# Patient Record
Sex: MALE | Race: WHITE
[De-identification: names, ages, dates, MRNs, and addresses within clinical notes are randomized per-mention and may not be internally consistent; named-entity substitution may affect disease eponyms.]

---

## 2017-01-03 ENCOUNTER — APPOINTMENT (OUTPATIENT)
Dept: OPHTHALMOLOGY | Facility: CLINIC | Age: 77
End: 2017-01-03

## 2017-01-09 ENCOUNTER — APPOINTMENT (OUTPATIENT)
Dept: GASTROENTEROLOGY | Facility: HOSPITAL | Age: 77
End: 2017-01-09

## 2017-01-09 ENCOUNTER — OUTPATIENT (OUTPATIENT)
Dept: OUTPATIENT SERVICES | Facility: HOSPITAL | Age: 77
LOS: 1 days | End: 2017-01-09
Payer: MEDICARE

## 2017-01-09 DIAGNOSIS — Z90.89 ACQUIRED ABSENCE OF OTHER ORGANS: Chronic | ICD-10-CM

## 2017-01-09 DIAGNOSIS — Z95.5 PRESENCE OF CORONARY ANGIOPLASTY IMPLANT AND GRAFT: Chronic | ICD-10-CM

## 2017-01-09 DIAGNOSIS — R13.10 DYSPHAGIA, UNSPECIFIED: ICD-10-CM

## 2017-01-09 DIAGNOSIS — Z12.11 ENCOUNTER FOR SCREENING FOR MALIGNANT NEOPLASM OF COLON: ICD-10-CM

## 2017-01-09 DIAGNOSIS — Z98.89 OTHER SPECIFIED POSTPROCEDURAL STATES: Chronic | ICD-10-CM

## 2017-01-09 DIAGNOSIS — K21.9 GASTRO-ESOPHAGEAL REFLUX DISEASE WITHOUT ESOPHAGITIS: ICD-10-CM

## 2017-01-09 PROCEDURE — 43235 EGD DIAGNOSTIC BRUSH WASH: CPT

## 2017-01-09 PROCEDURE — 45378 DIAGNOSTIC COLONOSCOPY: CPT

## 2017-01-09 PROCEDURE — G0121: CPT

## 2017-01-13 ENCOUNTER — RESULT CHARGE (OUTPATIENT)
Age: 77
End: 2017-01-13

## 2017-01-13 ENCOUNTER — APPOINTMENT (OUTPATIENT)
Dept: ENDOCRINOLOGY | Facility: CLINIC | Age: 77
End: 2017-01-13

## 2017-01-13 VITALS
BODY MASS INDEX: 27.4 KG/M2 | DIASTOLIC BLOOD PRESSURE: 70 MMHG | OXYGEN SATURATION: 99 % | HEIGHT: 69 IN | SYSTOLIC BLOOD PRESSURE: 120 MMHG | WEIGHT: 185 LBS | HEART RATE: 100 BPM

## 2017-01-13 LAB — GLUCOSE BLDC GLUCOMTR-MCNC: 136

## 2017-01-18 LAB
ALBUMIN SERPL ELPH-MCNC: 4.1 G/DL
ALP BLD-CCNC: 126 U/L
ALT SERPL-CCNC: 23 U/L
ANION GAP SERPL CALC-SCNC: 13 MMOL/L
AST SERPL-CCNC: 23 U/L
BILIRUB SERPL-MCNC: 0.5 MG/DL
BUN SERPL-MCNC: 31 MG/DL
CALCIUM SERPL-MCNC: 9.1 MG/DL
CHLORIDE SERPL-SCNC: 108 MMOL/L
CO2 SERPL-SCNC: 22 MMOL/L
CREAT SERPL-MCNC: 1.17 MG/DL
CREAT SPEC-SCNC: 79 MG/DL
GLUCOSE SERPL-MCNC: 130 MG/DL
HBA1C MFR BLD HPLC: 7 %
MICROALBUMIN 24H UR DL<=1MG/L-MCNC: 6.6 MG/DL
MICROALBUMIN/CREAT 24H UR-RTO: 83 UG/MG
POTASSIUM SERPL-SCNC: 4.8 MMOL/L
PROT SERPL-MCNC: 6.4 G/DL
SODIUM SERPL-SCNC: 143 MMOL/L

## 2017-02-08 ENCOUNTER — RX RENEWAL (OUTPATIENT)
Age: 77
End: 2017-02-08

## 2017-02-09 ENCOUNTER — RX RENEWAL (OUTPATIENT)
Age: 77
End: 2017-02-09

## 2017-02-09 ENCOUNTER — RESULT CHARGE (OUTPATIENT)
Age: 77
End: 2017-02-09

## 2017-02-16 ENCOUNTER — MEDICATION RENEWAL (OUTPATIENT)
Age: 77
End: 2017-02-16

## 2017-03-16 ENCOUNTER — APPOINTMENT (OUTPATIENT)
Dept: INTERNAL MEDICINE | Facility: CLINIC | Age: 77
End: 2017-03-16

## 2017-03-16 VITALS
HEIGHT: 69 IN | OXYGEN SATURATION: 97 % | TEMPERATURE: 97.9 F | WEIGHT: 183 LBS | DIASTOLIC BLOOD PRESSURE: 58 MMHG | BODY MASS INDEX: 27.11 KG/M2 | HEART RATE: 64 BPM | SYSTOLIC BLOOD PRESSURE: 114 MMHG

## 2017-03-16 DIAGNOSIS — Z12.11 ENCOUNTER FOR SCREENING FOR MALIGNANT NEOPLASM OF COLON: ICD-10-CM

## 2017-03-16 DIAGNOSIS — H25.13 AGE-RELATED NUCLEAR CATARACT, BILATERAL: ICD-10-CM

## 2017-03-16 DIAGNOSIS — L29.9 PRURITUS, UNSPECIFIED: ICD-10-CM

## 2017-03-16 DIAGNOSIS — R13.10 DYSPHAGIA, UNSPECIFIED: ICD-10-CM

## 2017-03-27 ENCOUNTER — APPOINTMENT (OUTPATIENT)
Dept: DERMATOLOGY | Facility: CLINIC | Age: 77
End: 2017-03-27

## 2017-03-27 VITALS
HEIGHT: 70 IN | SYSTOLIC BLOOD PRESSURE: 118 MMHG | WEIGHT: 182 LBS | BODY MASS INDEX: 26.05 KG/M2 | DIASTOLIC BLOOD PRESSURE: 72 MMHG

## 2017-03-27 DIAGNOSIS — Z87.09 PERSONAL HISTORY OF OTHER DISEASES OF THE RESPIRATORY SYSTEM: ICD-10-CM

## 2017-03-27 DIAGNOSIS — H91.90 UNSPECIFIED HEARING LOSS, UNSPECIFIED EAR: ICD-10-CM

## 2017-03-27 DIAGNOSIS — Z87.448 PERSONAL HISTORY OF OTHER DISEASES OF URINARY SYSTEM: ICD-10-CM

## 2017-03-27 DIAGNOSIS — N20.0 CALCULUS OF KIDNEY: ICD-10-CM

## 2017-03-27 RX ORDER — AMMONIUM LACTATE 5 %
5 LOTION (GRAM) TOPICAL TWICE DAILY
Qty: 50 | Refills: 0 | Status: DISCONTINUED | COMMUNITY
Start: 2017-01-13 | End: 2017-03-27

## 2017-03-27 RX ORDER — TRIAMCINOLONE ACETONIDE 1 MG/G
0.1 CREAM TOPICAL
Qty: 1 | Refills: 1 | Status: COMPLETED | COMMUNITY
Start: 2017-03-16 | End: 2017-05-26

## 2017-04-21 ENCOUNTER — APPOINTMENT (OUTPATIENT)
Dept: ENDOCRINOLOGY | Facility: CLINIC | Age: 77
End: 2017-04-21

## 2017-04-21 VITALS
WEIGHT: 183 LBS | HEART RATE: 63 BPM | SYSTOLIC BLOOD PRESSURE: 118 MMHG | BODY MASS INDEX: 26.2 KG/M2 | HEIGHT: 70 IN | OXYGEN SATURATION: 98 % | DIASTOLIC BLOOD PRESSURE: 64 MMHG

## 2017-04-21 LAB
GLUCOSE BLDC GLUCOMTR-MCNC: 156
HBA1C MFR BLD HPLC: 7.2

## 2017-04-24 ENCOUNTER — RX RENEWAL (OUTPATIENT)
Age: 77
End: 2017-04-24

## 2017-04-26 ENCOUNTER — MEDICATION RENEWAL (OUTPATIENT)
Age: 77
End: 2017-04-26

## 2017-04-27 ENCOUNTER — RX RENEWAL (OUTPATIENT)
Age: 77
End: 2017-04-27

## 2017-04-27 LAB
ALBUMIN SERPL ELPH-MCNC: 4.3 G/DL
ALP BLD-CCNC: 156 U/L
ALT SERPL-CCNC: 19 U/L
ANION GAP SERPL CALC-SCNC: 18 MMOL/L
AST SERPL-CCNC: 31 U/L
BILIRUB SERPL-MCNC: 0.4 MG/DL
BUN SERPL-MCNC: 39 MG/DL
CALCIUM SERPL-MCNC: 9.6 MG/DL
CHLORIDE SERPL-SCNC: 104 MMOL/L
CO2 SERPL-SCNC: 19 MMOL/L
CREAT SERPL-MCNC: 1.47 MG/DL
CREAT SPEC-SCNC: 36 MG/DL
GLUCOSE SERPL-MCNC: 145 MG/DL
MICROALBUMIN 24H UR DL<=1MG/L-MCNC: 4.9 MG/DL
MICROALBUMIN/CREAT 24H UR-RTO: 135 UG/MG
POTASSIUM SERPL-SCNC: 5.6 MMOL/L
PROT SERPL-MCNC: 7.1 G/DL
SODIUM SERPL-SCNC: 141 MMOL/L
T4 FREE SERPL-MCNC: 1.4 NG/DL
TSH SERPL-ACNC: 4.2 UIU/ML

## 2017-04-30 ENCOUNTER — RX RENEWAL (OUTPATIENT)
Age: 77
End: 2017-04-30

## 2017-05-01 ENCOUNTER — APPOINTMENT (OUTPATIENT)
Dept: DERMATOLOGY | Facility: CLINIC | Age: 77
End: 2017-05-01

## 2017-05-01 VITALS — SYSTOLIC BLOOD PRESSURE: 122 MMHG | DIASTOLIC BLOOD PRESSURE: 80 MMHG

## 2017-05-01 DIAGNOSIS — L85.3 XEROSIS CUTIS: ICD-10-CM

## 2017-05-01 DIAGNOSIS — L30.9 DERMATITIS, UNSPECIFIED: ICD-10-CM

## 2017-05-08 ENCOUNTER — LABORATORY RESULT (OUTPATIENT)
Age: 77
End: 2017-05-08

## 2017-05-08 ENCOUNTER — RX RENEWAL (OUTPATIENT)
Age: 77
End: 2017-05-08

## 2017-05-08 ENCOUNTER — APPOINTMENT (OUTPATIENT)
Dept: CARDIOLOGY | Facility: CLINIC | Age: 77
End: 2017-05-08

## 2017-05-08 VITALS
OXYGEN SATURATION: 100 % | BODY MASS INDEX: 26.2 KG/M2 | SYSTOLIC BLOOD PRESSURE: 131 MMHG | DIASTOLIC BLOOD PRESSURE: 71 MMHG | HEART RATE: 60 BPM | HEIGHT: 70 IN | WEIGHT: 183 LBS

## 2017-05-08 LAB
ALBUMIN SERPL ELPH-MCNC: 4.4 G/DL
ALP BLD-CCNC: 142 U/L
ALT SERPL-CCNC: 17 U/L
ANION GAP SERPL CALC-SCNC: 18 MMOL/L
AST SERPL-CCNC: 23 U/L
BILIRUB SERPL-MCNC: 0.7 MG/DL
BUN SERPL-MCNC: 31 MG/DL
CALCIUM SERPL-MCNC: 9.8 MG/DL
CHLORIDE SERPL-SCNC: 104 MMOL/L
CO2 SERPL-SCNC: 20 MMOL/L
CREAT SERPL-MCNC: 1.23 MG/DL
GLUCOSE SERPL-MCNC: 148 MG/DL
POTASSIUM SERPL-SCNC: 4.7 MMOL/L
PROT SERPL-MCNC: 7.7 G/DL
SODIUM SERPL-SCNC: 142 MMOL/L

## 2017-05-08 RX ORDER — LISINOPRIL 5 MG/1
5 TABLET ORAL
Qty: 90 | Refills: 0 | Status: DISCONTINUED | COMMUNITY
Start: 2016-12-08

## 2017-05-09 ENCOUNTER — MESSAGE (OUTPATIENT)
Age: 77
End: 2017-05-09

## 2017-05-09 LAB
BASOPHILS # BLD AUTO: 0.04 K/UL
BASOPHILS NFR BLD AUTO: 0.5 %
EOSINOPHIL # BLD AUTO: 0.33 K/UL
EOSINOPHIL NFR BLD AUTO: 4.2 %
HCT VFR BLD CALC: 25.8 %
HGB BLD-MCNC: 8.5 G/DL
IMM GRANULOCYTES NFR BLD AUTO: 1.4 %
LYMPHOCYTES # BLD AUTO: 1.33 K/UL
LYMPHOCYTES NFR BLD AUTO: 16.9 %
MAN DIFF?: NORMAL
MCHC RBC-ENTMCNC: 32.9 GM/DL
MCHC RBC-ENTMCNC: 37.6 PG
MCV RBC AUTO: 114.2 FL
MONOCYTES # BLD AUTO: 0.54 K/UL
MONOCYTES NFR BLD AUTO: 6.9 %
NEUTROPHILS # BLD AUTO: 5.5 K/UL
NEUTROPHILS NFR BLD AUTO: 70.1 %
NT-PROBNP SERPL-MCNC: 304 PG/ML
PLATELET # BLD AUTO: 343 K/UL
RBC # BLD: 2.26 M/UL
RBC # FLD: 16.4 %
WBC # FLD AUTO: 7.85 K/UL

## 2017-05-10 ENCOUNTER — INPATIENT (INPATIENT)
Facility: HOSPITAL | Age: 77
LOS: 1 days | Discharge: HOME | DRG: 812 | End: 2017-05-12
Attending: STUDENT IN AN ORGANIZED HEALTH CARE EDUCATION/TRAINING PROGRAM | Admitting: INTERNAL MEDICINE
Payer: MEDICARE

## 2017-05-10 VITALS
DIASTOLIC BLOOD PRESSURE: 57 MMHG | TEMPERATURE: 98 F | HEART RATE: 78 BPM | SYSTOLIC BLOOD PRESSURE: 147 MMHG | OXYGEN SATURATION: 100 % | HEIGHT: 70 IN | RESPIRATION RATE: 19 BRPM

## 2017-05-10 DIAGNOSIS — I48.91 UNSPECIFIED ATRIAL FIBRILLATION: ICD-10-CM

## 2017-05-10 DIAGNOSIS — D64.9 ANEMIA, UNSPECIFIED: ICD-10-CM

## 2017-05-10 DIAGNOSIS — Z90.89 ACQUIRED ABSENCE OF OTHER ORGANS: Chronic | ICD-10-CM

## 2017-05-10 DIAGNOSIS — Z95.5 PRESENCE OF CORONARY ANGIOPLASTY IMPLANT AND GRAFT: Chronic | ICD-10-CM

## 2017-05-10 DIAGNOSIS — I25.10 ATHEROSCLEROTIC HEART DISEASE OF NATIVE CORONARY ARTERY WITHOUT ANGINA PECTORIS: ICD-10-CM

## 2017-05-10 DIAGNOSIS — N18.9 CHRONIC KIDNEY DISEASE, UNSPECIFIED: ICD-10-CM

## 2017-05-10 DIAGNOSIS — E11.9 TYPE 2 DIABETES MELLITUS WITHOUT COMPLICATIONS: ICD-10-CM

## 2017-05-10 DIAGNOSIS — I50.9 HEART FAILURE, UNSPECIFIED: ICD-10-CM

## 2017-05-10 DIAGNOSIS — E03.9 HYPOTHYROIDISM, UNSPECIFIED: ICD-10-CM

## 2017-05-10 DIAGNOSIS — M10.9 GOUT, UNSPECIFIED: ICD-10-CM

## 2017-05-10 DIAGNOSIS — I10 ESSENTIAL (PRIMARY) HYPERTENSION: ICD-10-CM

## 2017-05-10 DIAGNOSIS — Z98.89 OTHER SPECIFIED POSTPROCEDURAL STATES: Chronic | ICD-10-CM

## 2017-05-10 DIAGNOSIS — Z29.9 ENCOUNTER FOR PROPHYLACTIC MEASURES, UNSPECIFIED: ICD-10-CM

## 2017-05-10 LAB
ALBUMIN SERPL ELPH-MCNC: 4.4 G/DL — SIGNIFICANT CHANGE UP (ref 3.3–5)
ALP SERPL-CCNC: 140 U/L — HIGH (ref 40–120)
ALT FLD-CCNC: 16 U/L RC — SIGNIFICANT CHANGE UP (ref 10–45)
ANION GAP SERPL CALC-SCNC: 14 MMOL/L — SIGNIFICANT CHANGE UP (ref 5–17)
AST SERPL-CCNC: 21 U/L — SIGNIFICANT CHANGE UP (ref 10–40)
BASOPHILS # BLD AUTO: 0.1 K/UL — SIGNIFICANT CHANGE UP (ref 0–0.2)
BASOPHILS NFR BLD AUTO: 1.3 % — SIGNIFICANT CHANGE UP (ref 0–2)
BILIRUB SERPL-MCNC: 0.4 MG/DL — SIGNIFICANT CHANGE UP (ref 0.2–1.2)
BLD GP AB SCN SERPL QL: NEGATIVE — SIGNIFICANT CHANGE UP
BUN SERPL-MCNC: 38 MG/DL — HIGH (ref 7–23)
CALCIUM SERPL-MCNC: 10.3 MG/DL — SIGNIFICANT CHANGE UP (ref 8.4–10.5)
CHLORIDE SERPL-SCNC: 108 MMOL/L — SIGNIFICANT CHANGE UP (ref 96–108)
CO2 SERPL-SCNC: 22 MMOL/L — SIGNIFICANT CHANGE UP (ref 22–31)
CREAT SERPL-MCNC: 1.42 MG/DL — HIGH (ref 0.5–1.3)
EOSINOPHIL # BLD AUTO: 0.2 K/UL — SIGNIFICANT CHANGE UP (ref 0–0.5)
EOSINOPHIL NFR BLD AUTO: 4.6 % — SIGNIFICANT CHANGE UP (ref 0–6)
GAS PNL BLDV: SIGNIFICANT CHANGE UP
GLUCOSE SERPL-MCNC: 131 MG/DL — HIGH (ref 70–99)
HCT VFR BLD CALC: 23.8 % — LOW (ref 39–50)
HGB BLD-MCNC: 8.3 G/DL — LOW (ref 13–17)
LYMPHOCYTES # BLD AUTO: 1.3 K/UL — SIGNIFICANT CHANGE UP (ref 1–3.3)
LYMPHOCYTES # BLD AUTO: 25.4 % — SIGNIFICANT CHANGE UP (ref 13–44)
MCHC RBC-ENTMCNC: 34.7 GM/DL — SIGNIFICANT CHANGE UP (ref 32–36)
MCHC RBC-ENTMCNC: 39.3 PG — HIGH (ref 27–34)
MCV RBC AUTO: 113 FL — HIGH (ref 80–100)
MONOCYTES # BLD AUTO: 0 K/UL — SIGNIFICANT CHANGE UP (ref 0–0.9)
MONOCYTES NFR BLD AUTO: 0.1 % — LOW (ref 2–14)
NEUTROPHILS # BLD AUTO: 3.6 K/UL — SIGNIFICANT CHANGE UP (ref 1.8–7.4)
NEUTROPHILS NFR BLD AUTO: 68.6 % — SIGNIFICANT CHANGE UP (ref 43–77)
PLATELET # BLD AUTO: 235 K/UL — SIGNIFICANT CHANGE UP (ref 150–400)
POTASSIUM SERPL-MCNC: 4.3 MMOL/L — SIGNIFICANT CHANGE UP (ref 3.5–5.3)
POTASSIUM SERPL-SCNC: 4.3 MMOL/L — SIGNIFICANT CHANGE UP (ref 3.5–5.3)
PROT SERPL-MCNC: 7.8 G/DL — SIGNIFICANT CHANGE UP (ref 6–8.3)
RBC # BLD: 2.1 M/UL — LOW (ref 4.2–5.8)
RBC # FLD: 16.6 % — HIGH (ref 10.3–14.5)
RH IG SCN BLD-IMP: POSITIVE — SIGNIFICANT CHANGE UP
SODIUM SERPL-SCNC: 144 MMOL/L — SIGNIFICANT CHANGE UP (ref 135–145)
WBC # BLD: 5.3 K/UL — SIGNIFICANT CHANGE UP (ref 3.8–10.5)
WBC # FLD AUTO: 5.3 K/UL — SIGNIFICANT CHANGE UP (ref 3.8–10.5)

## 2017-05-10 PROCEDURE — 93010 ELECTROCARDIOGRAM REPORT: CPT

## 2017-05-10 PROCEDURE — 99223 1ST HOSP IP/OBS HIGH 75: CPT | Mod: GC

## 2017-05-10 PROCEDURE — 71010: CPT | Mod: 26

## 2017-05-10 PROCEDURE — 99285 EMERGENCY DEPT VISIT HI MDM: CPT | Mod: 25

## 2017-05-10 RX ORDER — METHYLPHENIDATE HCL 5 MG
20 TABLET ORAL THREE TIMES A DAY
Qty: 0 | Refills: 0 | Status: DISCONTINUED | OUTPATIENT
Start: 2017-05-10 | End: 2017-05-12

## 2017-05-10 RX ORDER — METOPROLOL TARTRATE 50 MG
50 TABLET ORAL
Qty: 0 | Refills: 0 | Status: DISCONTINUED | OUTPATIENT
Start: 2017-05-10 | End: 2017-05-12

## 2017-05-10 RX ORDER — DEXTROSE 50 % IN WATER 50 %
25 SYRINGE (ML) INTRAVENOUS ONCE
Qty: 0 | Refills: 0 | Status: DISCONTINUED | OUTPATIENT
Start: 2017-05-10 | End: 2017-05-12

## 2017-05-10 RX ORDER — INSULIN LISPRO 100/ML
VIAL (ML) SUBCUTANEOUS
Qty: 0 | Refills: 0 | Status: DISCONTINUED | OUTPATIENT
Start: 2017-05-10 | End: 2017-05-12

## 2017-05-10 RX ORDER — ATORVASTATIN CALCIUM 80 MG/1
20 TABLET, FILM COATED ORAL AT BEDTIME
Qty: 0 | Refills: 0 | Status: DISCONTINUED | OUTPATIENT
Start: 2017-05-10 | End: 2017-05-12

## 2017-05-10 RX ORDER — DEXTROSE 50 % IN WATER 50 %
12.5 SYRINGE (ML) INTRAVENOUS ONCE
Qty: 0 | Refills: 0 | Status: DISCONTINUED | OUTPATIENT
Start: 2017-05-10 | End: 2017-05-12

## 2017-05-10 RX ORDER — SODIUM CHLORIDE 9 MG/ML
1000 INJECTION, SOLUTION INTRAVENOUS
Qty: 0 | Refills: 0 | Status: DISCONTINUED | OUTPATIENT
Start: 2017-05-10 | End: 2017-05-12

## 2017-05-10 RX ORDER — INSULIN LISPRO 100/ML
VIAL (ML) SUBCUTANEOUS AT BEDTIME
Qty: 0 | Refills: 0 | Status: DISCONTINUED | OUTPATIENT
Start: 2017-05-10 | End: 2017-05-12

## 2017-05-10 RX ORDER — ASPIRIN/CALCIUM CARB/MAGNESIUM 324 MG
81 TABLET ORAL DAILY
Qty: 0 | Refills: 0 | Status: DISCONTINUED | OUTPATIENT
Start: 2017-05-10 | End: 2017-05-10

## 2017-05-10 RX ORDER — LEVOTHYROXINE SODIUM 125 MCG
37.5 TABLET ORAL DAILY
Qty: 0 | Refills: 0 | Status: DISCONTINUED | OUTPATIENT
Start: 2017-05-10 | End: 2017-05-12

## 2017-05-10 RX ORDER — INSULIN GLARGINE 100 [IU]/ML
8 INJECTION, SOLUTION SUBCUTANEOUS AT BEDTIME
Qty: 0 | Refills: 0 | Status: DISCONTINUED | OUTPATIENT
Start: 2017-05-10 | End: 2017-05-12

## 2017-05-10 RX ORDER — COLCHICINE 0.6 MG
0.3 TABLET ORAL DAILY
Qty: 0 | Refills: 0 | Status: DISCONTINUED | OUTPATIENT
Start: 2017-05-10 | End: 2017-05-12

## 2017-05-10 RX ORDER — PANTOPRAZOLE SODIUM 20 MG/1
40 TABLET, DELAYED RELEASE ORAL
Qty: 0 | Refills: 0 | Status: DISCONTINUED | OUTPATIENT
Start: 2017-05-10 | End: 2017-05-12

## 2017-05-10 RX ORDER — FUROSEMIDE 40 MG
20 TABLET ORAL DAILY
Qty: 0 | Refills: 0 | Status: DISCONTINUED | OUTPATIENT
Start: 2017-05-10 | End: 2017-05-12

## 2017-05-10 RX ORDER — DEXTROSE 50 % IN WATER 50 %
1 SYRINGE (ML) INTRAVENOUS ONCE
Qty: 0 | Refills: 0 | Status: DISCONTINUED | OUTPATIENT
Start: 2017-05-10 | End: 2017-05-12

## 2017-05-10 RX ORDER — LISINOPRIL 2.5 MG/1
10 TABLET ORAL DAILY
Qty: 0 | Refills: 0 | Status: DISCONTINUED | OUTPATIENT
Start: 2017-05-10 | End: 2017-05-12

## 2017-05-10 RX ORDER — GLUCAGON INJECTION, SOLUTION 0.5 MG/.1ML
1 INJECTION, SOLUTION SUBCUTANEOUS ONCE
Qty: 0 | Refills: 0 | Status: DISCONTINUED | OUTPATIENT
Start: 2017-05-10 | End: 2017-05-12

## 2017-05-10 RX ADMIN — ATORVASTATIN CALCIUM 20 MILLIGRAM(S): 80 TABLET, FILM COATED ORAL at 22:05

## 2017-05-10 RX ADMIN — INSULIN GLARGINE 8 UNIT(S): 100 INJECTION, SOLUTION SUBCUTANEOUS at 22:57

## 2017-05-10 NOTE — H&P ADULT. - HISTORY OF PRESENT ILLNESS
76M PMHx AF on asa, CAD/MI 2011 s/p PCI, HFrEF (EF 40%), DM2, htn here with anemia. Had cbc done at Dr. Francis's office, noted to have Hb 8 down from 11 one year ago. Pt spoke to Dr. Tolentino who recommended pt be admitted. He states that he has been having lightheadedness, dizziness for the past few months; worsening in the past week. He has PUGH, no SOB, which has decreased his functional status. He has been having balance issues as well, described as his feet do not feel like his own. When he walks he will wobble to one side. Has not noted melena or hematochezia, no overt bleeds. Had colonoscopy and EGD done in 1/2017 demonstrating diverticulosis and hiatal hernia. No syncope, no weakness. No fever, chills, dysuria, abd pain, diarrhea.    In the ED vitals were T 98.3, HR 78, Bp 147/57, RR 19, O2 100 RA. 76M PMHx AF on asa, CAD/MI 2011 s/p PCI, ckd, HFrEF (EF 40%), DM2, htn here with anemia. Had cbc done at Dr. Francis's office, noted to have Hb 8 down from 11 one year ago. Pt spoke to Dr. Tolentino who recommended pt be admitted. He states that he has been having lightheadedness, dizziness for the past few months; worsening in the past week. He has PUGH, no SOB, which has decreased his functional status. He has been having balance issues as well, described as his feet do not feel like his own. When he walks he will wobble to one side. Has not noted melena or hematochezia, no overt bleeds. Had colonoscopy and EGD done in 1/2017 demonstrating diverticulosis and hiatal hernia. No syncope, no weakness. No fever, chills, dysuria, abd pain, diarrhea.    In the ED vitals were T 98.3, HR 78, Bp 147/57, RR 19, O2 100 RA.

## 2017-05-10 NOTE — H&P ADULT. - RS GEN PE MLT RESP DETAILS PC
no rhonchi/no intercostal retractions/breath sounds equal/no wheezes/clear to auscultation bilaterally/airway patent/good air movement/no rales

## 2017-05-10 NOTE — H&P ADULT. - ATTENDING COMMENTS
I have reviewed the labs, imaging and ekg, I agree with the above note unless otherwise stated in my independent assessment below  76M PMHx AF on asa, CKD, CAD/MI 2011 s/p PCI, HFrEF (EF 40%), DM2, htn, CKD p/w symptomatic macrocytic anemia. Will need to eval for folate and B12 deficiency. Also evaluate sources of iron deficiency anemia and have consult pt's GI to see him inpatient. He feels much better after 1uprbcs.  -F/u anemia work up including iron profile, B12, folate, methylmalonic acid (drawn after transfusion)  -F/u repeat hgb  -cont. home medications except ASA for now although could probably restart depending on GI recs  -GI consult  -Trend BMP  -DVT PPx, SCDs

## 2017-05-10 NOTE — H&P ADULT. - PROBLEM SELECTOR PLAN 1
macrocytic, inappropriate retic count  ldh high, pending hapto  pending folate, B12 (pt with balance disturbance)  s/p 1 unit prbc for symptomatic anemia  trend cbc q12  GI c/s in am  check peripheral smear macrocytic, inappropriate retic count  ldh high, pending hapto  pending folate, B12 (pt with balance disturbance)  check iron studies  s/p 1 unit prbc for symptomatic anemia  trend cbc q12  GI c/s in am  check peripheral smear

## 2017-05-10 NOTE — ED ADULT TRIAGE NOTE - CHIEF COMPLAINT QUOTE
anemia, (noted low H/H at Dr alcazar's office during routine visit, Hgb: 8.5), concern for bleed of unknown source, wife notes SOB, PUGH x few mos intermittently, +dark stools (denies hematuria), colonoscopy/endoscopy in feb: normal

## 2017-05-10 NOTE — ED ADULT NURSE NOTE - PMH
ADD (attention deficit disorder)    Atrial fibrillation    CAD (coronary artery disease)    Congestive heart failure (CHF)    DM (Diabetes Mellitus)    Gout    Hypercholesterolemia    Hypertension    Hypothyroid

## 2017-05-10 NOTE — ED PROVIDER NOTE - OBJECTIVE STATEMENT
75yo M with PMH A.fib, CAD, CHF, DM, HTN,  presenting with worsening weakness and lightheadedness x 1 week. Reports associated SOB. Saw cardiologist 2 days ago for routine visit who did labs and called patient regarding Hgb 8.5. Pt called his GI doctor who instructed him to come to ED. Pt also c/o burning sensation when he occasionally eats, has h/o GERD and hiatal hernia on last endoscopy in 2/2017. Normal colonoscopy at this time. Reports ?melena. Denies fever/chills, CP, abd pain, N/V, BRBPR. 75yo M with PMH A.fib, CAD, CHF, DM, HTN,  presenting with worsening weakness and lightheadedness x 1 week. Reports associated SOB. Saw cardiologist 2 days ago for routine visit who did labs and called patient regarding Hgb 8.5. Pt called his GI doctor who instructed him to come to ED. Pt also c/o burning sensation when he occasionally eats, has h/o GERD and hiatal hernia on last endoscopy in 2/2017. Normal colonoscopy at this time. Reports ?melena. Denies fever/chills, CP, abd pain, N/V, BRBPR.    GI Wayne  Cards Tito  PCP luz marina

## 2017-05-10 NOTE — ED PROVIDER NOTE - MEDICAL DECISION MAKING DETAILS
Attending Note (Paula): patient complaining of feeling weak and tired x months, getting progressively worse. reports drop in hgb from baseline 10.5 to 8.5.  denies active bleeding. denies chest pain, sob, abd pain. reports recent negative colonoscopy in last few months.  concern for symptomatic anemia, less likely acs. will send troponin and likely admit.

## 2017-05-10 NOTE — H&P ADULT. - ASSESSMENT
76M PMHx AF on asa, CAD/MI 2011 s/p PCI, HFrEF (EF 40%), DM2, htn here with anemia. 76M PMHx AF on asa, CKD, CAD/MI 2011 s/p PCI, HFrEF (EF 40%), DM2, htn here with anemia.

## 2017-05-10 NOTE — ED PROVIDER NOTE - PROGRESS NOTE DETAILS
Spoke to covering GI doctor for Dr. Tolentino. Agrees with plan for admission and will f/u with patient. - Nicole Andrade PA-C Spoke to covering GI doctor for Dr. Tolentino. Agrees with plan for admission and will f/u with patient. Called back by PMD's service - admit to hospitalist. - Nicole Andrade PA-C

## 2017-05-10 NOTE — ED ADULT NURSE REASSESSMENT NOTE - NS ED NURSE REASSESS COMMENT FT1
consent for PRBC in chart, PRBC initiated per facility protocol, patient verbalized possible side effects, no adverse reactions noted within first 15 minutes, vs in chart. patient admitted rtm clicked

## 2017-05-10 NOTE — ED ADULT NURSE NOTE - OBJECTIVE STATEMENT
76 year old male a/ox3 ambulatory presenting to ed with increased weakness, sob and dyspnea worsening over the last 2 weeks. patient had recent blood work drawn which shows anemia and was sent into ed for further eval. patient and wife states he has been tired, weak, PUGH, and light headed ness and dizziness with quick movements. patient denies any melena, or BRBPR, no hematuria no increased bruising or hematoma noted. respiration even unlabored no sob/dyspnea skin pale warm intact newsome equally

## 2017-05-10 NOTE — H&P ADULT. - PROBLEM SELECTOR PLAN 3
managed on aspirin  currently in sinus  lopressor 50 bid hold asa  currently in sinus  lopressor 50 bid

## 2017-05-11 ENCOUNTER — RESULT REVIEW (OUTPATIENT)
Age: 77
End: 2017-05-11

## 2017-05-11 LAB
BUN SERPL-MCNC: 33 MG/DL — HIGH (ref 7–23)
CALCIUM SERPL-MCNC: 9.2 MG/DL — SIGNIFICANT CHANGE UP (ref 8.4–10.5)
CHLORIDE SERPL-SCNC: 109 MMOL/L — HIGH (ref 96–108)
CO2 SERPL-SCNC: 20 MMOL/L — LOW (ref 22–31)
CREAT SERPL-MCNC: 1.15 MG/DL — SIGNIFICANT CHANGE UP (ref 0.5–1.3)
FERRITIN SERPL-MCNC: 369 NG/ML — SIGNIFICANT CHANGE UP (ref 30–400)
FOLATE SERPL-MCNC: 16.4 NG/ML — SIGNIFICANT CHANGE UP (ref 4.8–24.2)
GLUCOSE SERPL-MCNC: 149 MG/DL — HIGH (ref 70–99)
HBA1C BLD-MCNC: 6.8 % — HIGH (ref 4–5.6)
HCT VFR BLD CALC: 23.1 % — LOW (ref 39–50)
HCT VFR BLD CALC: 25.3 % — LOW (ref 39–50)
HGB BLD-MCNC: 8.2 G/DL — LOW (ref 13–17)
HGB BLD-MCNC: 9.3 G/DL — LOW (ref 13–17)
IRON SATN MFR SERPL: 147 UG/DL — SIGNIFICANT CHANGE UP (ref 45–165)
IRON SATN MFR SERPL: 67 % — HIGH (ref 16–55)
MAGNESIUM SERPL-MCNC: 1.8 MG/DL — SIGNIFICANT CHANGE UP (ref 1.6–2.6)
MCHC RBC-ENTMCNC: 35.8 GM/DL — SIGNIFICANT CHANGE UP (ref 32–36)
MCHC RBC-ENTMCNC: 36.9 GM/DL — HIGH (ref 32–36)
MCHC RBC-ENTMCNC: 38.5 PG — HIGH (ref 27–34)
MCHC RBC-ENTMCNC: 39.3 PG — HIGH (ref 27–34)
MCV RBC AUTO: 106 FL — HIGH (ref 80–100)
MCV RBC AUTO: 108 FL — HIGH (ref 80–100)
PHOSPHATE SERPL-MCNC: 3.2 MG/DL — SIGNIFICANT CHANGE UP (ref 2.5–4.5)
PLATELET # BLD AUTO: 197 K/UL — SIGNIFICANT CHANGE UP (ref 150–400)
PLATELET # BLD AUTO: 205 K/UL — SIGNIFICANT CHANGE UP (ref 150–400)
POTASSIUM SERPL-MCNC: 4.1 MMOL/L — SIGNIFICANT CHANGE UP (ref 3.5–5.3)
POTASSIUM SERPL-SCNC: 4.1 MMOL/L — SIGNIFICANT CHANGE UP (ref 3.5–5.3)
RBC # BLD: 2.14 M/UL — LOW (ref 4.2–5.8)
RBC # BLD: 2.37 M/UL — LOW (ref 4.2–5.8)
RBC # FLD: 19.8 % — HIGH (ref 10.3–14.5)
RBC # FLD: 19.9 % — HIGH (ref 10.3–14.5)
SODIUM SERPL-SCNC: 142 MMOL/L — SIGNIFICANT CHANGE UP (ref 135–145)
TIBC SERPL-MCNC: 219 UG/DL — LOW (ref 220–430)
UIBC SERPL-MCNC: 72 UG/DL — LOW (ref 110–370)
VIT B12 SERPL-MCNC: 624 PG/ML — SIGNIFICANT CHANGE UP (ref 243–894)
WBC # BLD: 4.6 K/UL — SIGNIFICANT CHANGE UP (ref 3.8–10.5)
WBC # BLD: 5.4 K/UL — SIGNIFICANT CHANGE UP (ref 3.8–10.5)
WBC # FLD AUTO: 4.6 K/UL — SIGNIFICANT CHANGE UP (ref 3.8–10.5)
WBC # FLD AUTO: 5.4 K/UL — SIGNIFICANT CHANGE UP (ref 3.8–10.5)

## 2017-05-11 PROCEDURE — 88305 TISSUE EXAM BY PATHOLOGIST: CPT | Mod: 26

## 2017-05-11 PROCEDURE — 88189 FLOWCYTOMETRY/READ 16 & >: CPT

## 2017-05-11 PROCEDURE — 93306 TTE W/DOPPLER COMPLETE: CPT | Mod: 26

## 2017-05-11 PROCEDURE — 88360 TUMOR IMMUNOHISTOCHEM/MANUAL: CPT | Mod: 26

## 2017-05-11 PROCEDURE — 99233 SBSQ HOSP IP/OBS HIGH 50: CPT | Mod: GC

## 2017-05-11 PROCEDURE — 88313 SPECIAL STAINS GROUP 2: CPT | Mod: 26

## 2017-05-11 PROCEDURE — 85097 BONE MARROW INTERPRETATION: CPT

## 2017-05-11 RX ADMIN — PANTOPRAZOLE SODIUM 40 MILLIGRAM(S): 20 TABLET, DELAYED RELEASE ORAL at 06:06

## 2017-05-11 RX ADMIN — Medication 37.5 MICROGRAM(S): at 06:06

## 2017-05-11 RX ADMIN — Medication 20 MILLIGRAM(S): at 15:38

## 2017-05-11 RX ADMIN — Medication 0.3 MILLIGRAM(S): at 15:38

## 2017-05-11 RX ADMIN — Medication 20 MILLIGRAM(S): at 06:06

## 2017-05-11 RX ADMIN — Medication 50 MILLIGRAM(S): at 06:06

## 2017-05-11 RX ADMIN — LISINOPRIL 10 MILLIGRAM(S): 2.5 TABLET ORAL at 06:06

## 2017-05-11 RX ADMIN — INSULIN GLARGINE 8 UNIT(S): 100 INJECTION, SOLUTION SUBCUTANEOUS at 21:24

## 2017-05-11 RX ADMIN — Medication 1: at 18:20

## 2017-05-11 RX ADMIN — ATORVASTATIN CALCIUM 20 MILLIGRAM(S): 80 TABLET, FILM COATED ORAL at 21:24

## 2017-05-11 RX ADMIN — Medication 50 MILLIGRAM(S): at 17:08

## 2017-05-12 ENCOUNTER — TRANSCRIPTION ENCOUNTER (OUTPATIENT)
Age: 77
End: 2017-05-12

## 2017-05-12 ENCOUNTER — APPOINTMENT (OUTPATIENT)
Dept: GASTROENTEROLOGY | Facility: CLINIC | Age: 77
End: 2017-05-12

## 2017-05-12 VITALS
DIASTOLIC BLOOD PRESSURE: 61 MMHG | RESPIRATION RATE: 17 BRPM | HEART RATE: 78 BPM | TEMPERATURE: 98 F | OXYGEN SATURATION: 99 % | SYSTOLIC BLOOD PRESSURE: 109 MMHG

## 2017-05-12 LAB
ALBUMIN SERPL ELPH-MCNC: 3.5 G/DL — SIGNIFICANT CHANGE UP (ref 3.3–5)
ALP SERPL-CCNC: 115 U/L — SIGNIFICANT CHANGE UP (ref 40–120)
ALT FLD-CCNC: 16 U/L RC — SIGNIFICANT CHANGE UP (ref 10–45)
AST SERPL-CCNC: 21 U/L — SIGNIFICANT CHANGE UP (ref 10–40)
BASOPHILS # BLD AUTO: 0 K/UL — SIGNIFICANT CHANGE UP (ref 0–0.2)
BASOPHILS NFR BLD AUTO: 0.8 % — SIGNIFICANT CHANGE UP (ref 0–2)
BILIRUB SERPL-MCNC: 0.8 MG/DL — SIGNIFICANT CHANGE UP (ref 0.2–1.2)
BUN SERPL-MCNC: 32 MG/DL — HIGH (ref 7–23)
CALCIUM SERPL-MCNC: 9.1 MG/DL — SIGNIFICANT CHANGE UP (ref 8.4–10.5)
CHLORIDE SERPL-SCNC: 106 MMOL/L — SIGNIFICANT CHANGE UP (ref 96–108)
CO2 SERPL-SCNC: 23 MMOL/L — SIGNIFICANT CHANGE UP (ref 22–31)
CREAT SERPL-MCNC: 1.17 MG/DL — SIGNIFICANT CHANGE UP (ref 0.5–1.3)
EOSINOPHIL # BLD AUTO: 0.3 K/UL — SIGNIFICANT CHANGE UP (ref 0–0.5)
EOSINOPHIL NFR BLD AUTO: 5.9 % — SIGNIFICANT CHANGE UP (ref 0–6)
GLUCOSE SERPL-MCNC: 143 MG/DL — HIGH (ref 70–99)
HCT VFR BLD CALC: 24.4 % — LOW (ref 39–50)
HGB BLD-MCNC: 8.7 G/DL — LOW (ref 13–17)
LYMPHOCYTES # BLD AUTO: 1.5 K/UL — SIGNIFICANT CHANGE UP (ref 1–3.3)
LYMPHOCYTES # BLD AUTO: 28.7 % — SIGNIFICANT CHANGE UP (ref 13–44)
MCHC RBC-ENTMCNC: 35.8 GM/DL — SIGNIFICANT CHANGE UP (ref 32–36)
MCHC RBC-ENTMCNC: 39.3 PG — HIGH (ref 27–34)
MCV RBC AUTO: 110 FL — HIGH (ref 80–100)
MONOCYTES # BLD AUTO: 0.3 K/UL — SIGNIFICANT CHANGE UP (ref 0–0.9)
MONOCYTES NFR BLD AUTO: 4.9 % — SIGNIFICANT CHANGE UP (ref 2–14)
NEUTROPHILS # BLD AUTO: 3.2 K/UL — SIGNIFICANT CHANGE UP (ref 1.8–7.4)
NEUTROPHILS NFR BLD AUTO: 59.7 % — SIGNIFICANT CHANGE UP (ref 43–77)
PLATELET # BLD AUTO: 198 K/UL — SIGNIFICANT CHANGE UP (ref 150–400)
POTASSIUM SERPL-MCNC: 4 MMOL/L — SIGNIFICANT CHANGE UP (ref 3.5–5.3)
POTASSIUM SERPL-SCNC: 4 MMOL/L — SIGNIFICANT CHANGE UP (ref 3.5–5.3)
PROT SERPL-MCNC: 6.3 G/DL — SIGNIFICANT CHANGE UP (ref 6–8.3)
RBC # BLD: 2.22 M/UL — LOW (ref 4.2–5.8)
RBC # FLD: 19.4 % — HIGH (ref 10.3–14.5)
SODIUM SERPL-SCNC: 142 MMOL/L — SIGNIFICANT CHANGE UP (ref 135–145)
WBC # BLD: 5.4 K/UL — SIGNIFICANT CHANGE UP (ref 3.8–10.5)
WBC # FLD AUTO: 5.4 K/UL — SIGNIFICANT CHANGE UP (ref 3.8–10.5)

## 2017-05-12 PROCEDURE — 86901 BLOOD TYPING SEROLOGIC RH(D): CPT

## 2017-05-12 PROCEDURE — 88271 CYTOGENETICS DNA PROBE: CPT

## 2017-05-12 PROCEDURE — 84100 ASSAY OF PHOSPHORUS: CPT

## 2017-05-12 PROCEDURE — 85014 HEMATOCRIT: CPT

## 2017-05-12 PROCEDURE — 83605 ASSAY OF LACTIC ACID: CPT

## 2017-05-12 PROCEDURE — 88360 TUMOR IMMUNOHISTOCHEM/MANUAL: CPT

## 2017-05-12 PROCEDURE — 84295 ASSAY OF SERUM SODIUM: CPT

## 2017-05-12 PROCEDURE — 88185 FLOWCYTOMETRY/TC ADD-ON: CPT

## 2017-05-12 PROCEDURE — 83921 ORGANIC ACID SINGLE QUANT: CPT

## 2017-05-12 PROCEDURE — 88184 FLOWCYTOMETRY/ TC 1 MARKER: CPT

## 2017-05-12 PROCEDURE — 82607 VITAMIN B-12: CPT

## 2017-05-12 PROCEDURE — 86340 INTRINSIC FACTOR ANTIBODY: CPT

## 2017-05-12 PROCEDURE — 88264 CHROMOSOME ANALYSIS 20-25: CPT

## 2017-05-12 PROCEDURE — 85045 AUTOMATED RETICULOCYTE COUNT: CPT

## 2017-05-12 PROCEDURE — 86850 RBC ANTIBODY SCREEN: CPT

## 2017-05-12 PROCEDURE — 84484 ASSAY OF TROPONIN QUANT: CPT

## 2017-05-12 PROCEDURE — C8929: CPT

## 2017-05-12 PROCEDURE — 82330 ASSAY OF CALCIUM: CPT

## 2017-05-12 PROCEDURE — 88280 CHROMOSOME KARYOTYPE STUDY: CPT

## 2017-05-12 PROCEDURE — 83010 ASSAY OF HAPTOGLOBIN QUANT: CPT

## 2017-05-12 PROCEDURE — 88237 TISSUE CULTURE BONE MARROW: CPT

## 2017-05-12 PROCEDURE — 80048 BASIC METABOLIC PNL TOTAL CA: CPT

## 2017-05-12 PROCEDURE — P9016: CPT

## 2017-05-12 PROCEDURE — 99285 EMERGENCY DEPT VISIT HI MDM: CPT | Mod: 25

## 2017-05-12 PROCEDURE — 99239 HOSP IP/OBS DSCHRG MGMT >30: CPT

## 2017-05-12 PROCEDURE — 36430 TRANSFUSION BLD/BLD COMPNT: CPT

## 2017-05-12 PROCEDURE — 83735 ASSAY OF MAGNESIUM: CPT

## 2017-05-12 PROCEDURE — 85097 BONE MARROW INTERPRETATION: CPT

## 2017-05-12 PROCEDURE — 82746 ASSAY OF FOLIC ACID SERUM: CPT

## 2017-05-12 PROCEDURE — 88305 TISSUE EXAM BY PATHOLOGIST: CPT

## 2017-05-12 PROCEDURE — 86923 COMPATIBILITY TEST ELECTRIC: CPT

## 2017-05-12 PROCEDURE — 93005 ELECTROCARDIOGRAM TRACING: CPT

## 2017-05-12 PROCEDURE — 82435 ASSAY OF BLOOD CHLORIDE: CPT

## 2017-05-12 PROCEDURE — 88275 CYTOGENETICS 100-300: CPT

## 2017-05-12 PROCEDURE — 82803 BLOOD GASES ANY COMBINATION: CPT

## 2017-05-12 PROCEDURE — 85027 COMPLETE CBC AUTOMATED: CPT

## 2017-05-12 PROCEDURE — 83550 IRON BINDING TEST: CPT

## 2017-05-12 PROCEDURE — 71045 X-RAY EXAM CHEST 1 VIEW: CPT

## 2017-05-12 PROCEDURE — 88313 SPECIAL STAINS GROUP 2: CPT

## 2017-05-12 PROCEDURE — 86900 BLOOD TYPING SEROLOGIC ABO: CPT

## 2017-05-12 PROCEDURE — 84132 ASSAY OF SERUM POTASSIUM: CPT

## 2017-05-12 PROCEDURE — 82947 ASSAY GLUCOSE BLOOD QUANT: CPT

## 2017-05-12 PROCEDURE — 82728 ASSAY OF FERRITIN: CPT

## 2017-05-12 PROCEDURE — 80053 COMPREHEN METABOLIC PANEL: CPT

## 2017-05-12 PROCEDURE — 83036 HEMOGLOBIN GLYCOSYLATED A1C: CPT

## 2017-05-12 PROCEDURE — 82272 OCCULT BLD FECES 1-3 TESTS: CPT

## 2017-05-12 PROCEDURE — 83615 LACTATE (LD) (LDH) ENZYME: CPT

## 2017-05-12 RX ADMIN — Medication 37.5 MICROGRAM(S): at 05:10

## 2017-05-12 RX ADMIN — LISINOPRIL 10 MILLIGRAM(S): 2.5 TABLET ORAL at 05:10

## 2017-05-12 RX ADMIN — Medication 20 MILLIGRAM(S): at 05:10

## 2017-05-12 RX ADMIN — Medication 1: at 07:38

## 2017-05-12 RX ADMIN — PANTOPRAZOLE SODIUM 40 MILLIGRAM(S): 20 TABLET, DELAYED RELEASE ORAL at 05:10

## 2017-05-12 RX ADMIN — Medication 0.3 MILLIGRAM(S): at 12:18

## 2017-05-12 RX ADMIN — Medication 1: at 11:43

## 2017-05-12 RX ADMIN — Medication 50 MILLIGRAM(S): at 05:10

## 2017-05-12 NOTE — DISCHARGE NOTE ADULT - MEDICATION SUMMARY - MEDICATIONS TO TAKE
I will START or STAY ON the medications listed below when I get home from the hospital:    Ecotrin Adult Low Strength 81 mg oral delayed release tablet  -- 1 tab(s) by mouth once a day  -- Indication: For CAD (coronary artery disease)    lisinopril 10 mg oral tablet  -- 1 tab(s) by mouth once a day  -- Indication: For Hypertension    metFORMIN 500 mg oral tablet  -- 1 tab(s) by mouth 2 times a day  -- Indication: For DM (Diabetes Mellitus)    Tradjenta 5 mg oral tablet  -- 1 tab(s) by mouth once a day  -- Indication: For DM (Diabetes Mellitus)    colchicine 0.6 mg oral tablet  -- 0.5 tab(s) by mouth once a day  -- Indication: For Gout    Crestor 5 mg oral tablet  -- 1 tab(s) by mouth once a day (at bedtime)  -- Indication: For Hyperlipidemia     Lopressor 50 mg oral tablet  -- 1 tab(s) by mouth 2 times a day  -- Indication: For Atrial fibrillation    Ritalin 20 mg oral tablet  -- 1 tab(s) by mouth 3 times a day  -- Indication: For Home medication    Lasix 20 mg oral tablet  -- 1 tab(s) by mouth once a day  -- Indication: For Hypertension    omeprazole 20 mg oral delayed release capsule  -- 1 cap(s) by mouth once a day  -- Indication: For Need for prophylactic measure    Synthroid 25 mcg (0.025 mg) oral tablet  -- 1.5 tab(s) by mouth once a day  -- Indication: For Hypothyroid

## 2017-05-12 NOTE — DISCHARGE NOTE ADULT - PLAN OF CARE
Follow up While you were in the hospital, you received a bone marrow biopsy to work up your anemia. You will follow up with Dr. aM, hematologist, for the results. You were also seen by the GI team, who did not feel inclined to perform any procedures. Please continue taking your aspirin at home.

## 2017-05-12 NOTE — DISCHARGE NOTE ADULT - CARE PLAN
Principal Discharge DX:	Anemia  Goal:	Follow up  Instructions for follow-up, activity and diet:	While you were in the hospital, you received a bone marrow biopsy to work up your anemia. You will follow up with Dr. Ma, hematologist, for the results. You were also seen by the GI team, who did not feel inclined to perform any procedures.  Secondary Diagnosis:	CAD (coronary artery disease)  Instructions for follow-up, activity and diet:	Please continue taking your aspirin at home.

## 2017-05-12 NOTE — DISCHARGE NOTE ADULT - CARE PROVIDER_API CALL
Juliann Francis), Cardiovascular Disease; Interventional Cardiology  [address] [place] [address]  Phone: [phone]  Fax: [phone]    Kel Ma), Internal Medicine  [address] [place] [id number]  Phone: [phone]  Fax: [phone]    Kahlil Cedillo), Medicine  [facility]  [address] [place] [id number]  Phone: [phone]  Fax: [phone]

## 2017-05-12 NOTE — DISCHARGE NOTE ADULT - PATIENT PORTAL LINK FT
“You can access the Follow[facility] Patient Portal, offered by [facility], by registering with the following website: [email or web address]”

## 2017-05-12 NOTE — DISCHARGE NOTE ADULT - ADDITIONAL INSTRUCTIONS
Please follow up with Dr. Ma, Hematologist at [address]. Please call [phone] to make an appointment.

## 2017-05-12 NOTE — DISCHARGE NOTE ADULT - HOSPITAL COURSE
History of Present Illness		  76M PMHx AF on asa, CAD/MI 2011 s/p PCI, ckd, HFrEF (EF 40%), DM2, htn here with anemia. Had cbc done at Dr. Francis's office, noted to have Hb 8 down from 11 one year ago. Pt spoke to Dr. Tolentino who recommended pt be admitted. He states that he has been having lightheadedness, dizziness for the past few months; worsening in the past week. He has PUGH, no SOB, which has decreased his functional status. He has been having balance issues as well, described as his feet do not feel like his own. When he walks he will wobble to one side. Has not noted melena or hematochezia, no overt bleeds. Had colonoscopy and EGD done in 1/2017 demonstrating diverticulosis and hiatal hernia. No syncope, no weakness. No fever, chills, dysuria, abd pain, diarrhea.    In the ED vitals were T 98.3, HR 78, Bp 147/57, RR 19, O2 100 RA.     Hospital Course  Patient was admitted to the medicine floors for further evaluation of his anemia. Patient was noted to have macrocytosis with inappropriate reticulocyte response, indicating possible dysplasia process involving the bone marrow. Pt's vitamin b12 and and folic acid levels were normal. Patient was given 1 Unit of blood, and felt symptomatically better. Pt's Hemoglobin hannah to 9 from 8 on admission. Pt was seen by hematology team, who performed a bone marrow biopsy. Pt will follow up with the results outpatient. Pt was seen by GI team, who did not feel inclined to perform any procedures.   Patient had no episodes of sharla bleeding in the hospital with negative guiac.     Pt is not stable for discharge to home. History of Present Illness		  76M PMHx AF on asa, CAD/MI 2011 s/p PCI, ckd, HFrEF (EF 40%), DM2, htn here with anemia. Had cbc done at Dr. Francis's office, noted to have Hb 8 down from 11 one year ago. Pt spoke to Dr. Tolentino who recommended pt be admitted. He states that he has been having lightheadedness, dizziness for the past few months; worsening in the past week. He has PUGH, no SOB, which has decreased his functional status. He has been having balance issues as well, described as his feet do not feel like his own. When he walks he will wobble to one side. Has not noted melena or hematochezia, no overt bleeds. Had colonoscopy and EGD done in 1/2017 demonstrating diverticulosis and hiatal hernia. No syncope, no weakness. No fever, chills, dysuria, abd pain, diarrhea.    In the ED vitals were T 98.3, HR 78, Bp 147/57, RR 19, O2 100 RA.     Hospital Course  Patient was admitted to the medicine floors for further evaluation of his anemia. Patient was noted to have macrocytosis with inappropriate reticulocyte response, indicating possible dysplasia process involving the bone marrow. Pt's vitamin b12 and and folic acid levels were normal. Patient was given 1 Unit of blood, and felt symptomatically better. Pt's Hemoglobin hannah to 9 from 8 on admission. Pt was seen by hematology team, who performed a bone marrow biopsy. Pt will follow up with the results outpatient. Pt was seen by GI team, who did not feel inclined to perform any procedures.   Patient had no episodes of sharla bleeding in the hospital with negative guiac.     Pt is now stable for discharge to home. History of Present Illness		  76M PMHx AF on asa, CAD/MI 2011 s/p PCI, ckd, HFrEF (EF 40%), DM2, htn here with anemia. Had cbc done at Dr. Francis's office, noted to have Hb 8 down from 11 one year ago. Pt spoke to Dr. Tolentino who recommended pt be admitted. He states that he has been having lightheadedness, dizziness for the past few months; worsening in the past week. He has PUGH, no SOB, which has decreased his functional status. He has been having balance issues as well, described as his feet do not feel like his own. When he walks he will wobble to one side. Has not noted melena or hematochezia, no overt bleeds. Had colonoscopy and EGD done in 1/2017 demonstrating diverticulosis and hiatal hernia. No syncope, no weakness. No fever, chills, dysuria, abd pain, diarrhea.    In the ED vitals were T 98.3, HR 78, Bp 147/57, RR 19, O2 100 RA.     Hospital Course  Patient was admitted to the medicine floors for further evaluation and management  of his symptomatic anemia. Patient was noted to have macrocytosis with inappropriate reticulocyte response, indicating possible dysplastic process involving the bone marrow. Pt's vitamin b12 and and folic acid levels were normal. Patient was given 1 Unit of blood, and felt symptomatically better. Pt's Hemoglobin hannah to 9 from 8 on admission. Pt was seen by hematology team, who performed a bone marrow biopsy. Pt will follow up with the results outpatient. Pt was seen by GI team, who did not feel inclined to perform any procedures. Patient had no episodes of sharla bleeding in the hospital with negative guiaic.     Pt is now stable for discharge to home.

## 2017-05-12 NOTE — DISCHARGE NOTE ADULT - CARE PROVIDERS DIRECT ADDRESSES
[email or web address]

## 2017-05-13 ENCOUNTER — INPATIENT (INPATIENT)
Facility: HOSPITAL | Age: 77
LOS: 2 days | Discharge: HOME HEALTH | DRG: 41 | End: 2017-05-16
Attending: PSYCHIATRY & NEUROLOGY | Admitting: PSYCHIATRY & NEUROLOGY
Payer: MEDICARE

## 2017-05-13 VITALS
TEMPERATURE: 97 F | DIASTOLIC BLOOD PRESSURE: 84 MMHG | HEART RATE: 74 BPM | OXYGEN SATURATION: 99 % | SYSTOLIC BLOOD PRESSURE: 148 MMHG | RESPIRATION RATE: 16 BRPM

## 2017-05-13 DIAGNOSIS — I63.9 CEREBRAL INFARCTION, UNSPECIFIED: ICD-10-CM

## 2017-05-13 DIAGNOSIS — Z98.89 OTHER SPECIFIED POSTPROCEDURAL STATES: Chronic | ICD-10-CM

## 2017-05-13 DIAGNOSIS — Z95.5 PRESENCE OF CORONARY ANGIOPLASTY IMPLANT AND GRAFT: Chronic | ICD-10-CM

## 2017-05-13 DIAGNOSIS — Z90.89 ACQUIRED ABSENCE OF OTHER ORGANS: Chronic | ICD-10-CM

## 2017-05-13 LAB
ALBUMIN SERPL ELPH-MCNC: 4.3 G/DL — SIGNIFICANT CHANGE UP (ref 3.3–5)
ALP SERPL-CCNC: 140 U/L — HIGH (ref 40–120)
ALT FLD-CCNC: 19 U/L RC — SIGNIFICANT CHANGE UP (ref 10–45)
ANION GAP SERPL CALC-SCNC: 16 MMOL/L — SIGNIFICANT CHANGE UP (ref 5–17)
APPEARANCE UR: CLEAR — SIGNIFICANT CHANGE UP
AST SERPL-CCNC: 24 U/L — SIGNIFICANT CHANGE UP (ref 10–40)
BASOPHILS # BLD AUTO: 0.1 K/UL — SIGNIFICANT CHANGE UP (ref 0–0.2)
BILIRUB SERPL-MCNC: 1 MG/DL — SIGNIFICANT CHANGE UP (ref 0.2–1.2)
BILIRUB UR-MCNC: NEGATIVE — SIGNIFICANT CHANGE UP
BLD GP AB SCN SERPL QL: NEGATIVE — SIGNIFICANT CHANGE UP
BUN SERPL-MCNC: 47 MG/DL — HIGH (ref 7–23)
CALCIUM SERPL-MCNC: 9.7 MG/DL — SIGNIFICANT CHANGE UP (ref 8.4–10.5)
CHLORIDE SERPL-SCNC: 106 MMOL/L — SIGNIFICANT CHANGE UP (ref 96–108)
CO2 SERPL-SCNC: 21 MMOL/L — LOW (ref 22–31)
COLOR SPEC: YELLOW — SIGNIFICANT CHANGE UP
CREAT SERPL-MCNC: 1.41 MG/DL — HIGH (ref 0.5–1.3)
DIFF PNL FLD: NEGATIVE — SIGNIFICANT CHANGE UP
EOSINOPHIL # BLD AUTO: 0.3 K/UL — SIGNIFICANT CHANGE UP (ref 0–0.5)
EOSINOPHIL NFR BLD AUTO: 3 % — SIGNIFICANT CHANGE UP (ref 0–6)
GAS PNL BLDV: SIGNIFICANT CHANGE UP
GLUCOSE SERPL-MCNC: 134 MG/DL — HIGH (ref 70–99)
GLUCOSE UR QL: NEGATIVE — SIGNIFICANT CHANGE UP
HCT VFR BLD CALC: 28.7 % — LOW (ref 39–50)
HGB BLD-MCNC: 10 G/DL — LOW (ref 13–17)
IF BLOCK AB SER-ACNC: SIGNIFICANT CHANGE UP
KETONES UR-MCNC: NEGATIVE — SIGNIFICANT CHANGE UP
LEUKOCYTE ESTERASE UR-ACNC: NEGATIVE — SIGNIFICANT CHANGE UP
LYMPHOCYTES # BLD AUTO: 1.6 K/UL — SIGNIFICANT CHANGE UP (ref 1–3.3)
LYMPHOCYTES # BLD AUTO: 15 % — SIGNIFICANT CHANGE UP (ref 13–44)
MCHC RBC-ENTMCNC: 35 GM/DL — SIGNIFICANT CHANGE UP (ref 32–36)
MCHC RBC-ENTMCNC: 38.9 PG — HIGH (ref 27–34)
MCV RBC AUTO: 111 FL — HIGH (ref 80–100)
MONOCYTES # BLD AUTO: 0.4 K/UL — SIGNIFICANT CHANGE UP (ref 0–0.9)
MONOCYTES NFR BLD AUTO: 1 % — LOW (ref 2–14)
NEUTROPHILS # BLD AUTO: 3.3 K/UL — SIGNIFICANT CHANGE UP (ref 1.8–7.4)
NEUTROPHILS NFR BLD AUTO: 81 % — HIGH (ref 43–77)
NITRITE UR-MCNC: NEGATIVE — SIGNIFICANT CHANGE UP
PH UR: 6 — SIGNIFICANT CHANGE UP (ref 5–8)
PLATELET # BLD AUTO: 201 K/UL — SIGNIFICANT CHANGE UP (ref 150–400)
POTASSIUM SERPL-MCNC: 4.5 MMOL/L — SIGNIFICANT CHANGE UP (ref 3.5–5.3)
POTASSIUM SERPL-SCNC: 4.5 MMOL/L — SIGNIFICANT CHANGE UP (ref 3.5–5.3)
PROT SERPL-MCNC: 7.6 G/DL — SIGNIFICANT CHANGE UP (ref 6–8.3)
PROT UR-MCNC: SIGNIFICANT CHANGE UP
RBC # BLD: 2.58 M/UL — LOW (ref 4.2–5.8)
RBC # FLD: 19.1 % — HIGH (ref 10.3–14.5)
RBC CASTS # UR COMP ASSIST: SIGNIFICANT CHANGE UP /HPF (ref 0–2)
RH IG SCN BLD-IMP: POSITIVE — SIGNIFICANT CHANGE UP
SODIUM SERPL-SCNC: 143 MMOL/L — SIGNIFICANT CHANGE UP (ref 135–145)
SP GR SPEC: 1.02 — SIGNIFICANT CHANGE UP (ref 1.01–1.02)
UROBILINOGEN FLD QL: NEGATIVE — SIGNIFICANT CHANGE UP
WBC # BLD: 5.7 K/UL — SIGNIFICANT CHANGE UP (ref 3.8–10.5)
WBC # FLD AUTO: 5.7 K/UL — SIGNIFICANT CHANGE UP (ref 3.8–10.5)
WBC UR QL: SIGNIFICANT CHANGE UP /HPF (ref 0–5)

## 2017-05-13 PROCEDURE — 99285 EMERGENCY DEPT VISIT HI MDM: CPT | Mod: 25,GC

## 2017-05-13 PROCEDURE — 70450 CT HEAD/BRAIN W/O DYE: CPT | Mod: 26

## 2017-05-13 PROCEDURE — 99223 1ST HOSP IP/OBS HIGH 75: CPT

## 2017-05-13 PROCEDURE — 93010 ELECTROCARDIOGRAM REPORT: CPT

## 2017-05-13 RX ORDER — FUROSEMIDE 40 MG
20 TABLET ORAL DAILY
Qty: 0 | Refills: 0 | Status: DISCONTINUED | OUTPATIENT
Start: 2017-05-13 | End: 2017-05-16

## 2017-05-13 RX ORDER — INSULIN LISPRO 100/ML
VIAL (ML) SUBCUTANEOUS
Qty: 0 | Refills: 0 | Status: DISCONTINUED | OUTPATIENT
Start: 2017-05-13 | End: 2017-05-16

## 2017-05-13 RX ORDER — INSULIN LISPRO 100/ML
VIAL (ML) SUBCUTANEOUS AT BEDTIME
Qty: 0 | Refills: 0 | Status: DISCONTINUED | OUTPATIENT
Start: 2017-05-13 | End: 2017-05-16

## 2017-05-13 RX ORDER — CLOPIDOGREL BISULFATE 75 MG/1
75 TABLET, FILM COATED ORAL DAILY
Qty: 0 | Refills: 0 | Status: DISCONTINUED | OUTPATIENT
Start: 2017-05-13 | End: 2017-05-16

## 2017-05-13 RX ORDER — COLCHICINE 0.6 MG
0.6 TABLET ORAL DAILY
Qty: 0 | Refills: 0 | Status: DISCONTINUED | OUTPATIENT
Start: 2017-05-13 | End: 2017-05-16

## 2017-05-13 RX ORDER — METHYLPHENIDATE HCL 5 MG
20 TABLET ORAL THREE TIMES A DAY
Qty: 0 | Refills: 0 | Status: DISCONTINUED | OUTPATIENT
Start: 2017-05-13 | End: 2017-05-16

## 2017-05-13 RX ORDER — ATORVASTATIN CALCIUM 80 MG/1
80 TABLET, FILM COATED ORAL AT BEDTIME
Qty: 0 | Refills: 0 | Status: DISCONTINUED | OUTPATIENT
Start: 2017-05-13 | End: 2017-05-16

## 2017-05-13 RX ORDER — ENOXAPARIN SODIUM 100 MG/ML
40 INJECTION SUBCUTANEOUS EVERY 24 HOURS
Qty: 0 | Refills: 0 | Status: DISCONTINUED | OUTPATIENT
Start: 2017-05-13 | End: 2017-05-16

## 2017-05-13 RX ORDER — DEXTROSE 50 % IN WATER 50 %
12.5 SYRINGE (ML) INTRAVENOUS ONCE
Qty: 0 | Refills: 0 | Status: DISCONTINUED | OUTPATIENT
Start: 2017-05-13 | End: 2017-05-16

## 2017-05-13 RX ORDER — LISINOPRIL 2.5 MG/1
10 TABLET ORAL DAILY
Qty: 0 | Refills: 0 | Status: DISCONTINUED | OUTPATIENT
Start: 2017-05-13 | End: 2017-05-16

## 2017-05-13 RX ORDER — ASPIRIN/CALCIUM CARB/MAGNESIUM 324 MG
81 TABLET ORAL DAILY
Qty: 0 | Refills: 0 | Status: DISCONTINUED | OUTPATIENT
Start: 2017-05-13 | End: 2017-05-16

## 2017-05-13 RX ORDER — METOPROLOL TARTRATE 50 MG
50 TABLET ORAL
Qty: 0 | Refills: 0 | Status: DISCONTINUED | OUTPATIENT
Start: 2017-05-13 | End: 2017-05-16

## 2017-05-13 RX ORDER — GLUCAGON INJECTION, SOLUTION 0.5 MG/.1ML
1 INJECTION, SOLUTION SUBCUTANEOUS ONCE
Qty: 0 | Refills: 0 | Status: DISCONTINUED | OUTPATIENT
Start: 2017-05-13 | End: 2017-05-16

## 2017-05-13 RX ORDER — PANTOPRAZOLE SODIUM 20 MG/1
40 TABLET, DELAYED RELEASE ORAL
Qty: 0 | Refills: 0 | Status: DISCONTINUED | OUTPATIENT
Start: 2017-05-13 | End: 2017-05-16

## 2017-05-13 RX ORDER — DEXTROSE 50 % IN WATER 50 %
1 SYRINGE (ML) INTRAVENOUS ONCE
Qty: 0 | Refills: 0 | Status: DISCONTINUED | OUTPATIENT
Start: 2017-05-13 | End: 2017-05-16

## 2017-05-13 RX ORDER — HYDROCORTISONE 20 MG
200 TABLET ORAL ONCE
Qty: 0 | Refills: 0 | Status: COMPLETED | OUTPATIENT
Start: 2017-05-13 | End: 2017-05-13

## 2017-05-13 RX ORDER — LEVOTHYROXINE SODIUM 125 MCG
25 TABLET ORAL DAILY
Qty: 0 | Refills: 0 | Status: DISCONTINUED | OUTPATIENT
Start: 2017-05-13 | End: 2017-05-16

## 2017-05-13 RX ADMIN — Medication 200 MILLIGRAM(S): at 15:28

## 2017-05-13 RX ADMIN — Medication 4: at 21:30

## 2017-05-13 RX ADMIN — ATORVASTATIN CALCIUM 80 MILLIGRAM(S): 80 TABLET, FILM COATED ORAL at 21:26

## 2017-05-13 RX ADMIN — ENOXAPARIN SODIUM 40 MILLIGRAM(S): 100 INJECTION SUBCUTANEOUS at 21:26

## 2017-05-13 NOTE — H&P ADULT. - PROBLEM SELECTOR PLAN 1
admit to stroke  [] telemetry  [] MRI/ MRA (pt is allergic to CT contrast  [] TTE c/bubble  [] BP goal normotensive, c/w home antihypertensives  [] A1c and Lipid Panel  [] Will c/w ASA and add Plavix for 3 weeks as per [facility] protocol  [] LOOP  [] PT  [] c/w other home meds

## 2017-05-13 NOTE — H&P ADULT. - ATTENDING COMMENTS
76-year-old right-handed white gentleman first evaluated at [facility] on 5/13/17 with right leg weakness. About 6 years prior to admission, he was noted to have atrial fibrillation, but this was thought to be transient and he was not anticoagulated. He had recently been admitted to the hospital with severe anemia, of uncertain cause, but apparently not due to GI blood loss. On 5/11/17 he stumbled, possibly due to right leg weakness. On the morning of 5/13/17 he had 2 additional falls. He had subjective mild difficulty with word finding although this was not noticeable to his wife. Medical history includes coronary stents. Home medications included aspirin. Allergy to IV contrast. ROS otherwise negative. Exam. Mental status, including speech production, was normal. Right leg drift, iliopsoas 4 -/5; remainder of neurologic exam was nonfocal. CT head (5/13/17) to my eye showed an acute/subacute small left FADIA infarct, callosomarginal distribution, involving the perirolandic region. Impression. 6 years prior to admission, he had atrial fibrillation which was thought to be transient and he was not anticoagulated. One-week prior to admission he had been admitted to hospital with severe anemia of uncertain cause, but apparently not due to GI blood loss. For 2 days prior to admission, he had several falls due to right leg weakness, probably due to a small left FADIA infarct of uncertain cause. Plan. MRI brain/MRA neck and head; TTE; most likely implantable loop recorder; aspirin/Plavix for 3 weeks, most likely followed by aspirin alone if atrial fibrillation is not detected; rehabilitation.

## 2017-05-13 NOTE — H&P ADULT. - MENTAL STATUS
occasionally difficulty getting words out, like trying to push them out, no word-finding difficulty, naming and repetition intact

## 2017-05-13 NOTE — ED ADULT NURSE NOTE - OBJECTIVE STATEMENT
76 yr old male PMHx DM2, a fib, CAD, HLD, HTN, CHF and hypothyroid presents here today with c/o right sided weakness and fall x 2 76 yr old male PMHx DM2, a fib, CAD, HLD, HTN, CHF and hypothyroid presents here today with c/o right sided weakness and fall x 2. states he was discharged from here yesterday and when he got home he felt weak in his right leg and "toppled over to his side on the hardwood floor", then again today it happened again but he denies any loc, head inj, or pain. reports right sided weakness and as per family intermittent episodes of confusion, where he forgets how perform simple tasks. Patient also states he has trouble pronouncing words recently as well. All these new symptoms have started a few days now. denies any dizziness, chest pain, sob, fevers, chills, changes in vision, dysuria, hematuria, leg swelling, leg pain, lower back pain, numbness, tingling, syncope, or headaches. Alert and oriented x 4. PERRLA brisk b/l WETZEL, weak on right arm and leg, strong on left arm and leg. safety and comfort maintained.

## 2017-05-13 NOTE — H&P ADULT. - ASSESSMENT
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 75 y/o RH  man with Hx of Afib on ASA, recent symptomatic anemia, and several other risk factors for stroke p/w acute onset RLE weakness and dysarthria due to a distal Left FADIA infarct of currently unknown etiology, possibly embolic. NIHSS 2, MRS 0

## 2017-05-13 NOTE — ED PROVIDER NOTE - OBJECTIVE STATEMENT
76 m hx Pt is a 76 yr old male with past medical hx of cardiac stenting, afib, CAD, CHF, HTN, DM, hypothyroid who was recently admitted for anemia found not to be GI bleed and more likely related to decreased iron.  Pt was generally weak which lead to presentation for recent anemia.  Family and pt give prolonged (mult year hx) of waxing and waning generalized weakness, intermittent confusion, falls with periods of return to full function.  He's had UTI previously when he's had these sxs. Was d/c'd yesterday after admission during which he received blood transfusion.  Had gone home with some improvement in sxs and general deconditioning but functional.  Now over the last day has developed weakness when he stands.  Pt denies dizziness/light-headedness/syncope.  He has not fallen/hit his head but has collapsed to the ground slowly and been unable to stand thereafter without assistance.  Pt has been evaluated with bone marrow bx.  He has not had formal muscle strength testing.  He presents today and when asked repeatedly he endorses focal leg weakness and states that RLE is weak.  On neuro exam pt found to have RUE weakness ~2-3/5 and RLE weakness ~1/5.  LUE and LLE 4/5 strength.  No significant dysmetria bilaterally.  No facial droop appreciated.  No hx of spinal injury/injections.  Pt endorses no back pain but occasional incontinence.  He is in no acute distress and is able to give some hx though details supplied by wife.  No headache/recent head injury.  Baby ASA only, no other anticoagulant.  Pt and family endorse hx of allergy to CT contrast but state he's been 'fine when he's had it with steroids and benadryl'.  No known last normal but pt and family endorse increasing weakness x several days to 1 wk.      PCP:  Nguyen  Cards: Tito Pt is a 76 yr old male with past medical hx of cardiac stenting, afib, CAD, CHF, HTN, DM, hypothyroid who was recently admitted for anemia found not to be GI bleed and more likely related to decreased iron.  Pt was generally weak which lead to presentation for recent anemia. Pt presents today for 2 episodes of fall and weakness especially on the right. per pt, during events felt like he couldn't stand on his legs and felt weak. Family and pt give prolonged (mult year hx) of waxing and waning generalized weakness, intermittent confusion, falls with periods of return to full function.  He's had UTI previously when he's had these sxs. Was d/c'd yesterday after admission during which he received blood transfusion.  Had gone home with some improvement in sxs and general deconditioning but functional.  Now over the last day has developed weakness when he stands.  Pt denies dizziness/light-headedness/syncope.  He has not fallen/hit his head but has collapsed to the ground slowly and been unable to stand thereafter without assistance.  Pt has been evaluated with bone marrow bx.  He has not had formal muscle strength testing.  He presents today and when asked repeatedly he endorses focal leg weakness and states that RLE is weak.  On neuro exam pt found to have RUE weakness ~2-3/5 and RLE weakness ~1/5.  LUE and LLE 4/5 strength.  No significant dysmetria bilaterally.  No facial droop appreciated.  No hx of spinal injury/injections.  Pt endorses no back pain but occasional incontinence.  He is in no acute distress and is able to give some hx though details supplied by wife.  No headache/recent head injury.  Baby ASA only, no other anticoagulant.  Pt and family endorse hx of allergy to CT contrast but state he's been 'fine when he's had it with steroids and benadryl'.  No known last normal but pt and family endorse increasing weakness x several days to 1 wk.      PCP:  Nguyen  Cards: Tito [employer]:  Pt is a 76 yr old male with past medical hx of cardiac stenting, afib, CAD, CHF, HTN, DM, hypothyroid who was recently admitted for anemia found not to be GI bleed and more likely related to decreased iron.  Pt was generally weak which lead to presentation for recent anemia. Pt presents today for 2 episodes of fall and weakness especially on the right. per pt, during events felt like he couldn't stand on his legs and felt weak. Family and pt give prolonged (mult year hx) of waxing and waning generalized weakness, intermittent confusion, falls with periods of return to full function.  He's had UTI previously when he's had these sxs. Was d/c'd yesterday after admission during which he received blood transfusion.  Had gone home with some improvement in sxs and general deconditioning but functional.  Now over the last day has developed weakness when he stands.  Pt denies dizziness/light-headedness/syncope.  He has not fallen/hit his head but has collapsed to the ground slowly and been unable to stand thereafter without assistance.  Pt has been evaluated with bone marrow bx.  He has not had formal muscle strength testing.  He presents today and when asked repeatedly he endorses focal leg weakness and states that RLE is weak.  On neuro exam pt found to have RUE weakness ~2-3/5 and RLE weakness ~1/5.  LUE and LLE 4/5 strength.  No significant dysmetria bilaterally.  No facial droop appreciated.  No hx of spinal injury/injections.  Pt endorses no back pain but occasional incontinence.  He is in no acute distress and is able to give some hx though details supplied by wife.  No headache/recent head injury.  Baby ASA only, no other anticoagulant.  Pt and family endorse hx of allergy to CT contrast but state he's been 'fine when he's had it with steroids and benadryl'.  No known last normal but pt and family endorse increasing weakness x several days to 1 wk.      PCP:  Nguyen  Cards: [place]

## 2017-05-13 NOTE — H&P ADULT. - HISTORY OF PRESENT ILLNESS
75 y/o RH  man PMH of AF on asa, CAD/MI 2011 s/p PCI, ckd, HFrEF (EF 40%), DM2, HTN, HLD p/w RLE weakness for 1 day. Pt was recently admitted on 5/10 for symptomatic anemia. Pt was determined to not have any Gi source of bleeding. He was given 1 unit PRBC and seen by hematology who performed a bone marrow biopsy. He was discharged 5/12, the day previous to this presentation. Pt previously have been feeling generalized weakness which improved after transfusion. When pt got home he later again noted some weakness and attributed it to the anemia. He woke today at around 3A and again at 8AM and noted right sided weakness and slumped to the floor each time. Pt returned to the ED due to this new focal weakness. Pt denies HA, numbness, difficulty swallowing.

## 2017-05-14 DIAGNOSIS — I63.9 CEREBRAL INFARCTION, UNSPECIFIED: ICD-10-CM

## 2017-05-14 LAB
ANION GAP SERPL CALC-SCNC: 16 MMOL/L — SIGNIFICANT CHANGE UP (ref 5–17)
BUN SERPL-MCNC: 42 MG/DL — HIGH (ref 7–23)
CALCIUM SERPL-MCNC: 9.6 MG/DL — SIGNIFICANT CHANGE UP (ref 8.4–10.5)
CHLORIDE SERPL-SCNC: 105 MMOL/L — SIGNIFICANT CHANGE UP (ref 96–108)
CHOLEST SERPL-MCNC: 115 MG/DL — SIGNIFICANT CHANGE UP (ref 10–199)
CO2 SERPL-SCNC: 19 MMOL/L — LOW (ref 22–31)
CREAT SERPL-MCNC: 1.23 MG/DL — SIGNIFICANT CHANGE UP (ref 0.5–1.3)
GLUCOSE SERPL-MCNC: 122 MG/DL — HIGH (ref 70–99)
HBA1C BLD-MCNC: 6.9 % — HIGH (ref 4–5.6)
HDLC SERPL-MCNC: 27 MG/DL — LOW (ref 40–125)
LIPID PNL WITH DIRECT LDL SERPL: 62 MG/DL — SIGNIFICANT CHANGE UP
POTASSIUM SERPL-MCNC: 4.3 MMOL/L — SIGNIFICANT CHANGE UP (ref 3.5–5.3)
POTASSIUM SERPL-SCNC: 4.3 MMOL/L — SIGNIFICANT CHANGE UP (ref 3.5–5.3)
SODIUM SERPL-SCNC: 140 MMOL/L — SIGNIFICANT CHANGE UP (ref 135–145)
TOTAL CHOLESTEROL/HDL RATIO MEASUREMENT: 4 RATIO — SIGNIFICANT CHANGE UP (ref 3.4–9.6)
TRIGL SERPL-MCNC: 129 MG/DL — SIGNIFICANT CHANGE UP (ref 10–149)

## 2017-05-14 PROCEDURE — 70544 MR ANGIOGRAPHY HEAD W/O DYE: CPT | Mod: 26

## 2017-05-14 PROCEDURE — 70549 MR ANGIOGRAPH NECK W/O&W/DYE: CPT | Mod: 26

## 2017-05-14 PROCEDURE — 99233 SBSQ HOSP IP/OBS HIGH 50: CPT

## 2017-05-14 RX ADMIN — Medication 20 MILLIGRAM(S): at 05:31

## 2017-05-14 RX ADMIN — LISINOPRIL 10 MILLIGRAM(S): 2.5 TABLET ORAL at 05:31

## 2017-05-14 RX ADMIN — Medication 20 MILLIGRAM(S): at 22:09

## 2017-05-14 RX ADMIN — Medication 50 MILLIGRAM(S): at 17:27

## 2017-05-14 RX ADMIN — Medication 20 MILLIGRAM(S): at 05:32

## 2017-05-14 RX ADMIN — Medication 25 MICROGRAM(S): at 05:32

## 2017-05-14 RX ADMIN — Medication 4: at 12:06

## 2017-05-14 RX ADMIN — Medication 50 MILLIGRAM(S): at 05:31

## 2017-05-14 RX ADMIN — Medication: at 17:31

## 2017-05-14 RX ADMIN — Medication 20 MILLIGRAM(S): at 14:00

## 2017-05-14 RX ADMIN — Medication 81 MILLIGRAM(S): at 11:38

## 2017-05-14 RX ADMIN — PANTOPRAZOLE SODIUM 40 MILLIGRAM(S): 20 TABLET, DELAYED RELEASE ORAL at 05:31

## 2017-05-14 RX ADMIN — ENOXAPARIN SODIUM 40 MILLIGRAM(S): 100 INJECTION SUBCUTANEOUS at 22:11

## 2017-05-14 RX ADMIN — ATORVASTATIN CALCIUM 80 MILLIGRAM(S): 80 TABLET, FILM COATED ORAL at 22:09

## 2017-05-14 RX ADMIN — Medication 0.6 MILLIGRAM(S): at 11:38

## 2017-05-14 RX ADMIN — CLOPIDOGREL BISULFATE 75 MILLIGRAM(S): 75 TABLET, FILM COATED ORAL at 11:38

## 2017-05-15 ENCOUNTER — MEDICATION RENEWAL (OUTPATIENT)
Age: 77
End: 2017-05-15

## 2017-05-15 PROCEDURE — 95957 EEG DIGITAL ANALYSIS: CPT | Mod: 26

## 2017-05-15 PROCEDURE — 33282: CPT

## 2017-05-15 PROCEDURE — 70553 MRI BRAIN STEM W/O & W/DYE: CPT | Mod: 26

## 2017-05-15 PROCEDURE — 99152 MOD SED SAME PHYS/QHP 5/>YRS: CPT

## 2017-05-15 PROCEDURE — 95819 EEG AWAKE AND ASLEEP: CPT | Mod: 26

## 2017-05-15 RX ADMIN — PANTOPRAZOLE SODIUM 40 MILLIGRAM(S): 20 TABLET, DELAYED RELEASE ORAL at 06:37

## 2017-05-15 RX ADMIN — Medication 20 MILLIGRAM(S): at 06:37

## 2017-05-15 RX ADMIN — Medication 81 MILLIGRAM(S): at 11:31

## 2017-05-15 RX ADMIN — Medication 20 MILLIGRAM(S): at 22:03

## 2017-05-15 RX ADMIN — Medication 50 MILLIGRAM(S): at 17:55

## 2017-05-15 RX ADMIN — LISINOPRIL 10 MILLIGRAM(S): 2.5 TABLET ORAL at 06:37

## 2017-05-15 RX ADMIN — Medication 4: at 12:54

## 2017-05-15 RX ADMIN — Medication 50 MILLIGRAM(S): at 06:37

## 2017-05-15 RX ADMIN — ENOXAPARIN SODIUM 40 MILLIGRAM(S): 100 INJECTION SUBCUTANEOUS at 22:03

## 2017-05-15 RX ADMIN — ATORVASTATIN CALCIUM 80 MILLIGRAM(S): 80 TABLET, FILM COATED ORAL at 22:03

## 2017-05-15 RX ADMIN — Medication 0.6 MILLIGRAM(S): at 11:32

## 2017-05-15 RX ADMIN — Medication 25 MICROGRAM(S): at 06:37

## 2017-05-15 RX ADMIN — CLOPIDOGREL BISULFATE 75 MILLIGRAM(S): 75 TABLET, FILM COATED ORAL at 11:33

## 2017-05-15 NOTE — EEG REPORT - NS EEG TEXT BOX
5/15/2017.  Indication: concern for seizure      Study Interpretation:    FINDINGS:  The background was continuous, spontaneously variable and reactive.  During wakefulness, the posteriorly dominant rhythm consisted of symmetric, well modulated 8 Hz activity, with an amplitude to 40 uV, that attenuated to eye opening.  Low amplitude central beta was noted in wakefulness.    Background Slowing:  Intermittent irregular delta/ theta activity over left temporal region     Sleep Background:  Stage II sleep transients were not recorded.    Epileptiform Activity:   No epileptiform discharges were present.    Events:  No clinical events were recorded.  No seizures were recorded.    Activation Procedures:   -Hyperventilation was not performed.    -Photic stimulation was not performed.    Artifacts:  Intermittent myogenic and movement artifacts were noted.    ECG:  The heart rate on single channel ECG was predominantly between 55-65 BPM.    Compressed Spectral Array Digital Analysis    FINDINGS:  Compressed Spectral Array (CSA) data was reviewed separately and correlated with the electroencephalographic findings detailed above.  CSA showed a variable spectral pattern.  Areas of increased power in particular were reviewed in detail, and compared with the raw EEG data.  Areas of abrupt increases in spectral power were reviewed to exclude seizures, and were determined to be artifactual in nature.    The relative ratio of the power of delta range frequencies and faster frequencies remained stable over the course of the study.  There was no definitive increase in the relative power in the delta frequency spectrum apparent in the left hemisphere versus the right hemisphere.      Compressed Spectral Array (Digital Analysis) Summary/ Impression:  No persistent hemispheric asymmetry.  Intermittent areas of increased power reviewed, without definite epileptiform activity associated on CSA.      EEG Classification / Summary:  Abnormal Routine EEG Study   Mild focal slowing over the left temporal region  Clinical Impression:  Findings indicate mild focal cerebral dysfunction in the left temporal region.  There were no epileptiform abnormalities recorded.      Prelim Read by fellow and resident 5/15/2017.  Indication: concern for seizure      Study Interpretation:    FINDINGS:  The background was continuous, spontaneously variable and reactive.  During wakefulness, the posteriorly dominant rhythm consisted of well modulated 8 Hz activity, with an amplitude to 40 uV, that attenuated to eye opening.  Low amplitude central beta was noted in wakefulness.    Background Slowing:  Intermittent irregular delta/ theta activity diffusely, greatest over left temporal region     Sleep Background:  Stage II sleep transients were not recorded.    Epileptiform Activity:   No epileptiform discharges were present.    Events:  No clinical events were recorded.  No seizures were recorded.    Activation Procedures:   -Hyperventilation was not performed.    -Photic stimulation was not performed.    Artifacts:  Intermittent myogenic and movement artifacts were noted.    ECG:  The heart rate on single channel ECG was predominantly between 55-65 BPM.    Compressed Spectral Array Digital Analysis    FINDINGS:  Compressed Spectral Array (CSA) data was reviewed separately and correlated with the electroencephalographic findings detailed above.  CSA showed a variable spectral pattern.  Areas of increased power in particular were reviewed in detail, and compared with the raw EEG data.  Areas of abrupt increases in spectral power were reviewed to exclude seizures, and were determined to be artifactual in nature.    The relative ratio of the power of delta range frequencies and faster frequencies remained stable over the course of the study.  There was no definitive increase in the relative power in the delta frequency spectrum apparent in the left hemisphere versus the right hemisphere.      Compressed Spectral Array (Digital Analysis) Summary/ Impression:  No persistent hemispheric asymmetry.  Intermittent areas of increased power reviewed, without definite epileptiform activity associated on CSA.      EEG Classification / Summary:  Abnormal Routine EEG Study   Mild diffuse slowing with focal slowing over the left temporal region    Clinical Impression:  Findings indicate mild diffuse cerebral dysfunction, more prominent in the left temporal region.  There were no epileptiform abnormalities recorded.

## 2017-05-15 NOTE — PHYSICAL THERAPY INITIAL EVALUATION ADULT - LIVES WITH, PROFILE
as per chart pt lives with spouse in PH with 3 steps to enter. +10 steps to bedroom/bathroom/spouse spouse/as per chart pt lives with spouse in  with 3 + 3 steps to enter. +10 steps to bedroom/bathroom with unilateral HR.  Pt states he and wife have been using bedroom/bathroom located on main level.

## 2017-05-15 NOTE — PHYSICAL THERAPY INITIAL EVALUATION ADULT - ADDITIONAL COMMENTS
5/13 CT head: Small acute to subacute infarct in the high left parasagittal frontoparietal region. No hemorrhagic conversion or significant mass effect. Prominence of the ventricular system out of proportion to sulcal prominence which is nonspecific but may be seen in the setting of normal pressure hydrocephalus.

## 2017-05-15 NOTE — PHYSICAL THERAPY INITIAL EVALUATION ADULT - PERTINENT HX OF CURRENT PROBLEM, REHAB EVAL
Pt is 76 y M, was recently admitted on 5/10 for symptomatic anemia. Pt was determined to not have any GI source of bleeding. He was given 1 unit PRBC and seen by hematology who performed a bone marrow biopsy. He was discharged 5/12. Now admitted with new onset RT sided weakness,  s/p fall x2 at home on 05/13, dx CVA. Pt is 76 y M, was recently admitted on 5/10 for symptomatic anemia. Pt was determined to not have any GI source of bleeding. He was given 1 unit PRBC and seen by hematology who performed a bone marrow biopsy. He was discharged 5/12. Now admitted with new onset RT sided weakness & dysathria due to a distal Left FADIA,  s/p fall x2 at home on 05/13.

## 2017-05-16 ENCOUNTER — TRANSCRIPTION ENCOUNTER (OUTPATIENT)
Age: 77
End: 2017-05-16

## 2017-05-16 VITALS
HEART RATE: 80 BPM | DIASTOLIC BLOOD PRESSURE: 71 MMHG | OXYGEN SATURATION: 98 % | RESPIRATION RATE: 18 BRPM | TEMPERATURE: 97 F | SYSTOLIC BLOOD PRESSURE: 106 MMHG

## 2017-05-16 LAB
ANION GAP SERPL CALC-SCNC: 14 MMOL/L — SIGNIFICANT CHANGE UP (ref 5–17)
BUN SERPL-MCNC: 36 MG/DL — HIGH (ref 7–23)
CALCIUM SERPL-MCNC: 9.2 MG/DL — SIGNIFICANT CHANGE UP (ref 8.4–10.5)
CHLORIDE SERPL-SCNC: 102 MMOL/L — SIGNIFICANT CHANGE UP (ref 96–108)
CHROM ANALY INTERPHASE BLD FISH-IMP: SIGNIFICANT CHANGE UP
CHROM ANALY OVERALL INTERP SPEC-IMP: SIGNIFICANT CHANGE UP
CO2 SERPL-SCNC: 21 MMOL/L — LOW (ref 22–31)
CREAT SERPL-MCNC: 1.15 MG/DL — SIGNIFICANT CHANGE UP (ref 0.5–1.3)
GLUCOSE SERPL-MCNC: 133 MG/DL — HIGH (ref 70–99)
HCT VFR BLD CALC: 26.1 % — LOW (ref 39–50)
HGB BLD-MCNC: 8.7 G/DL — LOW (ref 13–17)
MCHC RBC-ENTMCNC: 33.3 GM/DL — SIGNIFICANT CHANGE UP (ref 32–36)
MCHC RBC-ENTMCNC: 35.5 PG — HIGH (ref 27–34)
MCV RBC AUTO: 106.5 FL — HIGH (ref 80–100)
METHYLMALONATE SERPL-SCNC: 111 NMOL/L — SIGNIFICANT CHANGE UP (ref 0–378)
PLATELET # BLD AUTO: 240 K/UL — SIGNIFICANT CHANGE UP (ref 150–400)
POTASSIUM SERPL-MCNC: 4.1 MMOL/L — SIGNIFICANT CHANGE UP (ref 3.5–5.3)
POTASSIUM SERPL-SCNC: 4.1 MMOL/L — SIGNIFICANT CHANGE UP (ref 3.5–5.3)
RBC # BLD: 2.45 M/UL — LOW (ref 4.2–5.8)
RBC # FLD: 20.1 % — HIGH (ref 10.3–14.5)
SODIUM SERPL-SCNC: 137 MMOL/L — SIGNIFICANT CHANGE UP (ref 135–145)
WBC # BLD: 4.79 K/UL — SIGNIFICANT CHANGE UP (ref 3.8–10.5)
WBC # FLD AUTO: 4.79 K/UL — SIGNIFICANT CHANGE UP (ref 3.8–10.5)

## 2017-05-16 PROCEDURE — 85014 HEMATOCRIT: CPT

## 2017-05-16 PROCEDURE — 97165 OT EVAL LOW COMPLEX 30 MIN: CPT

## 2017-05-16 PROCEDURE — 99152 MOD SED SAME PHYS/QHP 5/>YRS: CPT

## 2017-05-16 PROCEDURE — 82330 ASSAY OF CALCIUM: CPT

## 2017-05-16 PROCEDURE — 83605 ASSAY OF LACTIC ACID: CPT

## 2017-05-16 PROCEDURE — 99153 MOD SED SAME PHYS/QHP EA: CPT

## 2017-05-16 PROCEDURE — A9585: CPT

## 2017-05-16 PROCEDURE — 86850 RBC ANTIBODY SCREEN: CPT

## 2017-05-16 PROCEDURE — C1764: CPT

## 2017-05-16 PROCEDURE — 81001 URINALYSIS AUTO W/SCOPE: CPT

## 2017-05-16 PROCEDURE — 80053 COMPREHEN METABOLIC PANEL: CPT

## 2017-05-16 PROCEDURE — 33282: CPT

## 2017-05-16 PROCEDURE — 96374 THER/PROPH/DIAG INJ IV PUSH: CPT

## 2017-05-16 PROCEDURE — 70553 MRI BRAIN STEM W/O & W/DYE: CPT

## 2017-05-16 PROCEDURE — 70544 MR ANGIOGRAPHY HEAD W/O DYE: CPT

## 2017-05-16 PROCEDURE — 83036 HEMOGLOBIN GLYCOSYLATED A1C: CPT

## 2017-05-16 PROCEDURE — 86900 BLOOD TYPING SEROLOGIC ABO: CPT

## 2017-05-16 PROCEDURE — 80061 LIPID PANEL: CPT

## 2017-05-16 PROCEDURE — 95951: CPT

## 2017-05-16 PROCEDURE — 84132 ASSAY OF SERUM POTASSIUM: CPT

## 2017-05-16 PROCEDURE — 70549 MR ANGIOGRAPH NECK W/O&W/DYE: CPT

## 2017-05-16 PROCEDURE — 82947 ASSAY GLUCOSE BLOOD QUANT: CPT

## 2017-05-16 PROCEDURE — 86901 BLOOD TYPING SEROLOGIC RH(D): CPT

## 2017-05-16 PROCEDURE — 82803 BLOOD GASES ANY COMBINATION: CPT

## 2017-05-16 PROCEDURE — 84295 ASSAY OF SERUM SODIUM: CPT

## 2017-05-16 PROCEDURE — 95951: CPT | Mod: 26

## 2017-05-16 PROCEDURE — 80048 BASIC METABOLIC PNL TOTAL CA: CPT

## 2017-05-16 PROCEDURE — 95819 EEG AWAKE AND ASLEEP: CPT

## 2017-05-16 PROCEDURE — 82435 ASSAY OF BLOOD CHLORIDE: CPT

## 2017-05-16 PROCEDURE — 70450 CT HEAD/BRAIN W/O DYE: CPT

## 2017-05-16 PROCEDURE — 97162 PT EVAL MOD COMPLEX 30 MIN: CPT

## 2017-05-16 PROCEDURE — 99285 EMERGENCY DEPT VISIT HI MDM: CPT | Mod: 25

## 2017-05-16 PROCEDURE — 85027 COMPLETE CBC AUTOMATED: CPT

## 2017-05-16 PROCEDURE — 95957 EEG DIGITAL ANALYSIS: CPT | Mod: 26

## 2017-05-16 PROCEDURE — 93005 ELECTROCARDIOGRAM TRACING: CPT

## 2017-05-16 PROCEDURE — 95957 EEG DIGITAL ANALYSIS: CPT

## 2017-05-16 RX ORDER — CLOPIDOGREL BISULFATE 75 MG/1
1 TABLET, FILM COATED ORAL
Qty: 21 | Refills: 0
Start: 2017-05-16 | End: 2017-06-06

## 2017-05-16 RX ADMIN — Medication 25 MICROGRAM(S): at 05:05

## 2017-05-16 RX ADMIN — CLOPIDOGREL BISULFATE 75 MILLIGRAM(S): 75 TABLET, FILM COATED ORAL at 12:01

## 2017-05-16 RX ADMIN — Medication 81 MILLIGRAM(S): at 12:01

## 2017-05-16 RX ADMIN — Medication 0.6 MILLIGRAM(S): at 12:01

## 2017-05-16 RX ADMIN — PANTOPRAZOLE SODIUM 40 MILLIGRAM(S): 20 TABLET, DELAYED RELEASE ORAL at 05:05

## 2017-05-16 RX ADMIN — LISINOPRIL 10 MILLIGRAM(S): 2.5 TABLET ORAL at 05:05

## 2017-05-16 RX ADMIN — Medication 20 MILLIGRAM(S): at 05:05

## 2017-05-16 RX ADMIN — Medication 8: at 12:58

## 2017-05-16 RX ADMIN — Medication 20 MILLIGRAM(S): at 13:38

## 2017-05-16 RX ADMIN — Medication 50 MILLIGRAM(S): at 05:05

## 2017-05-16 NOTE — OCCUPATIONAL THERAPY INITIAL EVALUATION ADULT - PERTINENT HX OF CURRENT PROBLEM, REHAB EVAL
77 y/o RH  man PMH of AF on asa, CAD/MI 2011 s/p PCI, ckd, HFrEF (EF 40%), DM2, HTN, HLD p/w RLE weakness for 1 day. Pt was recently admitted on 5/10 for symptomatic anemia. Pt was determined to not have any Gi source of bleeding. He was given 1 unit PRBC and seen by hematology who performed a bone marrow biopsy. He was discharged 5/12, the day previous to this presentation. (cont below)

## 2017-05-16 NOTE — EEG REPORT - NS EEG TEXT BOX
5/15-5/16/2017.  Indication: concern for seizure      Study Interpretation:    FINDINGS:  The background was continuous, spontaneously variable and reactive.  During wakefulness, the posteriorly dominant rhythm consisted of well modulated 8 Hz activity, with an amplitude to 40 uV, that attenuated to eye opening.  Low amplitude central beta was noted in wakefulness.    Background Slowing:  Intermittent irregular delta/ theta activity diffusely, greatest over left temporal region     Sleep Background:  Stage II sleep transients were noted by K-complexes and sleep spindles.    Epileptiform Activity:   No epileptiform discharges were present.    Events:  No clinical events were recorded.  No seizures were recorded.    Activation Procedures:   -Hyperventilation was not performed.    -Photic stimulation was not performed.    Artifacts:  Intermittent myogenic and movement artifacts were noted.    ECG:  The heart rate on single channel ECG was predominantly between 55-65 BPM.    Compressed Spectral Array Digital Analysis    FINDINGS:  Compressed Spectral Array (CSA) data was reviewed separately and correlated with the electroencephalographic findings detailed above.  CSA showed a variable spectral pattern.  Areas of increased power in particular were reviewed in detail, and compared with the raw EEG data.  Areas of abrupt increases in spectral power were reviewed to exclude seizures, and were determined to be artifactual in nature.    The relative ratio of the power of delta range frequencies and faster frequencies remained stable over the course of the study.  There was no definitive increase in the relative power in the delta frequency spectrum apparent in the left hemisphere versus the right hemisphere.      Compressed Spectral Array (Digital Analysis) Summary/ Impression:  No persistent hemispheric asymmetry.  Intermittent areas of increased power reviewed, without definite epileptiform activity associated on CSA.      EEG Classification / Summary:  Abnormal Routine EEG Study   Mild diffuse slowing with focal slowing over the left temporal region    Clinical Impression:  Findings indicate mild diffuse cerebral dysfunction, more prominent in the left temporal region.  There were no epileptiform abnormalities recorded.

## 2017-05-16 NOTE — OCCUPATIONAL THERAPY INITIAL EVALUATION ADULT - PLANNED THERAPY INTERVENTIONS, OT EVAL
balance training/bed mobility training/strengthening/transfer training/IADL retraining/ADL retraining

## 2017-05-16 NOTE — DISCHARGE NOTE ADULT - CARE PROVIDER_API CALL
Dez Tracy (MAKAYLA), Neurology; Vascular Neurology  [address] [place] [address]  Phone: [phone]  Fax: [phone]    your cardiologist,   Phone: (   )    -  Fax: (   )    -

## 2017-05-16 NOTE — DISCHARGE NOTE ADULT - CARE PLAN
Principal Discharge DX:	Cerebrovascular accident (CVA), unspecified mechanism  Goal:	prevent further strokes  Instructions for follow-up, activity and diet:	-take aspirin daily  -take plavix daily for 21 days  -followup neurology and cardiology as an outpatient

## 2017-05-16 NOTE — OCCUPATIONAL THERAPY INITIAL EVALUATION ADULT - ADDITIONAL COMMENTS
When pt got home he later again noted some weakness and attributed it to the anemia. He woke today at around 3A and again at 8AM and noted right sided weakness and slumped to the floor each time. Pt returned to the ED due to this new focal weakness. CT Brain 5/13/17: Small acute to subacute infarct in the high left parasagittal frontoparietal region. No hemorrhagic conversion or significant mass effect. Prominence of the ventricular system out of proportion to sulcal prominence which is nonspecific but may be seen in the setting of normal pressure hydrocephalus. Brain MRI 5/15/17:Acute infarct of the high left parasagittal frontoparietal region, as noted on the recent head CT study. No hemorrhagic transformation MRA brain 5/15/17: No flow-limiting stenosis or MRA evidence of aneurysm of the intracranial arteries.MRA neck 5/15/17: No significant stenosis of the internal carotids as per NASCET criteria. The common carotids and vertebral arteries are unremarkable.

## 2017-05-16 NOTE — DISCHARGE NOTE ADULT - PLAN OF CARE
prevent further strokes -take aspirin daily  -take plavix daily for 21 days  -followup neurology and cardiology as an outpatient

## 2017-05-16 NOTE — DISCHARGE NOTE ADULT - HOSPITAL COURSE
75 y/o RH  man PMH of AF on asa, CAD/MI 2011 s/p PCI, ckd, HFrEF (EF 40%), DM2, HTN, HLD p/w RLE weakness for 1 day. Pt was recently admitted on 5/10 for symptomatic anemia. Pt was determined to not have any Gi source of bleeding. He was given 1 unit PRBC and seen by hematology who performed a bone marrow biopsy. He was discharged 5/12, the day previous to this presentation. Pt previously have been feeling generalized weakness which improved after transfusion. When pt got home he later again noted some weakness and attributed it to the anemia. He woke today at around 3A and again at 8AM and noted right sided weakness and slumped to the floor each time. Pt returned to the ED due to this new focal weakness. Pt denies HA, numbness, difficulty swallowing.     Ct of head showed Small acute to subacute infarct in the high left parasagittal frontoparietal region. No hemorrhagic conversion or significant mass effect. He was admitted to Neurology service for further workup. MRI brain showed acute infarct of the high left parasagittal frontoparietal  region, as noted on the recent head CT study. No hemorrhagic transformation. MRA brain: No flow-limiting stenosis or MRA evidence of aneurysm of the intracranial arteries. MRA neck: No significant stenosis of the internal carotids as per NASCET criteria. The common carotids and vertebral arteries are unremarkable. Echocardiogram was negative. A loop recorder was placed. He was started an aspirin and plavix. Plan to stop plavix in 3 weeks. On 5/16 he was discharged home in stable condition and will followup with neurology and cardiology as an outpatient.

## 2017-05-16 NOTE — OCCUPATIONAL THERAPY INITIAL EVALUATION ADULT - LIVES WITH, PROFILE
Pt lives with spouse in 2 story private home , +3 steps down from street onto walkway that leads up to 3 steps up to front door (modern style home). Pt states he and wife have been using bedroom/bathroom located on main level. +walkin shower/spouse

## 2017-05-16 NOTE — OCCUPATIONAL THERAPY INITIAL EVALUATION ADULT - GENERAL OBSERVATIONS, REHAB EVAL
Pt received semisupine in bed, A+Ox4 however required addl time for processing, spouse at bedside. +bilateral venodynes

## 2017-05-16 NOTE — DISCHARGE NOTE ADULT - NS AS DC STROKE ED MATERIALS
Need for Followup After Discharge/Call 911 for Stroke/Stroke Warning Signs and Symptoms/Stroke Education Booklet/Risk Factors for Stroke/Prescribed Medications

## 2017-05-16 NOTE — OCCUPATIONAL THERAPY INITIAL EVALUATION ADULT - ANTICIPATED DISCHARGE DISPOSITION, OT EVAL
to maximize safety and independence with Activities of Daily Living/IADL in familiar environment/home w/ OT

## 2017-05-17 LAB
HEMATOPATHOLOGY REPORT: SIGNIFICANT CHANGE UP
TM INTERPRETATION: SIGNIFICANT CHANGE UP

## 2017-05-25 RX ORDER — BLOOD SUGAR DIAGNOSTIC
STRIP MISCELLANEOUS
Qty: 120 | Refills: 5 | Status: DISCONTINUED | COMMUNITY
Start: 2017-02-09 | End: 2017-05-25

## 2017-05-26 ENCOUNTER — APPOINTMENT (OUTPATIENT)
Dept: ELECTROPHYSIOLOGY | Facility: CLINIC | Age: 77
End: 2017-05-26

## 2017-05-26 VITALS — OXYGEN SATURATION: 100 % | HEART RATE: 65 BPM | SYSTOLIC BLOOD PRESSURE: 120 MMHG | DIASTOLIC BLOOD PRESSURE: 74 MMHG

## 2017-05-30 ENCOUNTER — RX RENEWAL (OUTPATIENT)
Age: 77
End: 2017-05-30

## 2017-06-02 ENCOUNTER — RX RENEWAL (OUTPATIENT)
Age: 77
End: 2017-06-02

## 2017-06-02 LAB — MISCELLANEOUS TEST NAME: SIGNIFICANT CHANGE UP

## 2017-06-05 ENCOUNTER — OUTPATIENT (OUTPATIENT)
Dept: OUTPATIENT SERVICES | Facility: HOSPITAL | Age: 77
LOS: 1 days | Discharge: HOME | End: 2017-06-05

## 2017-06-05 DIAGNOSIS — Z90.89 ACQUIRED ABSENCE OF OTHER ORGANS: Chronic | ICD-10-CM

## 2017-06-05 DIAGNOSIS — Z98.89 OTHER SPECIFIED POSTPROCEDURAL STATES: Chronic | ICD-10-CM

## 2017-06-05 DIAGNOSIS — D46.9 MYELODYSPLASTIC SYNDROME, UNSPECIFIED: ICD-10-CM

## 2017-06-05 DIAGNOSIS — Z95.5 PRESENCE OF CORONARY ANGIOPLASTY IMPLANT AND GRAFT: Chronic | ICD-10-CM

## 2017-06-07 ENCOUNTER — RESULT REVIEW (OUTPATIENT)
Age: 77
End: 2017-06-07

## 2017-06-07 ENCOUNTER — APPOINTMENT (OUTPATIENT)
Dept: HEMATOLOGY ONCOLOGY | Facility: CLINIC | Age: 77
End: 2017-06-07

## 2017-06-07 ENCOUNTER — LABORATORY RESULT (OUTPATIENT)
Age: 77
End: 2017-06-07

## 2017-06-07 ENCOUNTER — OUTPATIENT (OUTPATIENT)
Dept: OUTPATIENT SERVICES | Facility: HOSPITAL | Age: 77
LOS: 1 days | End: 2017-06-07
Payer: MEDICARE

## 2017-06-07 VITALS
HEIGHT: 70 IN | HEART RATE: 63 BPM | DIASTOLIC BLOOD PRESSURE: 70 MMHG | SYSTOLIC BLOOD PRESSURE: 129 MMHG | TEMPERATURE: 97.9 F | RESPIRATION RATE: 16 BRPM | BODY MASS INDEX: 26.04 KG/M2 | WEIGHT: 181.88 LBS | OXYGEN SATURATION: 100 %

## 2017-06-07 DIAGNOSIS — Z98.89 OTHER SPECIFIED POSTPROCEDURAL STATES: Chronic | ICD-10-CM

## 2017-06-07 DIAGNOSIS — D46.9 MYELODYSPLASTIC SYNDROME, UNSPECIFIED: ICD-10-CM

## 2017-06-07 DIAGNOSIS — Z95.5 PRESENCE OF CORONARY ANGIOPLASTY IMPLANT AND GRAFT: Chronic | ICD-10-CM

## 2017-06-07 DIAGNOSIS — Z90.89 ACQUIRED ABSENCE OF OTHER ORGANS: Chronic | ICD-10-CM

## 2017-06-07 LAB
BASOPHILS # BLD AUTO: 0.1 K/UL — SIGNIFICANT CHANGE UP (ref 0–0.2)
BASOPHILS NFR BLD AUTO: 1 % — SIGNIFICANT CHANGE UP (ref 0–2)
BLD GP AB SCN SERPL QL: NEGATIVE — SIGNIFICANT CHANGE UP
EOSINOPHIL # BLD AUTO: 0.5 K/UL — SIGNIFICANT CHANGE UP (ref 0–0.5)
EOSINOPHIL NFR BLD AUTO: 8.7 % — HIGH (ref 0–6)
HCT VFR BLD CALC: 25 % — LOW (ref 39–50)
HGB BLD-MCNC: 8.6 G/DL — LOW (ref 13–17)
LYMPHOCYTES # BLD AUTO: 1.5 K/UL — SIGNIFICANT CHANGE UP (ref 1–3.3)
LYMPHOCYTES # BLD AUTO: 26.5 % — SIGNIFICANT CHANGE UP (ref 13–44)
MCHC RBC-ENTMCNC: 34.5 G/DL — SIGNIFICANT CHANGE UP (ref 32–36)
MCHC RBC-ENTMCNC: 36.9 PG — HIGH (ref 27–34)
MCV RBC AUTO: 107 FL — HIGH (ref 80–100)
MONOCYTES # BLD AUTO: 0.3 K/UL — SIGNIFICANT CHANGE UP (ref 0–0.9)
MONOCYTES NFR BLD AUTO: 4.5 % — SIGNIFICANT CHANGE UP (ref 2–14)
NEUTROPHILS # BLD AUTO: 3.4 K/UL — SIGNIFICANT CHANGE UP (ref 1.8–7.4)
NEUTROPHILS NFR BLD AUTO: 59.3 % — SIGNIFICANT CHANGE UP (ref 43–77)
PLATELET # BLD AUTO: 260 K/UL — SIGNIFICANT CHANGE UP (ref 150–400)
RBC # BLD: 2.34 M/UL — LOW (ref 4.2–5.8)
RBC # FLD: 19.1 % — HIGH (ref 10.3–14.5)
RH IG SCN BLD-IMP: POSITIVE — SIGNIFICANT CHANGE UP
WBC # BLD: 5.8 K/UL — SIGNIFICANT CHANGE UP (ref 3.8–10.5)
WBC # FLD AUTO: 5.8 K/UL — SIGNIFICANT CHANGE UP (ref 3.8–10.5)

## 2017-06-08 ENCOUNTER — APPOINTMENT (OUTPATIENT)
Dept: INFUSION THERAPY | Facility: HOSPITAL | Age: 77
End: 2017-06-08

## 2017-06-08 ENCOUNTER — OTHER (OUTPATIENT)
Age: 77
End: 2017-06-08

## 2017-06-09 DIAGNOSIS — Z51.89 ENCOUNTER FOR OTHER SPECIFIED AFTERCARE: ICD-10-CM

## 2017-06-23 ENCOUNTER — RESULT REVIEW (OUTPATIENT)
Age: 77
End: 2017-06-23

## 2017-06-23 ENCOUNTER — LABORATORY RESULT (OUTPATIENT)
Age: 77
End: 2017-06-23

## 2017-06-23 ENCOUNTER — APPOINTMENT (OUTPATIENT)
Dept: HEMATOLOGY ONCOLOGY | Facility: CLINIC | Age: 77
End: 2017-06-23

## 2017-06-23 VITALS
HEART RATE: 61 BPM | BODY MASS INDEX: 26.1 KG/M2 | WEIGHT: 181.88 LBS | DIASTOLIC BLOOD PRESSURE: 70 MMHG | RESPIRATION RATE: 16 BRPM | TEMPERATURE: 97.8 F | SYSTOLIC BLOOD PRESSURE: 120 MMHG | OXYGEN SATURATION: 98 %

## 2017-06-23 LAB
BASOPHILS # BLD AUTO: 0.1 K/UL — SIGNIFICANT CHANGE UP (ref 0–0.2)
BASOPHILS NFR BLD AUTO: 1.2 % — SIGNIFICANT CHANGE UP (ref 0–2)
BLD GP AB SCN SERPL QL: NEGATIVE — SIGNIFICANT CHANGE UP
EOSINOPHIL # BLD AUTO: 0.4 K/UL — SIGNIFICANT CHANGE UP (ref 0–0.5)
EOSINOPHIL NFR BLD AUTO: 7 % — HIGH (ref 0–6)
HCT VFR BLD CALC: 30.4 % — LOW (ref 39–50)
HGB BLD-MCNC: 10.6 G/DL — LOW (ref 13–17)
LYMPHOCYTES # BLD AUTO: 1.4 K/UL — SIGNIFICANT CHANGE UP (ref 1–3.3)
LYMPHOCYTES # BLD AUTO: 28.9 % — SIGNIFICANT CHANGE UP (ref 13–44)
MCHC RBC-ENTMCNC: 34.9 G/DL — SIGNIFICANT CHANGE UP (ref 32–36)
MCHC RBC-ENTMCNC: 35.7 PG — HIGH (ref 27–34)
MCV RBC AUTO: 102 FL — HIGH (ref 80–100)
MONOCYTES # BLD AUTO: 0.2 K/UL — SIGNIFICANT CHANGE UP (ref 0–0.9)
MONOCYTES NFR BLD AUTO: 4.2 % — SIGNIFICANT CHANGE UP (ref 2–14)
NEUTROPHILS # BLD AUTO: 2.9 K/UL — SIGNIFICANT CHANGE UP (ref 1.8–7.4)
NEUTROPHILS NFR BLD AUTO: 58.7 % — SIGNIFICANT CHANGE UP (ref 43–77)
PLATELET # BLD AUTO: 238 K/UL — SIGNIFICANT CHANGE UP (ref 150–400)
RBC # BLD: 2.97 M/UL — LOW (ref 4.2–5.8)
RBC # FLD: 18.6 % — HIGH (ref 10.3–14.5)
RH IG SCN BLD-IMP: POSITIVE — SIGNIFICANT CHANGE UP
WBC # BLD: 5 K/UL — SIGNIFICANT CHANGE UP (ref 3.8–10.5)
WBC # FLD AUTO: 5 K/UL — SIGNIFICANT CHANGE UP (ref 3.8–10.5)

## 2017-06-23 PROCEDURE — 86923 COMPATIBILITY TEST ELECTRIC: CPT

## 2017-06-23 PROCEDURE — 86850 RBC ANTIBODY SCREEN: CPT

## 2017-06-23 PROCEDURE — 86900 BLOOD TYPING SEROLOGIC ABO: CPT

## 2017-06-23 PROCEDURE — 86901 BLOOD TYPING SEROLOGIC RH(D): CPT

## 2017-06-26 ENCOUNTER — APPOINTMENT (OUTPATIENT)
Dept: ELECTROPHYSIOLOGY | Facility: CLINIC | Age: 77
End: 2017-06-26
Payer: MEDICARE

## 2017-06-26 PROCEDURE — 93298 REM INTERROG DEV EVAL SCRMS: CPT

## 2017-07-05 ENCOUNTER — APPOINTMENT (OUTPATIENT)
Dept: HEMATOLOGY ONCOLOGY | Facility: CLINIC | Age: 77
End: 2017-07-05

## 2017-07-05 ENCOUNTER — RESULT REVIEW (OUTPATIENT)
Age: 77
End: 2017-07-05

## 2017-07-05 ENCOUNTER — MEDICATION RENEWAL (OUTPATIENT)
Age: 77
End: 2017-07-05

## 2017-07-05 LAB
BASOPHILS # BLD AUTO: 0 K/UL — SIGNIFICANT CHANGE UP (ref 0–0.2)
BASOPHILS NFR BLD AUTO: 0.8 % — SIGNIFICANT CHANGE UP (ref 0–2)
EOSINOPHIL # BLD AUTO: 0.4 K/UL — SIGNIFICANT CHANGE UP (ref 0–0.5)
EOSINOPHIL NFR BLD AUTO: 6.9 % — HIGH (ref 0–6)
HCT VFR BLD CALC: 26.4 % — LOW (ref 39–50)
HGB BLD-MCNC: 9.4 G/DL — LOW (ref 13–17)
LYMPHOCYTES # BLD AUTO: 1.5 K/UL — SIGNIFICANT CHANGE UP (ref 1–3.3)
LYMPHOCYTES # BLD AUTO: 26 % — SIGNIFICANT CHANGE UP (ref 13–44)
MCHC RBC-ENTMCNC: 35.7 G/DL — SIGNIFICANT CHANGE UP (ref 32–36)
MCHC RBC-ENTMCNC: 36.5 PG — HIGH (ref 27–34)
MCV RBC AUTO: 102 FL — HIGH (ref 80–100)
MONOCYTES # BLD AUTO: 0.3 K/UL — SIGNIFICANT CHANGE UP (ref 0–0.9)
MONOCYTES NFR BLD AUTO: 4.7 % — SIGNIFICANT CHANGE UP (ref 2–14)
NEUTROPHILS # BLD AUTO: 3.5 K/UL — SIGNIFICANT CHANGE UP (ref 1.8–7.4)
NEUTROPHILS NFR BLD AUTO: 61.6 % — SIGNIFICANT CHANGE UP (ref 43–77)
PLATELET # BLD AUTO: 249 K/UL — SIGNIFICANT CHANGE UP (ref 150–400)
RBC # BLD: 2.58 M/UL — LOW (ref 4.2–5.8)
RBC # FLD: 19.3 % — HIGH (ref 10.3–14.5)
WBC # BLD: 5.6 K/UL — SIGNIFICANT CHANGE UP (ref 3.8–10.5)
WBC # FLD AUTO: 5.6 K/UL — SIGNIFICANT CHANGE UP (ref 3.8–10.5)

## 2017-07-10 ENCOUNTER — OUTPATIENT (OUTPATIENT)
Dept: OUTPATIENT SERVICES | Facility: HOSPITAL | Age: 77
LOS: 1 days | Discharge: HOME | End: 2017-07-10

## 2017-07-10 DIAGNOSIS — D46.9 MYELODYSPLASTIC SYNDROME, UNSPECIFIED: ICD-10-CM

## 2017-07-10 DIAGNOSIS — Z98.89 OTHER SPECIFIED POSTPROCEDURAL STATES: Chronic | ICD-10-CM

## 2017-07-10 DIAGNOSIS — Z90.89 ACQUIRED ABSENCE OF OTHER ORGANS: Chronic | ICD-10-CM

## 2017-07-10 DIAGNOSIS — Z95.5 PRESENCE OF CORONARY ANGIOPLASTY IMPLANT AND GRAFT: Chronic | ICD-10-CM

## 2017-07-12 ENCOUNTER — RESULT REVIEW (OUTPATIENT)
Age: 77
End: 2017-07-12

## 2017-07-12 ENCOUNTER — APPOINTMENT (OUTPATIENT)
Dept: HEMATOLOGY ONCOLOGY | Facility: CLINIC | Age: 77
End: 2017-07-12

## 2017-07-12 LAB
BASOPHILS # BLD AUTO: 0 K/UL — SIGNIFICANT CHANGE UP (ref 0–0.2)
BASOPHILS NFR BLD AUTO: 0.8 % — SIGNIFICANT CHANGE UP (ref 0–2)
EOSINOPHIL # BLD AUTO: 0.3 K/UL — SIGNIFICANT CHANGE UP (ref 0–0.5)
EOSINOPHIL NFR BLD AUTO: 5.9 % — SIGNIFICANT CHANGE UP (ref 0–6)
HCT VFR BLD CALC: 27.6 % — LOW (ref 39–50)
HGB BLD-MCNC: 10 G/DL — LOW (ref 13–17)
LYMPHOCYTES # BLD AUTO: 1.4 K/UL — SIGNIFICANT CHANGE UP (ref 1–3.3)
LYMPHOCYTES # BLD AUTO: 28.5 % — SIGNIFICANT CHANGE UP (ref 13–44)
MCHC RBC-ENTMCNC: 36.2 G/DL — HIGH (ref 32–36)
MCHC RBC-ENTMCNC: 36.9 PG — HIGH (ref 27–34)
MCV RBC AUTO: 102 FL — HIGH (ref 80–100)
MONOCYTES # BLD AUTO: 0.4 K/UL — SIGNIFICANT CHANGE UP (ref 0–0.9)
MONOCYTES NFR BLD AUTO: 7.1 % — SIGNIFICANT CHANGE UP (ref 2–14)
NEUTROPHILS # BLD AUTO: 2.9 K/UL — SIGNIFICANT CHANGE UP (ref 1.8–7.4)
NEUTROPHILS NFR BLD AUTO: 57.7 % — SIGNIFICANT CHANGE UP (ref 43–77)
PLATELET # BLD AUTO: 245 K/UL — SIGNIFICANT CHANGE UP (ref 150–400)
RBC # BLD: 2.71 M/UL — LOW (ref 4.2–5.8)
RBC # FLD: 19.3 % — HIGH (ref 10.3–14.5)
WBC # BLD: 5 K/UL — SIGNIFICANT CHANGE UP (ref 3.8–10.5)
WBC # FLD AUTO: 5 K/UL — SIGNIFICANT CHANGE UP (ref 3.8–10.5)

## 2017-07-17 ENCOUNTER — RX RENEWAL (OUTPATIENT)
Age: 77
End: 2017-07-17

## 2017-07-26 ENCOUNTER — OUTPATIENT (OUTPATIENT)
Dept: OUTPATIENT SERVICES | Facility: HOSPITAL | Age: 77
LOS: 1 days | End: 2017-07-26
Payer: MEDICARE

## 2017-07-26 ENCOUNTER — RESULT REVIEW (OUTPATIENT)
Age: 77
End: 2017-07-26

## 2017-07-26 ENCOUNTER — APPOINTMENT (OUTPATIENT)
Dept: HEMATOLOGY ONCOLOGY | Facility: CLINIC | Age: 77
End: 2017-07-26

## 2017-07-26 VITALS
DIASTOLIC BLOOD PRESSURE: 70 MMHG | OXYGEN SATURATION: 100 % | SYSTOLIC BLOOD PRESSURE: 118 MMHG | RESPIRATION RATE: 16 BRPM | TEMPERATURE: 98 F | WEIGHT: 178.55 LBS | BODY MASS INDEX: 25.62 KG/M2 | HEART RATE: 66 BPM

## 2017-07-26 DIAGNOSIS — Z98.89 OTHER SPECIFIED POSTPROCEDURAL STATES: Chronic | ICD-10-CM

## 2017-07-26 DIAGNOSIS — Z95.5 PRESENCE OF CORONARY ANGIOPLASTY IMPLANT AND GRAFT: Chronic | ICD-10-CM

## 2017-07-26 DIAGNOSIS — D46.9 MYELODYSPLASTIC SYNDROME, UNSPECIFIED: ICD-10-CM

## 2017-07-26 DIAGNOSIS — Z90.89 ACQUIRED ABSENCE OF OTHER ORGANS: Chronic | ICD-10-CM

## 2017-07-26 LAB
BASOPHILS # BLD AUTO: 0.1 K/UL — SIGNIFICANT CHANGE UP (ref 0–0.2)
BASOPHILS NFR BLD AUTO: 1.2 % — SIGNIFICANT CHANGE UP (ref 0–2)
BLD GP AB SCN SERPL QL: NEGATIVE — SIGNIFICANT CHANGE UP
EOSINOPHIL # BLD AUTO: 0.4 K/UL — SIGNIFICANT CHANGE UP (ref 0–0.5)
EOSINOPHIL NFR BLD AUTO: 5.9 % — SIGNIFICANT CHANGE UP (ref 0–6)
HCT VFR BLD CALC: 23.8 % — LOW (ref 39–50)
HGB BLD-MCNC: 8.6 G/DL — LOW (ref 13–17)
LYMPHOCYTES # BLD AUTO: 1.6 K/UL — SIGNIFICANT CHANGE UP (ref 1–3.3)
LYMPHOCYTES # BLD AUTO: 26.3 % — SIGNIFICANT CHANGE UP (ref 13–44)
MCHC RBC-ENTMCNC: 36.3 G/DL — HIGH (ref 32–36)
MCHC RBC-ENTMCNC: 37.5 PG — HIGH (ref 27–34)
MCV RBC AUTO: 103 FL — HIGH (ref 80–100)
MONOCYTES # BLD AUTO: 0.4 K/UL — SIGNIFICANT CHANGE UP (ref 0–0.9)
MONOCYTES NFR BLD AUTO: 7.1 % — SIGNIFICANT CHANGE UP (ref 2–14)
NEUTROPHILS # BLD AUTO: 3.6 K/UL — SIGNIFICANT CHANGE UP (ref 1.8–7.4)
NEUTROPHILS NFR BLD AUTO: 59.5 % — SIGNIFICANT CHANGE UP (ref 43–77)
PLATELET # BLD AUTO: 311 K/UL — SIGNIFICANT CHANGE UP (ref 150–400)
RBC # BLD: 2.3 M/UL — LOW (ref 4.2–5.8)
RBC # FLD: 20.3 % — HIGH (ref 10.3–14.5)
RH IG SCN BLD-IMP: POSITIVE — SIGNIFICANT CHANGE UP
WBC # BLD: 6 K/UL — SIGNIFICANT CHANGE UP (ref 3.8–10.5)
WBC # FLD AUTO: 6 K/UL — SIGNIFICANT CHANGE UP (ref 3.8–10.5)

## 2017-07-27 PROCEDURE — 86900 BLOOD TYPING SEROLOGIC ABO: CPT

## 2017-07-27 PROCEDURE — 86923 COMPATIBILITY TEST ELECTRIC: CPT

## 2017-07-27 PROCEDURE — 86850 RBC ANTIBODY SCREEN: CPT

## 2017-07-27 PROCEDURE — 86901 BLOOD TYPING SEROLOGIC RH(D): CPT

## 2017-07-28 ENCOUNTER — APPOINTMENT (OUTPATIENT)
Dept: INFUSION THERAPY | Facility: HOSPITAL | Age: 77
End: 2017-07-28

## 2017-07-29 ENCOUNTER — APPOINTMENT (OUTPATIENT)
Dept: INFUSION THERAPY | Facility: HOSPITAL | Age: 77
End: 2017-07-29

## 2017-07-31 ENCOUNTER — APPOINTMENT (OUTPATIENT)
Dept: ELECTROPHYSIOLOGY | Facility: CLINIC | Age: 77
End: 2017-07-31
Payer: MEDICARE

## 2017-07-31 DIAGNOSIS — Z51.89 ENCOUNTER FOR OTHER SPECIFIED AFTERCARE: ICD-10-CM

## 2017-07-31 PROCEDURE — 93298 REM INTERROG DEV EVAL SCRMS: CPT

## 2017-08-08 ENCOUNTER — APPOINTMENT (OUTPATIENT)
Dept: CARDIOLOGY | Facility: CLINIC | Age: 77
End: 2017-08-08

## 2017-08-14 ENCOUNTER — RESULT REVIEW (OUTPATIENT)
Age: 77
End: 2017-08-14

## 2017-08-16 ENCOUNTER — MEDICATION RENEWAL (OUTPATIENT)
Age: 77
End: 2017-08-16

## 2017-08-22 ENCOUNTER — RX RENEWAL (OUTPATIENT)
Age: 77
End: 2017-08-22

## 2017-08-23 ENCOUNTER — OUTPATIENT (OUTPATIENT)
Dept: OUTPATIENT SERVICES | Facility: HOSPITAL | Age: 77
LOS: 1 days | Discharge: HOME | End: 2017-08-23

## 2017-08-23 ENCOUNTER — RX RENEWAL (OUTPATIENT)
Age: 77
End: 2017-08-23

## 2017-08-23 DIAGNOSIS — Z98.89 OTHER SPECIFIED POSTPROCEDURAL STATES: Chronic | ICD-10-CM

## 2017-08-23 DIAGNOSIS — D46.9 MYELODYSPLASTIC SYNDROME, UNSPECIFIED: ICD-10-CM

## 2017-08-23 DIAGNOSIS — Z90.89 ACQUIRED ABSENCE OF OTHER ORGANS: Chronic | ICD-10-CM

## 2017-08-23 DIAGNOSIS — Z95.5 PRESENCE OF CORONARY ANGIOPLASTY IMPLANT AND GRAFT: Chronic | ICD-10-CM

## 2017-08-25 ENCOUNTER — LABORATORY RESULT (OUTPATIENT)
Age: 77
End: 2017-08-25

## 2017-08-25 ENCOUNTER — APPOINTMENT (OUTPATIENT)
Dept: ENDOCRINOLOGY | Facility: CLINIC | Age: 77
End: 2017-08-25
Payer: MEDICARE

## 2017-08-25 ENCOUNTER — APPOINTMENT (OUTPATIENT)
Dept: ELECTROPHYSIOLOGY | Facility: CLINIC | Age: 77
End: 2017-08-25
Payer: MEDICARE

## 2017-08-25 ENCOUNTER — NON-APPOINTMENT (OUTPATIENT)
Age: 77
End: 2017-08-25

## 2017-08-25 VITALS
BODY MASS INDEX: 25.48 KG/M2 | WEIGHT: 178 LBS | HEART RATE: 76 BPM | OXYGEN SATURATION: 100 % | SYSTOLIC BLOOD PRESSURE: 126 MMHG | DIASTOLIC BLOOD PRESSURE: 80 MMHG | HEIGHT: 70 IN

## 2017-08-25 VITALS
OXYGEN SATURATION: 98 % | DIASTOLIC BLOOD PRESSURE: 70 MMHG | SYSTOLIC BLOOD PRESSURE: 120 MMHG | HEIGHT: 70 IN | HEART RATE: 75 BPM

## 2017-08-25 LAB
GLUCOSE BLDC GLUCOMTR-MCNC: 135
HBA1C MFR BLD HPLC: 7

## 2017-08-25 PROCEDURE — 83036 HEMOGLOBIN GLYCOSYLATED A1C: CPT | Mod: QW

## 2017-08-25 PROCEDURE — 93291 INTERROG DEV EVAL SCRMS IP: CPT

## 2017-08-25 PROCEDURE — 99215 OFFICE O/P EST HI 40 MIN: CPT | Mod: 25

## 2017-08-25 PROCEDURE — 82962 GLUCOSE BLOOD TEST: CPT

## 2017-08-28 ENCOUNTER — APPOINTMENT (OUTPATIENT)
Dept: HEMATOLOGY ONCOLOGY | Facility: CLINIC | Age: 77
End: 2017-08-28

## 2017-08-31 LAB
ALBUMIN SERPL ELPH-MCNC: 4.4 G/DL
ALP BLD-CCNC: 256 U/L
ALT SERPL-CCNC: 63 U/L
ANION GAP SERPL CALC-SCNC: 17 MMOL/L
AST SERPL-CCNC: 67 U/L
BASOPHILS # BLD AUTO: 0 K/UL
BASOPHILS NFR BLD AUTO: 0 %
BILIRUB SERPL-MCNC: 0.6 MG/DL
BUN SERPL-MCNC: 47 MG/DL
CALCIUM SERPL-MCNC: 9.5 MG/DL
CHLORIDE SERPL-SCNC: 104 MMOL/L
CO2 SERPL-SCNC: 21 MMOL/L
CREAT SERPL-MCNC: 1.21 MG/DL
CREAT SPEC-SCNC: 62 MG/DL
EOSINOPHIL # BLD AUTO: 0.48 K/UL
EOSINOPHIL NFR BLD AUTO: 6.1 %
GLUCOSE SERPL-MCNC: 130 MG/DL
HCT VFR BLD CALC: 28 %
HGB BLD-MCNC: 9.1 G/DL
LYMPHOCYTES # BLD AUTO: 0.61 K/UL
LYMPHOCYTES NFR BLD AUTO: 7.8 %
MAN DIFF?: NORMAL
MCHC RBC-ENTMCNC: 32.5 GM/DL
MCHC RBC-ENTMCNC: 32.9 PG
MCV RBC AUTO: 101.1 FL
MICROALBUMIN 24H UR DL<=1MG/L-MCNC: 10.3 MG/DL
MICROALBUMIN/CREAT 24H UR-RTO: 166 MG/G
MONOCYTES # BLD AUTO: 0 K/UL
MONOCYTES NFR BLD AUTO: 0 %
NEUTROPHILS # BLD AUTO: 6.71 K/UL
NEUTROPHILS NFR BLD AUTO: 85.2 %
PLATELET # BLD AUTO: 306 K/UL
POTASSIUM SERPL-SCNC: 5.1 MMOL/L
PROT SERPL-MCNC: 7.8 G/DL
RBC # BLD: 2.77 M/UL
RBC # FLD: 24.6 %
SODIUM SERPL-SCNC: 142 MMOL/L
T4 FREE SERPL-MCNC: 1.3 NG/DL
TSH SERPL-ACNC: 2.96 UIU/ML
WBC # FLD AUTO: 7.88 K/UL

## 2017-09-01 ENCOUNTER — RESULT REVIEW (OUTPATIENT)
Age: 77
End: 2017-09-01

## 2017-09-01 ENCOUNTER — OUTPATIENT (OUTPATIENT)
Dept: OUTPATIENT SERVICES | Facility: HOSPITAL | Age: 77
LOS: 1 days | End: 2017-09-01
Payer: MEDICARE

## 2017-09-01 ENCOUNTER — APPOINTMENT (OUTPATIENT)
Dept: HEMATOLOGY ONCOLOGY | Facility: CLINIC | Age: 77
End: 2017-09-01

## 2017-09-01 ENCOUNTER — LABORATORY RESULT (OUTPATIENT)
Age: 77
End: 2017-09-01

## 2017-09-01 DIAGNOSIS — Z95.5 PRESENCE OF CORONARY ANGIOPLASTY IMPLANT AND GRAFT: Chronic | ICD-10-CM

## 2017-09-01 DIAGNOSIS — Z98.89 OTHER SPECIFIED POSTPROCEDURAL STATES: Chronic | ICD-10-CM

## 2017-09-01 DIAGNOSIS — D46.9 MYELODYSPLASTIC SYNDROME, UNSPECIFIED: ICD-10-CM

## 2017-09-01 DIAGNOSIS — Z90.89 ACQUIRED ABSENCE OF OTHER ORGANS: Chronic | ICD-10-CM

## 2017-09-01 LAB
BASOPHILS # BLD AUTO: 0.1 K/UL — SIGNIFICANT CHANGE UP (ref 0–0.2)
BASOPHILS NFR BLD AUTO: 1.1 % — SIGNIFICANT CHANGE UP (ref 0–2)
BLD GP AB SCN SERPL QL: NEGATIVE — SIGNIFICANT CHANGE UP
EOSINOPHIL # BLD AUTO: 0.5 K/UL — SIGNIFICANT CHANGE UP (ref 0–0.5)
EOSINOPHIL NFR BLD AUTO: 8 % — HIGH (ref 0–6)
HCT VFR BLD CALC: 27 % — LOW (ref 39–50)
HGB BLD-MCNC: 9.1 G/DL — LOW (ref 13–17)
LYMPHOCYTES # BLD AUTO: 1.8 K/UL — SIGNIFICANT CHANGE UP (ref 1–3.3)
LYMPHOCYTES # BLD AUTO: 27 % — SIGNIFICANT CHANGE UP (ref 13–44)
MCHC RBC-ENTMCNC: 33.9 G/DL — SIGNIFICANT CHANGE UP (ref 32–36)
MCHC RBC-ENTMCNC: 34 PG — SIGNIFICANT CHANGE UP (ref 27–34)
MCV RBC AUTO: 100 FL — SIGNIFICANT CHANGE UP (ref 80–100)
MONOCYTES # BLD AUTO: 0.5 K/UL — SIGNIFICANT CHANGE UP (ref 0–0.9)
MONOCYTES NFR BLD AUTO: 7 % — SIGNIFICANT CHANGE UP (ref 2–14)
NEUTROPHILS # BLD AUTO: 3.8 K/UL — SIGNIFICANT CHANGE UP (ref 1.8–7.4)
NEUTROPHILS NFR BLD AUTO: 56.9 % — SIGNIFICANT CHANGE UP (ref 43–77)
PLATELET # BLD AUTO: 249 K/UL — SIGNIFICANT CHANGE UP (ref 150–400)
RBC # BLD: 2.69 M/UL — LOW (ref 4.2–5.8)
RBC # FLD: 23.3 % — HIGH (ref 10.3–14.5)
RH IG SCN BLD-IMP: POSITIVE — SIGNIFICANT CHANGE UP
WBC # BLD: 6.7 K/UL — SIGNIFICANT CHANGE UP (ref 3.8–10.5)
WBC # FLD AUTO: 6.7 K/UL — SIGNIFICANT CHANGE UP (ref 3.8–10.5)

## 2017-09-02 ENCOUNTER — APPOINTMENT (OUTPATIENT)
Dept: INFUSION THERAPY | Facility: HOSPITAL | Age: 77
End: 2017-09-02

## 2017-09-05 ENCOUNTER — APPOINTMENT (OUTPATIENT)
Dept: CARDIOLOGY | Facility: CLINIC | Age: 77
End: 2017-09-05
Payer: MEDICARE

## 2017-09-05 VITALS
BODY MASS INDEX: 25.48 KG/M2 | SYSTOLIC BLOOD PRESSURE: 122 MMHG | WEIGHT: 178 LBS | HEART RATE: 58 BPM | HEIGHT: 70 IN | DIASTOLIC BLOOD PRESSURE: 73 MMHG | OXYGEN SATURATION: 100 %

## 2017-09-05 PROCEDURE — 99215 OFFICE O/P EST HI 40 MIN: CPT

## 2017-09-05 PROCEDURE — 93000 ELECTROCARDIOGRAM COMPLETE: CPT

## 2017-09-06 DIAGNOSIS — Z51.89 ENCOUNTER FOR OTHER SPECIFIED AFTERCARE: ICD-10-CM

## 2017-09-08 ENCOUNTER — APPOINTMENT (OUTPATIENT)
Dept: HEMATOLOGY ONCOLOGY | Facility: CLINIC | Age: 77
End: 2017-09-08

## 2017-09-21 ENCOUNTER — OUTPATIENT (OUTPATIENT)
Dept: OUTPATIENT SERVICES | Facility: HOSPITAL | Age: 77
LOS: 1 days | Discharge: HOME | End: 2017-09-21

## 2017-09-21 DIAGNOSIS — Z98.89 OTHER SPECIFIED POSTPROCEDURAL STATES: Chronic | ICD-10-CM

## 2017-09-21 DIAGNOSIS — Z90.89 ACQUIRED ABSENCE OF OTHER ORGANS: Chronic | ICD-10-CM

## 2017-09-21 DIAGNOSIS — D46.9 MYELODYSPLASTIC SYNDROME, UNSPECIFIED: ICD-10-CM

## 2017-09-21 DIAGNOSIS — Z95.5 PRESENCE OF CORONARY ANGIOPLASTY IMPLANT AND GRAFT: Chronic | ICD-10-CM

## 2017-09-22 ENCOUNTER — MEDICATION RENEWAL (OUTPATIENT)
Age: 77
End: 2017-09-22

## 2017-09-25 ENCOUNTER — RESULT REVIEW (OUTPATIENT)
Age: 77
End: 2017-09-25

## 2017-09-25 ENCOUNTER — LABORATORY RESULT (OUTPATIENT)
Age: 77
End: 2017-09-25

## 2017-09-25 ENCOUNTER — APPOINTMENT (OUTPATIENT)
Dept: HEMATOLOGY ONCOLOGY | Facility: CLINIC | Age: 77
End: 2017-09-25
Payer: MEDICARE

## 2017-09-25 VITALS
BODY MASS INDEX: 25.31 KG/M2 | DIASTOLIC BLOOD PRESSURE: 70 MMHG | HEART RATE: 63 BPM | RESPIRATION RATE: 16 BRPM | WEIGHT: 176.37 LBS | TEMPERATURE: 97.7 F | OXYGEN SATURATION: 100 % | SYSTOLIC BLOOD PRESSURE: 120 MMHG

## 2017-09-25 LAB
ANISOCYTOSIS BLD QL: SIGNIFICANT CHANGE UP
BLD GP AB SCN SERPL QL: NEGATIVE — SIGNIFICANT CHANGE UP
ELLIPTOCYTES BLD QL SMEAR: SLIGHT — SIGNIFICANT CHANGE UP
EOSINOPHIL # BLD AUTO: 0 K/UL — SIGNIFICANT CHANGE UP (ref 0–0.5)
EOSINOPHIL NFR BLD AUTO: 4 % — SIGNIFICANT CHANGE UP (ref 0–6)
HCT VFR BLD CALC: 24.9 % — LOW (ref 39–50)
HGB BLD-MCNC: 8.7 G/DL — LOW (ref 13–17)
HYPOCHROMIA BLD QL: SLIGHT — SIGNIFICANT CHANGE UP
LYMPHOCYTES # BLD AUTO: 1.7 K/UL — SIGNIFICANT CHANGE UP (ref 1–3.3)
LYMPHOCYTES # BLD AUTO: 32 % — SIGNIFICANT CHANGE UP (ref 13–44)
MCHC RBC-ENTMCNC: 35 PG — HIGH (ref 27–34)
MCHC RBC-ENTMCNC: 35.1 G/DL — SIGNIFICANT CHANGE UP (ref 32–36)
MCV RBC AUTO: 99.9 FL — SIGNIFICANT CHANGE UP (ref 80–100)
MONOCYTES # BLD AUTO: 0.3 K/UL — SIGNIFICANT CHANGE UP (ref 0–0.9)
MONOCYTES NFR BLD AUTO: 7 % — SIGNIFICANT CHANGE UP (ref 2–14)
NEUTROPHILS # BLD AUTO: 3.4 K/UL — SIGNIFICANT CHANGE UP (ref 1.8–7.4)
NEUTROPHILS NFR BLD AUTO: 57 % — SIGNIFICANT CHANGE UP (ref 43–77)
PLAT MORPH BLD: NORMAL — SIGNIFICANT CHANGE UP
PLATELET # BLD AUTO: 260 K/UL — SIGNIFICANT CHANGE UP (ref 150–400)
POLYCHROMASIA BLD QL SMEAR: SLIGHT — SIGNIFICANT CHANGE UP
RBC # BLD: 2.49 M/UL — LOW (ref 4.2–5.8)
RBC # FLD: 24 % — HIGH (ref 10.3–14.5)
RBC BLD AUTO: ABNORMAL
RH IG SCN BLD-IMP: POSITIVE — SIGNIFICANT CHANGE UP
WBC # BLD: 5.5 K/UL — SIGNIFICANT CHANGE UP (ref 3.8–10.5)
WBC # FLD AUTO: 5.5 K/UL — SIGNIFICANT CHANGE UP (ref 3.8–10.5)

## 2017-09-25 PROCEDURE — 86900 BLOOD TYPING SEROLOGIC ABO: CPT

## 2017-09-25 PROCEDURE — 86850 RBC ANTIBODY SCREEN: CPT

## 2017-09-25 PROCEDURE — 99214 OFFICE O/P EST MOD 30 MIN: CPT

## 2017-09-25 PROCEDURE — 86901 BLOOD TYPING SEROLOGIC RH(D): CPT

## 2017-09-25 PROCEDURE — 86923 COMPATIBILITY TEST ELECTRIC: CPT

## 2017-09-26 ENCOUNTER — APPOINTMENT (OUTPATIENT)
Dept: INFUSION THERAPY | Facility: HOSPITAL | Age: 77
End: 2017-09-26

## 2017-09-26 ENCOUNTER — APPOINTMENT (OUTPATIENT)
Dept: ELECTROPHYSIOLOGY | Facility: CLINIC | Age: 77
End: 2017-09-26
Payer: MEDICARE

## 2017-09-26 PROCEDURE — 93298 REM INTERROG DEV EVAL SCRMS: CPT

## 2017-09-27 DIAGNOSIS — Z51.89 ENCOUNTER FOR OTHER SPECIFIED AFTERCARE: ICD-10-CM

## 2017-10-02 ENCOUNTER — APPOINTMENT (OUTPATIENT)
Dept: ELECTROPHYSIOLOGY | Facility: CLINIC | Age: 77
End: 2017-10-02

## 2017-10-04 NOTE — PHYSICAL THERAPY INITIAL EVALUATION ADULT - PHYSICAL ASSIST/NONPHYSICAL ASSIST: GAIT, REHAB EVAL
"Discussed with pt the predictability of visual outcome after cataract surgery is more difficult in s/p LVC, RK, or s/p refractive surgical pts. The pt is at greater risk for the need of a second surgery or ""touch up"" to achieve desired visual outcome. Pt understands. " verbal cues

## 2017-10-06 ENCOUNTER — RESULT REVIEW (OUTPATIENT)
Age: 77
End: 2017-10-06

## 2017-10-06 ENCOUNTER — OUTPATIENT (OUTPATIENT)
Dept: OUTPATIENT SERVICES | Facility: HOSPITAL | Age: 77
LOS: 1 days | End: 2017-10-06
Payer: MEDICARE

## 2017-10-06 ENCOUNTER — APPOINTMENT (OUTPATIENT)
Dept: HEMATOLOGY ONCOLOGY | Facility: CLINIC | Age: 77
End: 2017-10-06

## 2017-10-06 DIAGNOSIS — Z95.5 PRESENCE OF CORONARY ANGIOPLASTY IMPLANT AND GRAFT: Chronic | ICD-10-CM

## 2017-10-06 DIAGNOSIS — Z98.89 OTHER SPECIFIED POSTPROCEDURAL STATES: Chronic | ICD-10-CM

## 2017-10-06 DIAGNOSIS — D46.9 MYELODYSPLASTIC SYNDROME, UNSPECIFIED: ICD-10-CM

## 2017-10-06 DIAGNOSIS — Z90.89 ACQUIRED ABSENCE OF OTHER ORGANS: Chronic | ICD-10-CM

## 2017-10-06 LAB
BASOPHILS # BLD AUTO: 0.1 K/UL — SIGNIFICANT CHANGE UP (ref 0–0.2)
BASOPHILS NFR BLD AUTO: 1 % — SIGNIFICANT CHANGE UP (ref 0–2)
BLD GP AB SCN SERPL QL: NEGATIVE — SIGNIFICANT CHANGE UP
EOSINOPHIL # BLD AUTO: 0.3 K/UL — SIGNIFICANT CHANGE UP (ref 0–0.5)
EOSINOPHIL NFR BLD AUTO: 4.9 % — SIGNIFICANT CHANGE UP (ref 0–6)
HCT VFR BLD CALC: 25.7 % — LOW (ref 39–50)
HGB BLD-MCNC: 8.9 G/DL — LOW (ref 13–17)
LYMPHOCYTES # BLD AUTO: 1.6 K/UL — SIGNIFICANT CHANGE UP (ref 1–3.3)
LYMPHOCYTES # BLD AUTO: 23.8 % — SIGNIFICANT CHANGE UP (ref 13–44)
MCHC RBC-ENTMCNC: 33.7 PG — SIGNIFICANT CHANGE UP (ref 27–34)
MCHC RBC-ENTMCNC: 34.7 G/DL — SIGNIFICANT CHANGE UP (ref 32–36)
MCV RBC AUTO: 97.1 FL — SIGNIFICANT CHANGE UP (ref 80–100)
MONOCYTES # BLD AUTO: 0.1 K/UL — SIGNIFICANT CHANGE UP (ref 0–0.9)
MONOCYTES NFR BLD AUTO: 1.8 % — LOW (ref 2–14)
NEUTROPHILS # BLD AUTO: 4.5 K/UL — SIGNIFICANT CHANGE UP (ref 1.8–7.4)
NEUTROPHILS NFR BLD AUTO: 68.5 % — SIGNIFICANT CHANGE UP (ref 43–77)
PLATELET # BLD AUTO: 246 K/UL — SIGNIFICANT CHANGE UP (ref 150–400)
RBC # BLD: 2.65 M/UL — LOW (ref 4.2–5.8)
RBC # FLD: 23.9 % — HIGH (ref 10.3–14.5)
RH IG SCN BLD-IMP: POSITIVE — SIGNIFICANT CHANGE UP
WBC # BLD: 6.6 K/UL — SIGNIFICANT CHANGE UP (ref 3.8–10.5)
WBC # FLD AUTO: 6.6 K/UL — SIGNIFICANT CHANGE UP (ref 3.8–10.5)

## 2017-10-06 PROCEDURE — 86901 BLOOD TYPING SEROLOGIC RH(D): CPT

## 2017-10-06 PROCEDURE — 86923 COMPATIBILITY TEST ELECTRIC: CPT

## 2017-10-06 PROCEDURE — 86900 BLOOD TYPING SEROLOGIC ABO: CPT

## 2017-10-06 PROCEDURE — 86850 RBC ANTIBODY SCREEN: CPT

## 2017-10-07 ENCOUNTER — APPOINTMENT (OUTPATIENT)
Dept: INFUSION THERAPY | Facility: HOSPITAL | Age: 77
End: 2017-10-07

## 2017-10-16 ENCOUNTER — RX RENEWAL (OUTPATIENT)
Age: 77
End: 2017-10-16

## 2017-10-25 ENCOUNTER — APPOINTMENT (OUTPATIENT)
Dept: INTERNAL MEDICINE | Facility: CLINIC | Age: 77
End: 2017-10-25
Payer: MEDICARE

## 2017-10-25 VITALS
BODY MASS INDEX: 25.34 KG/M2 | WEIGHT: 177 LBS | SYSTOLIC BLOOD PRESSURE: 120 MMHG | OXYGEN SATURATION: 97 % | HEART RATE: 66 BPM | DIASTOLIC BLOOD PRESSURE: 60 MMHG | HEIGHT: 70 IN

## 2017-10-25 DIAGNOSIS — Z87.891 PERSONAL HISTORY OF NICOTINE DEPENDENCE: ICD-10-CM

## 2017-10-25 DIAGNOSIS — Z00.00 ENCOUNTER FOR GENERAL ADULT MEDICAL EXAMINATION W/OUT ABNORMAL FINDINGS: ICD-10-CM

## 2017-10-25 PROCEDURE — 99215 OFFICE O/P EST HI 40 MIN: CPT

## 2017-10-25 RX ORDER — LENALIDOMIDE 10 MG/1
10 CAPSULE ORAL DAILY
Qty: 21 | Refills: 0 | Status: DISCONTINUED | COMMUNITY
Start: 2017-06-14 | End: 2017-10-25

## 2017-10-25 RX ORDER — CETIRIZINE HYDROCHLORIDE 10 MG/1
10 TABLET, FILM COATED ORAL DAILY
Qty: 30 | Refills: 3 | Status: DISCONTINUED | COMMUNITY
Start: 2017-04-27 | End: 2017-10-25

## 2017-10-26 ENCOUNTER — MEDICATION RENEWAL (OUTPATIENT)
Age: 77
End: 2017-10-26

## 2017-10-27 LAB
ALBUMIN SERPL ELPH-MCNC: 4.4 G/DL
ALP BLD-CCNC: 148 U/L
ALT SERPL-CCNC: 23 U/L
ANION GAP SERPL CALC-SCNC: 16 MMOL/L
AST SERPL-CCNC: 21 U/L
BILIRUB SERPL-MCNC: 0.6 MG/DL
BUN SERPL-MCNC: 49 MG/DL
CALCIUM SERPL-MCNC: 9.9 MG/DL
CHLORIDE SERPL-SCNC: 103 MMOL/L
CO2 SERPL-SCNC: 22 MMOL/L
CREAT SERPL-MCNC: 1.28 MG/DL
GLUCOSE SERPL-MCNC: 133 MG/DL
POTASSIUM SERPL-SCNC: 5.2 MMOL/L
PROT SERPL-MCNC: 7.5 G/DL
SODIUM SERPL-SCNC: 141 MMOL/L
TSH SERPL-ACNC: 4.05 UIU/ML

## 2017-10-30 ENCOUNTER — RX RENEWAL (OUTPATIENT)
Age: 77
End: 2017-10-30

## 2017-11-03 RX ORDER — LANCETS
EACH MISCELLANEOUS
Qty: 2 | Refills: 3 | Status: ACTIVE | COMMUNITY
Start: 2017-11-03

## 2017-11-03 RX ORDER — BLOOD-GLUCOSE METER
W/DEVICE KIT MISCELLANEOUS
Qty: 1 | Refills: 1 | Status: ACTIVE | COMMUNITY
Start: 2017-11-03

## 2017-11-03 RX ORDER — BLOOD SUGAR DIAGNOSTIC
STRIP MISCELLANEOUS DAILY
Qty: 2 | Refills: 3 | Status: ACTIVE | COMMUNITY
Start: 2017-11-03

## 2017-11-06 ENCOUNTER — APPOINTMENT (OUTPATIENT)
Dept: ELECTROPHYSIOLOGY | Facility: CLINIC | Age: 77
End: 2017-11-06
Payer: MEDICARE

## 2017-11-06 PROCEDURE — 93299: CPT

## 2017-11-06 PROCEDURE — 93298 REM INTERROG DEV EVAL SCRMS: CPT

## 2017-11-20 ENCOUNTER — MEDICATION RENEWAL (OUTPATIENT)
Age: 77
End: 2017-11-20

## 2017-12-04 ENCOUNTER — RESULT REVIEW (OUTPATIENT)
Age: 77
End: 2017-12-04

## 2017-12-04 ENCOUNTER — APPOINTMENT (OUTPATIENT)
Dept: HEMATOLOGY ONCOLOGY | Facility: CLINIC | Age: 77
End: 2017-12-04

## 2017-12-04 ENCOUNTER — OUTPATIENT (OUTPATIENT)
Dept: OUTPATIENT SERVICES | Facility: HOSPITAL | Age: 77
LOS: 1 days | End: 2017-12-04
Payer: MEDICARE

## 2017-12-04 ENCOUNTER — OUTPATIENT (OUTPATIENT)
Dept: OUTPATIENT SERVICES | Facility: HOSPITAL | Age: 77
LOS: 1 days | Discharge: HOME | End: 2017-12-04

## 2017-12-04 DIAGNOSIS — Z98.89 OTHER SPECIFIED POSTPROCEDURAL STATES: Chronic | ICD-10-CM

## 2017-12-04 DIAGNOSIS — Z90.89 ACQUIRED ABSENCE OF OTHER ORGANS: Chronic | ICD-10-CM

## 2017-12-04 DIAGNOSIS — D46.9 MYELODYSPLASTIC SYNDROME, UNSPECIFIED: ICD-10-CM

## 2017-12-04 DIAGNOSIS — Z95.5 PRESENCE OF CORONARY ANGIOPLASTY IMPLANT AND GRAFT: Chronic | ICD-10-CM

## 2017-12-04 LAB
BASOPHILS # BLD AUTO: 0.1 K/UL — SIGNIFICANT CHANGE UP (ref 0–0.2)
BASOPHILS NFR BLD AUTO: 1.1 % — SIGNIFICANT CHANGE UP (ref 0–2)
BLD GP AB SCN SERPL QL: NEGATIVE — SIGNIFICANT CHANGE UP
EOSINOPHIL # BLD AUTO: 0.6 K/UL — HIGH (ref 0–0.5)
EOSINOPHIL NFR BLD AUTO: 9.5 % — HIGH (ref 0–6)
HCT VFR BLD CALC: 19.3 % — CRITICAL LOW (ref 39–50)
HGB BLD-MCNC: 6.8 G/DL — CRITICAL LOW (ref 13–17)
LYMPHOCYTES # BLD AUTO: 1.6 K/UL — SIGNIFICANT CHANGE UP (ref 1–3.3)
LYMPHOCYTES # BLD AUTO: 25.9 % — SIGNIFICANT CHANGE UP (ref 13–44)
MCHC RBC-ENTMCNC: 35.3 G/DL — SIGNIFICANT CHANGE UP (ref 32–36)
MCHC RBC-ENTMCNC: 36.3 PG — HIGH (ref 27–34)
MCV RBC AUTO: 103 FL — HIGH (ref 80–100)
MONOCYTES # BLD AUTO: 0.3 K/UL — SIGNIFICANT CHANGE UP (ref 0–0.9)
MONOCYTES NFR BLD AUTO: 4.4 % — SIGNIFICANT CHANGE UP (ref 2–14)
NEUTROPHILS # BLD AUTO: 3.6 K/UL — SIGNIFICANT CHANGE UP (ref 1.8–7.4)
NEUTROPHILS NFR BLD AUTO: 59.1 % — SIGNIFICANT CHANGE UP (ref 43–77)
PLATELET # BLD AUTO: 228 K/UL — SIGNIFICANT CHANGE UP (ref 150–400)
RBC # BLD: 1.88 M/UL — LOW (ref 4.2–5.8)
RBC # FLD: 23.9 % — HIGH (ref 10.3–14.5)
RH IG SCN BLD-IMP: POSITIVE — SIGNIFICANT CHANGE UP
WBC # BLD: 6 K/UL — SIGNIFICANT CHANGE UP (ref 3.8–10.5)
WBC # FLD AUTO: 6 K/UL — SIGNIFICANT CHANGE UP (ref 3.8–10.5)

## 2017-12-06 ENCOUNTER — APPOINTMENT (OUTPATIENT)
Dept: INFUSION THERAPY | Facility: HOSPITAL | Age: 77
End: 2017-12-06

## 2017-12-06 ENCOUNTER — LABORATORY RESULT (OUTPATIENT)
Age: 77
End: 2017-12-06

## 2017-12-07 DIAGNOSIS — Z51.89 ENCOUNTER FOR OTHER SPECIFIED AFTERCARE: ICD-10-CM

## 2017-12-11 ENCOUNTER — APPOINTMENT (OUTPATIENT)
Dept: HEMATOLOGY ONCOLOGY | Facility: CLINIC | Age: 77
End: 2017-12-11

## 2017-12-11 ENCOUNTER — RESULT REVIEW (OUTPATIENT)
Age: 77
End: 2017-12-11

## 2017-12-11 LAB
BASOPHILS # BLD AUTO: 0.1 K/UL — SIGNIFICANT CHANGE UP (ref 0–0.2)
BASOPHILS NFR BLD AUTO: 1.1 % — SIGNIFICANT CHANGE UP (ref 0–2)
EOSINOPHIL # BLD AUTO: 0.5 K/UL — SIGNIFICANT CHANGE UP (ref 0–0.5)
EOSINOPHIL NFR BLD AUTO: 8.1 % — HIGH (ref 0–6)
HCT VFR BLD CALC: 27.2 % — LOW (ref 39–50)
HGB BLD-MCNC: 9.3 G/DL — LOW (ref 13–17)
LYMPHOCYTES # BLD AUTO: 1.8 K/UL — SIGNIFICANT CHANGE UP (ref 1–3.3)
LYMPHOCYTES # BLD AUTO: 27.2 % — SIGNIFICANT CHANGE UP (ref 13–44)
MCHC RBC-ENTMCNC: 34.1 PG — HIGH (ref 27–34)
MCHC RBC-ENTMCNC: 34.3 G/DL — SIGNIFICANT CHANGE UP (ref 32–36)
MCV RBC AUTO: 99.2 FL — SIGNIFICANT CHANGE UP (ref 80–100)
MONOCYTES # BLD AUTO: 0.3 K/UL — SIGNIFICANT CHANGE UP (ref 0–0.9)
MONOCYTES NFR BLD AUTO: 4.1 % — SIGNIFICANT CHANGE UP (ref 2–14)
NEUTROPHILS # BLD AUTO: 4 K/UL — SIGNIFICANT CHANGE UP (ref 1.8–7.4)
NEUTROPHILS NFR BLD AUTO: 59.6 % — SIGNIFICANT CHANGE UP (ref 43–77)
PLATELET # BLD AUTO: 294 K/UL — SIGNIFICANT CHANGE UP (ref 150–400)
RBC # BLD: 2.74 M/UL — LOW (ref 4.2–5.8)
RBC # FLD: 21.5 % — HIGH (ref 10.3–14.5)
WBC # BLD: 6.7 K/UL — SIGNIFICANT CHANGE UP (ref 3.8–10.5)
WBC # FLD AUTO: 6.7 K/UL — SIGNIFICANT CHANGE UP (ref 3.8–10.5)

## 2017-12-22 ENCOUNTER — RESULT REVIEW (OUTPATIENT)
Age: 77
End: 2017-12-22

## 2017-12-22 ENCOUNTER — APPOINTMENT (OUTPATIENT)
Dept: ENDOCRINOLOGY | Facility: CLINIC | Age: 77
End: 2017-12-22

## 2017-12-22 ENCOUNTER — APPOINTMENT (OUTPATIENT)
Dept: HEMATOLOGY ONCOLOGY | Facility: CLINIC | Age: 77
End: 2017-12-22

## 2017-12-22 LAB
ANISOCYTOSIS BLD QL: SLIGHT — SIGNIFICANT CHANGE UP
BASOPHILS # BLD AUTO: 0.1 K/UL — SIGNIFICANT CHANGE UP (ref 0–0.2)
BASOPHILS NFR BLD AUTO: 4 % — HIGH (ref 0–2)
BLD GP AB SCN SERPL QL: NEGATIVE — SIGNIFICANT CHANGE UP
DACRYOCYTES BLD QL SMEAR: SLIGHT — SIGNIFICANT CHANGE UP
EOSINOPHIL # BLD AUTO: 0.5 K/UL — SIGNIFICANT CHANGE UP (ref 0–0.5)
EOSINOPHIL NFR BLD AUTO: 7 % — HIGH (ref 0–6)
GIANT PLATELETS BLD QL SMEAR: PRESENT — SIGNIFICANT CHANGE UP
HCT VFR BLD CALC: 28.9 % — LOW (ref 39–50)
HGB BLD-MCNC: 8.7 G/DL — LOW (ref 13–17)
LYMPHOCYTES # BLD AUTO: 1.7 K/UL — SIGNIFICANT CHANGE UP (ref 1–3.3)
LYMPHOCYTES # BLD AUTO: 24 % — SIGNIFICANT CHANGE UP (ref 13–44)
MACROCYTES BLD QL: SLIGHT — SIGNIFICANT CHANGE UP
MCHC RBC-ENTMCNC: 29.6 PG — SIGNIFICANT CHANGE UP (ref 27–34)
MCHC RBC-ENTMCNC: 30.1 G/DL — LOW (ref 32–36)
MCV RBC AUTO: 98.3 FL — SIGNIFICANT CHANGE UP (ref 80–100)
METAMYELOCYTES # FLD: 1 % — HIGH (ref 0–0)
MICROCYTES BLD QL: SLIGHT — SIGNIFICANT CHANGE UP
MONOCYTES # BLD AUTO: 0.2 K/UL — SIGNIFICANT CHANGE UP (ref 0–0.9)
MONOCYTES NFR BLD AUTO: 3 % — SIGNIFICANT CHANGE UP (ref 2–14)
NEUTROPHILS # BLD AUTO: 4.2 K/UL — SIGNIFICANT CHANGE UP (ref 1.8–7.4)
NEUTROPHILS NFR BLD AUTO: 61 % — SIGNIFICANT CHANGE UP (ref 43–77)
PLAT MORPH BLD: ABNORMAL
PLATELET # BLD AUTO: 301 K/UL — SIGNIFICANT CHANGE UP (ref 150–400)
POIKILOCYTOSIS BLD QL AUTO: SLIGHT — SIGNIFICANT CHANGE UP
RBC # BLD: 2.94 M/UL — LOW (ref 4.2–5.8)
RBC # FLD: 21.2 % — HIGH (ref 10.3–14.5)
RBC BLD AUTO: ABNORMAL
RH IG SCN BLD-IMP: POSITIVE — SIGNIFICANT CHANGE UP
SCHISTOCYTES BLD QL AUTO: SLIGHT — SIGNIFICANT CHANGE UP
STOMATOCYTES BLD QL SMEAR: PRESENT — SIGNIFICANT CHANGE UP
WBC # BLD: 6.8 K/UL — SIGNIFICANT CHANGE UP (ref 3.8–10.5)
WBC # FLD AUTO: 6.8 K/UL — SIGNIFICANT CHANGE UP (ref 3.8–10.5)

## 2017-12-23 ENCOUNTER — APPOINTMENT (OUTPATIENT)
Dept: INFUSION THERAPY | Facility: HOSPITAL | Age: 77
End: 2017-12-23

## 2017-12-26 ENCOUNTER — OUTPATIENT (OUTPATIENT)
Dept: OUTPATIENT SERVICES | Facility: HOSPITAL | Age: 77
LOS: 1 days | Discharge: HOME | End: 2017-12-26

## 2017-12-26 DIAGNOSIS — Z95.5 PRESENCE OF CORONARY ANGIOPLASTY IMPLANT AND GRAFT: Chronic | ICD-10-CM

## 2017-12-26 DIAGNOSIS — Z98.89 OTHER SPECIFIED POSTPROCEDURAL STATES: Chronic | ICD-10-CM

## 2017-12-26 DIAGNOSIS — D46.9 MYELODYSPLASTIC SYNDROME, UNSPECIFIED: ICD-10-CM

## 2017-12-26 DIAGNOSIS — Z90.89 ACQUIRED ABSENCE OF OTHER ORGANS: Chronic | ICD-10-CM

## 2017-12-28 ENCOUNTER — OTHER (OUTPATIENT)
Age: 77
End: 2017-12-28

## 2017-12-29 ENCOUNTER — APPOINTMENT (OUTPATIENT)
Dept: HEMATOLOGY ONCOLOGY | Facility: CLINIC | Age: 77
End: 2017-12-29
Payer: MEDICARE

## 2017-12-29 ENCOUNTER — RESULT REVIEW (OUTPATIENT)
Age: 77
End: 2017-12-29

## 2017-12-29 VITALS
RESPIRATION RATE: 16 BRPM | SYSTOLIC BLOOD PRESSURE: 118 MMHG | DIASTOLIC BLOOD PRESSURE: 80 MMHG | TEMPERATURE: 97.4 F | OXYGEN SATURATION: 98 % | WEIGHT: 171.96 LBS | BODY MASS INDEX: 24.67 KG/M2 | HEART RATE: 53 BPM

## 2017-12-29 LAB
BASOPHILS # BLD AUTO: 0.1 K/UL — SIGNIFICANT CHANGE UP (ref 0–0.2)
BASOPHILS NFR BLD AUTO: 1.2 % — SIGNIFICANT CHANGE UP (ref 0–2)
BLD GP AB SCN SERPL QL: NEGATIVE — SIGNIFICANT CHANGE UP
EOSINOPHIL # BLD AUTO: 0.7 K/UL — HIGH (ref 0–0.5)
EOSINOPHIL NFR BLD AUTO: 10 % — HIGH (ref 0–6)
HCT VFR BLD CALC: 26.1 % — LOW (ref 39–50)
HGB BLD-MCNC: 9.1 G/DL — LOW (ref 13–17)
LYMPHOCYTES # BLD AUTO: 1.5 K/UL — SIGNIFICANT CHANGE UP (ref 1–3.3)
LYMPHOCYTES # BLD AUTO: 22.8 % — SIGNIFICANT CHANGE UP (ref 13–44)
MCHC RBC-ENTMCNC: 34 PG — SIGNIFICANT CHANGE UP (ref 27–34)
MCHC RBC-ENTMCNC: 34.8 G/DL — SIGNIFICANT CHANGE UP (ref 32–36)
MCV RBC AUTO: 97.7 FL — SIGNIFICANT CHANGE UP (ref 80–100)
MONOCYTES # BLD AUTO: 0.1 K/UL — SIGNIFICANT CHANGE UP (ref 0–0.9)
MONOCYTES NFR BLD AUTO: 0.8 % — LOW (ref 2–14)
NEUTROPHILS # BLD AUTO: 4.4 K/UL — SIGNIFICANT CHANGE UP (ref 1.8–7.4)
NEUTROPHILS NFR BLD AUTO: 65.1 % — SIGNIFICANT CHANGE UP (ref 43–77)
PLATELET # BLD AUTO: 259 K/UL — SIGNIFICANT CHANGE UP (ref 150–400)
RBC # BLD: 2.67 M/UL — LOW (ref 4.2–5.8)
RBC # FLD: 20.5 % — HIGH (ref 10.3–14.5)
RH IG SCN BLD-IMP: POSITIVE — SIGNIFICANT CHANGE UP
WBC # BLD: 6.8 K/UL — SIGNIFICANT CHANGE UP (ref 3.8–10.5)
WBC # FLD AUTO: 6.8 K/UL — SIGNIFICANT CHANGE UP (ref 3.8–10.5)

## 2017-12-29 PROCEDURE — 86901 BLOOD TYPING SEROLOGIC RH(D): CPT

## 2017-12-29 PROCEDURE — 86850 RBC ANTIBODY SCREEN: CPT

## 2017-12-29 PROCEDURE — 86900 BLOOD TYPING SEROLOGIC ABO: CPT

## 2017-12-29 PROCEDURE — 99214 OFFICE O/P EST MOD 30 MIN: CPT

## 2017-12-29 PROCEDURE — 86923 COMPATIBILITY TEST ELECTRIC: CPT

## 2017-12-30 ENCOUNTER — APPOINTMENT (OUTPATIENT)
Dept: INFUSION THERAPY | Facility: HOSPITAL | Age: 77
End: 2017-12-30

## 2018-01-03 DIAGNOSIS — Z51.89 ENCOUNTER FOR OTHER SPECIFIED AFTERCARE: ICD-10-CM

## 2018-01-04 ENCOUNTER — APPOINTMENT (OUTPATIENT)
Dept: ELECTROPHYSIOLOGY | Facility: CLINIC | Age: 78
End: 2018-01-04

## 2018-01-04 ENCOUNTER — APPOINTMENT (OUTPATIENT)
Dept: CARDIOLOGY | Facility: CLINIC | Age: 78
End: 2018-01-04

## 2018-01-05 ENCOUNTER — APPOINTMENT (OUTPATIENT)
Dept: INFUSION THERAPY | Facility: HOSPITAL | Age: 78
End: 2018-01-05

## 2018-01-10 NOTE — PATIENT PROFILE ADULT. - NS TRANSFER EYEGLASSES PAIRS
Dr. Kapadia General paged regarding hgb and d/c planning and stated ok to discharge and follow up with Dr. Serena Griffin as outpatient. 1 pair

## 2018-01-16 ENCOUNTER — APPOINTMENT (OUTPATIENT)
Dept: ELECTROPHYSIOLOGY | Facility: CLINIC | Age: 78
End: 2018-01-16
Payer: MEDICARE

## 2018-01-16 PROCEDURE — 93298 REM INTERROG DEV EVAL SCRMS: CPT

## 2018-01-16 PROCEDURE — 93299: CPT

## 2018-01-22 ENCOUNTER — RESULT REVIEW (OUTPATIENT)
Age: 78
End: 2018-01-22

## 2018-01-22 ENCOUNTER — APPOINTMENT (OUTPATIENT)
Dept: HEMATOLOGY ONCOLOGY | Facility: CLINIC | Age: 78
End: 2018-01-22

## 2018-01-22 LAB
BASOPHILS # BLD AUTO: 0.1 K/UL — SIGNIFICANT CHANGE UP (ref 0–0.2)
BASOPHILS NFR BLD AUTO: 1.2 % — SIGNIFICANT CHANGE UP (ref 0–2)
EOSINOPHIL # BLD AUTO: 0.6 K/UL — HIGH (ref 0–0.5)
EOSINOPHIL NFR BLD AUTO: 8.8 % — HIGH (ref 0–6)
HCT VFR BLD CALC: 24.8 % — LOW (ref 39–50)
HGB BLD-MCNC: 9 G/DL — LOW (ref 13–17)
LYMPHOCYTES # BLD AUTO: 1.3 K/UL — SIGNIFICANT CHANGE UP (ref 1–3.3)
LYMPHOCYTES # BLD AUTO: 20.6 % — SIGNIFICANT CHANGE UP (ref 13–44)
MCHC RBC-ENTMCNC: 34 PG — SIGNIFICANT CHANGE UP (ref 27–34)
MCHC RBC-ENTMCNC: 36.3 G/DL — HIGH (ref 32–36)
MCV RBC AUTO: 93.9 FL — SIGNIFICANT CHANGE UP (ref 80–100)
MONOCYTES # BLD AUTO: 0.2 K/UL — SIGNIFICANT CHANGE UP (ref 0–0.9)
MONOCYTES NFR BLD AUTO: 2.9 % — SIGNIFICANT CHANGE UP (ref 2–14)
NEUTROPHILS # BLD AUTO: 4.2 K/UL — SIGNIFICANT CHANGE UP (ref 1.8–7.4)
NEUTROPHILS NFR BLD AUTO: 66.6 % — SIGNIFICANT CHANGE UP (ref 43–77)
PLATELET # BLD AUTO: 267 K/UL — SIGNIFICANT CHANGE UP (ref 150–400)
RBC # BLD: 2.64 M/UL — LOW (ref 4.2–5.8)
RBC # FLD: 19.3 % — HIGH (ref 10.3–14.5)
WBC # BLD: 6.3 K/UL — SIGNIFICANT CHANGE UP (ref 3.8–10.5)
WBC # FLD AUTO: 6.3 K/UL — SIGNIFICANT CHANGE UP (ref 3.8–10.5)

## 2018-01-25 ENCOUNTER — OUTPATIENT (OUTPATIENT)
Dept: OUTPATIENT SERVICES | Facility: HOSPITAL | Age: 78
LOS: 1 days | Discharge: HOME | End: 2018-01-25

## 2018-01-25 DIAGNOSIS — Z98.89 OTHER SPECIFIED POSTPROCEDURAL STATES: Chronic | ICD-10-CM

## 2018-01-25 DIAGNOSIS — D46.9 MYELODYSPLASTIC SYNDROME, UNSPECIFIED: ICD-10-CM

## 2018-01-25 DIAGNOSIS — Z90.89 ACQUIRED ABSENCE OF OTHER ORGANS: Chronic | ICD-10-CM

## 2018-01-25 DIAGNOSIS — Z95.5 PRESENCE OF CORONARY ANGIOPLASTY IMPLANT AND GRAFT: Chronic | ICD-10-CM

## 2018-01-29 ENCOUNTER — RESULT REVIEW (OUTPATIENT)
Age: 78
End: 2018-01-29

## 2018-01-29 ENCOUNTER — OUTPATIENT (OUTPATIENT)
Dept: OUTPATIENT SERVICES | Facility: HOSPITAL | Age: 78
LOS: 1 days | End: 2018-01-29
Payer: MEDICARE

## 2018-01-29 ENCOUNTER — APPOINTMENT (OUTPATIENT)
Dept: HEMATOLOGY ONCOLOGY | Facility: CLINIC | Age: 78
End: 2018-01-29
Payer: MEDICARE

## 2018-01-29 ENCOUNTER — RX RENEWAL (OUTPATIENT)
Age: 78
End: 2018-01-29

## 2018-01-29 VITALS
TEMPERATURE: 97.5 F | DIASTOLIC BLOOD PRESSURE: 66 MMHG | SYSTOLIC BLOOD PRESSURE: 111 MMHG | BODY MASS INDEX: 24.58 KG/M2 | WEIGHT: 171.3 LBS

## 2018-01-29 DIAGNOSIS — Z98.89 OTHER SPECIFIED POSTPROCEDURAL STATES: Chronic | ICD-10-CM

## 2018-01-29 DIAGNOSIS — Z95.5 PRESENCE OF CORONARY ANGIOPLASTY IMPLANT AND GRAFT: Chronic | ICD-10-CM

## 2018-01-29 DIAGNOSIS — Z90.89 ACQUIRED ABSENCE OF OTHER ORGANS: Chronic | ICD-10-CM

## 2018-01-29 DIAGNOSIS — D46.9 MYELODYSPLASTIC SYNDROME, UNSPECIFIED: ICD-10-CM

## 2018-01-29 LAB
BASOPHILS # BLD AUTO: 0.1 K/UL — SIGNIFICANT CHANGE UP (ref 0–0.2)
BASOPHILS NFR BLD AUTO: 1.4 % — SIGNIFICANT CHANGE UP (ref 0–2)
BLD GP AB SCN SERPL QL: NEGATIVE — SIGNIFICANT CHANGE UP
EOSINOPHIL # BLD AUTO: 0.4 K/UL — SIGNIFICANT CHANGE UP (ref 0–0.5)
EOSINOPHIL NFR BLD AUTO: 5.8 % — SIGNIFICANT CHANGE UP (ref 0–6)
HCT VFR BLD CALC: 24.6 % — LOW (ref 39–50)
HGB BLD-MCNC: 9 G/DL — LOW (ref 13–17)
LYMPHOCYTES # BLD AUTO: 1.5 K/UL — SIGNIFICANT CHANGE UP (ref 1–3.3)
LYMPHOCYTES # BLD AUTO: 21.6 % — SIGNIFICANT CHANGE UP (ref 13–44)
MCHC RBC-ENTMCNC: 34.2 PG — HIGH (ref 27–34)
MCHC RBC-ENTMCNC: 36.5 G/DL — HIGH (ref 32–36)
MCV RBC AUTO: 93.7 FL — SIGNIFICANT CHANGE UP (ref 80–100)
MONOCYTES # BLD AUTO: 0.3 K/UL — SIGNIFICANT CHANGE UP (ref 0–0.9)
MONOCYTES NFR BLD AUTO: 4.8 % — SIGNIFICANT CHANGE UP (ref 2–14)
NEUTROPHILS # BLD AUTO: 4.7 K/UL — SIGNIFICANT CHANGE UP (ref 1.8–7.4)
NEUTROPHILS NFR BLD AUTO: 66.4 % — SIGNIFICANT CHANGE UP (ref 43–77)
PLATELET # BLD AUTO: 276 K/UL — SIGNIFICANT CHANGE UP (ref 150–400)
RBC # BLD: 2.63 M/UL — LOW (ref 4.2–5.8)
RBC # FLD: 19.9 % — HIGH (ref 10.3–14.5)
RH IG SCN BLD-IMP: POSITIVE — SIGNIFICANT CHANGE UP
WBC # BLD: 7.1 K/UL — SIGNIFICANT CHANGE UP (ref 3.8–10.5)
WBC # FLD AUTO: 7.1 K/UL — SIGNIFICANT CHANGE UP (ref 3.8–10.5)

## 2018-01-29 PROCEDURE — 99214 OFFICE O/P EST MOD 30 MIN: CPT

## 2018-01-31 ENCOUNTER — APPOINTMENT (OUTPATIENT)
Dept: INFUSION THERAPY | Facility: HOSPITAL | Age: 78
End: 2018-01-31

## 2018-02-01 DIAGNOSIS — Z51.89 ENCOUNTER FOR OTHER SPECIFIED AFTERCARE: ICD-10-CM

## 2018-02-09 ENCOUNTER — RESULT REVIEW (OUTPATIENT)
Age: 78
End: 2018-02-09

## 2018-02-09 ENCOUNTER — APPOINTMENT (OUTPATIENT)
Dept: HEMATOLOGY ONCOLOGY | Facility: CLINIC | Age: 78
End: 2018-02-09

## 2018-02-09 LAB
BASOPHILS # BLD AUTO: 0.1 K/UL — SIGNIFICANT CHANGE UP (ref 0–0.2)
BASOPHILS NFR BLD AUTO: 0.7 % — SIGNIFICANT CHANGE UP (ref 0–2)
BLD GP AB SCN SERPL QL: NEGATIVE — SIGNIFICANT CHANGE UP
EOSINOPHIL # BLD AUTO: 0.4 K/UL — SIGNIFICANT CHANGE UP (ref 0–0.5)
EOSINOPHIL NFR BLD AUTO: 4.4 % — SIGNIFICANT CHANGE UP (ref 0–6)
HCT VFR BLD CALC: 27 % — LOW (ref 39–50)
HGB BLD-MCNC: 9.8 G/DL — LOW (ref 13–17)
LYMPHOCYTES # BLD AUTO: 1.5 K/UL — SIGNIFICANT CHANGE UP (ref 1–3.3)
LYMPHOCYTES # BLD AUTO: 17.8 % — SIGNIFICANT CHANGE UP (ref 13–44)
MCHC RBC-ENTMCNC: 33.9 PG — SIGNIFICANT CHANGE UP (ref 27–34)
MCHC RBC-ENTMCNC: 36.5 G/DL — HIGH (ref 32–36)
MCV RBC AUTO: 93 FL — SIGNIFICANT CHANGE UP (ref 80–100)
MONOCYTES # BLD AUTO: 0.4 K/UL — SIGNIFICANT CHANGE UP (ref 0–0.9)
MONOCYTES NFR BLD AUTO: 4.8 % — SIGNIFICANT CHANGE UP (ref 2–14)
NEUTROPHILS # BLD AUTO: 6.3 K/UL — SIGNIFICANT CHANGE UP (ref 1.8–7.4)
NEUTROPHILS NFR BLD AUTO: 72.3 % — SIGNIFICANT CHANGE UP (ref 43–77)
PLATELET # BLD AUTO: 215 K/UL — SIGNIFICANT CHANGE UP (ref 150–400)
RBC # BLD: 2.9 M/UL — LOW (ref 4.2–5.8)
RBC # FLD: 19.5 % — HIGH (ref 10.3–14.5)
RH IG SCN BLD-IMP: POSITIVE — SIGNIFICANT CHANGE UP
WBC # BLD: 8.7 K/UL — SIGNIFICANT CHANGE UP (ref 3.8–10.5)
WBC # FLD AUTO: 8.7 K/UL — SIGNIFICANT CHANGE UP (ref 3.8–10.5)

## 2018-02-13 ENCOUNTER — NON-APPOINTMENT (OUTPATIENT)
Age: 78
End: 2018-02-13

## 2018-02-13 ENCOUNTER — APPOINTMENT (OUTPATIENT)
Dept: CARDIOLOGY | Facility: CLINIC | Age: 78
End: 2018-02-13
Payer: MEDICARE

## 2018-02-13 VITALS
WEIGHT: 171.3 LBS | OXYGEN SATURATION: 99 % | HEIGHT: 70 IN | HEART RATE: 72 BPM | BODY MASS INDEX: 24.52 KG/M2 | DIASTOLIC BLOOD PRESSURE: 65 MMHG | SYSTOLIC BLOOD PRESSURE: 100 MMHG

## 2018-02-13 PROCEDURE — 93000 ELECTROCARDIOGRAM COMPLETE: CPT

## 2018-02-13 PROCEDURE — 99214 OFFICE O/P EST MOD 30 MIN: CPT

## 2018-02-13 RX ORDER — AMOXICILLIN AND CLAVULANATE POTASSIUM 875; 125 MG/1; MG/1
875-125 TABLET, COATED ORAL
Qty: 20 | Refills: 0 | Status: DISCONTINUED | COMMUNITY
Start: 2017-10-14

## 2018-02-22 ENCOUNTER — OUTPATIENT (OUTPATIENT)
Dept: OUTPATIENT SERVICES | Facility: HOSPITAL | Age: 78
LOS: 1 days | Discharge: HOME | End: 2018-02-22

## 2018-02-22 DIAGNOSIS — D46.9 MYELODYSPLASTIC SYNDROME, UNSPECIFIED: ICD-10-CM

## 2018-02-22 DIAGNOSIS — Z95.5 PRESENCE OF CORONARY ANGIOPLASTY IMPLANT AND GRAFT: Chronic | ICD-10-CM

## 2018-02-22 DIAGNOSIS — Z98.89 OTHER SPECIFIED POSTPROCEDURAL STATES: Chronic | ICD-10-CM

## 2018-02-22 DIAGNOSIS — Z90.89 ACQUIRED ABSENCE OF OTHER ORGANS: Chronic | ICD-10-CM

## 2018-02-26 ENCOUNTER — APPOINTMENT (OUTPATIENT)
Dept: ELECTROPHYSIOLOGY | Facility: CLINIC | Age: 78
End: 2018-02-26
Payer: MEDICARE

## 2018-02-26 PROCEDURE — 93298 REM INTERROG DEV EVAL SCRMS: CPT

## 2018-02-26 PROCEDURE — 93299: CPT

## 2018-02-28 ENCOUNTER — RESULT REVIEW (OUTPATIENT)
Age: 78
End: 2018-02-28

## 2018-02-28 ENCOUNTER — APPOINTMENT (OUTPATIENT)
Dept: HEMATOLOGY ONCOLOGY | Facility: CLINIC | Age: 78
End: 2018-02-28
Payer: MEDICARE

## 2018-02-28 VITALS
DIASTOLIC BLOOD PRESSURE: 73 MMHG | SYSTOLIC BLOOD PRESSURE: 121 MMHG | BODY MASS INDEX: 24.36 KG/M2 | WEIGHT: 169.73 LBS | HEART RATE: 66 BPM | TEMPERATURE: 97.7 F | OXYGEN SATURATION: 96 % | RESPIRATION RATE: 16 BRPM

## 2018-02-28 LAB
BASOPHILS # BLD AUTO: 0.1 K/UL — SIGNIFICANT CHANGE UP (ref 0–0.2)
BASOPHILS NFR BLD AUTO: 2.1 % — HIGH (ref 0–2)
BLD GP AB SCN SERPL QL: NEGATIVE — SIGNIFICANT CHANGE UP
EOSINOPHIL # BLD AUTO: 0.6 K/UL — HIGH (ref 0–0.5)
EOSINOPHIL NFR BLD AUTO: 10.3 % — HIGH (ref 0–6)
HCT VFR BLD CALC: 19.3 % — CRITICAL LOW (ref 39–50)
HGB BLD-MCNC: 6.9 G/DL — CRITICAL LOW (ref 13–17)
LYMPHOCYTES # BLD AUTO: 1.4 K/UL — SIGNIFICANT CHANGE UP (ref 1–3.3)
LYMPHOCYTES # BLD AUTO: 23.5 % — SIGNIFICANT CHANGE UP (ref 13–44)
MCHC RBC-ENTMCNC: 33.7 PG — SIGNIFICANT CHANGE UP (ref 27–34)
MCHC RBC-ENTMCNC: 35.9 G/DL — SIGNIFICANT CHANGE UP (ref 32–36)
MCV RBC AUTO: 93.8 FL — SIGNIFICANT CHANGE UP (ref 80–100)
MONOCYTES # BLD AUTO: 0.1 K/UL — SIGNIFICANT CHANGE UP (ref 0–0.9)
MONOCYTES NFR BLD AUTO: 2 % — SIGNIFICANT CHANGE UP (ref 2–14)
NEUTROPHILS # BLD AUTO: 3.6 K/UL — SIGNIFICANT CHANGE UP (ref 1.8–7.4)
NEUTROPHILS NFR BLD AUTO: 62.1 % — SIGNIFICANT CHANGE UP (ref 43–77)
PLATELET # BLD AUTO: 258 K/UL — SIGNIFICANT CHANGE UP (ref 150–400)
RBC # BLD: 2.05 M/UL — LOW (ref 4.2–5.8)
RBC # FLD: 20.9 % — HIGH (ref 10.3–14.5)
RH IG SCN BLD-IMP: POSITIVE — SIGNIFICANT CHANGE UP
WBC # BLD: 5.9 K/UL — SIGNIFICANT CHANGE UP (ref 3.8–10.5)
WBC # FLD AUTO: 5.9 K/UL — SIGNIFICANT CHANGE UP (ref 3.8–10.5)

## 2018-02-28 PROCEDURE — 86923 COMPATIBILITY TEST ELECTRIC: CPT

## 2018-02-28 PROCEDURE — 86850 RBC ANTIBODY SCREEN: CPT

## 2018-02-28 PROCEDURE — 86901 BLOOD TYPING SEROLOGIC RH(D): CPT

## 2018-02-28 PROCEDURE — 86900 BLOOD TYPING SEROLOGIC ABO: CPT

## 2018-02-28 PROCEDURE — 99214 OFFICE O/P EST MOD 30 MIN: CPT

## 2018-03-01 ENCOUNTER — OUTPATIENT (OUTPATIENT)
Dept: OUTPATIENT SERVICES | Facility: HOSPITAL | Age: 78
LOS: 1 days | End: 2018-03-01
Payer: MEDICARE

## 2018-03-01 ENCOUNTER — APPOINTMENT (OUTPATIENT)
Dept: INFUSION THERAPY | Facility: HOSPITAL | Age: 78
End: 2018-03-01

## 2018-03-01 DIAGNOSIS — Z95.5 PRESENCE OF CORONARY ANGIOPLASTY IMPLANT AND GRAFT: Chronic | ICD-10-CM

## 2018-03-01 DIAGNOSIS — Z90.89 ACQUIRED ABSENCE OF OTHER ORGANS: Chronic | ICD-10-CM

## 2018-03-01 DIAGNOSIS — D46.9 MYELODYSPLASTIC SYNDROME, UNSPECIFIED: ICD-10-CM

## 2018-03-01 DIAGNOSIS — Z98.89 OTHER SPECIFIED POSTPROCEDURAL STATES: Chronic | ICD-10-CM

## 2018-03-02 ENCOUNTER — APPOINTMENT (OUTPATIENT)
Dept: INFUSION THERAPY | Facility: HOSPITAL | Age: 78
End: 2018-03-02

## 2018-03-02 ENCOUNTER — RX RENEWAL (OUTPATIENT)
Age: 78
End: 2018-03-02

## 2018-03-02 DIAGNOSIS — Z51.89 ENCOUNTER FOR OTHER SPECIFIED AFTERCARE: ICD-10-CM

## 2018-03-02 DIAGNOSIS — R11.2 NAUSEA WITH VOMITING, UNSPECIFIED: ICD-10-CM

## 2018-03-08 ENCOUNTER — RESULT REVIEW (OUTPATIENT)
Age: 78
End: 2018-03-08

## 2018-03-08 ENCOUNTER — APPOINTMENT (OUTPATIENT)
Dept: HEMATOLOGY ONCOLOGY | Facility: CLINIC | Age: 78
End: 2018-03-08

## 2018-03-08 LAB
BASOPHILS # BLD AUTO: 0.1 K/UL — SIGNIFICANT CHANGE UP (ref 0–0.2)
BASOPHILS NFR BLD AUTO: 1.7 % — SIGNIFICANT CHANGE UP (ref 0–2)
BLD GP AB SCN SERPL QL: NEGATIVE — SIGNIFICANT CHANGE UP
EOSINOPHIL # BLD AUTO: 0.8 K/UL — HIGH (ref 0–0.5)
EOSINOPHIL NFR BLD AUTO: 13.6 % — HIGH (ref 0–6)
HCT VFR BLD CALC: 25.2 % — LOW (ref 39–50)
HGB BLD-MCNC: 8.9 G/DL — LOW (ref 13–17)
LYMPHOCYTES # BLD AUTO: 1.8 K/UL — SIGNIFICANT CHANGE UP (ref 1–3.3)
LYMPHOCYTES # BLD AUTO: 28.5 % — SIGNIFICANT CHANGE UP (ref 13–44)
MCHC RBC-ENTMCNC: 32.9 PG — SIGNIFICANT CHANGE UP (ref 27–34)
MCHC RBC-ENTMCNC: 35.5 G/DL — SIGNIFICANT CHANGE UP (ref 32–36)
MCV RBC AUTO: 92.7 FL — SIGNIFICANT CHANGE UP (ref 80–100)
MONOCYTES # BLD AUTO: 0.3 K/UL — SIGNIFICANT CHANGE UP (ref 0–0.9)
MONOCYTES NFR BLD AUTO: 5.5 % — SIGNIFICANT CHANGE UP (ref 2–14)
NEUTROPHILS # BLD AUTO: 3.2 K/UL — SIGNIFICANT CHANGE UP (ref 1.8–7.4)
NEUTROPHILS NFR BLD AUTO: 50.7 % — SIGNIFICANT CHANGE UP (ref 43–77)
PLATELET # BLD AUTO: 221 K/UL — SIGNIFICANT CHANGE UP (ref 150–400)
RBC # BLD: 2.71 M/UL — LOW (ref 4.2–5.8)
RBC # FLD: 18 % — HIGH (ref 10.3–14.5)
RH IG SCN BLD-IMP: POSITIVE — SIGNIFICANT CHANGE UP
WBC # BLD: 6.2 K/UL — SIGNIFICANT CHANGE UP (ref 3.8–10.5)
WBC # FLD AUTO: 6.2 K/UL — SIGNIFICANT CHANGE UP (ref 3.8–10.5)

## 2018-03-09 ENCOUNTER — APPOINTMENT (OUTPATIENT)
Dept: INFUSION THERAPY | Facility: HOSPITAL | Age: 78
End: 2018-03-09

## 2018-03-16 ENCOUNTER — APPOINTMENT (OUTPATIENT)
Dept: HEMATOLOGY ONCOLOGY | Facility: CLINIC | Age: 78
End: 2018-03-16
Payer: MEDICARE

## 2018-03-16 ENCOUNTER — RESULT REVIEW (OUTPATIENT)
Age: 78
End: 2018-03-16

## 2018-03-16 ENCOUNTER — LABORATORY RESULT (OUTPATIENT)
Age: 78
End: 2018-03-16

## 2018-03-16 VITALS
HEART RATE: 66 BPM | BODY MASS INDEX: 24.36 KG/M2 | WEIGHT: 169.76 LBS | SYSTOLIC BLOOD PRESSURE: 124 MMHG | OXYGEN SATURATION: 99 % | DIASTOLIC BLOOD PRESSURE: 74 MMHG | RESPIRATION RATE: 16 BRPM | TEMPERATURE: 98.2 F

## 2018-03-16 LAB
BASOPHILS # BLD AUTO: 0.1 K/UL — SIGNIFICANT CHANGE UP (ref 0–0.2)
BASOPHILS NFR BLD AUTO: 1.2 % — SIGNIFICANT CHANGE UP (ref 0–2)
EOSINOPHIL # BLD AUTO: 0.6 K/UL — HIGH (ref 0–0.5)
EOSINOPHIL NFR BLD AUTO: 8.2 % — HIGH (ref 0–6)
HCT VFR BLD CALC: 29.9 % — LOW (ref 39–50)
HGB BLD-MCNC: 10.5 G/DL — LOW (ref 13–17)
LYMPHOCYTES # BLD AUTO: 1.4 K/UL — SIGNIFICANT CHANGE UP (ref 1–3.3)
LYMPHOCYTES # BLD AUTO: 19.8 % — SIGNIFICANT CHANGE UP (ref 13–44)
MCHC RBC-ENTMCNC: 30.9 PG — SIGNIFICANT CHANGE UP (ref 27–34)
MCHC RBC-ENTMCNC: 35.1 G/DL — SIGNIFICANT CHANGE UP (ref 32–36)
MCV RBC AUTO: 88.1 FL — SIGNIFICANT CHANGE UP (ref 80–100)
MONOCYTES # BLD AUTO: 0.2 K/UL — SIGNIFICANT CHANGE UP (ref 0–0.9)
MONOCYTES NFR BLD AUTO: 2.5 % — SIGNIFICANT CHANGE UP (ref 2–14)
NEUTROPHILS # BLD AUTO: 4.8 K/UL — SIGNIFICANT CHANGE UP (ref 1.8–7.4)
NEUTROPHILS NFR BLD AUTO: 68.2 % — SIGNIFICANT CHANGE UP (ref 43–77)
PLATELET # BLD AUTO: 239 K/UL — SIGNIFICANT CHANGE UP (ref 150–400)
RBC # BLD: 3.4 M/UL — LOW (ref 4.2–5.8)
RBC # FLD: 17.8 % — HIGH (ref 10.3–14.5)
WBC # BLD: 7 K/UL — SIGNIFICANT CHANGE UP (ref 3.8–10.5)
WBC # FLD AUTO: 7 K/UL — SIGNIFICANT CHANGE UP (ref 3.8–10.5)

## 2018-03-16 PROCEDURE — 99214 OFFICE O/P EST MOD 30 MIN: CPT

## 2018-03-16 RX ORDER — METOCLOPRAMIDE 10 MG/1
10 TABLET ORAL
Qty: 30 | Refills: 1 | Status: ACTIVE | COMMUNITY
Start: 2018-03-16 | End: 1900-01-01

## 2018-03-23 ENCOUNTER — RESULT REVIEW (OUTPATIENT)
Age: 78
End: 2018-03-23

## 2018-03-23 ENCOUNTER — APPOINTMENT (OUTPATIENT)
Dept: HEMATOLOGY ONCOLOGY | Facility: CLINIC | Age: 78
End: 2018-03-23

## 2018-03-23 LAB
BASOPHILS # BLD AUTO: 0 K/UL — SIGNIFICANT CHANGE UP (ref 0–0.2)
BASOPHILS NFR BLD AUTO: 0.6 % — SIGNIFICANT CHANGE UP (ref 0–2)
EOSINOPHIL # BLD AUTO: 0.5 K/UL — SIGNIFICANT CHANGE UP (ref 0–0.5)
EOSINOPHIL NFR BLD AUTO: 10.1 % — HIGH (ref 0–6)
HCT VFR BLD CALC: 27.6 % — LOW (ref 39–50)
HGB BLD-MCNC: 9.8 G/DL — LOW (ref 13–17)
LYMPHOCYTES # BLD AUTO: 1.1 K/UL — SIGNIFICANT CHANGE UP (ref 1–3.3)
LYMPHOCYTES # BLD AUTO: 20.4 % — SIGNIFICANT CHANGE UP (ref 13–44)
MCHC RBC-ENTMCNC: 31.6 PG — SIGNIFICANT CHANGE UP (ref 27–34)
MCHC RBC-ENTMCNC: 35.5 G/DL — SIGNIFICANT CHANGE UP (ref 32–36)
MCV RBC AUTO: 89 FL — SIGNIFICANT CHANGE UP (ref 80–100)
MONOCYTES # BLD AUTO: 0.1 K/UL — SIGNIFICANT CHANGE UP (ref 0–0.9)
MONOCYTES NFR BLD AUTO: 1.2 % — LOW (ref 2–14)
NEUTROPHILS # BLD AUTO: 3.6 K/UL — SIGNIFICANT CHANGE UP (ref 1.8–7.4)
NEUTROPHILS NFR BLD AUTO: 67.6 % — SIGNIFICANT CHANGE UP (ref 43–77)
PLATELET # BLD AUTO: 258 K/UL — SIGNIFICANT CHANGE UP (ref 150–400)
RBC # BLD: 3.1 M/UL — LOW (ref 4.2–5.8)
RBC # FLD: 18.6 % — HIGH (ref 10.3–14.5)
WBC # BLD: 5.4 K/UL — SIGNIFICANT CHANGE UP (ref 3.8–10.5)
WBC # FLD AUTO: 5.4 K/UL — SIGNIFICANT CHANGE UP (ref 3.8–10.5)

## 2018-03-27 ENCOUNTER — OUTPATIENT (OUTPATIENT)
Dept: OUTPATIENT SERVICES | Facility: HOSPITAL | Age: 78
LOS: 1 days | Discharge: HOME | End: 2018-03-27

## 2018-03-27 DIAGNOSIS — Z90.89 ACQUIRED ABSENCE OF OTHER ORGANS: Chronic | ICD-10-CM

## 2018-03-27 DIAGNOSIS — Z95.5 PRESENCE OF CORONARY ANGIOPLASTY IMPLANT AND GRAFT: Chronic | ICD-10-CM

## 2018-03-27 DIAGNOSIS — D46.9 MYELODYSPLASTIC SYNDROME, UNSPECIFIED: ICD-10-CM

## 2018-03-27 DIAGNOSIS — Z98.89 OTHER SPECIFIED POSTPROCEDURAL STATES: Chronic | ICD-10-CM

## 2018-03-29 ENCOUNTER — APPOINTMENT (OUTPATIENT)
Dept: HEMATOLOGY ONCOLOGY | Facility: CLINIC | Age: 78
End: 2018-03-29

## 2018-03-29 ENCOUNTER — RESULT REVIEW (OUTPATIENT)
Age: 78
End: 2018-03-29

## 2018-03-29 LAB
BASOPHILS # BLD AUTO: 0.1 K/UL — SIGNIFICANT CHANGE UP (ref 0–0.2)
BASOPHILS NFR BLD AUTO: 0.9 % — SIGNIFICANT CHANGE UP (ref 0–2)
EOSINOPHIL # BLD AUTO: 0.5 K/UL — SIGNIFICANT CHANGE UP (ref 0–0.5)
EOSINOPHIL NFR BLD AUTO: 6.5 % — HIGH (ref 0–6)
HCT VFR BLD CALC: 24.5 % — LOW (ref 39–50)
HGB BLD-MCNC: 8.6 G/DL — LOW (ref 13–17)
LYMPHOCYTES # BLD AUTO: 1.5 K/UL — SIGNIFICANT CHANGE UP (ref 1–3.3)
LYMPHOCYTES # BLD AUTO: 19.9 % — SIGNIFICANT CHANGE UP (ref 13–44)
MCHC RBC-ENTMCNC: 31.2 PG — SIGNIFICANT CHANGE UP (ref 27–34)
MCHC RBC-ENTMCNC: 35.2 G/DL — SIGNIFICANT CHANGE UP (ref 32–36)
MCV RBC AUTO: 88.5 FL — SIGNIFICANT CHANGE UP (ref 80–100)
MONOCYTES # BLD AUTO: 0.1 K/UL — SIGNIFICANT CHANGE UP (ref 0–0.9)
MONOCYTES NFR BLD AUTO: 1.8 % — LOW (ref 2–14)
NEUTROPHILS # BLD AUTO: 5.4 K/UL — SIGNIFICANT CHANGE UP (ref 1.8–7.4)
NEUTROPHILS NFR BLD AUTO: 70.9 % — SIGNIFICANT CHANGE UP (ref 43–77)
PLATELET # BLD AUTO: 258 K/UL — SIGNIFICANT CHANGE UP (ref 150–400)
RBC # BLD: 2.77 M/UL — LOW (ref 4.2–5.8)
RBC # FLD: 19.2 % — HIGH (ref 10.3–14.5)
WBC # BLD: 7.6 K/UL — SIGNIFICANT CHANGE UP (ref 3.8–10.5)
WBC # FLD AUTO: 7.6 K/UL — SIGNIFICANT CHANGE UP (ref 3.8–10.5)

## 2018-03-30 ENCOUNTER — APPOINTMENT (OUTPATIENT)
Dept: HEMATOLOGY ONCOLOGY | Facility: CLINIC | Age: 78
End: 2018-03-30

## 2018-03-30 ENCOUNTER — RESULT REVIEW (OUTPATIENT)
Age: 78
End: 2018-03-30

## 2018-03-30 LAB
BASOPHILS # BLD AUTO: 0 K/UL — SIGNIFICANT CHANGE UP (ref 0–0.2)
BASOPHILS NFR BLD AUTO: 0.5 % — SIGNIFICANT CHANGE UP (ref 0–2)
BLD GP AB SCN SERPL QL: NEGATIVE — SIGNIFICANT CHANGE UP
EOSINOPHIL # BLD AUTO: 0.4 K/UL — SIGNIFICANT CHANGE UP (ref 0–0.5)
EOSINOPHIL NFR BLD AUTO: 6.1 % — HIGH (ref 0–6)
HCT VFR BLD CALC: 23.1 % — LOW (ref 39–50)
HGB BLD-MCNC: 8.1 G/DL — LOW (ref 13–17)
LYMPHOCYTES # BLD AUTO: 1.3 K/UL — SIGNIFICANT CHANGE UP (ref 1–3.3)
LYMPHOCYTES # BLD AUTO: 19.5 % — SIGNIFICANT CHANGE UP (ref 13–44)
MCHC RBC-ENTMCNC: 30.9 PG — SIGNIFICANT CHANGE UP (ref 27–34)
MCHC RBC-ENTMCNC: 35 G/DL — SIGNIFICANT CHANGE UP (ref 32–36)
MCV RBC AUTO: 88.3 FL — SIGNIFICANT CHANGE UP (ref 80–100)
MONOCYTES # BLD AUTO: 0.2 K/UL — SIGNIFICANT CHANGE UP (ref 0–0.9)
MONOCYTES NFR BLD AUTO: 2.5 % — SIGNIFICANT CHANGE UP (ref 2–14)
NEUTROPHILS # BLD AUTO: 4.7 K/UL — SIGNIFICANT CHANGE UP (ref 1.8–7.4)
NEUTROPHILS NFR BLD AUTO: 71.4 % — SIGNIFICANT CHANGE UP (ref 43–77)
PLATELET # BLD AUTO: 206 K/UL — SIGNIFICANT CHANGE UP (ref 150–400)
RBC # BLD: 2.62 M/UL — LOW (ref 4.2–5.8)
RBC # FLD: 19.3 % — HIGH (ref 10.3–14.5)
RH IG SCN BLD-IMP: POSITIVE — SIGNIFICANT CHANGE UP
WBC # BLD: 6.6 K/UL — SIGNIFICANT CHANGE UP (ref 3.8–10.5)
WBC # FLD AUTO: 6.6 K/UL — SIGNIFICANT CHANGE UP (ref 3.8–10.5)

## 2018-03-30 PROCEDURE — 86923 COMPATIBILITY TEST ELECTRIC: CPT

## 2018-03-30 PROCEDURE — 86900 BLOOD TYPING SEROLOGIC ABO: CPT

## 2018-03-30 PROCEDURE — 86901 BLOOD TYPING SEROLOGIC RH(D): CPT

## 2018-03-30 PROCEDURE — 86850 RBC ANTIBODY SCREEN: CPT

## 2018-03-31 ENCOUNTER — APPOINTMENT (OUTPATIENT)
Dept: INFUSION THERAPY | Facility: HOSPITAL | Age: 78
End: 2018-03-31

## 2018-04-02 ENCOUNTER — RESULT REVIEW (OUTPATIENT)
Age: 78
End: 2018-04-02

## 2018-04-02 ENCOUNTER — APPOINTMENT (OUTPATIENT)
Dept: HEMATOLOGY ONCOLOGY | Facility: CLINIC | Age: 78
End: 2018-04-02
Payer: MEDICARE

## 2018-04-02 VITALS
BODY MASS INDEX: 24.55 KG/M2 | DIASTOLIC BLOOD PRESSURE: 58 MMHG | WEIGHT: 171.08 LBS | TEMPERATURE: 97.5 F | OXYGEN SATURATION: 99 % | HEART RATE: 66 BPM | RESPIRATION RATE: 16 BRPM | SYSTOLIC BLOOD PRESSURE: 118 MMHG

## 2018-04-02 LAB
BASOPHILS # BLD AUTO: 0.1 K/UL — SIGNIFICANT CHANGE UP (ref 0–0.2)
BASOPHILS NFR BLD AUTO: 0.9 % — SIGNIFICANT CHANGE UP (ref 0–2)
EOSINOPHIL # BLD AUTO: 0.6 K/UL — HIGH (ref 0–0.5)
EOSINOPHIL NFR BLD AUTO: 9.2 % — HIGH (ref 0–6)
HCT VFR BLD CALC: 29.8 % — LOW (ref 39–50)
HGB BLD-MCNC: 10.3 G/DL — LOW (ref 13–17)
LYMPHOCYTES # BLD AUTO: 1.2 K/UL — SIGNIFICANT CHANGE UP (ref 1–3.3)
LYMPHOCYTES # BLD AUTO: 19.5 % — SIGNIFICANT CHANGE UP (ref 13–44)
MCHC RBC-ENTMCNC: 29.4 PG — SIGNIFICANT CHANGE UP (ref 27–34)
MCHC RBC-ENTMCNC: 34.6 G/DL — SIGNIFICANT CHANGE UP (ref 32–36)
MCV RBC AUTO: 84.9 FL — SIGNIFICANT CHANGE UP (ref 80–100)
MONOCYTES # BLD AUTO: 0.3 K/UL — SIGNIFICANT CHANGE UP (ref 0–0.9)
MONOCYTES NFR BLD AUTO: 4.5 % — SIGNIFICANT CHANGE UP (ref 2–14)
NEUTROPHILS # BLD AUTO: 4.1 K/UL — SIGNIFICANT CHANGE UP (ref 1.8–7.4)
NEUTROPHILS NFR BLD AUTO: 65.8 % — SIGNIFICANT CHANGE UP (ref 43–77)
PLATELET # BLD AUTO: 147 K/UL — LOW (ref 150–400)
RBC # BLD: 3.52 M/UL — LOW (ref 4.2–5.8)
RBC # FLD: 21.2 % — HIGH (ref 10.3–14.5)
WBC # BLD: 6.2 K/UL — SIGNIFICANT CHANGE UP (ref 3.8–10.5)
WBC # FLD AUTO: 6.2 K/UL — SIGNIFICANT CHANGE UP (ref 3.8–10.5)

## 2018-04-02 PROCEDURE — 99213 OFFICE O/P EST LOW 20 MIN: CPT

## 2018-04-03 ENCOUNTER — APPOINTMENT (OUTPATIENT)
Dept: ELECTROPHYSIOLOGY | Facility: CLINIC | Age: 78
End: 2018-04-03
Payer: MEDICARE

## 2018-04-03 DIAGNOSIS — Z51.89 ENCOUNTER FOR OTHER SPECIFIED AFTERCARE: ICD-10-CM

## 2018-04-03 PROCEDURE — 93298 REM INTERROG DEV EVAL SCRMS: CPT

## 2018-04-03 PROCEDURE — 93299: CPT

## 2018-04-09 ENCOUNTER — APPOINTMENT (OUTPATIENT)
Dept: HEMATOLOGY ONCOLOGY | Facility: CLINIC | Age: 78
End: 2018-04-09

## 2018-04-09 ENCOUNTER — RESULT REVIEW (OUTPATIENT)
Age: 78
End: 2018-04-09

## 2018-04-09 ENCOUNTER — OUTPATIENT (OUTPATIENT)
Dept: OUTPATIENT SERVICES | Facility: HOSPITAL | Age: 78
LOS: 1 days | End: 2018-04-09
Payer: MEDICARE

## 2018-04-09 DIAGNOSIS — Z98.89 OTHER SPECIFIED POSTPROCEDURAL STATES: Chronic | ICD-10-CM

## 2018-04-09 DIAGNOSIS — D46.9 MYELODYSPLASTIC SYNDROME, UNSPECIFIED: ICD-10-CM

## 2018-04-09 DIAGNOSIS — Z95.5 PRESENCE OF CORONARY ANGIOPLASTY IMPLANT AND GRAFT: Chronic | ICD-10-CM

## 2018-04-09 DIAGNOSIS — Z90.89 ACQUIRED ABSENCE OF OTHER ORGANS: Chronic | ICD-10-CM

## 2018-04-09 LAB
ANISOCYTOSIS BLD QL: SLIGHT — SIGNIFICANT CHANGE UP
BASOPHILS # BLD AUTO: 0.1 K/UL — SIGNIFICANT CHANGE UP (ref 0–0.2)
BASOPHILS NFR BLD AUTO: 4 % — HIGH (ref 0–2)
BLD GP AB SCN SERPL QL: NEGATIVE — SIGNIFICANT CHANGE UP
ELLIPTOCYTES BLD QL SMEAR: SLIGHT — SIGNIFICANT CHANGE UP
EOSINOPHIL # BLD AUTO: 1.1 K/UL — HIGH (ref 0–0.5)
EOSINOPHIL NFR BLD AUTO: 24 % — HIGH (ref 0–6)
GIANT PLATELETS BLD QL SMEAR: PRESENT — SIGNIFICANT CHANGE UP
HCT VFR BLD CALC: 25.5 % — LOW (ref 39–50)
HGB BLD-MCNC: 8.8 G/DL — LOW (ref 13–17)
HYPOCHROMIA BLD QL: SLIGHT — SIGNIFICANT CHANGE UP
LG PLATELETS BLD QL AUTO: SLIGHT — SIGNIFICANT CHANGE UP
LYMPHOCYTES # BLD AUTO: 1.1 K/UL — SIGNIFICANT CHANGE UP (ref 1–3.3)
LYMPHOCYTES # BLD AUTO: 29 % — SIGNIFICANT CHANGE UP (ref 13–44)
MACROCYTES BLD QL: SLIGHT — SIGNIFICANT CHANGE UP
MCHC RBC-ENTMCNC: 29 PG — SIGNIFICANT CHANGE UP (ref 27–34)
MCHC RBC-ENTMCNC: 34.5 G/DL — SIGNIFICANT CHANGE UP (ref 32–36)
MCV RBC AUTO: 84.1 FL — SIGNIFICANT CHANGE UP (ref 80–100)
MICROCYTES BLD QL: SLIGHT — SIGNIFICANT CHANGE UP
MONOCYTES # BLD AUTO: 0 K/UL — SIGNIFICANT CHANGE UP (ref 0–0.9)
MONOCYTES NFR BLD AUTO: 2 % — SIGNIFICANT CHANGE UP (ref 2–14)
NEUTROPHILS # BLD AUTO: 1.3 K/UL — LOW (ref 1.8–7.4)
NEUTROPHILS NFR BLD AUTO: 41 % — LOW (ref 43–77)
PLAT MORPH BLD: ABNORMAL
PLATELET # BLD AUTO: 46 K/UL — LOW (ref 150–400)
POIKILOCYTOSIS BLD QL AUTO: SLIGHT — SIGNIFICANT CHANGE UP
POLYCHROMASIA BLD QL SMEAR: SLIGHT — SIGNIFICANT CHANGE UP
RBC # BLD: 3.03 M/UL — LOW (ref 4.2–5.8)
RBC # FLD: 21 % — HIGH (ref 10.3–14.5)
RBC BLD AUTO: ABNORMAL
RH IG SCN BLD-IMP: POSITIVE — SIGNIFICANT CHANGE UP
WBC # BLD: 3.7 K/UL — LOW (ref 3.8–10.5)
WBC # FLD AUTO: 3.7 K/UL — LOW (ref 3.8–10.5)

## 2018-04-11 ENCOUNTER — APPOINTMENT (OUTPATIENT)
Dept: INFUSION THERAPY | Facility: HOSPITAL | Age: 78
End: 2018-04-11

## 2018-04-12 ENCOUNTER — MEDICATION RENEWAL (OUTPATIENT)
Age: 78
End: 2018-04-12

## 2018-04-13 ENCOUNTER — MEDICATION RENEWAL (OUTPATIENT)
Age: 78
End: 2018-04-13

## 2018-04-23 ENCOUNTER — RESULT REVIEW (OUTPATIENT)
Age: 78
End: 2018-04-23

## 2018-04-23 ENCOUNTER — APPOINTMENT (OUTPATIENT)
Dept: HEMATOLOGY ONCOLOGY | Facility: CLINIC | Age: 78
End: 2018-04-23

## 2018-04-23 LAB
ANISOCYTOSIS BLD QL: SLIGHT — SIGNIFICANT CHANGE UP
BASOPHILS # BLD AUTO: 0.1 K/UL — SIGNIFICANT CHANGE UP (ref 0–0.2)
BASOPHILS NFR BLD AUTO: 1 % — SIGNIFICANT CHANGE UP (ref 0–2)
BLD GP AB SCN SERPL QL: NEGATIVE — SIGNIFICANT CHANGE UP
DACRYOCYTES BLD QL SMEAR: SLIGHT — SIGNIFICANT CHANGE UP
ELLIPTOCYTES BLD QL SMEAR: SLIGHT — SIGNIFICANT CHANGE UP
EOSINOPHIL # BLD AUTO: 1.2 K/UL — HIGH (ref 0–0.5)
EOSINOPHIL NFR BLD AUTO: 26 % — HIGH (ref 0–6)
HCT VFR BLD CALC: 28.3 % — LOW (ref 39–50)
HGB BLD-MCNC: 9.7 G/DL — LOW (ref 13–17)
HYPOCHROMIA BLD QL: SLIGHT — SIGNIFICANT CHANGE UP
LG PLATELETS BLD QL AUTO: SLIGHT — SIGNIFICANT CHANGE UP
LYMPHOCYTES # BLD AUTO: 1.7 K/UL — SIGNIFICANT CHANGE UP (ref 1–3.3)
LYMPHOCYTES # BLD AUTO: 45 % — HIGH (ref 13–44)
MACROCYTES BLD QL: SLIGHT — SIGNIFICANT CHANGE UP
MCHC RBC-ENTMCNC: 29.9 PG — SIGNIFICANT CHANGE UP (ref 27–34)
MCHC RBC-ENTMCNC: 34.3 G/DL — SIGNIFICANT CHANGE UP (ref 32–36)
MCV RBC AUTO: 86.9 FL — SIGNIFICANT CHANGE UP (ref 80–100)
MICROCYTES BLD QL: SLIGHT — SIGNIFICANT CHANGE UP
MONOCYTES # BLD AUTO: 0.2 K/UL — SIGNIFICANT CHANGE UP (ref 0–0.9)
MONOCYTES NFR BLD AUTO: 7 % — SIGNIFICANT CHANGE UP (ref 2–14)
NEUTROPHILS # BLD AUTO: 0.7 K/UL — LOW (ref 1.8–7.4)
NEUTROPHILS NFR BLD AUTO: 20 % — LOW (ref 43–77)
NEUTS BAND # BLD: 1 % — SIGNIFICANT CHANGE UP (ref 0–8)
PLAT MORPH BLD: NORMAL — SIGNIFICANT CHANGE UP
PLATELET # BLD AUTO: 38 K/UL — LOW (ref 150–400)
POIKILOCYTOSIS BLD QL AUTO: SLIGHT — SIGNIFICANT CHANGE UP
POLYCHROMASIA BLD QL SMEAR: SLIGHT — SIGNIFICANT CHANGE UP
RBC # BLD: 3.26 M/UL — LOW (ref 4.2–5.8)
RBC # FLD: 21.8 % — HIGH (ref 10.3–14.5)
RBC BLD AUTO: ABNORMAL
RH IG SCN BLD-IMP: POSITIVE — SIGNIFICANT CHANGE UP
WBC # BLD: 3.8 K/UL — SIGNIFICANT CHANGE UP (ref 3.8–10.5)
WBC # FLD AUTO: 3.8 K/UL — SIGNIFICANT CHANGE UP (ref 3.8–10.5)

## 2018-04-26 ENCOUNTER — OUTPATIENT (OUTPATIENT)
Dept: OUTPATIENT SERVICES | Facility: HOSPITAL | Age: 78
LOS: 1 days | Discharge: HOME | End: 2018-04-26

## 2018-04-26 ENCOUNTER — RESULT REVIEW (OUTPATIENT)
Age: 78
End: 2018-04-26

## 2018-04-26 ENCOUNTER — APPOINTMENT (OUTPATIENT)
Dept: HEMATOLOGY ONCOLOGY | Facility: CLINIC | Age: 78
End: 2018-04-26

## 2018-04-26 DIAGNOSIS — Z95.5 PRESENCE OF CORONARY ANGIOPLASTY IMPLANT AND GRAFT: Chronic | ICD-10-CM

## 2018-04-26 DIAGNOSIS — D46.9 MYELODYSPLASTIC SYNDROME, UNSPECIFIED: ICD-10-CM

## 2018-04-26 DIAGNOSIS — Z90.89 ACQUIRED ABSENCE OF OTHER ORGANS: Chronic | ICD-10-CM

## 2018-04-26 DIAGNOSIS — Z98.89 OTHER SPECIFIED POSTPROCEDURAL STATES: Chronic | ICD-10-CM

## 2018-04-26 LAB
ANISOCYTOSIS BLD QL: SLIGHT — SIGNIFICANT CHANGE UP
DACRYOCYTES BLD QL SMEAR: SLIGHT — SIGNIFICANT CHANGE UP
ELLIPTOCYTES BLD QL SMEAR: SLIGHT — SIGNIFICANT CHANGE UP
EOSINOPHIL NFR BLD AUTO: 44 % — HIGH (ref 0–6)
HCT VFR BLD CALC: 27.7 % — LOW (ref 39–50)
HGB BLD-MCNC: 9.4 G/DL — LOW (ref 13–17)
HYPOCHROMIA BLD QL: SLIGHT — SIGNIFICANT CHANGE UP
LG PLATELETS BLD QL AUTO: SLIGHT — SIGNIFICANT CHANGE UP
LYMPHOCYTES # BLD AUTO: 24 % — SIGNIFICANT CHANGE UP (ref 13–44)
LYMPHOCYTES # BLD AUTO: SIGNIFICANT CHANGE UP K/UL (ref 1–3.3)
MACROCYTES BLD QL: SLIGHT — SIGNIFICANT CHANGE UP
MCHC RBC-ENTMCNC: 29.7 PG — SIGNIFICANT CHANGE UP (ref 27–34)
MCHC RBC-ENTMCNC: 34.1 G/DL — SIGNIFICANT CHANGE UP (ref 32–36)
MCV RBC AUTO: 87.1 FL — SIGNIFICANT CHANGE UP (ref 80–100)
MICROCYTES BLD QL: SLIGHT — SIGNIFICANT CHANGE UP
MONOCYTES NFR BLD AUTO: 6 % — SIGNIFICANT CHANGE UP (ref 2–14)
NEUTROPHILS # BLD AUTO: 1.2 K/UL — LOW (ref 1.8–7.4)
NEUTROPHILS NFR BLD AUTO: 26 % — LOW (ref 43–77)
PLAT MORPH BLD: NORMAL — SIGNIFICANT CHANGE UP
PLATELET # BLD AUTO: 61 K/UL — LOW (ref 150–400)
POIKILOCYTOSIS BLD QL AUTO: SLIGHT — SIGNIFICANT CHANGE UP
POLYCHROMASIA BLD QL SMEAR: SLIGHT — SIGNIFICANT CHANGE UP
RBC # BLD: 3.18 M/UL — LOW (ref 4.2–5.8)
RBC # FLD: 22.5 % — HIGH (ref 10.3–14.5)
RBC BLD AUTO: SIGNIFICANT CHANGE UP
WBC # BLD: 6.5 K/UL — SIGNIFICANT CHANGE UP (ref 3.8–10.5)
WBC # FLD AUTO: 6.5 K/UL — SIGNIFICANT CHANGE UP (ref 3.8–10.5)

## 2018-04-27 ENCOUNTER — RESULT REVIEW (OUTPATIENT)
Age: 78
End: 2018-04-27

## 2018-04-27 ENCOUNTER — APPOINTMENT (OUTPATIENT)
Dept: HEMATOLOGY ONCOLOGY | Facility: CLINIC | Age: 78
End: 2018-04-27

## 2018-04-27 LAB
ANISOCYTOSIS BLD QL: SLIGHT — SIGNIFICANT CHANGE UP
BASOPHILS # BLD AUTO: 0.1 K/UL — SIGNIFICANT CHANGE UP (ref 0–0.2)
BASOPHILS NFR BLD AUTO: 4 % — HIGH (ref 0–2)
BLD GP AB SCN SERPL QL: NEGATIVE — SIGNIFICANT CHANGE UP
EOSINOPHIL # BLD AUTO: 3.1 K/UL — HIGH (ref 0–0.5)
EOSINOPHIL NFR BLD AUTO: 33 % — HIGH (ref 0–6)
HCT VFR BLD CALC: 26.4 % — LOW (ref 39–50)
HGB BLD-MCNC: 9.3 G/DL — LOW (ref 13–17)
HYPOCHROMIA BLD QL: SLIGHT — SIGNIFICANT CHANGE UP
LYMPHOCYTES # BLD AUTO: 2.1 K/UL — SIGNIFICANT CHANGE UP (ref 1–3.3)
LYMPHOCYTES # BLD AUTO: 29 % — SIGNIFICANT CHANGE UP (ref 13–44)
MACROCYTES BLD QL: SLIGHT — SIGNIFICANT CHANGE UP
MCHC RBC-ENTMCNC: 31 PG — SIGNIFICANT CHANGE UP (ref 27–34)
MCHC RBC-ENTMCNC: 35.2 G/DL — SIGNIFICANT CHANGE UP (ref 32–36)
MCV RBC AUTO: 88.1 FL — SIGNIFICANT CHANGE UP (ref 80–100)
METAMYELOCYTES # FLD: 1 % — HIGH (ref 0–0)
MICROCYTES BLD QL: SLIGHT — SIGNIFICANT CHANGE UP
MONOCYTES # BLD AUTO: 0.5 K/UL — SIGNIFICANT CHANGE UP (ref 0–0.9)
MONOCYTES NFR BLD AUTO: 6 % — SIGNIFICANT CHANGE UP (ref 2–14)
MYELOCYTES NFR BLD: 2 % — HIGH (ref 0–0)
NEUTROPHILS # BLD AUTO: 1.3 K/UL — LOW (ref 1.8–7.4)
NEUTROPHILS NFR BLD AUTO: 25 % — LOW (ref 43–77)
PLAT MORPH BLD: NORMAL — SIGNIFICANT CHANGE UP
PLATELET # BLD AUTO: 66 K/UL — LOW (ref 150–400)
POIKILOCYTOSIS BLD QL AUTO: SLIGHT — SIGNIFICANT CHANGE UP
POLYCHROMASIA BLD QL SMEAR: SLIGHT — SIGNIFICANT CHANGE UP
RBC # BLD: 3 M/UL — LOW (ref 4.2–5.8)
RBC # FLD: 22.8 % — HIGH (ref 10.3–14.5)
RBC BLD AUTO: SIGNIFICANT CHANGE UP
RH IG SCN BLD-IMP: POSITIVE — SIGNIFICANT CHANGE UP
WBC # BLD: 7.1 K/UL — SIGNIFICANT CHANGE UP (ref 3.8–10.5)
WBC # FLD AUTO: 7.1 K/UL — SIGNIFICANT CHANGE UP (ref 3.8–10.5)

## 2018-04-29 PROCEDURE — 86850 RBC ANTIBODY SCREEN: CPT

## 2018-04-29 PROCEDURE — 86901 BLOOD TYPING SEROLOGIC RH(D): CPT

## 2018-04-29 PROCEDURE — 86900 BLOOD TYPING SEROLOGIC ABO: CPT

## 2018-04-29 PROCEDURE — 86923 COMPATIBILITY TEST ELECTRIC: CPT

## 2018-04-30 ENCOUNTER — RESULT REVIEW (OUTPATIENT)
Age: 78
End: 2018-04-30

## 2018-04-30 ENCOUNTER — APPOINTMENT (OUTPATIENT)
Dept: INFUSION THERAPY | Facility: HOSPITAL | Age: 78
End: 2018-04-30

## 2018-04-30 ENCOUNTER — APPOINTMENT (OUTPATIENT)
Dept: HEMATOLOGY ONCOLOGY | Facility: CLINIC | Age: 78
End: 2018-04-30
Payer: MEDICARE

## 2018-04-30 LAB
BASOPHILS # BLD AUTO: 0.1 K/UL — SIGNIFICANT CHANGE UP (ref 0–0.2)
BASOPHILS NFR BLD AUTO: 0.9 % — SIGNIFICANT CHANGE UP (ref 0–2)
EOSINOPHIL # BLD AUTO: 3.2 K/UL — HIGH (ref 0–0.5)
EOSINOPHIL NFR BLD AUTO: 44.2 % — HIGH (ref 0–6)
HCT VFR BLD CALC: 24.6 % — LOW (ref 39–50)
HGB BLD-MCNC: 8.6 G/DL — LOW (ref 13–17)
LYMPHOCYTES # BLD AUTO: 1.6 K/UL — SIGNIFICANT CHANGE UP (ref 1–3.3)
LYMPHOCYTES # BLD AUTO: 22.1 % — SIGNIFICANT CHANGE UP (ref 13–44)
MCHC RBC-ENTMCNC: 30.9 PG — SIGNIFICANT CHANGE UP (ref 27–34)
MCHC RBC-ENTMCNC: 35.2 G/DL — SIGNIFICANT CHANGE UP (ref 32–36)
MCV RBC AUTO: 88 FL — SIGNIFICANT CHANGE UP (ref 80–100)
MONOCYTES # BLD AUTO: 0.7 K/UL — SIGNIFICANT CHANGE UP (ref 0–0.9)
MONOCYTES NFR BLD AUTO: 10 % — SIGNIFICANT CHANGE UP (ref 2–14)
NEUTROPHILS # BLD AUTO: 1.7 K/UL — LOW (ref 1.8–7.4)
NEUTROPHILS NFR BLD AUTO: 22.7 % — LOW (ref 43–77)
PLATELET # BLD AUTO: 86 K/UL — LOW (ref 150–400)
RBC # BLD: 2.8 M/UL — LOW (ref 4.2–5.8)
RBC # FLD: 23 % — HIGH (ref 10.3–14.5)
WBC # BLD: 7.3 K/UL — SIGNIFICANT CHANGE UP (ref 3.8–10.5)
WBC # FLD AUTO: 7.3 K/UL — SIGNIFICANT CHANGE UP (ref 3.8–10.5)

## 2018-04-30 PROCEDURE — 99214 OFFICE O/P EST MOD 30 MIN: CPT

## 2018-05-01 ENCOUNTER — APPOINTMENT (OUTPATIENT)
Dept: INFUSION THERAPY | Facility: HOSPITAL | Age: 78
End: 2018-05-01

## 2018-05-01 DIAGNOSIS — R11.2 NAUSEA WITH VOMITING, UNSPECIFIED: ICD-10-CM

## 2018-05-01 DIAGNOSIS — Z51.89 ENCOUNTER FOR OTHER SPECIFIED AFTERCARE: ICD-10-CM

## 2018-05-07 ENCOUNTER — APPOINTMENT (OUTPATIENT)
Dept: HEMATOLOGY ONCOLOGY | Facility: CLINIC | Age: 78
End: 2018-05-07

## 2018-05-07 ENCOUNTER — RESULT REVIEW (OUTPATIENT)
Age: 78
End: 2018-05-07

## 2018-05-07 LAB
BASOPHILS # BLD AUTO: 0.1 K/UL — SIGNIFICANT CHANGE UP (ref 0–0.2)
BASOPHILS NFR BLD AUTO: 1.4 % — SIGNIFICANT CHANGE UP (ref 0–2)
EOSINOPHIL # BLD AUTO: 4.9 K/UL — HIGH (ref 0–0.5)
EOSINOPHIL NFR BLD AUTO: 50.6 % — HIGH (ref 0–6)
HCT VFR BLD CALC: 31.2 % — LOW (ref 39–50)
HGB BLD-MCNC: 10.8 G/DL — LOW (ref 13–17)
LYMPHOCYTES # BLD AUTO: 1.8 K/UL — SIGNIFICANT CHANGE UP (ref 1–3.3)
LYMPHOCYTES # BLD AUTO: 18 % — SIGNIFICANT CHANGE UP (ref 13–44)
MCHC RBC-ENTMCNC: 31.4 PG — SIGNIFICANT CHANGE UP (ref 27–34)
MCHC RBC-ENTMCNC: 34.6 G/DL — SIGNIFICANT CHANGE UP (ref 32–36)
MCV RBC AUTO: 90.8 FL — SIGNIFICANT CHANGE UP (ref 80–100)
MONOCYTES # BLD AUTO: 0.8 K/UL — SIGNIFICANT CHANGE UP (ref 0–0.9)
MONOCYTES NFR BLD AUTO: 8.1 % — SIGNIFICANT CHANGE UP (ref 2–14)
NEUTROPHILS # BLD AUTO: 2.1 K/UL — SIGNIFICANT CHANGE UP (ref 1.8–7.4)
NEUTROPHILS NFR BLD AUTO: 21.8 % — LOW (ref 43–77)
PLATELET # BLD AUTO: 108 K/UL — LOW (ref 150–400)
RBC # BLD: 3.43 M/UL — LOW (ref 4.2–5.8)
RBC # FLD: 21.6 % — HIGH (ref 10.3–14.5)
WBC # BLD: 9.7 K/UL — SIGNIFICANT CHANGE UP (ref 3.8–10.5)
WBC # FLD AUTO: 9.7 K/UL — SIGNIFICANT CHANGE UP (ref 3.8–10.5)

## 2018-05-14 ENCOUNTER — APPOINTMENT (OUTPATIENT)
Dept: HEMATOLOGY ONCOLOGY | Facility: CLINIC | Age: 78
End: 2018-05-14

## 2018-05-14 ENCOUNTER — OUTPATIENT (OUTPATIENT)
Dept: OUTPATIENT SERVICES | Facility: HOSPITAL | Age: 78
LOS: 1 days | End: 2018-05-14
Payer: MEDICARE

## 2018-05-14 ENCOUNTER — RESULT REVIEW (OUTPATIENT)
Age: 78
End: 2018-05-14

## 2018-05-14 DIAGNOSIS — Z95.5 PRESENCE OF CORONARY ANGIOPLASTY IMPLANT AND GRAFT: Chronic | ICD-10-CM

## 2018-05-14 DIAGNOSIS — Z90.89 ACQUIRED ABSENCE OF OTHER ORGANS: Chronic | ICD-10-CM

## 2018-05-14 DIAGNOSIS — Z98.89 OTHER SPECIFIED POSTPROCEDURAL STATES: Chronic | ICD-10-CM

## 2018-05-14 DIAGNOSIS — D46.9 MYELODYSPLASTIC SYNDROME, UNSPECIFIED: ICD-10-CM

## 2018-05-14 LAB
BASOPHILS # BLD AUTO: 0.1 K/UL — SIGNIFICANT CHANGE UP (ref 0–0.2)
BASOPHILS NFR BLD AUTO: 0.8 % — SIGNIFICANT CHANGE UP (ref 0–2)
EOSINOPHIL # BLD AUTO: 2.6 K/UL — HIGH (ref 0–0.5)
EOSINOPHIL NFR BLD AUTO: 28.5 % — HIGH (ref 0–6)
HCT VFR BLD CALC: 32.8 % — LOW (ref 39–50)
HGB BLD-MCNC: 11.4 G/DL — LOW (ref 13–17)
LYMPHOCYTES # BLD AUTO: 2.1 K/UL — SIGNIFICANT CHANGE UP (ref 1–3.3)
LYMPHOCYTES # BLD AUTO: 23.6 % — SIGNIFICANT CHANGE UP (ref 13–44)
MCHC RBC-ENTMCNC: 32 PG — SIGNIFICANT CHANGE UP (ref 27–34)
MCHC RBC-ENTMCNC: 34.7 G/DL — SIGNIFICANT CHANGE UP (ref 32–36)
MCV RBC AUTO: 92 FL — SIGNIFICANT CHANGE UP (ref 80–100)
MONOCYTES # BLD AUTO: 0.9 K/UL — SIGNIFICANT CHANGE UP (ref 0–0.9)
MONOCYTES NFR BLD AUTO: 9.6 % — SIGNIFICANT CHANGE UP (ref 2–14)
NEUTROPHILS # BLD AUTO: 3.4 K/UL — SIGNIFICANT CHANGE UP (ref 1.8–7.4)
NEUTROPHILS NFR BLD AUTO: 37.4 % — LOW (ref 43–77)
PLATELET # BLD AUTO: 129 K/UL — LOW (ref 150–400)
RBC # BLD: 3.57 M/UL — LOW (ref 4.2–5.8)
RBC # FLD: 22.1 % — HIGH (ref 10.3–14.5)
WBC # BLD: 9 K/UL — SIGNIFICANT CHANGE UP (ref 3.8–10.5)
WBC # FLD AUTO: 9 K/UL — SIGNIFICANT CHANGE UP (ref 3.8–10.5)

## 2018-05-18 ENCOUNTER — RESULT REVIEW (OUTPATIENT)
Age: 78
End: 2018-05-18

## 2018-05-18 ENCOUNTER — APPOINTMENT (OUTPATIENT)
Dept: HEMATOLOGY ONCOLOGY | Facility: CLINIC | Age: 78
End: 2018-05-18

## 2018-05-18 LAB
BASOPHILS # BLD AUTO: 0.1 K/UL — SIGNIFICANT CHANGE UP (ref 0–0.2)
BASOPHILS NFR BLD AUTO: 0.6 % — SIGNIFICANT CHANGE UP (ref 0–2)
BLD GP AB SCN SERPL QL: NEGATIVE — SIGNIFICANT CHANGE UP
EOSINOPHIL # BLD AUTO: 2.2 K/UL — HIGH (ref 0–0.5)
EOSINOPHIL NFR BLD AUTO: 24.6 % — HIGH (ref 0–6)
HCT VFR BLD CALC: 34.1 % — LOW (ref 39–50)
HGB BLD-MCNC: 11.8 G/DL — LOW (ref 13–17)
LYMPHOCYTES # BLD AUTO: 1.5 K/UL — SIGNIFICANT CHANGE UP (ref 1–3.3)
LYMPHOCYTES # BLD AUTO: 16.7 % — SIGNIFICANT CHANGE UP (ref 13–44)
MCHC RBC-ENTMCNC: 32.1 PG — SIGNIFICANT CHANGE UP (ref 27–34)
MCHC RBC-ENTMCNC: 34.5 G/DL — SIGNIFICANT CHANGE UP (ref 32–36)
MCV RBC AUTO: 92.9 FL — SIGNIFICANT CHANGE UP (ref 80–100)
MONOCYTES # BLD AUTO: 0.8 K/UL — SIGNIFICANT CHANGE UP (ref 0–0.9)
MONOCYTES NFR BLD AUTO: 8.8 % — SIGNIFICANT CHANGE UP (ref 2–14)
NEUTROPHILS # BLD AUTO: 4.5 K/UL — SIGNIFICANT CHANGE UP (ref 1.8–7.4)
NEUTROPHILS NFR BLD AUTO: 49.3 % — SIGNIFICANT CHANGE UP (ref 43–77)
PLATELET # BLD AUTO: 117 K/UL — LOW (ref 150–400)
RBC # BLD: 3.67 M/UL — LOW (ref 4.2–5.8)
RBC # FLD: 22.1 % — HIGH (ref 10.3–14.5)
RH IG SCN BLD-IMP: POSITIVE — SIGNIFICANT CHANGE UP
WBC # BLD: 9.1 K/UL — SIGNIFICANT CHANGE UP (ref 3.8–10.5)
WBC # FLD AUTO: 9.1 K/UL — SIGNIFICANT CHANGE UP (ref 3.8–10.5)

## 2018-05-21 ENCOUNTER — APPOINTMENT (OUTPATIENT)
Dept: INFUSION THERAPY | Facility: HOSPITAL | Age: 78
End: 2018-05-21

## 2018-05-24 ENCOUNTER — OUTPATIENT (OUTPATIENT)
Dept: OUTPATIENT SERVICES | Facility: HOSPITAL | Age: 78
LOS: 1 days | Discharge: HOME | End: 2018-05-24

## 2018-05-24 DIAGNOSIS — Z95.5 PRESENCE OF CORONARY ANGIOPLASTY IMPLANT AND GRAFT: Chronic | ICD-10-CM

## 2018-05-24 DIAGNOSIS — D46.9 MYELODYSPLASTIC SYNDROME, UNSPECIFIED: ICD-10-CM

## 2018-05-24 DIAGNOSIS — Z98.89 OTHER SPECIFIED POSTPROCEDURAL STATES: Chronic | ICD-10-CM

## 2018-05-24 DIAGNOSIS — Z90.89 ACQUIRED ABSENCE OF OTHER ORGANS: Chronic | ICD-10-CM

## 2018-05-29 ENCOUNTER — APPOINTMENT (OUTPATIENT)
Dept: HEMATOLOGY ONCOLOGY | Facility: CLINIC | Age: 78
End: 2018-05-29

## 2018-05-29 ENCOUNTER — RESULT REVIEW (OUTPATIENT)
Age: 78
End: 2018-05-29

## 2018-05-29 LAB
BASOPHILS # BLD AUTO: 0.1 K/UL — SIGNIFICANT CHANGE UP (ref 0–0.2)
BASOPHILS NFR BLD AUTO: 0.6 % — SIGNIFICANT CHANGE UP (ref 0–2)
BLD GP AB SCN SERPL QL: NEGATIVE — SIGNIFICANT CHANGE UP
EOSINOPHIL # BLD AUTO: 1.7 K/UL — HIGH (ref 0–0.5)
EOSINOPHIL NFR BLD AUTO: 17.4 % — HIGH (ref 0–6)
HCT VFR BLD CALC: 33.7 % — LOW (ref 39–50)
HGB BLD-MCNC: 11.6 G/DL — LOW (ref 13–17)
LYMPHOCYTES # BLD AUTO: 1.6 K/UL — SIGNIFICANT CHANGE UP (ref 1–3.3)
LYMPHOCYTES # BLD AUTO: 15.9 % — SIGNIFICANT CHANGE UP (ref 13–44)
MCHC RBC-ENTMCNC: 32.9 PG — SIGNIFICANT CHANGE UP (ref 27–34)
MCHC RBC-ENTMCNC: 34.5 G/DL — SIGNIFICANT CHANGE UP (ref 32–36)
MCV RBC AUTO: 95.4 FL — SIGNIFICANT CHANGE UP (ref 80–100)
MONOCYTES # BLD AUTO: 0.9 K/UL — SIGNIFICANT CHANGE UP (ref 0–0.9)
MONOCYTES NFR BLD AUTO: 9.4 % — SIGNIFICANT CHANGE UP (ref 2–14)
NEUTROPHILS # BLD AUTO: 5.5 K/UL — SIGNIFICANT CHANGE UP (ref 1.8–7.4)
NEUTROPHILS NFR BLD AUTO: 56.7 % — SIGNIFICANT CHANGE UP (ref 43–77)
PLATELET # BLD AUTO: 124 K/UL — LOW (ref 150–400)
RBC # BLD: 3.53 M/UL — LOW (ref 4.2–5.8)
RBC # FLD: 21.6 % — HIGH (ref 10.3–14.5)
RH IG SCN BLD-IMP: POSITIVE — SIGNIFICANT CHANGE UP
WBC # BLD: 9.8 K/UL — SIGNIFICANT CHANGE UP (ref 3.8–10.5)
WBC # FLD AUTO: 9.8 K/UL — SIGNIFICANT CHANGE UP (ref 3.8–10.5)

## 2018-05-29 PROCEDURE — 86850 RBC ANTIBODY SCREEN: CPT

## 2018-05-29 PROCEDURE — 86901 BLOOD TYPING SEROLOGIC RH(D): CPT

## 2018-05-29 PROCEDURE — 86900 BLOOD TYPING SEROLOGIC ABO: CPT

## 2018-05-31 ENCOUNTER — APPOINTMENT (OUTPATIENT)
Dept: INFUSION THERAPY | Facility: HOSPITAL | Age: 78
End: 2018-05-31

## 2018-06-04 ENCOUNTER — RESULT REVIEW (OUTPATIENT)
Age: 78
End: 2018-06-04

## 2018-06-04 ENCOUNTER — APPOINTMENT (OUTPATIENT)
Dept: HEMATOLOGY ONCOLOGY | Facility: CLINIC | Age: 78
End: 2018-06-04
Payer: MEDICARE

## 2018-06-04 VITALS
BODY MASS INDEX: 23.57 KG/M2 | DIASTOLIC BLOOD PRESSURE: 63 MMHG | TEMPERATURE: 97.6 F | HEART RATE: 60 BPM | OXYGEN SATURATION: 100 % | WEIGHT: 164.22 LBS | RESPIRATION RATE: 16 BRPM | SYSTOLIC BLOOD PRESSURE: 114 MMHG

## 2018-06-04 LAB
BASOPHILS # BLD AUTO: 0.1 K/UL — SIGNIFICANT CHANGE UP (ref 0–0.2)
EOSINOPHIL # BLD AUTO: 1.8 K/UL — HIGH (ref 0–0.5)
EOSINOPHIL NFR BLD AUTO: 22 % — HIGH (ref 0–6)
HCT VFR BLD CALC: 31.4 % — LOW (ref 39–50)
HGB BLD-MCNC: 10.8 G/DL — LOW (ref 13–17)
LYMPHOCYTES # BLD AUTO: 1.3 K/UL — SIGNIFICANT CHANGE UP (ref 1–3.3)
LYMPHOCYTES # BLD AUTO: 15 % — SIGNIFICANT CHANGE UP (ref 13–44)
MCHC RBC-ENTMCNC: 33.1 PG — SIGNIFICANT CHANGE UP (ref 27–34)
MCHC RBC-ENTMCNC: 34.4 G/DL — SIGNIFICANT CHANGE UP (ref 32–36)
MCV RBC AUTO: 96.2 FL — SIGNIFICANT CHANGE UP (ref 80–100)
METAMYELOCYTES # FLD: 1 % — HIGH (ref 0–0)
MONOCYTES # BLD AUTO: 0.8 K/UL — SIGNIFICANT CHANGE UP (ref 0–0.9)
MONOCYTES NFR BLD AUTO: 9 % — SIGNIFICANT CHANGE UP (ref 2–14)
NEUTROPHILS # BLD AUTO: 4.7 K/UL — SIGNIFICANT CHANGE UP (ref 1.8–7.4)
NEUTROPHILS NFR BLD AUTO: 51 % — SIGNIFICANT CHANGE UP (ref 43–77)
NEUTS BAND # BLD: 2 % — SIGNIFICANT CHANGE UP (ref 0–8)
PLAT MORPH BLD: NORMAL — SIGNIFICANT CHANGE UP
PLATELET # BLD AUTO: 121 K/UL — LOW (ref 150–400)
RBC # BLD: 3.27 M/UL — LOW (ref 4.2–5.8)
RBC # FLD: 21.4 % — HIGH (ref 10.3–14.5)
RBC BLD AUTO: SIGNIFICANT CHANGE UP
WBC # BLD: 8.6 K/UL — SIGNIFICANT CHANGE UP (ref 3.8–10.5)
WBC # FLD AUTO: 8.6 K/UL — SIGNIFICANT CHANGE UP (ref 3.8–10.5)

## 2018-06-04 PROCEDURE — 99214 OFFICE O/P EST MOD 30 MIN: CPT

## 2018-06-06 ENCOUNTER — MEDICATION RENEWAL (OUTPATIENT)
Age: 78
End: 2018-06-06

## 2018-06-06 ENCOUNTER — APPOINTMENT (OUTPATIENT)
Dept: INFUSION THERAPY | Facility: HOSPITAL | Age: 78
End: 2018-06-06

## 2018-06-11 ENCOUNTER — RESULT REVIEW (OUTPATIENT)
Age: 78
End: 2018-06-11

## 2018-06-11 ENCOUNTER — APPOINTMENT (OUTPATIENT)
Dept: HEMATOLOGY ONCOLOGY | Facility: CLINIC | Age: 78
End: 2018-06-11

## 2018-06-11 LAB
BASOPHILS # BLD AUTO: 0.1 K/UL — SIGNIFICANT CHANGE UP (ref 0–0.2)
BASOPHILS NFR BLD AUTO: 1 % — SIGNIFICANT CHANGE UP (ref 0–2)
EOSINOPHIL # BLD AUTO: 3 K/UL — HIGH (ref 0–0.5)
EOSINOPHIL NFR BLD AUTO: 20 % — HIGH (ref 0–6)
HCT VFR BLD CALC: 29.6 % — LOW (ref 39–50)
HGB BLD-MCNC: 10.4 G/DL — LOW (ref 13–17)
LYMPHOCYTES # BLD AUTO: 1.7 K/UL — SIGNIFICANT CHANGE UP (ref 1–3.3)
LYMPHOCYTES # BLD AUTO: 16 % — SIGNIFICANT CHANGE UP (ref 13–44)
MCHC RBC-ENTMCNC: 33.7 PG — SIGNIFICANT CHANGE UP (ref 27–34)
MCHC RBC-ENTMCNC: 35 G/DL — SIGNIFICANT CHANGE UP (ref 32–36)
MCV RBC AUTO: 96.2 FL — SIGNIFICANT CHANGE UP (ref 80–100)
MONOCYTES # BLD AUTO: 1 K/UL — HIGH (ref 0–0.9)
MONOCYTES NFR BLD AUTO: 8 % — SIGNIFICANT CHANGE UP (ref 2–14)
NEUTROPHILS # BLD AUTO: 5.7 K/UL — SIGNIFICANT CHANGE UP (ref 1.8–7.4)
NEUTROPHILS NFR BLD AUTO: 53 % — SIGNIFICANT CHANGE UP (ref 43–77)
NEUTS BAND # BLD: 2 % — SIGNIFICANT CHANGE UP (ref 0–8)
PLAT MORPH BLD: NORMAL — SIGNIFICANT CHANGE UP
PLATELET # BLD AUTO: 135 K/UL — LOW (ref 150–400)
RBC # BLD: 3.08 M/UL — LOW (ref 4.2–5.8)
RBC # FLD: 20.8 % — HIGH (ref 10.3–14.5)
RBC BLD AUTO: SIGNIFICANT CHANGE UP
WBC # BLD: 11.5 K/UL — HIGH (ref 3.8–10.5)
WBC # FLD AUTO: 11.5 K/UL — HIGH (ref 3.8–10.5)

## 2018-06-19 ENCOUNTER — APPOINTMENT (OUTPATIENT)
Dept: ELECTROPHYSIOLOGY | Facility: CLINIC | Age: 78
End: 2018-06-19
Payer: MEDICARE

## 2018-06-19 ENCOUNTER — APPOINTMENT (OUTPATIENT)
Dept: CARDIOLOGY | Facility: CLINIC | Age: 78
End: 2018-06-19
Payer: MEDICARE

## 2018-06-19 VITALS
HEART RATE: 71 BPM | BODY MASS INDEX: 23.34 KG/M2 | HEIGHT: 70 IN | WEIGHT: 163 LBS | DIASTOLIC BLOOD PRESSURE: 73 MMHG | OXYGEN SATURATION: 100 % | SYSTOLIC BLOOD PRESSURE: 117 MMHG

## 2018-06-19 PROCEDURE — 93285 PRGRMG DEV EVAL SCRMS IP: CPT

## 2018-06-19 PROCEDURE — 99214 OFFICE O/P EST MOD 30 MIN: CPT

## 2018-06-19 RX ORDER — LISINOPRIL 5 MG/1
5 TABLET ORAL
Qty: 90 | Refills: 1 | Status: DISCONTINUED | COMMUNITY
Start: 2017-09-05 | End: 2018-06-19

## 2018-06-21 ENCOUNTER — OUTPATIENT (OUTPATIENT)
Dept: OUTPATIENT SERVICES | Facility: HOSPITAL | Age: 78
LOS: 1 days | Discharge: HOME | End: 2018-06-21

## 2018-06-21 DIAGNOSIS — Z98.89 OTHER SPECIFIED POSTPROCEDURAL STATES: Chronic | ICD-10-CM

## 2018-06-21 DIAGNOSIS — D46.9 MYELODYSPLASTIC SYNDROME, UNSPECIFIED: ICD-10-CM

## 2018-06-21 DIAGNOSIS — Z95.5 PRESENCE OF CORONARY ANGIOPLASTY IMPLANT AND GRAFT: Chronic | ICD-10-CM

## 2018-06-21 DIAGNOSIS — Z90.89 ACQUIRED ABSENCE OF OTHER ORGANS: Chronic | ICD-10-CM

## 2018-06-25 ENCOUNTER — RX RENEWAL (OUTPATIENT)
Age: 78
End: 2018-06-25

## 2018-06-25 ENCOUNTER — APPOINTMENT (OUTPATIENT)
Dept: HEMATOLOGY ONCOLOGY | Facility: CLINIC | Age: 78
End: 2018-06-25

## 2018-06-25 ENCOUNTER — OUTPATIENT (OUTPATIENT)
Dept: OUTPATIENT SERVICES | Facility: HOSPITAL | Age: 78
LOS: 1 days | End: 2018-06-25
Payer: MEDICARE

## 2018-06-25 DIAGNOSIS — Z95.5 PRESENCE OF CORONARY ANGIOPLASTY IMPLANT AND GRAFT: Chronic | ICD-10-CM

## 2018-06-25 DIAGNOSIS — Z98.89 OTHER SPECIFIED POSTPROCEDURAL STATES: Chronic | ICD-10-CM

## 2018-06-25 DIAGNOSIS — D46.9 MYELODYSPLASTIC SYNDROME, UNSPECIFIED: ICD-10-CM

## 2018-06-25 DIAGNOSIS — Z90.89 ACQUIRED ABSENCE OF OTHER ORGANS: Chronic | ICD-10-CM

## 2018-06-26 ENCOUNTER — RESULT REVIEW (OUTPATIENT)
Age: 78
End: 2018-06-26

## 2018-06-26 ENCOUNTER — APPOINTMENT (OUTPATIENT)
Dept: HEMATOLOGY ONCOLOGY | Facility: CLINIC | Age: 78
End: 2018-06-26

## 2018-06-26 LAB
BASOPHILS # BLD AUTO: 0.1 K/UL — SIGNIFICANT CHANGE UP (ref 0–0.2)
BLD GP AB SCN SERPL QL: NEGATIVE — SIGNIFICANT CHANGE UP
EOSINOPHIL # BLD AUTO: 4 K/UL — HIGH (ref 0–0.5)
EOSINOPHIL NFR BLD AUTO: 29 % — HIGH (ref 0–6)
HCT VFR BLD CALC: 27.7 % — LOW (ref 39–50)
HGB BLD-MCNC: 9.4 G/DL — LOW (ref 13–17)
LYMPHOCYTES # BLD AUTO: 1.4 K/UL — SIGNIFICANT CHANGE UP (ref 1–3.3)
LYMPHOCYTES # BLD AUTO: 13 % — SIGNIFICANT CHANGE UP (ref 13–44)
MCHC RBC-ENTMCNC: 34 G/DL — SIGNIFICANT CHANGE UP (ref 32–36)
MCHC RBC-ENTMCNC: 34.6 PG — HIGH (ref 27–34)
MCV RBC AUTO: 102 FL — HIGH (ref 80–100)
MONOCYTES # BLD AUTO: 1 K/UL — HIGH (ref 0–0.9)
MONOCYTES NFR BLD AUTO: 7 % — SIGNIFICANT CHANGE UP (ref 2–14)
NEUTROPHILS # BLD AUTO: 5.2 K/UL — SIGNIFICANT CHANGE UP (ref 1.8–7.4)
NEUTROPHILS NFR BLD AUTO: 51 % — SIGNIFICANT CHANGE UP (ref 43–77)
PLAT MORPH BLD: NORMAL — SIGNIFICANT CHANGE UP
PLATELET # BLD AUTO: 102 K/UL — LOW (ref 150–400)
RBC # BLD: 2.73 M/UL — LOW (ref 4.2–5.8)
RBC # FLD: 19.5 % — HIGH (ref 10.3–14.5)
RBC BLD AUTO: SIGNIFICANT CHANGE UP
RH IG SCN BLD-IMP: POSITIVE — SIGNIFICANT CHANGE UP
WBC # BLD: 11.7 K/UL — HIGH (ref 3.8–10.5)
WBC # FLD AUTO: 11.7 K/UL — HIGH (ref 3.8–10.5)

## 2018-06-27 ENCOUNTER — APPOINTMENT (OUTPATIENT)
Dept: INFUSION THERAPY | Facility: HOSPITAL | Age: 78
End: 2018-06-27

## 2018-06-28 DIAGNOSIS — Z51.89 ENCOUNTER FOR OTHER SPECIFIED AFTERCARE: ICD-10-CM

## 2018-06-28 DIAGNOSIS — R11.2 NAUSEA WITH VOMITING, UNSPECIFIED: ICD-10-CM

## 2018-07-02 ENCOUNTER — RESULT REVIEW (OUTPATIENT)
Age: 78
End: 2018-07-02

## 2018-07-02 ENCOUNTER — APPOINTMENT (OUTPATIENT)
Dept: HEMATOLOGY ONCOLOGY | Facility: CLINIC | Age: 78
End: 2018-07-02
Payer: MEDICARE

## 2018-07-02 VITALS
TEMPERATURE: 97.8 F | OXYGEN SATURATION: 100 % | RESPIRATION RATE: 16 BRPM | BODY MASS INDEX: 23.41 KG/M2 | DIASTOLIC BLOOD PRESSURE: 72 MMHG | SYSTOLIC BLOOD PRESSURE: 124 MMHG | HEART RATE: 72 BPM | WEIGHT: 163.14 LBS

## 2018-07-02 LAB
BASOPHILS # BLD AUTO: 0.1 K/UL — SIGNIFICANT CHANGE UP (ref 0–0.2)
BASOPHILS NFR BLD AUTO: 0.8 % — SIGNIFICANT CHANGE UP (ref 0–2)
BUN SERPL-MCNC: 40 MG/DL — HIGH (ref 7–23)
CA-I BLDA-SCNC: 1.26 MMOL/L — SIGNIFICANT CHANGE UP (ref 1.12–1.3)
CHLORIDE SERPL-SCNC: 111 MMOL/L — HIGH (ref 96–108)
CO2 SERPL-SCNC: 22 MMOL/L — SIGNIFICANT CHANGE UP (ref 22–31)
CREAT SERPL-MCNC: 1.7 MG/DL — HIGH (ref 0.5–1.3)
EOSINOPHIL # BLD AUTO: 2.1 K/UL — HIGH (ref 0–0.5)
EOSINOPHIL NFR BLD AUTO: 23.4 % — HIGH (ref 0–6)
GLUCOSE SERPL-MCNC: 137 MG/DL — HIGH (ref 70–99)
HCT VFR BLD CALC: 32 % — LOW (ref 39–50)
HGB BLD-MCNC: 10.8 G/DL — LOW (ref 13–17)
LYMPHOCYTES # BLD AUTO: 1.5 K/UL — SIGNIFICANT CHANGE UP (ref 1–3.3)
LYMPHOCYTES # BLD AUTO: 16.9 % — SIGNIFICANT CHANGE UP (ref 13–44)
MCHC RBC-ENTMCNC: 33.8 G/DL — SIGNIFICANT CHANGE UP (ref 32–36)
MCHC RBC-ENTMCNC: 34.3 PG — HIGH (ref 27–34)
MCV RBC AUTO: 102 FL — HIGH (ref 80–100)
MONOCYTES # BLD AUTO: 0.9 K/UL — SIGNIFICANT CHANGE UP (ref 0–0.9)
MONOCYTES NFR BLD AUTO: 9.8 % — SIGNIFICANT CHANGE UP (ref 2–14)
NEUTROPHILS # BLD AUTO: 4.4 K/UL — SIGNIFICANT CHANGE UP (ref 1.8–7.4)
NEUTROPHILS NFR BLD AUTO: 49.1 % — SIGNIFICANT CHANGE UP (ref 43–77)
PLATELET # BLD AUTO: 123 K/UL — LOW (ref 150–400)
POTASSIUM SERPL-MCNC: 4.6 MMOL/L — SIGNIFICANT CHANGE UP (ref 3.5–5.3)
POTASSIUM SERPL-SCNC: 4.6 MMOL/L — SIGNIFICANT CHANGE UP (ref 3.5–5.3)
RBC # BLD: 3.15 M/UL — LOW (ref 4.2–5.8)
RBC # FLD: 18 % — HIGH (ref 10.3–14.5)
SODIUM SERPL-SCNC: 142 MMOL/L — SIGNIFICANT CHANGE UP (ref 135–145)
WBC # BLD: 9 K/UL — SIGNIFICANT CHANGE UP (ref 3.8–10.5)
WBC # FLD AUTO: 9 K/UL — SIGNIFICANT CHANGE UP (ref 3.8–10.5)

## 2018-07-02 PROCEDURE — 99214 OFFICE O/P EST MOD 30 MIN: CPT

## 2018-07-05 ENCOUNTER — RX RENEWAL (OUTPATIENT)
Age: 78
End: 2018-07-05

## 2018-07-06 ENCOUNTER — MEDICATION RENEWAL (OUTPATIENT)
Age: 78
End: 2018-07-06

## 2018-07-16 ENCOUNTER — RESULT REVIEW (OUTPATIENT)
Age: 78
End: 2018-07-16

## 2018-07-16 ENCOUNTER — APPOINTMENT (OUTPATIENT)
Dept: HEMATOLOGY ONCOLOGY | Facility: CLINIC | Age: 78
End: 2018-07-16
Payer: MEDICARE

## 2018-07-16 VITALS
BODY MASS INDEX: 23.41 KG/M2 | RESPIRATION RATE: 16 BRPM | OXYGEN SATURATION: 100 % | WEIGHT: 163.14 LBS | SYSTOLIC BLOOD PRESSURE: 148 MMHG | HEART RATE: 64 BPM | TEMPERATURE: 97.8 F | DIASTOLIC BLOOD PRESSURE: 77 MMHG

## 2018-07-16 LAB
BASOPHILS # BLD AUTO: 0 K/UL — SIGNIFICANT CHANGE UP (ref 0–0.2)
BASOPHILS NFR BLD AUTO: 0.3 % — SIGNIFICANT CHANGE UP (ref 0–2)
BLD GP AB SCN SERPL QL: NEGATIVE — SIGNIFICANT CHANGE UP
EOSINOPHIL # BLD AUTO: 0.9 K/UL — HIGH (ref 0–0.5)
EOSINOPHIL NFR BLD AUTO: 13.5 % — HIGH (ref 0–6)
HCT VFR BLD CALC: 32.3 % — LOW (ref 39–50)
HGB BLD-MCNC: 11.5 G/DL — LOW (ref 13–17)
LYMPHOCYTES # BLD AUTO: 1.2 K/UL — SIGNIFICANT CHANGE UP (ref 1–3.3)
LYMPHOCYTES # BLD AUTO: 18.1 % — SIGNIFICANT CHANGE UP (ref 13–44)
MCHC RBC-ENTMCNC: 35.7 G/DL — SIGNIFICANT CHANGE UP (ref 32–36)
MCHC RBC-ENTMCNC: 35.8 PG — HIGH (ref 27–34)
MCV RBC AUTO: 100 FL — SIGNIFICANT CHANGE UP (ref 80–100)
MONOCYTES # BLD AUTO: 0.5 K/UL — SIGNIFICANT CHANGE UP (ref 0–0.9)
MONOCYTES NFR BLD AUTO: 8 % — SIGNIFICANT CHANGE UP (ref 2–14)
NEUTROPHILS # BLD AUTO: 4 K/UL — SIGNIFICANT CHANGE UP (ref 1.8–7.4)
NEUTROPHILS NFR BLD AUTO: 60.1 % — SIGNIFICANT CHANGE UP (ref 43–77)
PLATELET # BLD AUTO: 119 K/UL — LOW (ref 150–400)
RBC # BLD: 3.22 M/UL — LOW (ref 4.2–5.8)
RBC # FLD: 15.4 % — HIGH (ref 10.3–14.5)
RH IG SCN BLD-IMP: POSITIVE — SIGNIFICANT CHANGE UP
WBC # BLD: 6.7 K/UL — SIGNIFICANT CHANGE UP (ref 3.8–10.5)
WBC # FLD AUTO: 6.7 K/UL — SIGNIFICANT CHANGE UP (ref 3.8–10.5)

## 2018-07-16 PROCEDURE — 86923 COMPATIBILITY TEST ELECTRIC: CPT

## 2018-07-16 PROCEDURE — 86901 BLOOD TYPING SEROLOGIC RH(D): CPT

## 2018-07-16 PROCEDURE — 99214 OFFICE O/P EST MOD 30 MIN: CPT

## 2018-07-16 PROCEDURE — 86850 RBC ANTIBODY SCREEN: CPT

## 2018-07-16 PROCEDURE — 86900 BLOOD TYPING SEROLOGIC ABO: CPT

## 2018-07-18 ENCOUNTER — APPOINTMENT (OUTPATIENT)
Dept: INFUSION THERAPY | Facility: HOSPITAL | Age: 78
End: 2018-07-18

## 2018-07-25 ENCOUNTER — MEDICATION RENEWAL (OUTPATIENT)
Age: 78
End: 2018-07-25

## 2018-08-03 ENCOUNTER — RX RENEWAL (OUTPATIENT)
Age: 78
End: 2018-08-03

## 2018-08-03 ENCOUNTER — MEDICATION RENEWAL (OUTPATIENT)
Age: 78
End: 2018-08-03

## 2018-08-03 ENCOUNTER — OUTPATIENT (OUTPATIENT)
Dept: OUTPATIENT SERVICES | Facility: HOSPITAL | Age: 78
LOS: 1 days | Discharge: HOME | End: 2018-08-03

## 2018-08-03 DIAGNOSIS — Z98.89 OTHER SPECIFIED POSTPROCEDURAL STATES: Chronic | ICD-10-CM

## 2018-08-03 DIAGNOSIS — Z90.89 ACQUIRED ABSENCE OF OTHER ORGANS: Chronic | ICD-10-CM

## 2018-08-03 DIAGNOSIS — Z95.5 PRESENCE OF CORONARY ANGIOPLASTY IMPLANT AND GRAFT: Chronic | ICD-10-CM

## 2018-08-03 DIAGNOSIS — D46.9 MYELODYSPLASTIC SYNDROME, UNSPECIFIED: ICD-10-CM

## 2018-08-13 ENCOUNTER — RESULT REVIEW (OUTPATIENT)
Age: 78
End: 2018-08-13

## 2018-08-13 ENCOUNTER — APPOINTMENT (OUTPATIENT)
Dept: HEMATOLOGY ONCOLOGY | Facility: CLINIC | Age: 78
End: 2018-08-13
Payer: MEDICARE

## 2018-08-13 VITALS
HEART RATE: 68 BPM | SYSTOLIC BLOOD PRESSURE: 120 MMHG | BODY MASS INDEX: 24.04 KG/M2 | OXYGEN SATURATION: 98 % | TEMPERATURE: 97.8 F | DIASTOLIC BLOOD PRESSURE: 70 MMHG | RESPIRATION RATE: 17 BRPM | WEIGHT: 167.55 LBS

## 2018-08-13 LAB
BASOPHILS # BLD AUTO: 0 K/UL — SIGNIFICANT CHANGE UP (ref 0–0.2)
BASOPHILS NFR BLD AUTO: 0.3 % — SIGNIFICANT CHANGE UP (ref 0–2)
EOSINOPHIL # BLD AUTO: 0.5 K/UL — SIGNIFICANT CHANGE UP (ref 0–0.5)
EOSINOPHIL NFR BLD AUTO: 5.9 % — SIGNIFICANT CHANGE UP (ref 0–6)
HCT VFR BLD CALC: 35.6 % — LOW (ref 39–50)
HGB BLD-MCNC: 12.2 G/DL — LOW (ref 13–17)
LYMPHOCYTES # BLD AUTO: 1.4 K/UL — SIGNIFICANT CHANGE UP (ref 1–3.3)
LYMPHOCYTES # BLD AUTO: 18.5 % — SIGNIFICANT CHANGE UP (ref 13–44)
MCHC RBC-ENTMCNC: 34.3 G/DL — SIGNIFICANT CHANGE UP (ref 32–36)
MCHC RBC-ENTMCNC: 34.9 PG — HIGH (ref 27–34)
MCV RBC AUTO: 102 FL — HIGH (ref 80–100)
MONOCYTES # BLD AUTO: 0.7 K/UL — SIGNIFICANT CHANGE UP (ref 0–0.9)
MONOCYTES NFR BLD AUTO: 9.6 % — SIGNIFICANT CHANGE UP (ref 2–14)
NEUTROPHILS # BLD AUTO: 5.1 K/UL — SIGNIFICANT CHANGE UP (ref 1.8–7.4)
NEUTROPHILS NFR BLD AUTO: 65.7 % — SIGNIFICANT CHANGE UP (ref 43–77)
PLATELET # BLD AUTO: 115 K/UL — LOW (ref 150–400)
RBC # BLD: 3.5 M/UL — LOW (ref 4.2–5.8)
RBC # FLD: 13.5 % — SIGNIFICANT CHANGE UP (ref 10.3–14.5)
WBC # BLD: 7.8 K/UL — SIGNIFICANT CHANGE UP (ref 3.8–10.5)
WBC # FLD AUTO: 7.8 K/UL — SIGNIFICANT CHANGE UP (ref 3.8–10.5)

## 2018-08-13 PROCEDURE — 99213 OFFICE O/P EST LOW 20 MIN: CPT

## 2018-08-28 ENCOUNTER — RX RENEWAL (OUTPATIENT)
Age: 78
End: 2018-08-28

## 2018-08-29 NOTE — ED ADULT NURSE NOTE - NS ED NURSE RECORD ANOTHER VITAL SIGN
Ms. Hedy Uribe is an 81year old, ,  female who comes to the office today for annual comprehensive personal healthcare examination. History of Present Illness (Problem List): This patient has multiple medical problems. These include:  1. CAD by calcium scoring study. She has been asymptomatic with regard to angina or symptoms of congestive heart failure, however. She does have a fairly high calcium score, however. 2. Hypothyroidism, S/P thyroidectomy in stages. She is currently mildly underactive, and her dose is being adjusted. 3. Hyperlipidemia, high HDL cholesterol. She is intolerant to statins. 4. Palpitations and dizziness of unclear cause. She recently saw Dr. Carmita Azar, who changed her from Amlodipine to Cardizem  mg daily to see if this would help with her palpitations and some mild orthostasis that he noted. 5. History of villous adenoma, status post laparoscopic right colectomy. 6. Family history of colon cancer. 7. Osteoporosis, not in need of treatment given her age. 8. Mild cognitive impairment with some short term memory loss. She clearly does not have dementia, however. 9. History of urinary incontinence, status post Contigen implants. 10. History of recurrent UTIs without recent symptoms. 11. Remote history of TIA with no recent recurrence. 12. History of near syncope in the past without absolute syncope. 13. History of skin cancer of the face and right lower eyelid, S/P excision. 14. History of hair loss, stabilized. 15. History of night cramps, diminished. 16. 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 17. History of right knee ACL tear that resolved with conservative therapy. 18. Hard of hearing. 19. History of herpes zoster. 20. Anxiety. 21. Hypertension. 22. Vitamin D deficiency. 21. Age related macular degeneration.     At the time of the visit she was continuing to note a sensation of not feeling right in her head, particularly in the morning after getting up. She recently had undergone a Holter monitor, which revealed some very short lived SVT and no evidence for atrial fibrillation. Dr. Kaitlin Bojorquez felt she might benefit from Cardizem CD for this, as well as treating her blood pressure with this, which would be less likely to cause orthostasis than Amlodipine. She denied other new CR, GI ,  or MSK complaints. Past Medical/Surgical History:  Otherwise remarkable for bilateral cataract extractions and the other surgeries as noted, including the Contigen injection, right colectomy, tonsillectomy and thyroidectomy, (in stages). Allergies:  None known, though she is intolerant to statins. Current Medications:  1. Aspirin 81 mg daily. 2. Levothyroxine 75 mcg Monday through Saturday and 50 mcg on . 3. PreserVision AREDS 2, one twice a day. 4. Vitamin B12 1,000 mcg daily. 5. TheraTears as needed. 6. Vitamin D 1,000 I U daily. 7. Diltiazem  mg daily. She also takes Claritin occasionally. We tried her on some Xyzal, but she was intolerant to this. Social History:    She is now . She is retired. She does not smoke. She does not drink alcohol. She tries to exercise regularly. Family History: Mother had Alzheimer's type dementia and stomach cancer. She  at age 80. Father  of natural causes at age 80. She has multiple siblings and medical problems that have included colon cancer, pancreatic cancer, heart disease and a cerebral hemorrhage. Review of Systems:    CONSTITUTIONAL:  She denies fever, weight loss, sweats or fatigue. HEENT:  No blurred or double vision, headache or dizziness. No difficulty with swallowing, taste, speech or smell. RESPIRATORY:  No cough, wheezing or shortness of breath. No sputum production. CARDIAC:  Occasional palpitations as noted above.   GI:  No changes in bowel movements, no abdominal pain, no bloating, anorexia, nausea, vomiting or heartburn. :  No frequency or dysuria. Denies incontinence or sexual dysfunction. BREASTS:  She does monthly self-breast examination. No masses have been felt. No nipple discharge noted. GYN:  Denies vaginal discharge or unexpected bleeding. EXTREMITIES:  No joint pain, stiffness or swelling. SKIN:  No recent rashes or mole changes. NEUROLOGICAL:  Occasional sensations of dizziness as noted above, as well as some issues with short term memory, particularly remembering names. Physical Examination:    GENERAL:  Well-developed, well-nourished, appears younger than her stated age. VITAL SIGNS:  BP: 135/70. P: 79 and regular. R: 16.  T: 98.5. HT: 5'5\". WT: 128 lbs. BMI: 21.1. VISION:  Deferred to ophthalmologist.       HEARING: Deferred as she uses hearing aids. HEENT:   Ears:  The TMs and ear canals were clear. Eyes:  The pupillary responses were normal.  Extraocular muscle function intact. Lids and conjunctiva not injected. Funduscopic exam revealed sharp disc margins. Pharynx:  Clear with teeth in good repair. No masses were noted. NECK:  Well healed thyroidectomy incision. No palpable thyroid. No adenopathy. No JVD noted. No     carotid bruits. LUNGS:  Clear to auscultation and percussion. CARDIAC:  Regular rate and rhythm with grade 1/6 systolic murmur. PMI not displaced. No gallop, rub or click. ABDOMEN:  Flat, soft, non-tender without palpable organomegaly or mass. No pulsatile mass was felt, and no bruit was heard. Bowel sounds were active. BREASTS:  Both symmetric. No palpable masses felt. No skin retractions noted. No nipple discharge evident. PELVIC/RECTAL:  Deferred. EXTREMITIES:  No clubbing, cyanosis or edema. Mild lower extremity venous varicosities without significant edema. PULSES:  Dorsalis pedis and posterior tibial pulses felt without difficulty. SKIN:  No rash or unusual mole changes noted.     LYMPH NODES:  None felt in the cervical, supraclavicular, axillary or inguinal region. NEUROLOGICAL:  Cranial nerves II-XII grossly intact. Motor strength 5/5. DTRs 2+ and symmetric. Station and gait normal.        Laboratory:  Hemoglobin is 14.3. White blood count is 6,200. Blood sugar is 96. Liver and kidney function are normal.  Electrolytes are normal.  Iron is 126. TSH is 6.07. Free thyroxine 1.65. Urinalysis is clear. Vitamin D level is 48.8. Lipid profile reveals a total cholesterol of 237, triglycerides of 130, HDL cholesterol of 98 and an LDL of 113. Health Maintenance Issues and Ancillary Studies:  1. TDAP (pertussis and tetanus) vaccine was given in August, 2011.  2. Pneumovax 23 (PPSV23) was given in March, 2001. 3. Seasonal influenza vaccine was given in October, 2017. 4. Zostavax given in July, 2009. 5. Prevnar 13 (PCV13) was given in August, 2015. 6. Rx for Shingrix given on the day of the visit. 7. Colonoscopy revealed diverticulosis in March, 2013, (follow up as needed). 8. Pap smear was negative in July, 2009.  9. EBT heart scan revealed a calcium score of 1,792 in July, 2008. 10. MRI of the brain revealed some mild atrophy and white matter disease in January, 2014. 11. Carotid dopplers revealed 10-49% stenosis on the right, none on the left, in May, 2014. 12. CT scan of the soft tissues of the neck in January, 2017 revealed enlargement of the left lobe of the thyroid and she subsequently underwent full thyroidectomy. 13. CT scan of the chest in January, 2017 was negative. 14. Upper GI in June, 2017 negative. 13. Mammogram was last negative in August, 2017, but are scheduled for 09/04/18. 16. CT scan of the head in July, 2018 revealed atrophy and chronic small vessel disease. 17. Bone density revealed a T score of -2.7 in March, 2018. 18. CT scan of the abdomen and pelvis in February, 2018 was negative. 19. Right leg venous doppler was negative for DVT in November, 2017.  20.  MRI of the right knee in October, 2011 revealed an ACL tear. 21. Holter monitor in August, 2018 revealed no high grade arrhythmia. Some very short lived SVT, no more than 5-6 beats at most. No significant pauses and no atrial fibrillation. 22. EKG done by Dr. Monica Osborn the day prior to the visit normal.   23. Pulmonary function studies were normal.  24. Health screening assessments were essentially normal.    Impression:  1. Palpitations and dizziness, unclear etiology, hopefully the change to Cardizem and improvement in her thyroid will improve things. She is scheduled for an echo, which we will get the results. Depending upon the results of that we may consider an event monitor and/or stress testing. 2. CAD by calcium scoring without evidence for angina. 3. Hypothyroidism, slightly under treated, status post full thyroidectomy in stages. 4. Hyperlipidemia. 5. Hx of villous adenoma. 6. Family history of colon cancer. 7. Osteoporosis. 8. MCI. 9. Hx of urinary incontinence. 10. Hx of recurrent UTI. 11. Hx of TIA in remote past.  12. S/P excision multiple skin cancers. 13. Hx of hair loss, stable. 14. DJD. 15. Right knee ACL tear. 16. Vitamin D deficiency. 17. Hx of night cramps. 18. S/P multiple surgeries as noted. Plan:  1. Continue present medications with the increase in thyroid and the starting of Cardizem CD. 2. Continue prudent diet and exercise program  3. Proceed with mammography. 4. She's up to date on health maintenance issues otherwise. 5. Recheck here in six weeks' time. 6. We will call her about the results of the echo and consider stress testing or event monitor or both. 7. Ten year coronary heart disease risk was calculated to be greater than 7.5%. Given her age and statin intolerance and the high HDL, I don't think therapy is warranted. 8. Lifetime breast cancer risk was calculated to be 2.7% versus the average for age group of 2.5%.   9. Ten year risk for major osteoporotic fracture was not Yes calculated as she already has osteoporosis. All screenings were reviewed and results discussed with the patient, who verbalized understand and agreement with the plans. A copy of the after visit summary with a personalized health plan was provided to the patient on the day of the visit. This is a Subsequent Medicare Annual Wellness Exam (AWV) (Performed 12 months after IPPE or effective date of Medicare Part B enrollment)    I have reviewed the patient's medical history in detail and updated the computerized patient record as noted above. Problem list reviewed with patient and risk factors discussed. PSH, SH, FH, Medications and HM issues also reviewed and discussed. Depression screen, fall risk assessment, functional abilities and ACP also reviewed and discussed as above and below. Depression Risk Factor Screening:     PHQ over the last two weeks 8/28/2018   Little interest or pleasure in doing things Not at all   Feeling down, depressed, irritable, or hopeless Not at all   Total Score PHQ 2 0     Alcohol Risk Factor Screening: You do not drink alcohol or very rarely. Functional Ability and Level of Safety:   Hearing Loss  The patient wears hearing aids. Activities of Daily Living  The home contains: no safety equipment. Patient does total self care    Fall Risk  Fall Risk Assessment, last 12 mths 8/28/2018   Able to walk? Yes   Fall in past 12 months?  No   Fall with injury? -   Number of falls in past 12 months -   Fall Risk Score -       Abuse Screen  Patient is not abused    Cognitive Screening   Evaluation of Cognitive Function:  Has your family/caregiver stated any concerns about your memory: no  Normal   MCI (names)    Patient Care Team   Patient Care Team:  Sulema Sunshine MD as PCP - General (Internal Medicine)  Filipe Ochoa MD as Surgeon (General Surgery)    Assessment/Plan   Education and counseling provided:  Are appropriate based on today's review and evaluation    Diagnoses and all orders for this visit:    1. PE (physical exam), annual  -     AMB POC SPIROMETRY REVIEW/INTERP  -     varicella-zoster recombinant, PF, (SHINGRIX, PF,) 50 mcg/0.5 mL susr injection; 0.5 mL by IntraMUSCular route once for 1 dose. 2. Immunization due  -     AMB POC SPIROMETRY REVIEW/INTERP  -     varicella-zoster recombinant, PF, (SHINGRIX, PF,) 50 mcg/0.5 mL susr injection; 0.5 mL by IntraMUSCular route once for 1 dose. 3. Dizziness  -     levothyroxine (SYNTHROID) 75 mcg tablet; Take 1 Tab by mouth Daily (before breakfast). 1 tab M->Sat    4. Traumatic injury of head, subsequent encounter  -     levothyroxine (SYNTHROID) 75 mcg tablet; Take 1 Tab by mouth Daily (before breakfast). 1 tab M->Sat    5. Adult onset hypothyroidism    6. Dyslipidemia    7. Essential hypertension    8. Anxiety    9. Mild cognitive impairment with memory loss    10. ASHD (arteriosclerotic heart disease)    11. History of TIA (transient ischemic attack)    12. Near syncope    13. Primary osteoarthritis, unspecified site    14. ARMD (age related macular degeneration)    13. Family history of colon cancer    12. Bilateral hearing loss, unspecified hearing loss type    17. Osteoporosis, unspecified osteoporosis type, unspecified pathological fracture presence    18. Vitamin D deficiency    Other orders  -     levothyroxine (SYNTHROID) 50 mcg tablet; Take 1 Tab by mouth two (2) times a week. Cathy Cheese        Other problems as listed above. Health Maintenance Due   Topic Date Due    GLAUCOMA SCREENING Q2Y  1994    Influenza Age 5 to Adult  2018         Orders Placed This Encounter    AMB POC SPIROMETRY REVIEW/INTERP    varicella-zoster recombinant, PF, (SHINGRIX, PF,) 50 mcg/0.5 mL susr injection     Si.5 mL by IntraMUSCular route once for 1 dose.      Dispense:  0.5 mL     Refill:  1       Demetrio Rajput MD

## 2018-09-06 ENCOUNTER — MEDICATION RENEWAL (OUTPATIENT)
Age: 78
End: 2018-09-06

## 2018-09-11 ENCOUNTER — OUTPATIENT (OUTPATIENT)
Dept: OUTPATIENT SERVICES | Facility: HOSPITAL | Age: 78
LOS: 1 days | Discharge: HOME | End: 2018-09-11

## 2018-09-11 DIAGNOSIS — D46.9 MYELODYSPLASTIC SYNDROME, UNSPECIFIED: ICD-10-CM

## 2018-09-11 DIAGNOSIS — Z98.89 OTHER SPECIFIED POSTPROCEDURAL STATES: Chronic | ICD-10-CM

## 2018-09-11 DIAGNOSIS — Z90.89 ACQUIRED ABSENCE OF OTHER ORGANS: Chronic | ICD-10-CM

## 2018-09-11 DIAGNOSIS — Z95.5 PRESENCE OF CORONARY ANGIOPLASTY IMPLANT AND GRAFT: Chronic | ICD-10-CM

## 2018-09-13 ENCOUNTER — RESULT REVIEW (OUTPATIENT)
Age: 78
End: 2018-09-13

## 2018-09-13 ENCOUNTER — APPOINTMENT (OUTPATIENT)
Dept: HEMATOLOGY ONCOLOGY | Facility: CLINIC | Age: 78
End: 2018-09-13

## 2018-09-13 LAB
BASOPHILS # BLD AUTO: 0 K/UL — SIGNIFICANT CHANGE UP (ref 0–0.2)
BASOPHILS NFR BLD AUTO: 0.6 % — SIGNIFICANT CHANGE UP (ref 0–2)
EOSINOPHIL # BLD AUTO: 0.7 K/UL — HIGH (ref 0–0.5)
EOSINOPHIL NFR BLD AUTO: 8 % — HIGH (ref 0–6)
HCT VFR BLD CALC: 39 % — SIGNIFICANT CHANGE UP (ref 39–50)
HGB BLD-MCNC: 13.3 G/DL — SIGNIFICANT CHANGE UP (ref 13–17)
LYMPHOCYTES # BLD AUTO: 1.8 K/UL — SIGNIFICANT CHANGE UP (ref 1–3.3)
LYMPHOCYTES # BLD AUTO: 21.4 % — SIGNIFICANT CHANGE UP (ref 13–44)
MCHC RBC-ENTMCNC: 34 PG — SIGNIFICANT CHANGE UP (ref 27–34)
MCHC RBC-ENTMCNC: 34.2 G/DL — SIGNIFICANT CHANGE UP (ref 32–36)
MCV RBC AUTO: 99.4 FL — SIGNIFICANT CHANGE UP (ref 80–100)
MONOCYTES # BLD AUTO: 1 K/UL — HIGH (ref 0–0.9)
MONOCYTES NFR BLD AUTO: 11.3 % — SIGNIFICANT CHANGE UP (ref 2–14)
NEUTROPHILS # BLD AUTO: 5.1 K/UL — SIGNIFICANT CHANGE UP (ref 1.8–7.4)
NEUTROPHILS NFR BLD AUTO: 58.7 % — SIGNIFICANT CHANGE UP (ref 43–77)
PLATELET # BLD AUTO: 150 K/UL — SIGNIFICANT CHANGE UP (ref 150–400)
RBC # BLD: 3.92 M/UL — LOW (ref 4.2–5.8)
RBC # FLD: 12.3 % — SIGNIFICANT CHANGE UP (ref 10.3–14.5)
WBC # BLD: 8.6 K/UL — SIGNIFICANT CHANGE UP (ref 3.8–10.5)
WBC # FLD AUTO: 8.6 K/UL — SIGNIFICANT CHANGE UP (ref 3.8–10.5)

## 2018-09-14 ENCOUNTER — RX RENEWAL (OUTPATIENT)
Age: 78
End: 2018-09-14

## 2018-09-14 ENCOUNTER — MEDICATION RENEWAL (OUTPATIENT)
Age: 78
End: 2018-09-14

## 2018-09-16 ENCOUNTER — TRANSCRIPTION ENCOUNTER (OUTPATIENT)
Age: 78
End: 2018-09-16

## 2018-09-17 ENCOUNTER — APPOINTMENT (OUTPATIENT)
Dept: HEMATOLOGY ONCOLOGY | Facility: CLINIC | Age: 78
End: 2018-09-17

## 2018-09-18 ENCOUNTER — APPOINTMENT (OUTPATIENT)
Dept: CARDIOLOGY | Facility: CLINIC | Age: 78
End: 2018-09-18

## 2018-09-27 ENCOUNTER — APPOINTMENT (OUTPATIENT)
Dept: NEUROLOGY | Facility: CLINIC | Age: 78
End: 2018-09-27
Payer: MEDICARE

## 2018-09-27 VITALS
HEIGHT: 70 IN | WEIGHT: 170 LBS | DIASTOLIC BLOOD PRESSURE: 73 MMHG | BODY MASS INDEX: 24.34 KG/M2 | SYSTOLIC BLOOD PRESSURE: 159 MMHG | HEART RATE: 62 BPM

## 2018-09-27 DIAGNOSIS — E11.9 TYPE 2 DIABETES MELLITUS W/OUT COMPLICATIONS: ICD-10-CM

## 2018-09-27 PROCEDURE — 99215 OFFICE O/P EST HI 40 MIN: CPT

## 2018-10-01 ENCOUNTER — OTHER (OUTPATIENT)
Age: 78
End: 2018-10-01

## 2018-10-01 ENCOUNTER — FORM ENCOUNTER (OUTPATIENT)
Age: 78
End: 2018-10-01

## 2018-10-02 ENCOUNTER — OUTPATIENT (OUTPATIENT)
Dept: OUTPATIENT SERVICES | Facility: HOSPITAL | Age: 78
LOS: 1 days | End: 2018-10-02
Payer: MEDICARE

## 2018-10-02 ENCOUNTER — APPOINTMENT (OUTPATIENT)
Dept: MRI IMAGING | Facility: CLINIC | Age: 78
End: 2018-10-02
Payer: MEDICARE

## 2018-10-02 DIAGNOSIS — Z98.89 OTHER SPECIFIED POSTPROCEDURAL STATES: Chronic | ICD-10-CM

## 2018-10-02 DIAGNOSIS — Z90.89 ACQUIRED ABSENCE OF OTHER ORGANS: Chronic | ICD-10-CM

## 2018-10-02 DIAGNOSIS — Z95.5 PRESENCE OF CORONARY ANGIOPLASTY IMPLANT AND GRAFT: Chronic | ICD-10-CM

## 2018-10-02 DIAGNOSIS — Z00.8 ENCOUNTER FOR OTHER GENERAL EXAMINATION: ICD-10-CM

## 2018-10-02 PROCEDURE — 72141 MRI NECK SPINE W/O DYE: CPT | Mod: 26

## 2018-10-02 PROCEDURE — 72141 MRI NECK SPINE W/O DYE: CPT

## 2018-10-03 ENCOUNTER — APPOINTMENT (OUTPATIENT)
Dept: ELECTROPHYSIOLOGY | Facility: CLINIC | Age: 78
End: 2018-10-03
Payer: MEDICARE

## 2018-10-03 PROCEDURE — 93298 REM INTERROG DEV EVAL SCRMS: CPT

## 2018-10-03 PROCEDURE — 93299: CPT

## 2018-10-10 LAB
APPEARANCE: CLEAR
BACTERIA UR CULT: NORMAL
BACTERIA: NEGATIVE
BILIRUBIN URINE: NEGATIVE
BLOOD URINE: NEGATIVE
COLOR: YELLOW
GLUCOSE QUALITATIVE U: NEGATIVE MG/DL
HYALINE CASTS: 1 /LPF
KETONES URINE: NEGATIVE
LEUKOCYTE ESTERASE URINE: NEGATIVE
MICROSCOPIC-UA: NORMAL
NITRITE URINE: NEGATIVE
PH URINE: 5.5
PROTEIN URINE: 30 MG/DL
RED BLOOD CELLS URINE: 1 /HPF
SPECIFIC GRAVITY URINE: 1.01
SQUAMOUS EPITHELIAL CELLS: 0 /HPF
UROBILINOGEN URINE: NEGATIVE MG/DL
WHITE BLOOD CELLS URINE: 0 /HPF

## 2018-10-15 ENCOUNTER — MEDICATION RENEWAL (OUTPATIENT)
Age: 78
End: 2018-10-15

## 2018-10-16 ENCOUNTER — OUTPATIENT (OUTPATIENT)
Dept: OUTPATIENT SERVICES | Facility: HOSPITAL | Age: 78
LOS: 1 days | End: 2018-10-16
Payer: MEDICARE

## 2018-10-16 ENCOUNTER — APPOINTMENT (OUTPATIENT)
Dept: HEMATOLOGY ONCOLOGY | Facility: CLINIC | Age: 78
End: 2018-10-16

## 2018-10-16 ENCOUNTER — OUTPATIENT (OUTPATIENT)
Dept: OUTPATIENT SERVICES | Facility: HOSPITAL | Age: 78
LOS: 1 days | Discharge: HOME | End: 2018-10-16

## 2018-10-16 ENCOUNTER — RESULT REVIEW (OUTPATIENT)
Age: 78
End: 2018-10-16

## 2018-10-16 DIAGNOSIS — Z95.5 PRESENCE OF CORONARY ANGIOPLASTY IMPLANT AND GRAFT: Chronic | ICD-10-CM

## 2018-10-16 DIAGNOSIS — Z98.89 OTHER SPECIFIED POSTPROCEDURAL STATES: Chronic | ICD-10-CM

## 2018-10-16 DIAGNOSIS — D46.9 MYELODYSPLASTIC SYNDROME, UNSPECIFIED: ICD-10-CM

## 2018-10-16 DIAGNOSIS — Z90.89 ACQUIRED ABSENCE OF OTHER ORGANS: Chronic | ICD-10-CM

## 2018-10-16 LAB
BASOPHILS # BLD AUTO: 0.1 K/UL — SIGNIFICANT CHANGE UP (ref 0–0.2)
BASOPHILS NFR BLD AUTO: 0.8 % — SIGNIFICANT CHANGE UP (ref 0–2)
BLD GP AB SCN SERPL QL: NEGATIVE — SIGNIFICANT CHANGE UP
EOSINOPHIL # BLD AUTO: 0.4 K/UL — SIGNIFICANT CHANGE UP (ref 0–0.5)
EOSINOPHIL NFR BLD AUTO: 5.8 % — SIGNIFICANT CHANGE UP (ref 0–6)
HCT VFR BLD CALC: 37.3 % — LOW (ref 39–50)
HGB BLD-MCNC: 12.6 G/DL — LOW (ref 13–17)
LYMPHOCYTES # BLD AUTO: 1.5 K/UL — SIGNIFICANT CHANGE UP (ref 1–3.3)
LYMPHOCYTES # BLD AUTO: 22 % — SIGNIFICANT CHANGE UP (ref 13–44)
MCHC RBC-ENTMCNC: 33.5 PG — SIGNIFICANT CHANGE UP (ref 27–34)
MCHC RBC-ENTMCNC: 33.7 G/DL — SIGNIFICANT CHANGE UP (ref 32–36)
MCV RBC AUTO: 99.4 FL — SIGNIFICANT CHANGE UP (ref 80–100)
MONOCYTES # BLD AUTO: 0.8 K/UL — SIGNIFICANT CHANGE UP (ref 0–0.9)
MONOCYTES NFR BLD AUTO: 11.1 % — SIGNIFICANT CHANGE UP (ref 2–14)
NEUTROPHILS # BLD AUTO: 4.2 K/UL — SIGNIFICANT CHANGE UP (ref 1.8–7.4)
NEUTROPHILS NFR BLD AUTO: 60.3 % — SIGNIFICANT CHANGE UP (ref 43–77)
PLATELET # BLD AUTO: 121 K/UL — LOW (ref 150–400)
RBC # BLD: 3.75 M/UL — LOW (ref 4.2–5.8)
RBC # FLD: 11.9 % — SIGNIFICANT CHANGE UP (ref 10.3–14.5)
RH IG SCN BLD-IMP: POSITIVE — SIGNIFICANT CHANGE UP
WBC # BLD: 6.9 K/UL — SIGNIFICANT CHANGE UP (ref 3.8–10.5)
WBC # FLD AUTO: 6.9 K/UL — SIGNIFICANT CHANGE UP (ref 3.8–10.5)

## 2018-10-16 PROCEDURE — 86850 RBC ANTIBODY SCREEN: CPT

## 2018-10-16 PROCEDURE — 86900 BLOOD TYPING SEROLOGIC ABO: CPT

## 2018-10-16 PROCEDURE — 86901 BLOOD TYPING SEROLOGIC RH(D): CPT

## 2018-10-30 ENCOUNTER — NON-APPOINTMENT (OUTPATIENT)
Age: 78
End: 2018-10-30

## 2018-10-30 ENCOUNTER — APPOINTMENT (OUTPATIENT)
Dept: CARDIOLOGY | Facility: CLINIC | Age: 78
End: 2018-10-30
Payer: MEDICARE

## 2018-10-30 VITALS
DIASTOLIC BLOOD PRESSURE: 77 MMHG | HEIGHT: 70 IN | BODY MASS INDEX: 23.91 KG/M2 | OXYGEN SATURATION: 97 % | SYSTOLIC BLOOD PRESSURE: 135 MMHG | WEIGHT: 167 LBS | HEART RATE: 64 BPM

## 2018-10-30 PROCEDURE — 99214 OFFICE O/P EST MOD 30 MIN: CPT

## 2018-11-09 ENCOUNTER — MEDICATION RENEWAL (OUTPATIENT)
Age: 78
End: 2018-11-09

## 2018-11-16 ENCOUNTER — APPOINTMENT (OUTPATIENT)
Dept: ELECTROPHYSIOLOGY | Facility: CLINIC | Age: 78
End: 2018-11-16

## 2018-12-19 ENCOUNTER — RX RENEWAL (OUTPATIENT)
Age: 78
End: 2018-12-19

## 2018-12-28 ENCOUNTER — APPOINTMENT (OUTPATIENT)
Dept: FAMILY MEDICINE | Facility: CLINIC | Age: 78
End: 2018-12-28
Payer: MEDICARE

## 2018-12-28 VITALS
DIASTOLIC BLOOD PRESSURE: 80 MMHG | HEIGHT: 70 IN | WEIGHT: 165 LBS | BODY MASS INDEX: 23.62 KG/M2 | SYSTOLIC BLOOD PRESSURE: 130 MMHG

## 2018-12-28 DIAGNOSIS — M10.9 GOUT, UNSPECIFIED: ICD-10-CM

## 2018-12-28 DIAGNOSIS — R53.1 WEAKNESS: ICD-10-CM

## 2018-12-28 DIAGNOSIS — E03.9 HYPOTHYROIDISM, UNSPECIFIED: ICD-10-CM

## 2018-12-28 PROCEDURE — 99203 OFFICE O/P NEW LOW 30 MIN: CPT | Mod: 25

## 2018-12-28 PROCEDURE — 36415 COLL VENOUS BLD VENIPUNCTURE: CPT

## 2018-12-31 LAB
ALBUMIN SERPL ELPH-MCNC: 4.5 G/DL
ALP BLD-CCNC: 135 U/L
ALT SERPL-CCNC: 25 U/L
ANION GAP SERPL CALC-SCNC: 16 MMOL/L
AST SERPL-CCNC: 26 U/L
B BURGDOR AB SER-IMP: NEGATIVE
B BURGDOR IGM PATRN SER IB-IMP: NEGATIVE
B BURGDOR18/20KD IGM SER QL IB: NORMAL
B BURGDOR18KD IGG SER QL IB: NORMAL
B BURGDOR23KD IGG SER QL IB: NORMAL
B BURGDOR23KD IGM SER QL IB: NORMAL
B BURGDOR28KD AB SER QL IB: NORMAL
B BURGDOR28KD IGG SER QL IB: NORMAL
B BURGDOR30KD AB SER QL IB: NORMAL
B BURGDOR30KD IGG SER QL IB: NORMAL
B BURGDOR31KD IGG SER QL IB: NORMAL
B BURGDOR31KD IGM SER QL IB: NORMAL
B BURGDOR39KD IGG SER QL IB: NORMAL
B BURGDOR39KD IGM SER QL IB: NORMAL
B BURGDOR41KD IGG SER QL IB: NORMAL
B BURGDOR41KD IGM SER QL IB: NORMAL
B BURGDOR45KD AB SER QL IB: NORMAL
B BURGDOR45KD IGG SER QL IB: NORMAL
B BURGDOR58KD AB SER QL IB: NORMAL
B BURGDOR58KD IGG SER QL IB: PRESENT
B BURGDOR66KD IGG SER QL IB: NORMAL
B BURGDOR66KD IGM SER QL IB: NORMAL
B BURGDOR93KD IGG SER QL IB: NORMAL
B BURGDOR93KD IGM SER QL IB: NORMAL
BASOPHILS # BLD AUTO: 0.03 K/UL
BASOPHILS NFR BLD AUTO: 0.3 %
BILIRUB SERPL-MCNC: 0.6 MG/DL
BUN SERPL-MCNC: 50 MG/DL
CALCIUM SERPL-MCNC: 9.6 MG/DL
CHLORIDE SERPL-SCNC: 106 MMOL/L
CO2 SERPL-SCNC: 22 MMOL/L
CREAT SERPL-MCNC: 1.34 MG/DL
EOSINOPHIL # BLD AUTO: 0.5 K/UL
EOSINOPHIL NFR BLD AUTO: 5.5 %
ESTIMATED AVERAGE GLUCOSE: 117 MG/DL
GLUCOSE SERPL-MCNC: 110 MG/DL
HBA1C MFR BLD HPLC: 5.7 %
HCT VFR BLD CALC: 40.1 %
HGB BLD-MCNC: 12.9 G/DL
IMM GRANULOCYTES NFR BLD AUTO: 1.2 %
LYMPHOCYTES # BLD AUTO: 2.14 K/UL
LYMPHOCYTES NFR BLD AUTO: 23.4 %
MAGNESIUM SERPL-MCNC: 2 MG/DL
MAN DIFF?: NORMAL
MCHC RBC-ENTMCNC: 31.8 PG
MCHC RBC-ENTMCNC: 32.2 GM/DL
MCV RBC AUTO: 98.8 FL
MONOCYTES # BLD AUTO: 0.76 K/UL
MONOCYTES NFR BLD AUTO: 8.3 %
NEUTROPHILS # BLD AUTO: 5.59 K/UL
NEUTROPHILS NFR BLD AUTO: 61.3 %
PLATELET # BLD AUTO: 155 K/UL
POTASSIUM SERPL-SCNC: 4.5 MMOL/L
PROT SERPL-MCNC: 7.1 G/DL
PSA SERPL-MCNC: 1.88 NG/ML
RBC # BLD: 4.06 M/UL
RBC # FLD: 13.6 %
SODIUM SERPL-SCNC: 144 MMOL/L
TSH SERPL-ACNC: 3.49 UIU/ML
URATE SERPL-MCNC: 5.9 MG/DL
WBC # FLD AUTO: 9.13 K/UL

## 2019-01-10 NOTE — PHYSICAL EXAM
[No Acute Distress] : no acute distress [Well Nourished] : well nourished [Well Developed] : well developed [No Respiratory Distress] : no respiratory distress  [Clear to Auscultation] : lungs were clear to auscultation bilaterally [Normal Rate] : normal rate  [Regular Rhythm] : with a regular rhythm [Normal S1, S2] : normal S1 and S2 [No Murmur] : no murmur heard

## 2019-01-10 NOTE — HISTORY OF PRESENT ILLNESS
[FreeTextEntry8] : New patient. Here to establish care. Patient went to the neurology and they did an MRI October 2nd in [facility]. patient had hx of stroke about 1 year and a half ago and was later dx with MDS bone marrow. Patient states it is starting to resolve better. patient needed blood transfusion because he wasn't getting a lot of RBC.

## 2019-01-10 NOTE — HISTORY OF PRESENT ILLNESS
[FreeTextEntry8] : New patient. Here to establish care. Patient went to the neurology and they did an MRI October 2nd in [facility]. patient had hx of stroke about 1 year and a half ago and was later dx with MDS bone marrow. Patient states it is starting to resolve better. patient needed blood transfusion because he wasn't getting a lot of RBC.

## 2019-01-25 ENCOUNTER — APPOINTMENT (OUTPATIENT)
Dept: NEUROLOGY | Facility: CLINIC | Age: 79
End: 2019-01-25
Payer: MEDICARE

## 2019-01-25 ENCOUNTER — MEDICATION RENEWAL (OUTPATIENT)
Age: 79
End: 2019-01-25

## 2019-01-25 ENCOUNTER — APPOINTMENT (OUTPATIENT)
Dept: NEUROLOGY | Facility: CLINIC | Age: 79
End: 2019-01-25

## 2019-01-25 VITALS
DIASTOLIC BLOOD PRESSURE: 85 MMHG | HEIGHT: 70 IN | BODY MASS INDEX: 23.77 KG/M2 | HEART RATE: 63 BPM | SYSTOLIC BLOOD PRESSURE: 159 MMHG | WEIGHT: 166 LBS

## 2019-01-25 PROCEDURE — 99214 OFFICE O/P EST MOD 30 MIN: CPT

## 2019-01-25 NOTE — ASSESSMENT
[FreeTextEntry1] : Patient does not have myelopathy, and exam c/w neuropathy, told to be very careful with walking and to avoid falls\par \par f/u PRN

## 2019-01-25 NOTE — PHYSICAL EXAM
[Person] : oriented to person [Place] : oriented to place [Time] : oriented to time [Concentration Intact] : normal concentrating ability [Visual Intact] : visual attention was ~T not ~L decreased [Naming Objects] : no difficulty naming common objects [Repeating Phrases] : no difficulty repeating a phrase [Writing A Sentence] : no difficulty writing a sentence [Fluency] : fluency intact [Comprehension] : comprehension intact [Reading] : reading intact [Past History] : adequate knowledge of personal past history [Cranial Nerves Optic (II)] : visual acuity intact bilaterally,  visual fields full to confrontation, pupils equal round and reactive to light [Cranial Nerves Oculomotor (III)] : extraocular motion intact [Cranial Nerves Trigeminal (V)] : facial sensation intact symmetrically [Cranial Nerves Facial (VII)] : face symmetrical [Cranial Nerves Vestibulocochlear (VIII)] : hearing was intact bilaterally [Cranial Nerves Glossopharyngeal (IX)] : tongue and palate midline [Cranial Nerves Accessory (XI - Cranial And Spinal)] : head turning and shoulder shrug symmetric [Cranial Nerves Hypoglossal (XII)] : there was no tongue deviation with protrusion [Motor Tone] : muscle tone was normal in all four extremities [No Muscle Atrophy] : normal bulk in all four extremities [Hand Weakness Right] : normal hand  [Hand Weakness Left] : normal hand  [+4] : knee flexion +4/5 [5] : ankle inversion 5/5 [Sensation Tactile Decrease] : light touch was intact [Romberg's Sign] : a positive Romberg's sign was present [Vibration Decrease Leg / Foot Both Ankles] : decreased at both ankles [Vibration Decrease Leg / Foot Toes Both Feet] : decreased at the toes of both feet  [Position Sensation Decrease Leg/Foot At Level Of Ankle] : impaired at the ankle in both legs [Position Sensation Decrease Leg/Foot At Level Of Toes] : impaired at the toes in both legs [Past-pointing] : there was no past-pointing [Tremor] : no tremor present [3+] : Triceps left 3+ [2+] : Patella left 2+ [0] : Ankle jerk left 0 [Plantar Reflex Right Only] : normal on the right [Plantar Reflex Left Only] : normal on the left [FreeTextEntry6] : exaggerated cervical kyphosis, ?pectoral jerks bilaterally [FreeTextEntry8] : wide-based and ataxic gait

## 2019-01-25 NOTE — HISTORY OF PRESENT ILLNESS
[FreeTextEntry1] : Patient doing about the same, falls occasionally.  \par \par MRI c spine:  no stenosis or compression\par \par vit B12 normal, MMA normal

## 2019-01-29 ENCOUNTER — APPOINTMENT (OUTPATIENT)
Dept: ELECTROPHYSIOLOGY | Facility: CLINIC | Age: 79
End: 2019-01-29

## 2019-01-29 ENCOUNTER — APPOINTMENT (OUTPATIENT)
Dept: CARDIOLOGY | Facility: CLINIC | Age: 79
End: 2019-01-29
Payer: MEDICARE

## 2019-01-29 ENCOUNTER — APPOINTMENT (OUTPATIENT)
Dept: ELECTROPHYSIOLOGY | Facility: CLINIC | Age: 79
End: 2019-01-29
Payer: MEDICARE

## 2019-01-29 ENCOUNTER — NON-APPOINTMENT (OUTPATIENT)
Age: 79
End: 2019-01-29

## 2019-01-29 VITALS
SYSTOLIC BLOOD PRESSURE: 152 MMHG | HEIGHT: 70 IN | DIASTOLIC BLOOD PRESSURE: 82 MMHG | BODY MASS INDEX: 24.2 KG/M2 | WEIGHT: 169 LBS | OXYGEN SATURATION: 95 % | HEART RATE: 69 BPM

## 2019-01-29 DIAGNOSIS — E78.5 HYPERLIPIDEMIA, UNSPECIFIED: ICD-10-CM

## 2019-01-29 PROCEDURE — 93291 INTERROG DEV EVAL SCRMS IP: CPT

## 2019-01-29 PROCEDURE — 93000 ELECTROCARDIOGRAM COMPLETE: CPT

## 2019-01-29 PROCEDURE — 99214 OFFICE O/P EST MOD 30 MIN: CPT

## 2019-01-29 NOTE — PHYSICAL EXAM
[General Appearance - Well Developed] : well developed [Normal Appearance] : normal appearance [Well Groomed] : well groomed [General Appearance - Well Nourished] : well nourished [No Deformities] : no deformities [General Appearance - In No Acute Distress] : no acute distress [Normal Conjunctiva] : the conjunctiva exhibited no abnormalities [Eyelids - No Xanthelasma] : the eyelids demonstrated no xanthelasmas [Normal Oral Mucosa] : normal oral mucosa [No Oral Pallor] : no oral pallor [No Oral Cyanosis] : no oral cyanosis [Normal Jugular Venous A Waves Present] : normal jugular venous A waves present [Normal Jugular Venous V Waves Present] : normal jugular venous V waves present [No Jugular Venous Talley A Waves] : no jugular venous talley A waves [Respiration, Rhythm And Depth] : normal respiratory rhythm and effort [Exaggerated Use Of Accessory Muscles For Inspiration] : no accessory muscle use [Auscultation Breath Sounds / Voice Sounds] : lungs were clear to auscultation bilaterally [Heart Rate And Rhythm] : heart rate and rhythm were normal [Heart Sounds] : normal S1 and S2 [Murmurs] : no murmurs present [Abdomen Soft] : soft [Abdomen Tenderness] : non-tender [Abdomen Mass (___ Cm)] : no abdominal mass palpated [Abnormal Walk] : normal gait [Gait - Sufficient For Exercise Testing] : the gait was sufficient for exercise testing [Nail Clubbing] : no clubbing of the fingernails [Cyanosis, Localized] : no localized cyanosis [Petechial Hemorrhages (___cm)] : no petechial hemorrhages [Skin Color & Pigmentation] : normal skin color and pigmentation [] : no rash [No Venous Stasis] : no venous stasis [Skin Lesions] : no skin lesions [No Skin Ulcers] : no skin ulcer [No Xanthoma] : no  xanthoma was observed [Oriented To Time, Place, And Person] : oriented to person, place, and time [Affect] : the affect was normal [Mood] : the mood was normal [No Anxiety] : not feeling anxious

## 2019-01-29 NOTE — REASON FOR VISIT
[Follow-Up - Clinic] : a clinic follow-up of [Fatigue] : feeling tired (fatigue) [Heart Failure] : congestive heart failure

## 2019-01-29 NOTE — DISCUSSION/SUMMARY
[___ Month(s)] : [unfilled] month(s) [FreeTextEntry1] : The patient is a 78-year-old gentleman ex-smoker, diabetes mellitus, hyperlipidemia, coronary artery disease, chronic systolic congestive heart failure, hypothyroidism, gout, MDS, who is declining. Cervical neuropathy is major concern. He uses an upwalker at home on his own. He is never alone. No angina or heart failure. Looking for PCP closer to home. We discussed adherence to a low fat, low cholesterol diet and regular daily exercise.\par

## 2019-01-29 NOTE — HISTORY OF PRESENT ILLNESS
[FreeTextEntry1] : Malachi has severe polyneuropathy. He ambulates more with walker. He tried going off ritalin and did not feel well off it.   Denies any chest pain, palpitations, lightheadedness or dizziness.

## 2019-02-05 ENCOUNTER — APPOINTMENT (OUTPATIENT)
Dept: INTERNAL MEDICINE | Facility: CLINIC | Age: 79
End: 2019-02-05

## 2019-03-13 ENCOUNTER — MEDICATION RENEWAL (OUTPATIENT)
Age: 79
End: 2019-03-13

## 2019-03-13 LAB
ALBUMIN SERPL ELPH-MCNC: 3.7 G/DL
ALBUMIN SERPL ELPH-MCNC: 3.9 G/DL
ALBUMIN SERPL ELPH-MCNC: 4 G/DL
ALBUMIN SERPL ELPH-MCNC: 4.1 G/DL
ALBUMIN SERPL ELPH-MCNC: 4.1 G/DL
ALBUMIN SERPL ELPH-MCNC: 4.4 G/DL
ALP BLD-CCNC: 137 U/L
ALP BLD-CCNC: 180 U/L
ALP BLD-CCNC: 181 U/L
ALP BLD-CCNC: 187 U/L
ALP BLD-CCNC: 191 U/L
ALP BLD-CCNC: 210 U/L
ALP BLD-CCNC: 221 U/L
ALP BLD-CCNC: 358 U/L
ALT SERPL-CCNC: 18 U/L
ALT SERPL-CCNC: 21 U/L
ALT SERPL-CCNC: 30 U/L
ALT SERPL-CCNC: 34 U/L
ALT SERPL-CCNC: 44 U/L
ALT SERPL-CCNC: 45 U/L
ALT SERPL-CCNC: 46 U/L
ALT SERPL-CCNC: 71 U/L
ANION GAP SERPL CALC-SCNC: 13 MMOL/L
ANION GAP SERPL CALC-SCNC: 14 MMOL/L
ANION GAP SERPL CALC-SCNC: 15 MMOL/L
ANION GAP SERPL CALC-SCNC: 16 MMOL/L
ANION GAP SERPL CALC-SCNC: 17 MMOL/L
ANION GAP SERPL CALC-SCNC: 19 MMOL/L
AST SERPL-CCNC: 21 U/L
AST SERPL-CCNC: 24 U/L
AST SERPL-CCNC: 26 U/L
AST SERPL-CCNC: 29 U/L
AST SERPL-CCNC: 36 U/L
AST SERPL-CCNC: 36 U/L
AST SERPL-CCNC: 42 U/L
AST SERPL-CCNC: 46 U/L
BILIRUB SERPL-MCNC: 0.4 MG/DL
BILIRUB SERPL-MCNC: 0.5 MG/DL
BILIRUB SERPL-MCNC: 0.6 MG/DL
BILIRUB SERPL-MCNC: 1.1 MG/DL
BUN SERPL-MCNC: 39 MG/DL
BUN SERPL-MCNC: 45 MG/DL
BUN SERPL-MCNC: 48 MG/DL
BUN SERPL-MCNC: 50 MG/DL
BUN SERPL-MCNC: 56 MG/DL
BUN SERPL-MCNC: 67 MG/DL
BUN SERPL-MCNC: 80 MG/DL
BUN SERPL-MCNC: 91 MG/DL
CALCIUM SERPL-MCNC: 10 MG/DL
CALCIUM SERPL-MCNC: 9 MG/DL
CALCIUM SERPL-MCNC: 9.2 MG/DL
CALCIUM SERPL-MCNC: 9.4 MG/DL
CALCIUM SERPL-MCNC: 9.5 MG/DL
CALCIUM SERPL-MCNC: 9.6 MG/DL
CALCIUM SERPL-MCNC: 9.8 MG/DL
CALCIUM SERPL-MCNC: 9.8 MG/DL
CHLORIDE SERPL-SCNC: 100 MMOL/L
CHLORIDE SERPL-SCNC: 103 MMOL/L
CHLORIDE SERPL-SCNC: 105 MMOL/L
CHLORIDE SERPL-SCNC: 105 MMOL/L
CHLORIDE SERPL-SCNC: 107 MMOL/L
CHLORIDE SERPL-SCNC: 108 MMOL/L
CHLORIDE SERPL-SCNC: 110 MMOL/L
CHLORIDE SERPL-SCNC: 111 MMOL/L
CO2 SERPL-SCNC: 18 MMOL/L
CO2 SERPL-SCNC: 18 MMOL/L
CO2 SERPL-SCNC: 19 MMOL/L
CO2 SERPL-SCNC: 20 MMOL/L
CO2 SERPL-SCNC: 20 MMOL/L
CO2 SERPL-SCNC: 21 MMOL/L
CO2 SERPL-SCNC: 22 MMOL/L
CO2 SERPL-SCNC: 23 MMOL/L
CREAT SERPL-MCNC: 1.27 MG/DL
CREAT SERPL-MCNC: 1.39 MG/DL
CREAT SERPL-MCNC: 1.41 MG/DL
CREAT SERPL-MCNC: 1.64 MG/DL
CREAT SERPL-MCNC: 1.7 MG/DL
CREAT SERPL-MCNC: 1.77 MG/DL
CREAT SERPL-MCNC: 1.88 MG/DL
CREAT SERPL-MCNC: 2.8 MG/DL
GLUCOSE SERPL-MCNC: 103 MG/DL
GLUCOSE SERPL-MCNC: 110 MG/DL
GLUCOSE SERPL-MCNC: 118 MG/DL
GLUCOSE SERPL-MCNC: 123 MG/DL
GLUCOSE SERPL-MCNC: 126 MG/DL
GLUCOSE SERPL-MCNC: 141 MG/DL
GLUCOSE SERPL-MCNC: 144 MG/DL
GLUCOSE SERPL-MCNC: 160 MG/DL
HBA1C MFR BLD HPLC: 5.6 %
LDH SERPL-CCNC: 184 U/L
LDH SERPL-CCNC: 216 U/L
POTASSIUM SERPL-SCNC: 4.3 MMOL/L
POTASSIUM SERPL-SCNC: 4.4 MMOL/L
POTASSIUM SERPL-SCNC: 4.4 MMOL/L
POTASSIUM SERPL-SCNC: 4.5 MMOL/L
POTASSIUM SERPL-SCNC: 4.7 MMOL/L
POTASSIUM SERPL-SCNC: 4.7 MMOL/L
POTASSIUM SERPL-SCNC: 5 MMOL/L
POTASSIUM SERPL-SCNC: 5 MMOL/L
PROT SERPL-MCNC: 6.7 G/DL
PROT SERPL-MCNC: 7 G/DL
PROT SERPL-MCNC: 7 G/DL
PROT SERPL-MCNC: 7.1 G/DL
PROT SERPL-MCNC: 7.1 G/DL
PROT SERPL-MCNC: 7.4 G/DL
SODIUM SERPL-SCNC: 138 MMOL/L
SODIUM SERPL-SCNC: 140 MMOL/L
SODIUM SERPL-SCNC: 141 MMOL/L
SODIUM SERPL-SCNC: 142 MMOL/L
SODIUM SERPL-SCNC: 144 MMOL/L
SODIUM SERPL-SCNC: 145 MMOL/L

## 2019-03-20 ENCOUNTER — APPOINTMENT (OUTPATIENT)
Dept: ELECTROPHYSIOLOGY | Facility: HOSPITAL | Age: 79
End: 2019-03-20
Payer: MEDICARE

## 2019-03-20 PROCEDURE — 93298 REM INTERROG DEV EVAL SCRMS: CPT

## 2019-03-20 PROCEDURE — 93299: CPT

## 2019-04-22 ENCOUNTER — APPOINTMENT (OUTPATIENT)
Dept: ELECTROPHYSIOLOGY | Facility: HOSPITAL | Age: 79
End: 2019-04-22
Payer: MEDICARE

## 2019-04-22 PROCEDURE — 93298 REM INTERROG DEV EVAL SCRMS: CPT

## 2019-04-22 PROCEDURE — 93299: CPT

## 2019-04-23 ENCOUNTER — MEDICATION RENEWAL (OUTPATIENT)
Age: 79
End: 2019-04-23

## 2019-04-23 RX ORDER — BLOOD-GLUCOSE METER
KIT MISCELLANEOUS
Qty: 1 | Refills: 0 | Status: ACTIVE | COMMUNITY
Start: 2019-04-23 | End: 1900-01-01

## 2019-05-07 ENCOUNTER — MEDICATION RENEWAL (OUTPATIENT)
Age: 79
End: 2019-05-07

## 2019-05-07 ENCOUNTER — RX RENEWAL (OUTPATIENT)
Age: 79
End: 2019-05-07

## 2019-05-10 ENCOUNTER — RX RENEWAL (OUTPATIENT)
Age: 79
End: 2019-05-10

## 2019-05-24 ENCOUNTER — APPOINTMENT (OUTPATIENT)
Dept: ELECTROPHYSIOLOGY | Facility: CLINIC | Age: 79
End: 2019-05-24
Payer: MEDICARE

## 2019-05-24 PROCEDURE — 93298 REM INTERROG DEV EVAL SCRMS: CPT

## 2019-05-24 PROCEDURE — 93299: CPT

## 2019-06-03 ENCOUNTER — RX RENEWAL (OUTPATIENT)
Age: 79
End: 2019-06-03

## 2019-06-05 ENCOUNTER — RX RENEWAL (OUTPATIENT)
Age: 79
End: 2019-06-05

## 2019-06-06 ENCOUNTER — MEDICATION RENEWAL (OUTPATIENT)
Age: 79
End: 2019-06-06

## 2019-06-07 ENCOUNTER — MEDICATION RENEWAL (OUTPATIENT)
Age: 79
End: 2019-06-07

## 2019-06-11 NOTE — ED PROVIDER NOTE - PSYCHIATRIC, MLM
room air Alert and oriented to person, place, time/situation. normal mood and affect. no apparent risk to self or others.

## 2019-06-25 ENCOUNTER — APPOINTMENT (OUTPATIENT)
Dept: ELECTROPHYSIOLOGY | Facility: CLINIC | Age: 79
End: 2019-06-25
Payer: MEDICARE

## 2019-06-25 PROCEDURE — 93299: CPT

## 2019-06-25 PROCEDURE — 93298 REM INTERROG DEV EVAL SCRMS: CPT

## 2019-07-22 ENCOUNTER — MEDICATION RENEWAL (OUTPATIENT)
Age: 79
End: 2019-07-22

## 2019-07-30 ENCOUNTER — LABORATORY RESULT (OUTPATIENT)
Age: 79
End: 2019-07-30

## 2019-07-30 ENCOUNTER — APPOINTMENT (OUTPATIENT)
Dept: ELECTROPHYSIOLOGY | Facility: CLINIC | Age: 79
End: 2019-07-30
Payer: MEDICARE

## 2019-07-30 ENCOUNTER — APPOINTMENT (OUTPATIENT)
Dept: CARDIOLOGY | Facility: CLINIC | Age: 79
End: 2019-07-30
Payer: MEDICARE

## 2019-07-30 VITALS
BODY MASS INDEX: 24.34 KG/M2 | WEIGHT: 170 LBS | OXYGEN SATURATION: 98 % | HEIGHT: 70 IN | SYSTOLIC BLOOD PRESSURE: 126 MMHG | DIASTOLIC BLOOD PRESSURE: 80 MMHG | HEART RATE: 72 BPM

## 2019-07-30 LAB
ALBUMIN SERPL ELPH-MCNC: 4.2 G/DL
ALP BLD-CCNC: 157 U/L
ALT SERPL-CCNC: 20 U/L
ANION GAP SERPL CALC-SCNC: 12 MMOL/L
AST SERPL-CCNC: 20 U/L
BILIRUB SERPL-MCNC: 0.4 MG/DL
BUN SERPL-MCNC: 46 MG/DL
CALCIUM SERPL-MCNC: 9.7 MG/DL
CHLORIDE SERPL-SCNC: 108 MMOL/L
CHOLEST SERPL-MCNC: 121 MG/DL
CHOLEST/HDLC SERPL: 3.6 RATIO
CO2 SERPL-SCNC: 22 MMOL/L
CREAT SERPL-MCNC: 1.39 MG/DL
ESTIMATED AVERAGE GLUCOSE: 137 MG/DL
GLUCOSE SERPL-MCNC: 103 MG/DL
HBA1C MFR BLD HPLC: 6.4 %
HDLC SERPL-MCNC: 34 MG/DL
LDLC SERPL CALC-MCNC: 67 MG/DL
POTASSIUM SERPL-SCNC: 5.9 MMOL/L
PROT SERPL-MCNC: 7.3 G/DL
SODIUM SERPL-SCNC: 142 MMOL/L
TRIGL SERPL-MCNC: 100 MG/DL
TSH SERPL-ACNC: 4.7 UIU/ML
VIT B12 SERPL-MCNC: 782 PG/ML

## 2019-07-30 PROCEDURE — 99214 OFFICE O/P EST MOD 30 MIN: CPT

## 2019-07-30 PROCEDURE — 93291 INTERROG DEV EVAL SCRMS IP: CPT

## 2019-07-30 PROCEDURE — 93000 ELECTROCARDIOGRAM COMPLETE: CPT

## 2019-07-30 RX ORDER — TRIAMCINOLONE ACETONIDE 1 MG/G
0.1 CREAM TOPICAL
Qty: 1 | Refills: 1 | Status: ACTIVE | COMMUNITY
Start: 2019-07-30 | End: 1900-01-01

## 2019-07-30 NOTE — DISCUSSION/SUMMARY
[___ Month(s)] : [unfilled] month(s) [FreeTextEntry1] : The patient is a 78-year-old gentleman ex-smoker, diabetes mellitus, hyperlipidemia, coronary artery disease, chronic systolic congestive heart failure, hypothyroidism, gout, MDS, whose decline has stabilized.\par #1 Neuro- Cervical neuropathy is major concern. He uses an upwalker at home on his own. He is never alone. \par #1 CAD- on aspirin, no angina \par #3 HFrEF- euvolemic\par #4 Lipids- on atorvastatin, labs today\par #5 DM- on orals\par #6 Hypothyroid- on levothyroxine\par #7 ILR- negative to date, f/u 1 year

## 2019-07-30 NOTE — PHYSICAL EXAM
[Normal Appearance] : normal appearance [General Appearance - Well Developed] : well developed [Well Groomed] : well groomed [General Appearance - Well Nourished] : well nourished [No Deformities] : no deformities [Normal Conjunctiva] : the conjunctiva exhibited no abnormalities [General Appearance - In No Acute Distress] : no acute distress [Eyelids - No Xanthelasma] : the eyelids demonstrated no xanthelasmas [Normal Oral Mucosa] : normal oral mucosa [No Oral Cyanosis] : no oral cyanosis [No Oral Pallor] : no oral pallor [Normal Jugular Venous V Waves Present] : normal jugular venous V waves present [Normal Jugular Venous A Waves Present] : normal jugular venous A waves present [No Jugular Venous Talley A Waves] : no jugular venous talley A waves [Respiration, Rhythm And Depth] : normal respiratory rhythm and effort [Exaggerated Use Of Accessory Muscles For Inspiration] : no accessory muscle use [Auscultation Breath Sounds / Voice Sounds] : lungs were clear to auscultation bilaterally [Murmurs] : no murmurs present [Heart Sounds] : normal S1 and S2 [Heart Rate And Rhythm] : heart rate and rhythm were normal [Abdomen Tenderness] : non-tender [Abdomen Soft] : soft [Abdomen Mass (___ Cm)] : no abdominal mass palpated [Abnormal Walk] : normal gait [Gait - Sufficient For Exercise Testing] : the gait was sufficient for exercise testing [Nail Clubbing] : no clubbing of the fingernails [Cyanosis, Localized] : no localized cyanosis [Petechial Hemorrhages (___cm)] : no petechial hemorrhages [Skin Color & Pigmentation] : normal skin color and pigmentation [] : no rash [No Venous Stasis] : no venous stasis [Skin Lesions] : no skin lesions [No Skin Ulcers] : no skin ulcer [No Xanthoma] : no  xanthoma was observed [Oriented To Time, Place, And Person] : oriented to person, place, and time [Mood] : the mood was normal [Affect] : the affect was normal [No Anxiety] : not feeling anxious

## 2019-07-30 NOTE — PHYSICAL THERAPY INITIAL EVALUATION ADULT - NAME OF CLINICIAN
Patient has never been seen in this clinic - Thresa Smoker - are we able to see this patient for etoh hepatitis?
Steffany SCHULTZ

## 2019-07-30 NOTE — HISTORY OF PRESENT ILLNESS
[FreeTextEntry1] : Malachi has severe polyneuropathy. He ambulates more with walker. His legs are stronger.    Denies any chest pain, palpitations, lightheadedness or dizziness.  1-2 drinks

## 2019-07-31 LAB
25(OH)D3 SERPL-MCNC: 39.7 NG/ML
BASOPHILS # BLD AUTO: 0.06 K/UL
BASOPHILS NFR BLD AUTO: 0.9 %
EOSINOPHIL # BLD AUTO: 0.26 K/UL
EOSINOPHIL NFR BLD AUTO: 3.9 %
HCT VFR BLD CALC: 29.3 %
HGB BLD-MCNC: 9.2 G/DL
IMM GRANULOCYTES NFR BLD AUTO: 2.3 %
LYMPHOCYTES # BLD AUTO: 1.3 K/UL
LYMPHOCYTES NFR BLD AUTO: 19.5 %
MAN DIFF?: NORMAL
MCHC RBC-ENTMCNC: 31.4 GM/DL
MCHC RBC-ENTMCNC: 35.2 PG
MCV RBC AUTO: 112.3 FL
MONOCYTES # BLD AUTO: 0.55 K/UL
MONOCYTES NFR BLD AUTO: 8.3 %
NEUTROPHILS # BLD AUTO: 4.33 K/UL
NEUTROPHILS NFR BLD AUTO: 65.1 %
PLATELET # BLD AUTO: 357 K/UL
RBC # BLD: 2.61 M/UL
RBC # FLD: 16.4 %
WBC # FLD AUTO: 6.65 K/UL

## 2019-08-11 ENCOUNTER — FORM ENCOUNTER (OUTPATIENT)
Age: 79
End: 2019-08-11

## 2019-08-12 ENCOUNTER — APPOINTMENT (OUTPATIENT)
Dept: NEPHROLOGY | Facility: CLINIC | Age: 79
End: 2019-08-12
Payer: MEDICARE

## 2019-08-12 ENCOUNTER — APPOINTMENT (OUTPATIENT)
Dept: ULTRASOUND IMAGING | Facility: IMAGING CENTER | Age: 79
End: 2019-08-12

## 2019-08-12 ENCOUNTER — LABORATORY RESULT (OUTPATIENT)
Age: 79
End: 2019-08-12

## 2019-08-12 ENCOUNTER — OUTPATIENT (OUTPATIENT)
Dept: OUTPATIENT SERVICES | Facility: HOSPITAL | Age: 79
LOS: 1 days | End: 2019-08-12
Payer: MEDICARE

## 2019-08-12 VITALS
HEART RATE: 74 BPM | SYSTOLIC BLOOD PRESSURE: 132 MMHG | OXYGEN SATURATION: 98 % | BODY MASS INDEX: 24.3 KG/M2 | DIASTOLIC BLOOD PRESSURE: 57 MMHG | WEIGHT: 169.75 LBS | HEIGHT: 70 IN

## 2019-08-12 DIAGNOSIS — Z90.89 ACQUIRED ABSENCE OF OTHER ORGANS: Chronic | ICD-10-CM

## 2019-08-12 DIAGNOSIS — I63.9 CEREBRAL INFARCTION, UNSPECIFIED: ICD-10-CM

## 2019-08-12 DIAGNOSIS — Z95.5 PRESENCE OF CORONARY ANGIOPLASTY IMPLANT AND GRAFT: Chronic | ICD-10-CM

## 2019-08-12 DIAGNOSIS — Z98.89 OTHER SPECIFIED POSTPROCEDURAL STATES: Chronic | ICD-10-CM

## 2019-08-12 PROCEDURE — 99204 OFFICE O/P NEW MOD 45 MIN: CPT

## 2019-08-12 PROCEDURE — 76770 US EXAM ABDO BACK WALL COMP: CPT | Mod: 26

## 2019-08-12 PROCEDURE — 76770 US EXAM ABDO BACK WALL COMP: CPT

## 2019-08-12 RX ORDER — LENALIDOMIDE 2.5 MG/1
2.5 CAPSULE ORAL
Qty: 21 | Refills: 0 | Status: DISCONTINUED | COMMUNITY
Start: 2018-02-28 | End: 2019-08-12

## 2019-08-12 NOTE — PHYSICAL EXAM
[General Appearance - In No Acute Distress] : in no acute distress [FreeTextEntry1] : wheelchair bound [Sclera] : the sclera and conjunctiva were normal [Extraocular Movements] : extraocular movements were intact [PERRL With Normal Accommodation] : pupils were equal in size, round, and reactive to light [Outer Ear] : the ears and nose were normal in appearance [Oropharynx] : the oropharynx was normal [Neck Appearance] : the appearance of the neck was normal [Neck Cervical Mass (___cm)] : no neck mass was observed [Jugular Venous Distention Increased] : there was no jugular-venous distention [Thyroid Diffuse Enlargement] : the thyroid was not enlarged [] : no respiratory distress [Thyroid Nodule] : there were no palpable thyroid nodules [Heart Rate And Rhythm] : heart rate was normal and rhythm regular [Auscultation Breath Sounds / Voice Sounds] : lungs were clear to auscultation bilaterally [Heart Sounds Gallop] : no gallops [Murmurs] : no murmurs [Heart Sounds] : normal S1 and S2 [Cervical Lymph Nodes Enlarged Posterior Bilaterally] : posterior cervical [Heart Sounds Pericardial Friction Rub] : no pericardial rub [Supraclavicular Lymph Nodes Enlarged Bilaterally] : supraclavicular [Cervical Lymph Nodes Enlarged Anterior Bilaterally] : anterior cervical [Femoral Lymph Nodes Enlarged Bilaterally] : femoral [Axillary Lymph Nodes Enlarged Bilaterally] : axillary [Inguinal Lymph Nodes Enlarged Bilaterally] : inguinal [No CVA Tenderness] : no ~M costovertebral angle tenderness [Oriented To Time, Place, And Person] : oriented to person, place, and time

## 2019-08-12 NOTE — HISTORY OF PRESENT ILLNESS
[FreeTextEntry1] : Mr. JUAREZ VALLADARES is a 78 year old male with CAD, myelodysplastic syndrome here to establish care for CKD and recently noted K >5.5. He has had CAD for years, DMII and HTN and hyperlipidemia for sometime. He has good A1c control and only on 2 oral agents. He has no known prior proteinuria, if so very minimal at 100-300mg range. He has had up and down crt in 1.3-1.9 range since 2014 and most recently in 1.3 range. He has a solitary R kidney and L kidney since old scans noted from 2014 onwards dysplastic and atrophic. He had no major issues except CAD till 2017. Bone marrow biopsy 5/11/17- Myelodysplastic syndrome (MDS) with isolated del(5q)  Patient admitted in 2017 or anemia and then for acute CVA. 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 MRA brain: No flow-limiting stenosis or MRA evidence of aneurysm of the intracranial arteries. A workup didn't reveal anything specific but then had several transfusions and short course of revlimid and given mild disease, he was just told to stay on conservative management. While this was going on, he had on and off balance concerns. Several falls were noted. He was dx with cervical myelopathy vs. diabetic polyneuropathy, he probably has elements of both per neurology. MRI is to be done as well. He is now wheelchair bound and feels scared to walk as his risk of falling. His meds were reviewed. His wt has been stable and he takes lasix as needed 20mg po qd. He does admit to high K diet recently and his K was in 5 range and crt stable 1.3mg/dl.  No n/v. No decrease in appetite, no tremors. No edema worsening. no decreased sleep. No foamy urine. no hematuria, no dysuria. no confusion. No SOB, PUGH. No wt loss. He has no hx of BPH but he does have trouble urinating on and off since being on a wheelchair and has increased dribbling. He has nocturia as well. \par \par

## 2019-08-12 NOTE — ASSESSMENT
[FreeTextEntry1] : Mr. PIZARRO SR is a 78 year old male with known CKD with likely due to solitary kidney and DMII and chronic vascular disease. No signs of JOYCE at this point.  Since 2017, pt has new dx of MDS and polyneuropathy. Overall, not doing well but stable. \par \par CKD:- check free light chains, c3,c4 but likely stable at this point. Get a renal sono and post void bladder to make sure no component of retention from neurogenic bladder\par Check PTH, anemia f.u\par \par Hyperkalemia- recheck first, low K diet handout given, Lokelma 10gm samples given to the patient. \par \par Meds reviewed:- dose all meds to CKD eGFR and for now stable. Lasix as needed only for wt changes\par \par f/u 4 months

## 2019-08-13 LAB
25(OH)D3 SERPL-MCNC: 40.1 NG/ML
ALBUMIN SERPL ELPH-MCNC: 4.1 G/DL
ANION GAP SERPL CALC-SCNC: 10 MMOL/L
APPEARANCE: CLEAR
BACTERIA: NEGATIVE
BASOPHILS # BLD AUTO: 0.06 K/UL
BASOPHILS NFR BLD AUTO: 1 %
BILIRUBIN URINE: NEGATIVE
BLOOD URINE: NEGATIVE
BUN SERPL-MCNC: 47 MG/DL
C3 SERPL-MCNC: 116 MG/DL
C4 SERPL-MCNC: 28 MG/DL
CALCIUM SERPL-MCNC: 9.3 MG/DL
CALCIUM SERPL-MCNC: 9.3 MG/DL
CHLORIDE SERPL-SCNC: 109 MMOL/L
CO2 SERPL-SCNC: 21 MMOL/L
COLOR: YELLOW
CREAT SERPL-MCNC: 1.32 MG/DL
DEPRECATED KAPPA LC FREE/LAMBDA SER: 1.57 RATIO
EOSINOPHIL # BLD AUTO: 0.24 K/UL
EOSINOPHIL NFR BLD AUTO: 4 %
ESTIMATED AVERAGE GLUCOSE: 128 MG/DL
GLUCOSE QUALITATIVE U: NEGATIVE
GLUCOSE SERPL-MCNC: 116 MG/DL
HBA1C MFR BLD HPLC: 6.1 %
HCT VFR BLD CALC: 27 %
HGB BLD-MCNC: 8.4 G/DL
HYALINE CASTS: 0 /LPF
IMM GRANULOCYTES NFR BLD AUTO: 1.7 %
KAPPA LC CSF-MCNC: 4.16 MG/DL
KAPPA LC SERPL-MCNC: 6.54 MG/DL
KETONES URINE: NEGATIVE
LEUKOCYTE ESTERASE URINE: NEGATIVE
LYMPHOCYTES # BLD AUTO: 1.58 K/UL
LYMPHOCYTES NFR BLD AUTO: 26.6 %
M PROTEIN SPEC IFE-MCNC: NORMAL
MAGNESIUM SERPL-MCNC: 1.8 MG/DL
MAN DIFF?: NORMAL
MCHC RBC-ENTMCNC: 31.1 GM/DL
MCHC RBC-ENTMCNC: 35.7 PG
MCV RBC AUTO: 114.9 FL
MICROSCOPIC-UA: NORMAL
MONOCYTES # BLD AUTO: 0.5 K/UL
MONOCYTES NFR BLD AUTO: 8.4 %
NEUTROPHILS # BLD AUTO: 3.47 K/UL
NEUTROPHILS NFR BLD AUTO: 58.3 %
NITRITE URINE: NEGATIVE
PARATHYROID HORMONE INTACT: 44 PG/ML
PH URINE: 5
PHOSPHATE SERPL-MCNC: 3.5 MG/DL
PLATELET # BLD AUTO: 308 K/UL
POTASSIUM SERPL-SCNC: 5.4 MMOL/L
PROTEIN URINE: NORMAL
RBC # BLD: 2.35 M/UL
RBC # FLD: 17.2 %
RED BLOOD CELLS URINE: 0 /HPF
SODIUM SERPL-SCNC: 140 MMOL/L
SPECIFIC GRAVITY URINE: 1.02
SQUAMOUS EPITHELIAL CELLS: 0 /HPF
UROBILINOGEN URINE: NORMAL
WBC # FLD AUTO: 5.95 K/UL
WHITE BLOOD CELLS URINE: 0 /HPF

## 2019-08-16 ENCOUNTER — RESULT REVIEW (OUTPATIENT)
Age: 79
End: 2019-08-16

## 2019-08-16 ENCOUNTER — APPOINTMENT (OUTPATIENT)
Dept: HEMATOLOGY ONCOLOGY | Facility: CLINIC | Age: 79
End: 2019-08-16

## 2019-08-16 ENCOUNTER — OUTPATIENT (OUTPATIENT)
Dept: OUTPATIENT SERVICES | Facility: HOSPITAL | Age: 79
LOS: 1 days | End: 2019-08-16
Payer: MEDICARE

## 2019-08-16 ENCOUNTER — OUTPATIENT (OUTPATIENT)
Dept: OUTPATIENT SERVICES | Facility: HOSPITAL | Age: 79
LOS: 1 days | Discharge: HOME | End: 2019-08-16

## 2019-08-16 ENCOUNTER — RX RENEWAL (OUTPATIENT)
Age: 79
End: 2019-08-16

## 2019-08-16 DIAGNOSIS — D46.9 MYELODYSPLASTIC SYNDROME, UNSPECIFIED: ICD-10-CM

## 2019-08-16 DIAGNOSIS — Z90.89 ACQUIRED ABSENCE OF OTHER ORGANS: Chronic | ICD-10-CM

## 2019-08-16 DIAGNOSIS — Z95.5 PRESENCE OF CORONARY ANGIOPLASTY IMPLANT AND GRAFT: Chronic | ICD-10-CM

## 2019-08-16 DIAGNOSIS — D64.9 ANEMIA, UNSPECIFIED: ICD-10-CM

## 2019-08-16 DIAGNOSIS — Z98.89 OTHER SPECIFIED POSTPROCEDURAL STATES: Chronic | ICD-10-CM

## 2019-08-16 LAB
BASOPHILS # BLD AUTO: 0.1 K/UL — SIGNIFICANT CHANGE UP (ref 0–0.2)
BASOPHILS NFR BLD AUTO: 1.3 % — SIGNIFICANT CHANGE UP (ref 0–2)
BLD GP AB SCN SERPL QL: NEGATIVE — SIGNIFICANT CHANGE UP
EOSINOPHIL # BLD AUTO: 0.2 K/UL — SIGNIFICANT CHANGE UP (ref 0–0.5)
EOSINOPHIL NFR BLD AUTO: 3.6 % — SIGNIFICANT CHANGE UP (ref 0–6)
HCT VFR BLD CALC: 24.1 % — LOW (ref 39–50)
HGB BLD-MCNC: 8 G/DL — LOW (ref 13–17)
LYMPHOCYTES # BLD AUTO: 1.7 K/UL — SIGNIFICANT CHANGE UP (ref 1–3.3)
LYMPHOCYTES # BLD AUTO: 30.8 % — SIGNIFICANT CHANGE UP (ref 13–44)
MCHC RBC-ENTMCNC: 33.3 G/DL — SIGNIFICANT CHANGE UP (ref 32–36)
MCHC RBC-ENTMCNC: 36.9 PG — HIGH (ref 27–34)
MCV RBC AUTO: 111 FL — HIGH (ref 80–100)
MONOCYTES # BLD AUTO: 0.3 K/UL — SIGNIFICANT CHANGE UP (ref 0–0.9)
MONOCYTES NFR BLD AUTO: 5.3 % — SIGNIFICANT CHANGE UP (ref 2–14)
NEUTROPHILS # BLD AUTO: 3.2 K/UL — SIGNIFICANT CHANGE UP (ref 1.8–7.4)
NEUTROPHILS NFR BLD AUTO: 58.9 % — SIGNIFICANT CHANGE UP (ref 43–77)
PLATELET # BLD AUTO: 250 K/UL — SIGNIFICANT CHANGE UP (ref 150–400)
RBC # BLD: 2.17 M/UL — LOW (ref 4.2–5.8)
RBC # FLD: 16.7 % — HIGH (ref 10.3–14.5)
RETICS #: 41.2 K/UL — SIGNIFICANT CHANGE UP (ref 25–125)
RETICS/RBC NFR: 1.9 % — SIGNIFICANT CHANGE UP (ref 0.5–2.5)
RH IG SCN BLD-IMP: POSITIVE — SIGNIFICANT CHANGE UP
WBC # BLD: 5.5 K/UL — SIGNIFICANT CHANGE UP (ref 3.8–10.5)
WBC # FLD AUTO: 5.5 K/UL — SIGNIFICANT CHANGE UP (ref 3.8–10.5)

## 2019-08-16 PROCEDURE — 86850 RBC ANTIBODY SCREEN: CPT

## 2019-08-16 PROCEDURE — 86901 BLOOD TYPING SEROLOGIC RH(D): CPT

## 2019-08-16 PROCEDURE — 86923 COMPATIBILITY TEST ELECTRIC: CPT

## 2019-08-16 PROCEDURE — 86900 BLOOD TYPING SEROLOGIC ABO: CPT

## 2019-08-17 ENCOUNTER — APPOINTMENT (OUTPATIENT)
Dept: INFUSION THERAPY | Facility: HOSPITAL | Age: 79
End: 2019-08-17

## 2019-08-19 ENCOUNTER — MEDICATION RENEWAL (OUTPATIENT)
Age: 79
End: 2019-08-19

## 2019-08-19 DIAGNOSIS — Z51.89 ENCOUNTER FOR OTHER SPECIFIED AFTERCARE: ICD-10-CM

## 2019-08-19 DIAGNOSIS — E87.5 HYPERKALEMIA: ICD-10-CM

## 2019-08-20 ENCOUNTER — LABORATORY RESULT (OUTPATIENT)
Age: 79
End: 2019-08-20

## 2019-08-21 LAB
ALBUMIN SERPL ELPH-MCNC: 3.9 G/DL
ANION GAP SERPL CALC-SCNC: 15 MMOL/L
BASOPHILS # BLD AUTO: 0.07 K/UL
BASOPHILS NFR BLD AUTO: 1.2 %
BUN SERPL-MCNC: 47 MG/DL
CALCIUM SERPL-MCNC: 8.9 MG/DL
CHLORIDE SERPL-SCNC: 108 MMOL/L
CO2 SERPL-SCNC: 21 MMOL/L
CREAT SERPL-MCNC: 1.17 MG/DL
EOSINOPHIL # BLD AUTO: 0.23 K/UL
EOSINOPHIL NFR BLD AUTO: 4 %
FERRITIN SERPL-MCNC: 1691 NG/ML
FOLATE SERPL-MCNC: 19.7 NG/ML
GLUCOSE SERPL-MCNC: 101 MG/DL
HCT VFR BLD CALC: 29.5 %
HGB BLD-MCNC: 9.9 G/DL
IMM GRANULOCYTES NFR BLD AUTO: 1.2 %
IRON SATN MFR SERPL: 87 %
IRON SERPL-MCNC: 155 UG/DL
LYMPHOCYTES # BLD AUTO: 1.55 K/UL
LYMPHOCYTES NFR BLD AUTO: 27.1 %
MAN DIFF?: NORMAL
MCHC RBC-ENTMCNC: 33.6 GM/DL
MCHC RBC-ENTMCNC: 35.4 PG
MCV RBC AUTO: 105.4 FL
MONOCYTES # BLD AUTO: 0.43 K/UL
MONOCYTES NFR BLD AUTO: 7.5 %
NEUTROPHILS # BLD AUTO: 3.36 K/UL
NEUTROPHILS NFR BLD AUTO: 59 %
PHOSPHATE SERPL-MCNC: 3 MG/DL
PLATELET # BLD AUTO: 259 K/UL
POTASSIUM SERPL-SCNC: 4.4 MMOL/L
RBC # BLD: 2.8 M/UL
RBC # FLD: 17.6 %
SODIUM SERPL-SCNC: 144 MMOL/L
TIBC SERPL-MCNC: 179 UG/DL
UIBC SERPL-MCNC: 24 UG/DL
VIT B12 SERPL-MCNC: 695 PG/ML
WBC # FLD AUTO: 5.71 K/UL

## 2019-08-30 ENCOUNTER — APPOINTMENT (OUTPATIENT)
Dept: ELECTROPHYSIOLOGY | Facility: CLINIC | Age: 79
End: 2019-08-30
Payer: MEDICARE

## 2019-08-30 PROCEDURE — 93299: CPT

## 2019-08-30 PROCEDURE — 93298 REM INTERROG DEV EVAL SCRMS: CPT

## 2019-09-06 ENCOUNTER — APPOINTMENT (OUTPATIENT)
Dept: NEUROLOGY | Facility: CLINIC | Age: 79
End: 2019-09-06
Payer: MEDICARE

## 2019-09-06 ENCOUNTER — APPOINTMENT (OUTPATIENT)
Dept: HEMATOLOGY ONCOLOGY | Facility: CLINIC | Age: 79
End: 2019-09-06
Payer: MEDICARE

## 2019-09-06 ENCOUNTER — OUTPATIENT (OUTPATIENT)
Dept: OUTPATIENT SERVICES | Facility: HOSPITAL | Age: 79
LOS: 1 days | End: 2019-09-06
Payer: MEDICARE

## 2019-09-06 ENCOUNTER — RESULT REVIEW (OUTPATIENT)
Age: 79
End: 2019-09-06

## 2019-09-06 VITALS
WEIGHT: 170.17 LBS | DIASTOLIC BLOOD PRESSURE: 78 MMHG | OXYGEN SATURATION: 97 % | RESPIRATION RATE: 16 BRPM | TEMPERATURE: 97.5 F | BODY MASS INDEX: 24.42 KG/M2 | SYSTOLIC BLOOD PRESSURE: 133 MMHG | HEART RATE: 68 BPM

## 2019-09-06 DIAGNOSIS — Z90.89 ACQUIRED ABSENCE OF OTHER ORGANS: Chronic | ICD-10-CM

## 2019-09-06 DIAGNOSIS — Z98.89 OTHER SPECIFIED POSTPROCEDURAL STATES: Chronic | ICD-10-CM

## 2019-09-06 DIAGNOSIS — Z95.5 PRESENCE OF CORONARY ANGIOPLASTY IMPLANT AND GRAFT: Chronic | ICD-10-CM

## 2019-09-06 DIAGNOSIS — D46.9 MYELODYSPLASTIC SYNDROME, UNSPECIFIED: ICD-10-CM

## 2019-09-06 LAB
BASOPHILS # BLD AUTO: 0 K/UL — SIGNIFICANT CHANGE UP (ref 0–0.2)
BASOPHILS NFR BLD AUTO: 0.4 % — SIGNIFICANT CHANGE UP (ref 0–2)
BLD GP AB SCN SERPL QL: NEGATIVE — SIGNIFICANT CHANGE UP
EOSINOPHIL # BLD AUTO: 0.2 K/UL — SIGNIFICANT CHANGE UP (ref 0–0.5)
EOSINOPHIL NFR BLD AUTO: 4.2 % — SIGNIFICANT CHANGE UP (ref 0–6)
HCT VFR BLD CALC: 28.4 % — LOW (ref 39–50)
HGB BLD-MCNC: 9.4 G/DL — LOW (ref 13–17)
LYMPHOCYTES # BLD AUTO: 1.4 K/UL — SIGNIFICANT CHANGE UP (ref 1–3.3)
LYMPHOCYTES # BLD AUTO: 23.9 % — SIGNIFICANT CHANGE UP (ref 13–44)
MCHC RBC-ENTMCNC: 33.1 G/DL — SIGNIFICANT CHANGE UP (ref 32–36)
MCHC RBC-ENTMCNC: 35.1 PG — HIGH (ref 27–34)
MCV RBC AUTO: 106 FL — HIGH (ref 80–100)
MONOCYTES # BLD AUTO: 0.3 K/UL — SIGNIFICANT CHANGE UP (ref 0–0.9)
MONOCYTES NFR BLD AUTO: 5.8 % — SIGNIFICANT CHANGE UP (ref 2–14)
NEUTROPHILS # BLD AUTO: 3.9 K/UL — SIGNIFICANT CHANGE UP (ref 1.8–7.4)
NEUTROPHILS NFR BLD AUTO: 65.6 % — SIGNIFICANT CHANGE UP (ref 43–77)
PLATELET # BLD AUTO: 336 K/UL — SIGNIFICANT CHANGE UP (ref 150–400)
RBC # BLD: 2.67 M/UL — LOW (ref 4.2–5.8)
RBC # FLD: 16.7 % — HIGH (ref 10.3–14.5)
RH IG SCN BLD-IMP: POSITIVE — SIGNIFICANT CHANGE UP
WBC # BLD: 5.9 K/UL — SIGNIFICANT CHANGE UP (ref 3.8–10.5)
WBC # FLD AUTO: 5.9 K/UL — SIGNIFICANT CHANGE UP (ref 3.8–10.5)

## 2019-09-06 PROCEDURE — 95885 MUSC TST DONE W/NERV TST LIM: CPT

## 2019-09-06 PROCEDURE — 99214 OFFICE O/P EST MOD 30 MIN: CPT

## 2019-09-06 PROCEDURE — 95908 NRV CNDJ TST 3-4 STUDIES: CPT

## 2019-09-06 RX ORDER — MULTIVIT-MIN/FOLIC/VIT K/LYCOP 400-300MCG
1000 TABLET ORAL
Refills: 0 | Status: ACTIVE | COMMUNITY
Start: 2019-09-06

## 2019-09-06 RX ORDER — CETIRIZINE HYDROCHLORIDE 10 MG/1
10 TABLET, COATED ORAL DAILY
Qty: 90 | Refills: 1 | Status: DISCONTINUED | COMMUNITY
Start: 2017-08-22 | End: 2019-09-06

## 2019-09-06 RX ORDER — COLCHICINE 0.6 MG/1
0.6 TABLET ORAL
Qty: 45 | Refills: 1 | Status: DISCONTINUED | COMMUNITY
Start: 2017-10-25 | End: 2019-09-06

## 2019-09-13 ENCOUNTER — APPOINTMENT (OUTPATIENT)
Dept: HEMATOLOGY ONCOLOGY | Facility: CLINIC | Age: 79
End: 2019-09-13
Payer: MEDICARE

## 2019-09-13 ENCOUNTER — RESULT REVIEW (OUTPATIENT)
Age: 79
End: 2019-09-13

## 2019-09-13 VITALS
TEMPERATURE: 97.8 F | DIASTOLIC BLOOD PRESSURE: 77 MMHG | HEART RATE: 69 BPM | OXYGEN SATURATION: 100 % | RESPIRATION RATE: 12 BRPM | BODY MASS INDEX: 24.36 KG/M2 | WEIGHT: 169.73 LBS | SYSTOLIC BLOOD PRESSURE: 134 MMHG

## 2019-09-13 LAB
BASOPHILS # BLD AUTO: 0.1 K/UL — SIGNIFICANT CHANGE UP (ref 0–0.2)
BASOPHILS NFR BLD AUTO: 0.9 % — SIGNIFICANT CHANGE UP (ref 0–2)
BLD GP AB SCN SERPL QL: NEGATIVE — SIGNIFICANT CHANGE UP
EOSINOPHIL # BLD AUTO: 0.2 K/UL — SIGNIFICANT CHANGE UP (ref 0–0.5)
EOSINOPHIL NFR BLD AUTO: 4.2 % — SIGNIFICANT CHANGE UP (ref 0–6)
HCT VFR BLD CALC: 24.1 % — LOW (ref 39–50)
HGB BLD-MCNC: 8.3 G/DL — LOW (ref 13–17)
LYMPHOCYTES # BLD AUTO: 1.6 K/UL — SIGNIFICANT CHANGE UP (ref 1–3.3)
LYMPHOCYTES # BLD AUTO: 27.1 % — SIGNIFICANT CHANGE UP (ref 13–44)
MCHC RBC-ENTMCNC: 34.6 G/DL — SIGNIFICANT CHANGE UP (ref 32–36)
MCHC RBC-ENTMCNC: 36.4 PG — HIGH (ref 27–34)
MCV RBC AUTO: 105 FL — HIGH (ref 80–100)
MONOCYTES # BLD AUTO: 0.4 K/UL — SIGNIFICANT CHANGE UP (ref 0–0.9)
MONOCYTES NFR BLD AUTO: 7.2 % — SIGNIFICANT CHANGE UP (ref 2–14)
NEUTROPHILS # BLD AUTO: 3.5 K/UL — SIGNIFICANT CHANGE UP (ref 1.8–7.4)
NEUTROPHILS NFR BLD AUTO: 60.6 % — SIGNIFICANT CHANGE UP (ref 43–77)
PLATELET # BLD AUTO: 380 K/UL — SIGNIFICANT CHANGE UP (ref 150–400)
RBC # BLD: 2.29 M/UL — LOW (ref 4.2–5.8)
RBC # FLD: 17.2 % — HIGH (ref 10.3–14.5)
RH IG SCN BLD-IMP: POSITIVE — SIGNIFICANT CHANGE UP
WBC # BLD: 5.8 K/UL — SIGNIFICANT CHANGE UP (ref 3.8–10.5)
WBC # FLD AUTO: 5.8 K/UL — SIGNIFICANT CHANGE UP (ref 3.8–10.5)

## 2019-09-13 PROCEDURE — 85097 BONE MARROW INTERPRETATION: CPT

## 2019-09-13 PROCEDURE — 38222 DX BONE MARROW BX & ASPIR: CPT | Mod: LT

## 2019-09-13 PROCEDURE — 88313 SPECIAL STAINS GROUP 2: CPT | Mod: 26

## 2019-09-13 PROCEDURE — 88360 TUMOR IMMUNOHISTOCHEM/MANUAL: CPT | Mod: 26

## 2019-09-13 PROCEDURE — 88189 FLOWCYTOMETRY/READ 16 & >: CPT

## 2019-09-13 PROCEDURE — 88305 TISSUE EXAM BY PATHOLOGIST: CPT | Mod: 26

## 2019-09-13 PROCEDURE — 88342 IMHCHEM/IMCYTCHM 1ST ANTB: CPT | Mod: 26,59

## 2019-09-13 NOTE — REVIEW OF SYSTEMS
[Fatigue] : fatigue [SOB on Exertion] : shortness of breath during exertion [Muscle Weakness] : muscle weakness [Negative] : Allergic/Immunologic [FreeTextEntry9] : generalized weakness [de-identified] : denies neuropathy, is unsteady on feet, feels a sense of imbalance; denies dizziness

## 2019-09-13 NOTE — PROCEDURE
[Bone Marrow Biopsy] : bone marrow biopsy [Bone Marrow Aspiration] : bone marrow aspiration  [Patient] : the patient [Patient identification verified] : patient identification verified [Correct positioning] : correct positioning [Correct implant and/ or special equipment obtained] : correct impact and/ or special equipment obtained [Prone] : prone [The left posterior iliac crest was prepped with betadine and draped, using sterile technique.] : The left posterior iliac crest was prepped with betadine and draped, using sterile technique. [Lidocaine was injected and into the periosteum overlying the site.] : Lidocaine was injected and into the periosteum overlying the site. [Aspirate] : aspirate [Cytogenetics] : cytogenetics [FISH] : FISH [Biopsy] : biopsy [Flow Cytometry] : flow cytometry [FreeTextEntry1] : MDS [] : The patient was instructed to remove the bandage the following AM. The patient may bathe. Acetaminophen may be taken for discomfort, as per package directions.If there are any other problems, the patient was instructed to call the office. The patient verbalized understanding, and is aware of the office contact numbers. [FreeTextEntry2] : CBC prior to procedure.\par WBC 5.8\par Hgb 8.3\par Hct 24.1\par Plts 380\par BM bx and aspirate done.

## 2019-09-13 NOTE — HISTORY OF PRESENT ILLNESS
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 MRA brain: No flow-limiting stenosis or MRA evidence of aneurysm of the intracranial arteries. \par \par  [de-identified] : Patient presents for a follow up accompanied by his wife.  \par He is unable to ambulate, is using the wheelchair only; feels his legs are strong using wheelchair to get around.  His appetite is good.   Finished PT.   He denies any chest pain, no SOB, +PUGH, no abdominal pain, no n/v/d.  When he transfers from wheelchair to bed, he feels a sense of imbalance.  Denies dizziness.

## 2019-09-13 NOTE — REASON FOR VISIT
[Bone Marrow Biopsy] : bone marrow biopsy [Bone Marrow Aspiration] : bone marrow aspiration [Spouse] : spouse [FreeTextEntry2] : MDS

## 2019-09-13 NOTE — ASSESSMENT
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  Received transfusion on 8/17/19.  \par He is off all therapy for now.  Will not begin Revlimid as of yet.   \par As pt now has worsening counts, will repeat BM bx; to be scheduled.  \par Hgb-9.4 today.  No transfusion necessary.  Transfuse for Hgb less than 9.    \par Ferritin had been over 2000 in March 2018, now trending down.  Will repeat today.     \par Will schedule follow up after BM bx.  \par \par \par \par \par

## 2019-09-14 ENCOUNTER — APPOINTMENT (OUTPATIENT)
Dept: INFUSION THERAPY | Facility: HOSPITAL | Age: 79
End: 2019-09-14

## 2019-09-16 ENCOUNTER — RESULT REVIEW (OUTPATIENT)
Age: 79
End: 2019-09-16

## 2019-09-19 LAB
HEMATOPATHOLOGY REPORT: SIGNIFICANT CHANGE UP
TM INTERPRETATION: SIGNIFICANT CHANGE UP

## 2019-09-20 ENCOUNTER — OUTPATIENT (OUTPATIENT)
Dept: OUTPATIENT SERVICES | Facility: HOSPITAL | Age: 79
LOS: 1 days | Discharge: HOME | End: 2019-09-20
Payer: MEDICARE

## 2019-09-20 DIAGNOSIS — Z98.89 OTHER SPECIFIED POSTPROCEDURAL STATES: Chronic | ICD-10-CM

## 2019-09-20 DIAGNOSIS — Z95.5 PRESENCE OF CORONARY ANGIOPLASTY IMPLANT AND GRAFT: Chronic | ICD-10-CM

## 2019-09-20 DIAGNOSIS — Z90.89 ACQUIRED ABSENCE OF OTHER ORGANS: Chronic | ICD-10-CM

## 2019-09-20 DIAGNOSIS — D46.9 MYELODYSPLASTIC SYNDROME, UNSPECIFIED: ICD-10-CM

## 2019-09-23 ENCOUNTER — RESULT REVIEW (OUTPATIENT)
Age: 79
End: 2019-09-23

## 2019-09-23 ENCOUNTER — APPOINTMENT (OUTPATIENT)
Dept: HEMATOLOGY ONCOLOGY | Facility: CLINIC | Age: 79
End: 2019-09-23

## 2019-09-23 LAB
BASOPHILS # BLD AUTO: 0.1 K/UL — SIGNIFICANT CHANGE UP (ref 0–0.2)
BASOPHILS NFR BLD AUTO: 1 % — SIGNIFICANT CHANGE UP (ref 0–2)
BLD GP AB SCN SERPL QL: NEGATIVE — SIGNIFICANT CHANGE UP
EOSINOPHIL # BLD AUTO: 0.2 K/UL — SIGNIFICANT CHANGE UP (ref 0–0.5)
EOSINOPHIL NFR BLD AUTO: 3.9 % — SIGNIFICANT CHANGE UP (ref 0–6)
HCT VFR BLD CALC: 33 % — LOW (ref 39–50)
HGB BLD-MCNC: 10.9 G/DL — LOW (ref 13–17)
LYMPHOCYTES # BLD AUTO: 1.5 K/UL — SIGNIFICANT CHANGE UP (ref 1–3.3)
LYMPHOCYTES # BLD AUTO: 25 % — SIGNIFICANT CHANGE UP (ref 13–44)
MCHC RBC-ENTMCNC: 32.8 PG — SIGNIFICANT CHANGE UP (ref 27–34)
MCHC RBC-ENTMCNC: 33.1 G/DL — SIGNIFICANT CHANGE UP (ref 32–36)
MCV RBC AUTO: 99 FL — SIGNIFICANT CHANGE UP (ref 80–100)
MONOCYTES # BLD AUTO: 0.5 K/UL — SIGNIFICANT CHANGE UP (ref 0–0.9)
MONOCYTES NFR BLD AUTO: 8.4 % — SIGNIFICANT CHANGE UP (ref 2–14)
NEUTROPHILS # BLD AUTO: 3.7 K/UL — SIGNIFICANT CHANGE UP (ref 1.8–7.4)
NEUTROPHILS NFR BLD AUTO: 61.7 % — SIGNIFICANT CHANGE UP (ref 43–77)
PLATELET # BLD AUTO: 325 K/UL — SIGNIFICANT CHANGE UP (ref 150–400)
RBC # BLD: 3.33 M/UL — LOW (ref 4.2–5.8)
RBC # FLD: 17.5 % — HIGH (ref 10.3–14.5)
RH IG SCN BLD-IMP: POSITIVE — SIGNIFICANT CHANGE UP
WBC # BLD: 6 K/UL — SIGNIFICANT CHANGE UP (ref 3.8–10.5)
WBC # FLD AUTO: 6 K/UL — SIGNIFICANT CHANGE UP (ref 3.8–10.5)

## 2019-09-23 PROCEDURE — 88280 CHROMOSOME KARYOTYPE STUDY: CPT

## 2019-09-23 PROCEDURE — 88184 FLOWCYTOMETRY/ TC 1 MARKER: CPT

## 2019-09-23 PROCEDURE — 88342 IMHCHEM/IMCYTCHM 1ST ANTB: CPT

## 2019-09-23 PROCEDURE — 88305 TISSUE EXAM BY PATHOLOGIST: CPT

## 2019-09-23 PROCEDURE — 88264 CHROMOSOME ANALYSIS 20-25: CPT

## 2019-09-23 PROCEDURE — 88185 FLOWCYTOMETRY/TC ADD-ON: CPT

## 2019-09-23 PROCEDURE — 86923 COMPATIBILITY TEST ELECTRIC: CPT

## 2019-09-23 PROCEDURE — 86850 RBC ANTIBODY SCREEN: CPT

## 2019-09-23 PROCEDURE — 86901 BLOOD TYPING SEROLOGIC RH(D): CPT

## 2019-09-23 PROCEDURE — 87205 SMEAR GRAM STAIN: CPT

## 2019-09-23 PROCEDURE — 88313 SPECIAL STAINS GROUP 2: CPT

## 2019-09-23 PROCEDURE — 86900 BLOOD TYPING SEROLOGIC ABO: CPT

## 2019-09-23 PROCEDURE — 85097 BONE MARROW INTERPRETATION: CPT

## 2019-09-23 PROCEDURE — 88237 TISSUE CULTURE BONE MARROW: CPT

## 2019-09-23 PROCEDURE — 88360 TUMOR IMMUNOHISTOCHEM/MANUAL: CPT

## 2019-09-25 ENCOUNTER — APPOINTMENT (OUTPATIENT)
Dept: INFUSION THERAPY | Facility: HOSPITAL | Age: 79
End: 2019-09-25

## 2019-09-25 ENCOUNTER — RESULT REVIEW (OUTPATIENT)
Age: 79
End: 2019-09-25

## 2019-09-25 ENCOUNTER — APPOINTMENT (OUTPATIENT)
Dept: HEMATOLOGY ONCOLOGY | Facility: CLINIC | Age: 79
End: 2019-09-25
Payer: MEDICARE

## 2019-09-25 VITALS
HEART RATE: 66 BPM | WEIGHT: 170.17 LBS | DIASTOLIC BLOOD PRESSURE: 70 MMHG | TEMPERATURE: 98.1 F | SYSTOLIC BLOOD PRESSURE: 134 MMHG | BODY MASS INDEX: 24.42 KG/M2 | RESPIRATION RATE: 16 BRPM | OXYGEN SATURATION: 98 %

## 2019-09-25 LAB
BASOPHILS # BLD AUTO: 0.1 K/UL — SIGNIFICANT CHANGE UP (ref 0–0.2)
BASOPHILS NFR BLD AUTO: 0.9 % — SIGNIFICANT CHANGE UP (ref 0–2)
EOSINOPHIL # BLD AUTO: 0.3 K/UL — SIGNIFICANT CHANGE UP (ref 0–0.5)
EOSINOPHIL NFR BLD AUTO: 4.9 % — SIGNIFICANT CHANGE UP (ref 0–6)
HCT VFR BLD CALC: 31.1 % — LOW (ref 39–50)
HGB BLD-MCNC: 10.5 G/DL — LOW (ref 13–17)
LYMPHOCYTES # BLD AUTO: 1.5 K/UL — SIGNIFICANT CHANGE UP (ref 1–3.3)
LYMPHOCYTES # BLD AUTO: 23.5 % — SIGNIFICANT CHANGE UP (ref 13–44)
MCHC RBC-ENTMCNC: 33 PG — SIGNIFICANT CHANGE UP (ref 27–34)
MCHC RBC-ENTMCNC: 33.6 G/DL — SIGNIFICANT CHANGE UP (ref 32–36)
MCV RBC AUTO: 98.4 FL — SIGNIFICANT CHANGE UP (ref 80–100)
MONOCYTES # BLD AUTO: 0.5 K/UL — SIGNIFICANT CHANGE UP (ref 0–0.9)
MONOCYTES NFR BLD AUTO: 7.8 % — SIGNIFICANT CHANGE UP (ref 2–14)
NEUTROPHILS # BLD AUTO: 4 K/UL — SIGNIFICANT CHANGE UP (ref 1.8–7.4)
NEUTROPHILS NFR BLD AUTO: 63 % — SIGNIFICANT CHANGE UP (ref 43–77)
PLATELET # BLD AUTO: 355 K/UL — SIGNIFICANT CHANGE UP (ref 150–400)
RBC # BLD: 3.17 M/UL — LOW (ref 4.2–5.8)
RBC # FLD: 17.1 % — HIGH (ref 10.3–14.5)
WBC # BLD: 6.3 K/UL — SIGNIFICANT CHANGE UP (ref 3.8–10.5)
WBC # FLD AUTO: 6.3 K/UL — SIGNIFICANT CHANGE UP (ref 3.8–10.5)

## 2019-09-25 PROCEDURE — 99214 OFFICE O/P EST MOD 30 MIN: CPT

## 2019-09-27 LAB — CHROM ANALY OVERALL INTERP SPEC-IMP: SIGNIFICANT CHANGE UP

## 2019-09-27 NOTE — ASSESSMENT
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  Received transfusion on 8/17/19.  \par Hgb-9.4 today.  No transfusion necessary.  Transfuse for Hgb less than 9.    \par Ferritin had been over 2000 in March 2018, now trending down.  Will repeat today.     \par \par BM biopsy done on 9/13/19\par Immunohistochemical stains (block 1A: CD34, p53) show increased\par CD34 positive blasts, approximately 7% of cellularity. p53 show\par >1% strongly positive cells, indicative of poor prognosis in MDS\par with del(5q).\par \par Pt now with more aggressive MDS-with 7% myelobalsts on BM bx.  IPSS score to be determined pending Cytogenetics results.   Will start Vidaza x 7 days to be given every 28 days.  Risks, benefits, side effects explained to pt.  Informed consent obtained.  Pt scheduled to start treatment on 9/30/19.  \par Follow up in 4 weeks.\par \par \par \par

## 2019-09-27 NOTE — HISTORY OF PRESENT ILLNESS
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 MRA brain: No flow-limiting stenosis or MRA evidence of aneurysm of the intracranial arteries. \par \par  [de-identified] : Patient presents for a follow up accompanied by his wife.  \par He is unable to ambulate, is using the wheelchair only; feels his legs are strong using wheelchair to get around.  His appetite is good.   He denies any chest pain, no SOB, +PUGH, no abdominal pain, no n/v/d.  Feeling less fatigued after having received last tranfusion.\par \par To discuss results of BM bx.

## 2019-09-27 NOTE — RESULTS/DATA
[FreeTextEntry1] : \par ACCESSION No:  [id number]\par \par JUAREZ SR, EDWARD                   3\par \par \par \par Hematopathology Report\par \par \par \par \par Final Diagnosis\par 1, 2. 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  Increased ring sideroblasts are also\par present.\par Comprehensive report with results of pending ancillary studies to\par follow.\par \par Dr. Elizondo was notified of the diagnosis on 09/19/19.\par \par Ancillary studies\par Bone marrow aspirate iron stain: Iron stores are increased;\par numerous ring sideroblasts are present (greater than 15%).\par \par Flow cytometry:  The myeloid immunophenotypic findings show\par decreased myeloid granularity, no increase in myeloid immaturity\par with aberrant myeloblasts (2% of cells), positive for HLA-DR,\par CD34, , CD33, CD13, partial CD7, and myeloid antigen\par maturation pattern with left shift.\par \par Immunohistochemical stains (block 1A: CD34, p53) show increased\par CD34 positive blasts, approximately 7% of cellularity. p53 show\par >1% strongly positive cells, indicative of poor prognosis in MDS\par with del(5q).\par \par Cytogenetics:  Pending\par \par FISH:  pending\par \par Microscopic description:\par 1. Biopsy:  Sections of bone marrow biopsy and  show\par normocellularity (30% cellularity), focal immature cells,\par trilineage hematopoiesis with maturation, myeloid predominance,\par megakaryocytes normal in number, most with small/hypolobulated\par morphology, mild perivascular plasmacytosis, and iron stores\par increased.  Bone marrow fragments are not present in clot.\par \par 2. Aspirate:  Cellular spicules are present, adequate for\par interpretation.  Blasts are increased (9%). Maturing and\par \par \par \par \par \par \par MCELHONE SR, EDWARD                   3\par \par \par \par Hematopathology Report\par \par \par \par \par mature myeloid and erythroid elements are present with myeloid\par predominance (M:E ratio (3.8:1).  Megakaryocytes appear normal in\par number with small/dysplastic morphology.\par \par Bone Marrow Aspirate Differential: (300 Cells).\par Type            %    Normal*\par Blast                9%   0-3\par Neutrophil and\par Precursors        50%  33-63\par Eosinophil           3%   1-5\par Basophil        2%   0-1\par Pronormoblast        2%   0-2\par Normoblast           15%  15-25\par Monocyte        0%   0-2\par Lymphocyte           15%  10-15\par Plasma cell          4%   0-1\par *Adult Range\par \par Comment\par Iron stain (examined to evaluate for iron stores; see microscopic\par description) and Giemsa stain (shows appropriate\par staining pattern) are performed and evaluated on block(s): 1A, 1B\par \par Verified by: Brenda Larsen\par (Electronic Signature)\par Reported on: 09/19/19 15:30 EDT, 6 [facility], [place], [place]\par [address]\par _________________________________________________________________\par \par Clinical History\par MDS\par \par Specimen(s) Submitted\par 1     Bone marrow biopsy\par 2     Bone marrow aspirate\par \par Gross Description\par 1. The specimen is received in bouin's fixative and the specimen\par container is labeled: Bone marrow biopsy.  It consists of two\par bone marrow cores measuring 0.2 and 1.0 cm in length by 0.1 cm in\par diameter.  Also present is a 0.5 x 0.4 x 0.1 cm aggregate of\par blood clot.  Entirely submitted.  Two cassettes: A = bone marrow\par core; B = blood clot.\par \par 2. Two Lao-Giemsa and one iron stained bone marrow aspirate\par smears are submitted  {10-FL- }.\par \par \par \par \par \par \par \par JUAREZ SR, JENIFFER                   3\par \par \par \par Hematopathology Report\par \par \par \par \par In addition to other data that may appear on the specimen\par container, the label has been inspected to confirm the presence\par of the patient's name and date of birth.\par Linda Ritchie 09/13/19 20:07

## 2019-09-27 NOTE — REVIEW OF SYSTEMS
[Fatigue] : fatigue [SOB on Exertion] : shortness of breath during exertion [Muscle Weakness] : muscle weakness [Negative] : Allergic/Immunologic [FreeTextEntry9] : generalized weakness [FreeTextEntry2] : less fatigued [de-identified] : denies neuropathy, is unsteady on feet, feels a sense of imbalance; denies dizziness

## 2019-09-30 ENCOUNTER — RESULT REVIEW (OUTPATIENT)
Age: 79
End: 2019-09-30

## 2019-09-30 ENCOUNTER — APPOINTMENT (OUTPATIENT)
Dept: INFUSION THERAPY | Facility: HOSPITAL | Age: 79
End: 2019-09-30

## 2019-09-30 ENCOUNTER — APPOINTMENT (OUTPATIENT)
Dept: HEMATOLOGY ONCOLOGY | Facility: CLINIC | Age: 79
End: 2019-09-30

## 2019-09-30 ENCOUNTER — APPOINTMENT (OUTPATIENT)
Dept: ELECTROPHYSIOLOGY | Facility: CLINIC | Age: 79
End: 2019-09-30
Payer: MEDICARE

## 2019-09-30 LAB
BASOPHILS # BLD AUTO: 0 K/UL — SIGNIFICANT CHANGE UP (ref 0–0.2)
BASOPHILS NFR BLD AUTO: 0.8 % — SIGNIFICANT CHANGE UP (ref 0–2)
BUN SERPL-MCNC: 43 MG/DL — HIGH (ref 7–23)
CA-I BLDA-SCNC: 1.3 MMOL/L — SIGNIFICANT CHANGE UP (ref 1.12–1.3)
CHLORIDE SERPL-SCNC: 109 MMOL/L — HIGH (ref 96–108)
CO2 SERPL-SCNC: 23 MMOL/L — SIGNIFICANT CHANGE UP (ref 22–31)
CREAT SERPL-MCNC: 1.2 MG/DL — SIGNIFICANT CHANGE UP (ref 0.5–1.3)
EOSINOPHIL # BLD AUTO: 0.3 K/UL — SIGNIFICANT CHANGE UP (ref 0–0.5)
EOSINOPHIL NFR BLD AUTO: 3.9 % — SIGNIFICANT CHANGE UP (ref 0–6)
GLUCOSE SERPL-MCNC: 117 MG/DL — HIGH (ref 70–99)
HCT VFR BLD CALC: 32.1 % — LOW (ref 39–50)
HGB BLD-MCNC: 10.5 G/DL — LOW (ref 13–17)
LYMPHOCYTES # BLD AUTO: 1.4 K/UL — SIGNIFICANT CHANGE UP (ref 1–3.3)
LYMPHOCYTES # BLD AUTO: 20.6 % — SIGNIFICANT CHANGE UP (ref 13–44)
MCHC RBC-ENTMCNC: 32.4 PG — SIGNIFICANT CHANGE UP (ref 27–34)
MCHC RBC-ENTMCNC: 32.7 G/DL — SIGNIFICANT CHANGE UP (ref 32–36)
MCV RBC AUTO: 99.3 FL — SIGNIFICANT CHANGE UP (ref 80–100)
MONOCYTES # BLD AUTO: 0.5 K/UL — SIGNIFICANT CHANGE UP (ref 0–0.9)
MONOCYTES NFR BLD AUTO: 7.8 % — SIGNIFICANT CHANGE UP (ref 2–14)
NEUTROPHILS # BLD AUTO: 4.4 K/UL — SIGNIFICANT CHANGE UP (ref 1.8–7.4)
NEUTROPHILS NFR BLD AUTO: 66.9 % — SIGNIFICANT CHANGE UP (ref 43–77)
PLATELET # BLD AUTO: 419 K/UL — HIGH (ref 150–400)
POTASSIUM SERPL-MCNC: 4.9 MMOL/L — SIGNIFICANT CHANGE UP (ref 3.5–5.3)
POTASSIUM SERPL-SCNC: 4.9 MMOL/L — SIGNIFICANT CHANGE UP (ref 3.5–5.3)
RBC # BLD: 3.23 M/UL — LOW (ref 4.2–5.8)
RBC # FLD: 17.7 % — HIGH (ref 10.3–14.5)
SODIUM SERPL-SCNC: 142 MMOL/L — SIGNIFICANT CHANGE UP (ref 135–145)
WBC # BLD: 6.6 K/UL — SIGNIFICANT CHANGE UP (ref 3.8–10.5)
WBC # FLD AUTO: 6.6 K/UL — SIGNIFICANT CHANGE UP (ref 3.8–10.5)

## 2019-09-30 PROCEDURE — 93010 ELECTROCARDIOGRAM REPORT: CPT

## 2019-09-30 PROCEDURE — 93299: CPT

## 2019-09-30 PROCEDURE — 93298 REM INTERROG DEV EVAL SCRMS: CPT

## 2019-10-01 ENCOUNTER — APPOINTMENT (OUTPATIENT)
Dept: INFUSION THERAPY | Facility: HOSPITAL | Age: 79
End: 2019-10-01

## 2019-10-01 DIAGNOSIS — R11.2 NAUSEA WITH VOMITING, UNSPECIFIED: ICD-10-CM

## 2019-10-01 DIAGNOSIS — Z51.11 ENCOUNTER FOR ANTINEOPLASTIC CHEMOTHERAPY: ICD-10-CM

## 2019-10-02 ENCOUNTER — APPOINTMENT (OUTPATIENT)
Dept: INFUSION THERAPY | Facility: HOSPITAL | Age: 79
End: 2019-10-02

## 2019-10-03 ENCOUNTER — RESULT REVIEW (OUTPATIENT)
Age: 79
End: 2019-10-03

## 2019-10-03 ENCOUNTER — APPOINTMENT (OUTPATIENT)
Dept: INFUSION THERAPY | Facility: HOSPITAL | Age: 79
End: 2019-10-03

## 2019-10-03 LAB
BASOPHILS # BLD AUTO: 0.1 K/UL — SIGNIFICANT CHANGE UP (ref 0–0.2)
BASOPHILS NFR BLD AUTO: 0.7 % — SIGNIFICANT CHANGE UP (ref 0–2)
EOSINOPHIL # BLD AUTO: 0.4 K/UL — SIGNIFICANT CHANGE UP (ref 0–0.5)
EOSINOPHIL NFR BLD AUTO: 4.7 % — SIGNIFICANT CHANGE UP (ref 0–6)
HCT VFR BLD CALC: 27.8 % — LOW (ref 39–50)
HGB BLD-MCNC: 9.6 G/DL — LOW (ref 13–17)
LYMPHOCYTES # BLD AUTO: 1.5 K/UL — SIGNIFICANT CHANGE UP (ref 1–3.3)
LYMPHOCYTES # BLD AUTO: 17.3 % — SIGNIFICANT CHANGE UP (ref 13–44)
MCHC RBC-ENTMCNC: 34.2 PG — HIGH (ref 27–34)
MCHC RBC-ENTMCNC: 34.7 G/DL — SIGNIFICANT CHANGE UP (ref 32–36)
MCV RBC AUTO: 98.8 FL — SIGNIFICANT CHANGE UP (ref 80–100)
MONOCYTES # BLD AUTO: 0.6 K/UL — SIGNIFICANT CHANGE UP (ref 0–0.9)
MONOCYTES NFR BLD AUTO: 6.9 % — SIGNIFICANT CHANGE UP (ref 2–14)
NEUTROPHILS # BLD AUTO: 6 K/UL — SIGNIFICANT CHANGE UP (ref 1.8–7.4)
NEUTROPHILS NFR BLD AUTO: 70.4 % — SIGNIFICANT CHANGE UP (ref 43–77)
PLATELET # BLD AUTO: 337 K/UL — SIGNIFICANT CHANGE UP (ref 150–400)
RBC # BLD: 2.82 M/UL — LOW (ref 4.2–5.8)
RBC # FLD: 18 % — HIGH (ref 10.3–14.5)
WBC # BLD: 8.6 K/UL — SIGNIFICANT CHANGE UP (ref 3.8–10.5)
WBC # FLD AUTO: 8.6 K/UL — SIGNIFICANT CHANGE UP (ref 3.8–10.5)

## 2019-10-04 ENCOUNTER — RESULT REVIEW (OUTPATIENT)
Age: 79
End: 2019-10-04

## 2019-10-04 ENCOUNTER — APPOINTMENT (OUTPATIENT)
Dept: INFUSION THERAPY | Facility: HOSPITAL | Age: 79
End: 2019-10-04

## 2019-10-04 LAB
BASOPHILS # BLD AUTO: 0.1 K/UL — SIGNIFICANT CHANGE UP (ref 0–0.2)
BASOPHILS NFR BLD AUTO: 0.6 % — SIGNIFICANT CHANGE UP (ref 0–2)
EOSINOPHIL # BLD AUTO: 0.3 K/UL — SIGNIFICANT CHANGE UP (ref 0–0.5)
EOSINOPHIL NFR BLD AUTO: 2.9 % — SIGNIFICANT CHANGE UP (ref 0–6)
FERRITIN SERPL-MCNC: 1919 NG/ML
FOLATE SERPL-MCNC: 19.1 NG/ML
HAPTOGLOB SERPL-MCNC: 140 MG/DL
HCT VFR BLD CALC: 26.9 % — LOW (ref 39–50)
HGB BLD-MCNC: 9.4 G/DL — LOW (ref 13–17)
LDH SERPL-CCNC: 151 U/L
LYMPHOCYTES # BLD AUTO: 1.8 K/UL — SIGNIFICANT CHANGE UP (ref 1–3.3)
LYMPHOCYTES # BLD AUTO: 18.1 % — SIGNIFICANT CHANGE UP (ref 13–44)
MCHC RBC-ENTMCNC: 34.4 PG — HIGH (ref 27–34)
MCHC RBC-ENTMCNC: 35.1 G/DL — SIGNIFICANT CHANGE UP (ref 32–36)
MCV RBC AUTO: 97.8 FL — SIGNIFICANT CHANGE UP (ref 80–100)
MONOCYTES # BLD AUTO: 0.3 K/UL — SIGNIFICANT CHANGE UP (ref 0–0.9)
MONOCYTES NFR BLD AUTO: 3 % — SIGNIFICANT CHANGE UP (ref 2–14)
NEUTROPHILS # BLD AUTO: 7.7 K/UL — HIGH (ref 1.8–7.4)
NEUTROPHILS NFR BLD AUTO: 75.4 % — SIGNIFICANT CHANGE UP (ref 43–77)
PLATELET # BLD AUTO: 336 K/UL — SIGNIFICANT CHANGE UP (ref 150–400)
RBC # BLD: 2.75 M/UL — LOW (ref 4.2–5.8)
RBC # FLD: 18 % — HIGH (ref 10.3–14.5)
VIT B12 SERPL-MCNC: 661 PG/ML
WBC # BLD: 10.3 K/UL — SIGNIFICANT CHANGE UP (ref 3.8–10.5)
WBC # FLD AUTO: 10.3 K/UL — SIGNIFICANT CHANGE UP (ref 3.8–10.5)

## 2019-10-04 RX ORDER — COLCHICINE 0.6 MG
0.5 TABLET ORAL
Qty: 0 | Refills: 0 | DISCHARGE

## 2019-10-07 ENCOUNTER — APPOINTMENT (OUTPATIENT)
Dept: INFUSION THERAPY | Facility: HOSPITAL | Age: 79
End: 2019-10-07

## 2019-10-07 ENCOUNTER — RESULT REVIEW (OUTPATIENT)
Age: 79
End: 2019-10-07

## 2019-10-07 LAB
BASOPHILS # BLD AUTO: 0.1 K/UL — SIGNIFICANT CHANGE UP (ref 0–0.2)
BASOPHILS NFR BLD AUTO: 1.1 % — SIGNIFICANT CHANGE UP (ref 0–2)
EOSINOPHIL # BLD AUTO: 0.3 K/UL — SIGNIFICANT CHANGE UP (ref 0–0.5)
EOSINOPHIL NFR BLD AUTO: 4.4 % — SIGNIFICANT CHANGE UP (ref 0–6)
HCT VFR BLD CALC: 27.9 % — LOW (ref 39–50)
HGB BLD-MCNC: 10.1 G/DL — LOW (ref 13–17)
LYMPHOCYTES # BLD AUTO: 1.4 K/UL — SIGNIFICANT CHANGE UP (ref 1–3.3)
LYMPHOCYTES # BLD AUTO: 19.4 % — SIGNIFICANT CHANGE UP (ref 13–44)
MCHC RBC-ENTMCNC: 34.8 PG — HIGH (ref 27–34)
MCHC RBC-ENTMCNC: 36.2 G/DL — HIGH (ref 32–36)
MCV RBC AUTO: 96.1 FL — SIGNIFICANT CHANGE UP (ref 80–100)
MONOCYTES # BLD AUTO: 0.4 K/UL — SIGNIFICANT CHANGE UP (ref 0–0.9)
MONOCYTES NFR BLD AUTO: 5 % — SIGNIFICANT CHANGE UP (ref 2–14)
NEUTROPHILS # BLD AUTO: 5 K/UL — SIGNIFICANT CHANGE UP (ref 1.8–7.4)
NEUTROPHILS NFR BLD AUTO: 70.1 % — SIGNIFICANT CHANGE UP (ref 43–77)
PLATELET # BLD AUTO: 303 K/UL — SIGNIFICANT CHANGE UP (ref 150–400)
RBC # BLD: 2.9 M/UL — LOW (ref 4.2–5.8)
RBC # FLD: 17.7 % — HIGH (ref 10.3–14.5)
WBC # BLD: 7.1 K/UL — SIGNIFICANT CHANGE UP (ref 3.8–10.5)
WBC # FLD AUTO: 7.1 K/UL — SIGNIFICANT CHANGE UP (ref 3.8–10.5)

## 2019-10-08 ENCOUNTER — APPOINTMENT (OUTPATIENT)
Dept: INFUSION THERAPY | Facility: HOSPITAL | Age: 79
End: 2019-10-08

## 2019-10-11 ENCOUNTER — RESULT REVIEW (OUTPATIENT)
Age: 79
End: 2019-10-11

## 2019-10-11 ENCOUNTER — APPOINTMENT (OUTPATIENT)
Dept: HEMATOLOGY ONCOLOGY | Facility: CLINIC | Age: 79
End: 2019-10-11

## 2019-10-11 LAB
BASOPHILS # BLD AUTO: 0 K/UL — SIGNIFICANT CHANGE UP (ref 0–0.2)
BASOPHILS NFR BLD AUTO: 0.2 % — SIGNIFICANT CHANGE UP (ref 0–2)
EOSINOPHIL # BLD AUTO: 0 K/UL — SIGNIFICANT CHANGE UP (ref 0–0.5)
EOSINOPHIL NFR BLD AUTO: 0 % — SIGNIFICANT CHANGE UP (ref 0–6)
HCT VFR BLD CALC: 26.7 % — LOW (ref 39–50)
HGB BLD-MCNC: 9 G/DL — LOW (ref 13–17)
LYMPHOCYTES # BLD AUTO: 1.9 K/UL — SIGNIFICANT CHANGE UP (ref 1–3.3)
LYMPHOCYTES # BLD AUTO: 28.3 % — SIGNIFICANT CHANGE UP (ref 13–44)
MCHC RBC-ENTMCNC: 32.5 PG — SIGNIFICANT CHANGE UP (ref 27–34)
MCHC RBC-ENTMCNC: 33.8 G/DL — SIGNIFICANT CHANGE UP (ref 32–36)
MCV RBC AUTO: 96.1 FL — SIGNIFICANT CHANGE UP (ref 80–100)
MONOCYTES # BLD AUTO: 0.2 K/UL — SIGNIFICANT CHANGE UP (ref 0–0.9)
MONOCYTES NFR BLD AUTO: 3.6 % — SIGNIFICANT CHANGE UP (ref 2–14)
NEUTROPHILS # BLD AUTO: 4.5 K/UL — SIGNIFICANT CHANGE UP (ref 1.8–7.4)
NEUTROPHILS NFR BLD AUTO: 67.8 % — SIGNIFICANT CHANGE UP (ref 43–77)
PLAT MORPH BLD: NORMAL — SIGNIFICANT CHANGE UP
PLATELET # BLD AUTO: 164 K/UL — SIGNIFICANT CHANGE UP (ref 150–400)
RBC # BLD: 2.78 M/UL — LOW (ref 4.2–5.8)
RBC # FLD: 17.7 % — HIGH (ref 10.3–14.5)
RBC BLD AUTO: SIGNIFICANT CHANGE UP
WBC # BLD: 6.7 K/UL — SIGNIFICANT CHANGE UP (ref 3.8–10.5)
WBC # FLD AUTO: 6.7 K/UL — SIGNIFICANT CHANGE UP (ref 3.8–10.5)

## 2019-10-14 ENCOUNTER — APPOINTMENT (OUTPATIENT)
Dept: HEMATOLOGY ONCOLOGY | Facility: CLINIC | Age: 79
End: 2019-10-14

## 2019-10-14 ENCOUNTER — RESULT REVIEW (OUTPATIENT)
Age: 79
End: 2019-10-14

## 2019-10-14 LAB
BASOPHILS # BLD AUTO: 0.1 K/UL — SIGNIFICANT CHANGE UP (ref 0–0.2)
BASOPHILS NFR BLD AUTO: 1.1 % — SIGNIFICANT CHANGE UP (ref 0–2)
EOSINOPHIL # BLD AUTO: 0.3 K/UL — SIGNIFICANT CHANGE UP (ref 0–0.5)
EOSINOPHIL NFR BLD AUTO: 4 % — SIGNIFICANT CHANGE UP (ref 0–6)
HCT VFR BLD CALC: 24.6 % — LOW (ref 39–50)
HGB BLD-MCNC: 8.5 G/DL — LOW (ref 13–17)
LYMPHOCYTES # BLD AUTO: 2.5 K/UL — SIGNIFICANT CHANGE UP (ref 1–3.3)
LYMPHOCYTES # BLD AUTO: 31.8 % — SIGNIFICANT CHANGE UP (ref 13–44)
MCHC RBC-ENTMCNC: 32.9 PG — SIGNIFICANT CHANGE UP (ref 27–34)
MCHC RBC-ENTMCNC: 34.6 G/DL — SIGNIFICANT CHANGE UP (ref 32–36)
MCV RBC AUTO: 95.2 FL — SIGNIFICANT CHANGE UP (ref 80–100)
MONOCYTES # BLD AUTO: 0.1 K/UL — SIGNIFICANT CHANGE UP (ref 0–0.9)
MONOCYTES NFR BLD AUTO: 0.9 % — LOW (ref 2–14)
NEUTROPHILS # BLD AUTO: 4.8 K/UL — SIGNIFICANT CHANGE UP (ref 1.8–7.4)
NEUTROPHILS NFR BLD AUTO: 62.2 % — SIGNIFICANT CHANGE UP (ref 43–77)
PLATELET # BLD AUTO: 64 K/UL — LOW (ref 150–400)
RBC # BLD: 2.58 M/UL — LOW (ref 4.2–5.8)
RBC # FLD: 17.2 % — HIGH (ref 10.3–14.5)
WBC # BLD: 7.8 K/UL — SIGNIFICANT CHANGE UP (ref 3.8–10.5)
WBC # FLD AUTO: 7.8 K/UL — SIGNIFICANT CHANGE UP (ref 3.8–10.5)

## 2019-10-15 ENCOUNTER — RESULT REVIEW (OUTPATIENT)
Age: 79
End: 2019-10-15

## 2019-10-15 ENCOUNTER — APPOINTMENT (OUTPATIENT)
Dept: HEMATOLOGY ONCOLOGY | Facility: CLINIC | Age: 79
End: 2019-10-15

## 2019-10-15 ENCOUNTER — OUTPATIENT (OUTPATIENT)
Dept: OUTPATIENT SERVICES | Facility: HOSPITAL | Age: 79
LOS: 1 days | End: 2019-10-15
Payer: MEDICARE

## 2019-10-15 DIAGNOSIS — D46.9 MYELODYSPLASTIC SYNDROME, UNSPECIFIED: ICD-10-CM

## 2019-10-15 DIAGNOSIS — Z95.5 PRESENCE OF CORONARY ANGIOPLASTY IMPLANT AND GRAFT: Chronic | ICD-10-CM

## 2019-10-15 DIAGNOSIS — Z98.89 OTHER SPECIFIED POSTPROCEDURAL STATES: Chronic | ICD-10-CM

## 2019-10-15 DIAGNOSIS — Z90.89 ACQUIRED ABSENCE OF OTHER ORGANS: Chronic | ICD-10-CM

## 2019-10-15 LAB
BASOPHILS # BLD AUTO: 0.1 K/UL — SIGNIFICANT CHANGE UP (ref 0–0.2)
BASOPHILS NFR BLD AUTO: 1 % — SIGNIFICANT CHANGE UP (ref 0–2)
BLD GP AB SCN SERPL QL: NEGATIVE — SIGNIFICANT CHANGE UP
EOSINOPHIL # BLD AUTO: 0.2 K/UL — SIGNIFICANT CHANGE UP (ref 0–0.5)
EOSINOPHIL NFR BLD AUTO: 2.5 % — SIGNIFICANT CHANGE UP (ref 0–6)
HCT VFR BLD CALC: 24.7 % — LOW (ref 39–50)
HGB BLD-MCNC: 8.3 G/DL — LOW (ref 13–17)
LYMPHOCYTES # BLD AUTO: 2 K/UL — SIGNIFICANT CHANGE UP (ref 1–3.3)
LYMPHOCYTES # BLD AUTO: 25 % — SIGNIFICANT CHANGE UP (ref 13–44)
MCHC RBC-ENTMCNC: 32.3 PG — SIGNIFICANT CHANGE UP (ref 27–34)
MCHC RBC-ENTMCNC: 33.7 G/DL — SIGNIFICANT CHANGE UP (ref 32–36)
MCV RBC AUTO: 95.7 FL — SIGNIFICANT CHANGE UP (ref 80–100)
MONOCYTES # BLD AUTO: 0.1 K/UL — SIGNIFICANT CHANGE UP (ref 0–0.9)
MONOCYTES NFR BLD AUTO: 1.2 % — LOW (ref 2–14)
NEUTROPHILS # BLD AUTO: 5.5 K/UL — SIGNIFICANT CHANGE UP (ref 1.8–7.4)
NEUTROPHILS NFR BLD AUTO: 70.3 % — SIGNIFICANT CHANGE UP (ref 43–77)
PLATELET # BLD AUTO: 49 K/UL — LOW (ref 150–400)
RBC # BLD: 2.58 M/UL — LOW (ref 4.2–5.8)
RBC # FLD: 17.4 % — HIGH (ref 10.3–14.5)
RH IG SCN BLD-IMP: POSITIVE — SIGNIFICANT CHANGE UP
WBC # BLD: 7.8 K/UL — SIGNIFICANT CHANGE UP (ref 3.8–10.5)
WBC # FLD AUTO: 7.8 K/UL — SIGNIFICANT CHANGE UP (ref 3.8–10.5)

## 2019-10-15 PROCEDURE — 86923 COMPATIBILITY TEST ELECTRIC: CPT

## 2019-10-15 PROCEDURE — 86850 RBC ANTIBODY SCREEN: CPT

## 2019-10-15 PROCEDURE — 86900 BLOOD TYPING SEROLOGIC ABO: CPT

## 2019-10-15 PROCEDURE — 86901 BLOOD TYPING SEROLOGIC RH(D): CPT

## 2019-10-16 ENCOUNTER — APPOINTMENT (OUTPATIENT)
Dept: INFUSION THERAPY | Facility: HOSPITAL | Age: 79
End: 2019-10-16

## 2019-10-17 ENCOUNTER — OUTPATIENT (OUTPATIENT)
Dept: OUTPATIENT SERVICES | Facility: HOSPITAL | Age: 79
LOS: 1 days | Discharge: HOME | End: 2019-10-17

## 2019-10-17 ENCOUNTER — MEDICATION RENEWAL (OUTPATIENT)
Age: 79
End: 2019-10-17

## 2019-10-17 DIAGNOSIS — Z95.5 PRESENCE OF CORONARY ANGIOPLASTY IMPLANT AND GRAFT: Chronic | ICD-10-CM

## 2019-10-17 DIAGNOSIS — Z51.89 ENCOUNTER FOR OTHER SPECIFIED AFTERCARE: ICD-10-CM

## 2019-10-17 DIAGNOSIS — Z98.89 OTHER SPECIFIED POSTPROCEDURAL STATES: Chronic | ICD-10-CM

## 2019-10-17 DIAGNOSIS — D46.9 MYELODYSPLASTIC SYNDROME, UNSPECIFIED: ICD-10-CM

## 2019-10-17 DIAGNOSIS — Z90.89 ACQUIRED ABSENCE OF OTHER ORGANS: Chronic | ICD-10-CM

## 2019-10-18 ENCOUNTER — RESULT REVIEW (OUTPATIENT)
Age: 79
End: 2019-10-18

## 2019-10-18 ENCOUNTER — APPOINTMENT (OUTPATIENT)
Dept: HEMATOLOGY ONCOLOGY | Facility: CLINIC | Age: 79
End: 2019-10-18

## 2019-10-18 LAB
BASOPHILS # BLD AUTO: 0.1 K/UL — SIGNIFICANT CHANGE UP (ref 0–0.2)
BASOPHILS NFR BLD AUTO: 1.5 % — SIGNIFICANT CHANGE UP (ref 0–2)
EOSINOPHIL # BLD AUTO: 0.2 K/UL — SIGNIFICANT CHANGE UP (ref 0–0.5)
EOSINOPHIL NFR BLD AUTO: 3.2 % — SIGNIFICANT CHANGE UP (ref 0–6)
HCT VFR BLD CALC: 31.7 % — LOW (ref 39–50)
HGB BLD-MCNC: 11.2 G/DL — LOW (ref 13–17)
LYMPHOCYTES # BLD AUTO: 1.5 K/UL — SIGNIFICANT CHANGE UP (ref 1–3.3)
LYMPHOCYTES # BLD AUTO: 27.7 % — SIGNIFICANT CHANGE UP (ref 13–44)
MCHC RBC-ENTMCNC: 33.4 PG — SIGNIFICANT CHANGE UP (ref 27–34)
MCHC RBC-ENTMCNC: 35.2 G/DL — SIGNIFICANT CHANGE UP (ref 32–36)
MCV RBC AUTO: 95.1 FL — SIGNIFICANT CHANGE UP (ref 80–100)
MONOCYTES # BLD AUTO: 0.1 K/UL — SIGNIFICANT CHANGE UP (ref 0–0.9)
MONOCYTES NFR BLD AUTO: 2.2 % — SIGNIFICANT CHANGE UP (ref 2–14)
NEUTROPHILS # BLD AUTO: 3.5 K/UL — SIGNIFICANT CHANGE UP (ref 1.8–7.4)
NEUTROPHILS NFR BLD AUTO: 65.4 % — SIGNIFICANT CHANGE UP (ref 43–77)
PLATELET # BLD AUTO: 30 K/UL — LOW (ref 150–400)
RBC # BLD: 3.33 M/UL — LOW (ref 4.2–5.8)
RBC # FLD: 15.5 % — HIGH (ref 10.3–14.5)
WBC # BLD: 5.4 K/UL — SIGNIFICANT CHANGE UP (ref 3.8–10.5)
WBC # FLD AUTO: 5.4 K/UL — SIGNIFICANT CHANGE UP (ref 3.8–10.5)

## 2019-10-21 ENCOUNTER — RESULT REVIEW (OUTPATIENT)
Age: 79
End: 2019-10-21

## 2019-10-21 ENCOUNTER — APPOINTMENT (OUTPATIENT)
Dept: INFUSION THERAPY | Facility: HOSPITAL | Age: 79
End: 2019-10-21

## 2019-10-21 ENCOUNTER — APPOINTMENT (OUTPATIENT)
Dept: HEMATOLOGY ONCOLOGY | Facility: CLINIC | Age: 79
End: 2019-10-21

## 2019-10-21 LAB
BASOPHILS # BLD AUTO: 0.1 K/UL — SIGNIFICANT CHANGE UP (ref 0–0.2)
EOSINOPHIL NFR BLD AUTO: 1 % — SIGNIFICANT CHANGE UP (ref 0–6)
HCT VFR BLD CALC: 32.3 % — LOW (ref 39–50)
HGB BLD-MCNC: 11.2 G/DL — LOW (ref 13–17)
LYMPHOCYTES # BLD AUTO: 1.2 K/UL — SIGNIFICANT CHANGE UP (ref 1–3.3)
LYMPHOCYTES # BLD AUTO: 22 % — SIGNIFICANT CHANGE UP (ref 13–44)
MCHC RBC-ENTMCNC: 32.9 PG — SIGNIFICANT CHANGE UP (ref 27–34)
MCHC RBC-ENTMCNC: 34.7 G/DL — SIGNIFICANT CHANGE UP (ref 32–36)
MCV RBC AUTO: 95 FL — SIGNIFICANT CHANGE UP (ref 80–100)
MONOCYTES NFR BLD AUTO: 6 % — SIGNIFICANT CHANGE UP (ref 2–14)
NEUTROPHILS # BLD AUTO: 3.2 K/UL — SIGNIFICANT CHANGE UP (ref 1.8–7.4)
NEUTROPHILS NFR BLD AUTO: 71 % — SIGNIFICANT CHANGE UP (ref 43–77)
NRBC # BLD: 1 /100 — HIGH (ref 0–0)
PLAT MORPH BLD: NORMAL — SIGNIFICANT CHANGE UP
PLATELET # BLD AUTO: 48 K/UL — LOW (ref 150–400)
RBC # BLD: 3.4 M/UL — LOW (ref 4.2–5.8)
RBC # FLD: 15.9 % — HIGH (ref 10.3–14.5)
RBC BLD AUTO: SIGNIFICANT CHANGE UP
WBC # BLD: 4.4 K/UL — SIGNIFICANT CHANGE UP (ref 3.8–10.5)
WBC # FLD AUTO: 4.4 K/UL — SIGNIFICANT CHANGE UP (ref 3.8–10.5)

## 2019-10-25 ENCOUNTER — APPOINTMENT (OUTPATIENT)
Dept: HEMATOLOGY ONCOLOGY | Facility: CLINIC | Age: 79
End: 2019-10-25
Payer: MEDICARE

## 2019-10-25 ENCOUNTER — RESULT REVIEW (OUTPATIENT)
Age: 79
End: 2019-10-25

## 2019-10-25 VITALS
TEMPERATURE: 98.1 F | BODY MASS INDEX: 23.88 KG/M2 | RESPIRATION RATE: 15 BRPM | DIASTOLIC BLOOD PRESSURE: 70 MMHG | OXYGEN SATURATION: 99 % | WEIGHT: 166.45 LBS | HEART RATE: 61 BPM | SYSTOLIC BLOOD PRESSURE: 134 MMHG

## 2019-10-25 LAB
ALBUMIN SERPL ELPH-MCNC: 3.7 G/DL
ALP BLD-CCNC: 146 U/L
ALT SERPL-CCNC: 22 U/L
ANION GAP SERPL CALC-SCNC: 14 MMOL/L
AST SERPL-CCNC: 23 U/L
BASOPHILS # BLD AUTO: 0.1 K/UL — SIGNIFICANT CHANGE UP (ref 0–0.2)
BASOPHILS NFR BLD AUTO: 2.1 % — HIGH (ref 0–2)
BILIRUB SERPL-MCNC: 0.5 MG/DL
BUN SERPL-MCNC: 35 MG/DL
CALCIUM SERPL-MCNC: 9.2 MG/DL
CHLORIDE SERPL-SCNC: 107 MMOL/L
CO2 SERPL-SCNC: 20 MMOL/L
CREAT SERPL-MCNC: 1.18 MG/DL
EOSINOPHIL # BLD AUTO: 0.1 K/UL — SIGNIFICANT CHANGE UP (ref 0–0.5)
EOSINOPHIL NFR BLD AUTO: 2.4 % — SIGNIFICANT CHANGE UP (ref 0–6)
GLUCOSE SERPL-MCNC: 117 MG/DL
HCT VFR BLD CALC: 30.1 % — LOW (ref 39–50)
HGB BLD-MCNC: 10.4 G/DL — LOW (ref 13–17)
LYMPHOCYTES # BLD AUTO: 1.6 K/UL — SIGNIFICANT CHANGE UP (ref 1–3.3)
LYMPHOCYTES # BLD AUTO: 32.2 % — SIGNIFICANT CHANGE UP (ref 13–44)
MCHC RBC-ENTMCNC: 33.3 PG — SIGNIFICANT CHANGE UP (ref 27–34)
MCHC RBC-ENTMCNC: 34.6 G/DL — SIGNIFICANT CHANGE UP (ref 32–36)
MCV RBC AUTO: 96.2 FL — SIGNIFICANT CHANGE UP (ref 80–100)
MONOCYTES # BLD AUTO: 0.1 K/UL — SIGNIFICANT CHANGE UP (ref 0–0.9)
MONOCYTES NFR BLD AUTO: 1.7 % — LOW (ref 2–14)
NEUTROPHILS # BLD AUTO: 3.2 K/UL — SIGNIFICANT CHANGE UP (ref 1.8–7.4)
NEUTROPHILS NFR BLD AUTO: 61.6 % — SIGNIFICANT CHANGE UP (ref 43–77)
PLATELET # BLD AUTO: 138 K/UL — LOW (ref 150–400)
POTASSIUM SERPL-SCNC: 4.3 MMOL/L
PROT SERPL-MCNC: 6.6 G/DL
RBC # BLD: 3.13 M/UL — LOW (ref 4.2–5.8)
RBC # FLD: 16.3 % — HIGH (ref 10.3–14.5)
SODIUM SERPL-SCNC: 141 MMOL/L
WBC # BLD: 5.1 K/UL — SIGNIFICANT CHANGE UP (ref 3.8–10.5)
WBC # FLD AUTO: 5.1 K/UL — SIGNIFICANT CHANGE UP (ref 3.8–10.5)

## 2019-10-25 PROCEDURE — 99214 OFFICE O/P EST MOD 30 MIN: CPT

## 2019-10-28 ENCOUNTER — RESULT REVIEW (OUTPATIENT)
Age: 79
End: 2019-10-28

## 2019-10-28 ENCOUNTER — APPOINTMENT (OUTPATIENT)
Dept: INFUSION THERAPY | Facility: HOSPITAL | Age: 79
End: 2019-10-28

## 2019-10-28 LAB
BASOPHILS # BLD AUTO: 0 K/UL — SIGNIFICANT CHANGE UP (ref 0–0.2)
BASOPHILS NFR BLD AUTO: 2 % — SIGNIFICANT CHANGE UP (ref 0–2)
EOSINOPHIL # BLD AUTO: 0 K/UL — SIGNIFICANT CHANGE UP (ref 0–0.5)
EOSINOPHIL NFR BLD AUTO: 1 % — SIGNIFICANT CHANGE UP (ref 0–6)
HCT VFR BLD CALC: 27.4 % — LOW (ref 39–50)
HGB BLD-MCNC: 10 G/DL — LOW (ref 13–17)
LYMPHOCYTES # BLD AUTO: 1.3 K/UL — SIGNIFICANT CHANGE UP (ref 1–3.3)
LYMPHOCYTES # BLD AUTO: 31 % — SIGNIFICANT CHANGE UP (ref 13–44)
MCHC RBC-ENTMCNC: 34.6 PG — HIGH (ref 27–34)
MCHC RBC-ENTMCNC: 36.5 G/DL — HIGH (ref 32–36)
MCV RBC AUTO: 94.8 FL — SIGNIFICANT CHANGE UP (ref 80–100)
MONOCYTES # BLD AUTO: 0.1 K/UL — SIGNIFICANT CHANGE UP (ref 0–0.9)
MONOCYTES NFR BLD AUTO: 5 % — SIGNIFICANT CHANGE UP (ref 2–14)
NEUTROPHILS # BLD AUTO: 2.6 K/UL — SIGNIFICANT CHANGE UP (ref 1.8–7.4)
NEUTROPHILS NFR BLD AUTO: 61 % — SIGNIFICANT CHANGE UP (ref 43–77)
PLAT MORPH BLD: NORMAL — SIGNIFICANT CHANGE UP
PLATELET # BLD AUTO: 243 K/UL — SIGNIFICANT CHANGE UP (ref 150–400)
RBC # BLD: 2.89 M/UL — LOW (ref 4.2–5.8)
RBC # FLD: 16.7 % — HIGH (ref 10.3–14.5)
RBC BLD AUTO: SIGNIFICANT CHANGE UP
WBC # BLD: 4 K/UL — SIGNIFICANT CHANGE UP (ref 3.8–10.5)
WBC # FLD AUTO: 4 K/UL — SIGNIFICANT CHANGE UP (ref 3.8–10.5)

## 2019-10-29 ENCOUNTER — APPOINTMENT (OUTPATIENT)
Dept: INFUSION THERAPY | Facility: HOSPITAL | Age: 79
End: 2019-10-29

## 2019-10-29 DIAGNOSIS — R11.2 NAUSEA WITH VOMITING, UNSPECIFIED: ICD-10-CM

## 2019-10-29 DIAGNOSIS — Z51.11 ENCOUNTER FOR ANTINEOPLASTIC CHEMOTHERAPY: ICD-10-CM

## 2019-10-30 ENCOUNTER — APPOINTMENT (OUTPATIENT)
Dept: INFUSION THERAPY | Facility: HOSPITAL | Age: 79
End: 2019-10-30

## 2019-10-30 ENCOUNTER — RESULT REVIEW (OUTPATIENT)
Age: 79
End: 2019-10-30

## 2019-10-30 LAB
BASOPHILS # BLD AUTO: 0.1 K/UL — SIGNIFICANT CHANGE UP (ref 0–0.2)
BASOPHILS NFR BLD AUTO: 1.6 % — SIGNIFICANT CHANGE UP (ref 0–2)
EOSINOPHIL # BLD AUTO: 0.4 K/UL — SIGNIFICANT CHANGE UP (ref 0–0.5)
EOSINOPHIL NFR BLD AUTO: 8.6 % — HIGH (ref 0–6)
HCT VFR BLD CALC: 27.2 % — LOW (ref 39–50)
HGB BLD-MCNC: 9.9 G/DL — LOW (ref 13–17)
LYMPHOCYTES # BLD AUTO: 1.3 K/UL — SIGNIFICANT CHANGE UP (ref 1–3.3)
LYMPHOCYTES # BLD AUTO: 27.8 % — SIGNIFICANT CHANGE UP (ref 13–44)
MCHC RBC-ENTMCNC: 35.1 PG — HIGH (ref 27–34)
MCHC RBC-ENTMCNC: 36.4 G/DL — HIGH (ref 32–36)
MCV RBC AUTO: 96.3 FL — SIGNIFICANT CHANGE UP (ref 80–100)
MONOCYTES # BLD AUTO: 0.3 K/UL — SIGNIFICANT CHANGE UP (ref 0–0.9)
MONOCYTES NFR BLD AUTO: 7.2 % — SIGNIFICANT CHANGE UP (ref 2–14)
NEUTROPHILS # BLD AUTO: 2.5 K/UL — SIGNIFICANT CHANGE UP (ref 1.8–7.4)
NEUTROPHILS NFR BLD AUTO: 55 % — SIGNIFICANT CHANGE UP (ref 43–77)
PLATELET # BLD AUTO: 384 K/UL — SIGNIFICANT CHANGE UP (ref 150–400)
RBC # BLD: 2.82 M/UL — LOW (ref 4.2–5.8)
RBC # FLD: 16.9 % — HIGH (ref 10.3–14.5)
WBC # BLD: 4.6 K/UL — SIGNIFICANT CHANGE UP (ref 3.8–10.5)
WBC # FLD AUTO: 4.6 K/UL — SIGNIFICANT CHANGE UP (ref 3.8–10.5)

## 2019-10-31 ENCOUNTER — APPOINTMENT (OUTPATIENT)
Dept: ELECTROPHYSIOLOGY | Facility: CLINIC | Age: 79
End: 2019-10-31
Payer: MEDICARE

## 2019-10-31 ENCOUNTER — APPOINTMENT (OUTPATIENT)
Dept: INFUSION THERAPY | Facility: HOSPITAL | Age: 79
End: 2019-10-31

## 2019-10-31 PROCEDURE — 93298 REM INTERROG DEV EVAL SCRMS: CPT

## 2019-10-31 PROCEDURE — 93299: CPT

## 2019-11-01 ENCOUNTER — RESULT REVIEW (OUTPATIENT)
Age: 79
End: 2019-11-01

## 2019-11-01 ENCOUNTER — APPOINTMENT (OUTPATIENT)
Dept: INFUSION THERAPY | Facility: HOSPITAL | Age: 79
End: 2019-11-01

## 2019-11-01 LAB
EOSINOPHIL NFR BLD AUTO: 6 % — SIGNIFICANT CHANGE UP (ref 0–6)
HCT VFR BLD CALC: 26.1 % — LOW (ref 39–50)
HGB BLD-MCNC: 9.2 G/DL — LOW (ref 13–17)
LG PLATELETS BLD QL AUTO: SLIGHT — SIGNIFICANT CHANGE UP
LYMPHOCYTES # BLD AUTO: 1.3 K/UL — SIGNIFICANT CHANGE UP (ref 1–3.3)
LYMPHOCYTES # BLD AUTO: 38 % — SIGNIFICANT CHANGE UP (ref 13–44)
MCHC RBC-ENTMCNC: 34.1 PG — HIGH (ref 27–34)
MCHC RBC-ENTMCNC: 35.4 G/DL — SIGNIFICANT CHANGE UP (ref 32–36)
MCV RBC AUTO: 96.3 FL — SIGNIFICANT CHANGE UP (ref 80–100)
MONOCYTES # BLD AUTO: 0.3 K/UL — SIGNIFICANT CHANGE UP (ref 0–0.9)
MONOCYTES NFR BLD AUTO: 9 % — SIGNIFICANT CHANGE UP (ref 2–14)
NEUTROPHILS # BLD AUTO: 1.8 K/UL — SIGNIFICANT CHANGE UP (ref 1.8–7.4)
NEUTROPHILS NFR BLD AUTO: 47 % — SIGNIFICANT CHANGE UP (ref 43–77)
PLAT MORPH BLD: NORMAL — SIGNIFICANT CHANGE UP
PLATELET # BLD AUTO: 376 K/UL — SIGNIFICANT CHANGE UP (ref 150–400)
RBC # BLD: 2.71 M/UL — LOW (ref 4.2–5.8)
RBC # FLD: 17.1 % — HIGH (ref 10.3–14.5)
RBC BLD AUTO: SIGNIFICANT CHANGE UP
WBC # BLD: 3.5 K/UL — LOW (ref 3.8–10.5)
WBC # FLD AUTO: 3.5 K/UL — LOW (ref 3.8–10.5)

## 2019-11-01 RX ORDER — ROSUVASTATIN CALCIUM 5 MG/1
1 TABLET ORAL
Qty: 0 | Refills: 0 | DISCHARGE

## 2019-11-01 RX ORDER — LISINOPRIL 2.5 MG/1
1 TABLET ORAL
Qty: 0 | Refills: 0 | DISCHARGE

## 2019-11-01 RX ORDER — ASPIRIN/CALCIUM CARB/MAGNESIUM 324 MG
1 TABLET ORAL
Qty: 0 | Refills: 0 | DISCHARGE

## 2019-11-01 RX ORDER — FUROSEMIDE 40 MG
1 TABLET ORAL
Qty: 0 | Refills: 0 | DISCHARGE

## 2019-11-04 ENCOUNTER — RESULT REVIEW (OUTPATIENT)
Age: 79
End: 2019-11-04

## 2019-11-04 ENCOUNTER — APPOINTMENT (OUTPATIENT)
Dept: INFUSION THERAPY | Facility: HOSPITAL | Age: 79
End: 2019-11-04

## 2019-11-04 LAB
BASOPHILS # BLD AUTO: 0.1 K/UL — SIGNIFICANT CHANGE UP (ref 0–0.2)
EOSINOPHIL # BLD AUTO: 0 K/UL — SIGNIFICANT CHANGE UP (ref 0–0.5)
EOSINOPHIL NFR BLD AUTO: 6 % — SIGNIFICANT CHANGE UP (ref 0–6)
HCT VFR BLD CALC: 27.6 % — LOW (ref 39–50)
HGB BLD-MCNC: 9.8 G/DL — LOW (ref 13–17)
LYMPHOCYTES # BLD AUTO: 1.6 K/UL — SIGNIFICANT CHANGE UP (ref 1–3.3)
LYMPHOCYTES # BLD AUTO: 42 % — SIGNIFICANT CHANGE UP (ref 13–44)
MCHC RBC-ENTMCNC: 34 PG — SIGNIFICANT CHANGE UP (ref 27–34)
MCHC RBC-ENTMCNC: 35.4 G/DL — SIGNIFICANT CHANGE UP (ref 32–36)
MCV RBC AUTO: 96.1 FL — SIGNIFICANT CHANGE UP (ref 80–100)
METAMYELOCYTES # FLD: 1 % — HIGH (ref 0–0)
MONOCYTES # BLD AUTO: 0.3 K/UL — SIGNIFICANT CHANGE UP (ref 0–0.9)
MONOCYTES NFR BLD AUTO: 10 % — SIGNIFICANT CHANGE UP (ref 2–14)
NEUTROPHILS # BLD AUTO: 2.1 K/UL — SIGNIFICANT CHANGE UP (ref 1.8–7.4)
NEUTROPHILS NFR BLD AUTO: 41 % — LOW (ref 43–77)
PLAT MORPH BLD: NORMAL — SIGNIFICANT CHANGE UP
PLATELET # BLD AUTO: 366 K/UL — SIGNIFICANT CHANGE UP (ref 150–400)
RBC # BLD: 2.88 M/UL — LOW (ref 4.2–5.8)
RBC # FLD: 17.6 % — HIGH (ref 10.3–14.5)
RBC BLD AUTO: SIGNIFICANT CHANGE UP
WBC # BLD: 4 K/UL — SIGNIFICANT CHANGE UP (ref 3.8–10.5)
WBC # FLD AUTO: 4 K/UL — SIGNIFICANT CHANGE UP (ref 3.8–10.5)

## 2019-11-05 ENCOUNTER — APPOINTMENT (OUTPATIENT)
Dept: INFUSION THERAPY | Facility: HOSPITAL | Age: 79
End: 2019-11-05

## 2019-11-13 ENCOUNTER — RESULT REVIEW (OUTPATIENT)
Age: 79
End: 2019-11-13

## 2019-11-13 ENCOUNTER — CHART COPY (OUTPATIENT)
Age: 79
End: 2019-11-13

## 2019-11-13 ENCOUNTER — LABORATORY RESULT (OUTPATIENT)
Age: 79
End: 2019-11-13

## 2019-11-13 ENCOUNTER — APPOINTMENT (OUTPATIENT)
Dept: HEMATOLOGY ONCOLOGY | Facility: CLINIC | Age: 79
End: 2019-11-13

## 2019-11-13 LAB
BASOPHILS # BLD AUTO: 0.1 K/UL — SIGNIFICANT CHANGE UP (ref 0–0.2)
BASOPHILS NFR BLD AUTO: 0.6 % — SIGNIFICANT CHANGE UP (ref 0–2)
BLD GP AB SCN SERPL QL: NEGATIVE — SIGNIFICANT CHANGE UP
EOSINOPHIL # BLD AUTO: 0.1 K/UL — SIGNIFICANT CHANGE UP (ref 0–0.5)
EOSINOPHIL NFR BLD AUTO: 1.1 % — SIGNIFICANT CHANGE UP (ref 0–6)
HCT VFR BLD CALC: 24.8 % — LOW (ref 39–50)
HGB BLD-MCNC: 8.6 G/DL — LOW (ref 13–17)
LYMPHOCYTES # BLD AUTO: 1.8 K/UL — SIGNIFICANT CHANGE UP (ref 1–3.3)
LYMPHOCYTES # BLD AUTO: 19.5 % — SIGNIFICANT CHANGE UP (ref 13–44)
MCHC RBC-ENTMCNC: 33.2 PG — SIGNIFICANT CHANGE UP (ref 27–34)
MCHC RBC-ENTMCNC: 34.4 G/DL — SIGNIFICANT CHANGE UP (ref 32–36)
MCV RBC AUTO: 96.3 FL — SIGNIFICANT CHANGE UP (ref 80–100)
MONOCYTES # BLD AUTO: 0 K/UL — SIGNIFICANT CHANGE UP (ref 0–0.9)
MONOCYTES NFR BLD AUTO: 0.4 % — LOW (ref 2–14)
NEUTROPHILS # BLD AUTO: 7.2 K/UL — SIGNIFICANT CHANGE UP (ref 1.8–7.4)
NEUTROPHILS NFR BLD AUTO: 78.3 % — HIGH (ref 43–77)
PLATELET # BLD AUTO: 57 K/UL — LOW (ref 150–400)
RBC # BLD: 2.58 M/UL — LOW (ref 4.2–5.8)
RBC # FLD: 17.7 % — HIGH (ref 10.3–14.5)
RH IG SCN BLD-IMP: POSITIVE — SIGNIFICANT CHANGE UP
WBC # BLD: 9.1 K/UL — SIGNIFICANT CHANGE UP (ref 3.8–10.5)
WBC # FLD AUTO: 9.1 K/UL — SIGNIFICANT CHANGE UP (ref 3.8–10.5)

## 2019-11-14 ENCOUNTER — INPATIENT (INPATIENT)
Facility: HOSPITAL | Age: 79
LOS: 1 days | Discharge: HOME | DRG: 690 | End: 2019-11-16
Attending: STUDENT IN AN ORGANIZED HEALTH CARE EDUCATION/TRAINING PROGRAM | Admitting: HOSPITALIST
Payer: MEDICARE

## 2019-11-14 VITALS
SYSTOLIC BLOOD PRESSURE: 120 MMHG | WEIGHT: 164.91 LBS | RESPIRATION RATE: 16 BRPM | HEART RATE: 90 BPM | TEMPERATURE: 98 F | HEIGHT: 70 IN | DIASTOLIC BLOOD PRESSURE: 67 MMHG | OXYGEN SATURATION: 99 %

## 2019-11-14 DIAGNOSIS — Z95.5 PRESENCE OF CORONARY ANGIOPLASTY IMPLANT AND GRAFT: Chronic | ICD-10-CM

## 2019-11-14 DIAGNOSIS — D64.9 ANEMIA, UNSPECIFIED: ICD-10-CM

## 2019-11-14 DIAGNOSIS — I48.91 UNSPECIFIED ATRIAL FIBRILLATION: ICD-10-CM

## 2019-11-14 DIAGNOSIS — E03.9 HYPOTHYROIDISM, UNSPECIFIED: ICD-10-CM

## 2019-11-14 DIAGNOSIS — Z98.89 OTHER SPECIFIED POSTPROCEDURAL STATES: Chronic | ICD-10-CM

## 2019-11-14 DIAGNOSIS — I25.10 ATHEROSCLEROTIC HEART DISEASE OF NATIVE CORONARY ARTERY WITHOUT ANGINA PECTORIS: ICD-10-CM

## 2019-11-14 DIAGNOSIS — E78.5 HYPERLIPIDEMIA, UNSPECIFIED: ICD-10-CM

## 2019-11-14 DIAGNOSIS — I10 ESSENTIAL (PRIMARY) HYPERTENSION: ICD-10-CM

## 2019-11-14 DIAGNOSIS — N39.0 URINARY TRACT INFECTION, SITE NOT SPECIFIED: ICD-10-CM

## 2019-11-14 DIAGNOSIS — I50.9 HEART FAILURE, UNSPECIFIED: ICD-10-CM

## 2019-11-14 DIAGNOSIS — E11.9 TYPE 2 DIABETES MELLITUS WITHOUT COMPLICATIONS: ICD-10-CM

## 2019-11-14 DIAGNOSIS — Z90.89 ACQUIRED ABSENCE OF OTHER ORGANS: Chronic | ICD-10-CM

## 2019-11-14 DIAGNOSIS — Z29.9 ENCOUNTER FOR PROPHYLACTIC MEASURES, UNSPECIFIED: ICD-10-CM

## 2019-11-14 DIAGNOSIS — D69.6 THROMBOCYTOPENIA, UNSPECIFIED: ICD-10-CM

## 2019-11-14 LAB
ALBUMIN SERPL ELPH-MCNC: 3.6 G/DL — SIGNIFICANT CHANGE UP (ref 3.3–5)
ALP SERPL-CCNC: 132 U/L — HIGH (ref 40–120)
ALT FLD-CCNC: 47 U/L — HIGH (ref 10–45)
ANION GAP SERPL CALC-SCNC: 12 MMOL/L — SIGNIFICANT CHANGE UP (ref 5–17)
APPEARANCE UR: ABNORMAL
APTT BLD: 26.9 SEC — LOW (ref 27.5–36.3)
AST SERPL-CCNC: 42 U/L — HIGH (ref 10–40)
BASOPHILS # BLD AUTO: 0.05 K/UL — SIGNIFICANT CHANGE UP (ref 0–0.2)
BASOPHILS NFR BLD AUTO: 1 % — SIGNIFICANT CHANGE UP (ref 0–2)
BILIRUB SERPL-MCNC: 0.6 MG/DL — SIGNIFICANT CHANGE UP (ref 0.2–1.2)
BILIRUB UR-MCNC: NEGATIVE — SIGNIFICANT CHANGE UP
BLD GP AB SCN SERPL QL: NEGATIVE — SIGNIFICANT CHANGE UP
BUN SERPL-MCNC: 48 MG/DL — HIGH (ref 7–23)
CALCIUM SERPL-MCNC: 9.4 MG/DL — SIGNIFICANT CHANGE UP (ref 8.4–10.5)
CHLORIDE SERPL-SCNC: 105 MMOL/L — SIGNIFICANT CHANGE UP (ref 96–108)
CO2 SERPL-SCNC: 16 MMOL/L — LOW (ref 22–31)
COLOR SPEC: SIGNIFICANT CHANGE UP
CREAT SERPL-MCNC: 1.49 MG/DL — HIGH (ref 0.5–1.3)
DIFF PNL FLD: ABNORMAL
EOSINOPHIL # BLD AUTO: 0.18 K/UL — SIGNIFICANT CHANGE UP (ref 0–0.5)
EOSINOPHIL NFR BLD AUTO: 4 % — SIGNIFICANT CHANGE UP (ref 0–6)
GLUCOSE SERPL-MCNC: 158 MG/DL — HIGH (ref 70–99)
GLUCOSE UR QL: NEGATIVE — SIGNIFICANT CHANGE UP
HCT VFR BLD CALC: 22 % — LOW (ref 39–50)
HGB BLD-MCNC: 7.3 G/DL — LOW (ref 13–17)
INR BLD: 1.14 RATIO — SIGNIFICANT CHANGE UP (ref 0.88–1.16)
KETONES UR-MCNC: NEGATIVE — SIGNIFICANT CHANGE UP
LEUKOCYTE ESTERASE UR-ACNC: ABNORMAL
LYMPHOCYTES # BLD AUTO: 0.64 K/UL — LOW (ref 1–3.3)
LYMPHOCYTES # BLD AUTO: 14 % — SIGNIFICANT CHANGE UP (ref 13–44)
MCHC RBC-ENTMCNC: 31.6 PG — SIGNIFICANT CHANGE UP (ref 27–34)
MCHC RBC-ENTMCNC: 33.2 GM/DL — SIGNIFICANT CHANGE UP (ref 32–36)
MCV RBC AUTO: 95.2 FL — SIGNIFICANT CHANGE UP (ref 80–100)
MONOCYTES # BLD AUTO: 0.09 K/UL — SIGNIFICANT CHANGE UP (ref 0–0.9)
MONOCYTES NFR BLD AUTO: 2 % — SIGNIFICANT CHANGE UP (ref 2–14)
NEUTROPHILS # BLD AUTO: 3.5 K/UL — SIGNIFICANT CHANGE UP (ref 1.8–7.4)
NEUTROPHILS NFR BLD AUTO: 74 % — SIGNIFICANT CHANGE UP (ref 43–77)
NITRITE UR-MCNC: POSITIVE
NT-PROBNP SERPL-SCNC: 1478 PG/ML — HIGH (ref 0–300)
PH UR: 6 — SIGNIFICANT CHANGE UP (ref 5–8)
PLATELET # BLD AUTO: 58 K/UL — LOW (ref 150–400)
POTASSIUM SERPL-MCNC: 4.2 MMOL/L — SIGNIFICANT CHANGE UP (ref 3.5–5.3)
POTASSIUM SERPL-SCNC: 4.2 MMOL/L — SIGNIFICANT CHANGE UP (ref 3.5–5.3)
PROT SERPL-MCNC: 7.3 G/DL — SIGNIFICANT CHANGE UP (ref 6–8.3)
PROT UR-MCNC: 100 — SIGNIFICANT CHANGE UP
PROTHROM AB SERPL-ACNC: 13.2 SEC — HIGH (ref 10–12.9)
RBC # BLD: 2.31 M/UL — LOW (ref 4.2–5.8)
RBC # FLD: 19.5 % — HIGH (ref 10.3–14.5)
RH IG SCN BLD-IMP: POSITIVE — SIGNIFICANT CHANGE UP
SODIUM SERPL-SCNC: 133 MMOL/L — LOW (ref 135–145)
SP GR SPEC: 1.02 — SIGNIFICANT CHANGE UP (ref 1.01–1.02)
TROPONIN T, HIGH SENSITIVITY RESULT: 16 NG/L — SIGNIFICANT CHANGE UP (ref 0–51)
UROBILINOGEN FLD QL: NEGATIVE — SIGNIFICANT CHANGE UP
WBC # BLD: 4.6 K/UL — SIGNIFICANT CHANGE UP (ref 3.8–10.5)
WBC # FLD AUTO: 4.6 K/UL — SIGNIFICANT CHANGE UP (ref 3.8–10.5)

## 2019-11-14 PROCEDURE — 93010 ELECTROCARDIOGRAM REPORT: CPT

## 2019-11-14 PROCEDURE — 99223 1ST HOSP IP/OBS HIGH 75: CPT | Mod: GC

## 2019-11-14 PROCEDURE — 71045 X-RAY EXAM CHEST 1 VIEW: CPT | Mod: 26

## 2019-11-14 PROCEDURE — 99285 EMERGENCY DEPT VISIT HI MDM: CPT | Mod: GC

## 2019-11-14 RX ORDER — ASPIRIN/CALCIUM CARB/MAGNESIUM 324 MG
81 TABLET ORAL DAILY
Refills: 0 | Status: DISCONTINUED | OUTPATIENT
Start: 2019-11-15 | End: 2019-11-15

## 2019-11-14 RX ORDER — DEXTROSE 50 % IN WATER 50 %
25 SYRINGE (ML) INTRAVENOUS ONCE
Refills: 0 | Status: DISCONTINUED | OUTPATIENT
Start: 2019-11-14 | End: 2019-11-16

## 2019-11-14 RX ORDER — ATORVASTATIN CALCIUM 80 MG/1
20 TABLET, FILM COATED ORAL AT BEDTIME
Refills: 0 | Status: DISCONTINUED | OUTPATIENT
Start: 2019-11-15 | End: 2019-11-16

## 2019-11-14 RX ORDER — INSULIN LISPRO 100/ML
VIAL (ML) SUBCUTANEOUS
Refills: 0 | Status: DISCONTINUED | OUTPATIENT
Start: 2019-11-14 | End: 2019-11-16

## 2019-11-14 RX ORDER — METOCLOPRAMIDE HCL 10 MG
10 TABLET ORAL DAILY
Refills: 0 | Status: DISCONTINUED | OUTPATIENT
Start: 2019-11-15 | End: 2019-11-16

## 2019-11-14 RX ORDER — METOPROLOL TARTRATE 50 MG
50 TABLET ORAL
Refills: 0 | Status: DISCONTINUED | OUTPATIENT
Start: 2019-11-14 | End: 2019-11-16

## 2019-11-14 RX ORDER — METHYLPHENIDATE HCL 5 MG
20 TABLET ORAL THREE TIMES A DAY
Refills: 0 | Status: DISCONTINUED | OUTPATIENT
Start: 2019-11-14 | End: 2019-11-14

## 2019-11-14 RX ORDER — LEVOTHYROXINE SODIUM 125 MCG
37.5 TABLET ORAL DAILY
Refills: 0 | Status: DISCONTINUED | OUTPATIENT
Start: 2019-11-14 | End: 2019-11-14

## 2019-11-14 RX ORDER — DEXTROSE 50 % IN WATER 50 %
15 SYRINGE (ML) INTRAVENOUS ONCE
Refills: 0 | Status: DISCONTINUED | OUTPATIENT
Start: 2019-11-14 | End: 2019-11-16

## 2019-11-14 RX ORDER — ATORVASTATIN CALCIUM 80 MG/1
20 TABLET, FILM COATED ORAL AT BEDTIME
Refills: 0 | Status: DISCONTINUED | OUTPATIENT
Start: 2019-11-14 | End: 2019-11-14

## 2019-11-14 RX ORDER — FUROSEMIDE 40 MG
20 TABLET ORAL ONCE
Refills: 0 | Status: COMPLETED | OUTPATIENT
Start: 2019-11-14 | End: 2019-11-15

## 2019-11-14 RX ORDER — SODIUM CHLORIDE 9 MG/ML
1000 INJECTION, SOLUTION INTRAVENOUS
Refills: 0 | Status: DISCONTINUED | OUTPATIENT
Start: 2019-11-14 | End: 2019-11-16

## 2019-11-14 RX ORDER — DEXTROSE 50 % IN WATER 50 %
12.5 SYRINGE (ML) INTRAVENOUS ONCE
Refills: 0 | Status: DISCONTINUED | OUTPATIENT
Start: 2019-11-14 | End: 2019-11-16

## 2019-11-14 RX ORDER — METHYLPHENIDATE HCL 5 MG
20 TABLET ORAL THREE TIMES A DAY
Refills: 0 | Status: DISCONTINUED | OUTPATIENT
Start: 2019-11-14 | End: 2019-11-15

## 2019-11-14 RX ORDER — ALLOPURINOL 300 MG
100 TABLET ORAL DAILY
Refills: 0 | Status: DISCONTINUED | OUTPATIENT
Start: 2019-11-14 | End: 2019-11-14

## 2019-11-14 RX ORDER — PANTOPRAZOLE SODIUM 20 MG/1
40 TABLET, DELAYED RELEASE ORAL
Refills: 0 | Status: DISCONTINUED | OUTPATIENT
Start: 2019-11-14 | End: 2019-11-16

## 2019-11-14 RX ORDER — METOPROLOL TARTRATE 50 MG
1 TABLET ORAL
Qty: 0 | Refills: 0 | DISCHARGE

## 2019-11-14 RX ORDER — AMLODIPINE BESYLATE 2.5 MG/1
5 TABLET ORAL DAILY
Refills: 0 | Status: DISCONTINUED | OUTPATIENT
Start: 2019-11-15 | End: 2019-11-16

## 2019-11-14 RX ORDER — GLUCAGON INJECTION, SOLUTION 0.5 MG/.1ML
1 INJECTION, SOLUTION SUBCUTANEOUS ONCE
Refills: 0 | Status: DISCONTINUED | OUTPATIENT
Start: 2019-11-14 | End: 2019-11-16

## 2019-11-14 RX ORDER — ALLOPURINOL 300 MG
100 TABLET ORAL DAILY
Refills: 0 | Status: DISCONTINUED | OUTPATIENT
Start: 2019-11-15 | End: 2019-11-16

## 2019-11-14 RX ORDER — AMLODIPINE BESYLATE 2.5 MG/1
5 TABLET ORAL DAILY
Refills: 0 | Status: DISCONTINUED | OUTPATIENT
Start: 2019-11-14 | End: 2019-11-14

## 2019-11-14 RX ORDER — METHYLPHENIDATE HCL 5 MG
1 TABLET ORAL
Qty: 0 | Refills: 0 | DISCHARGE

## 2019-11-14 RX ORDER — LEVOTHYROXINE SODIUM 125 MCG
37.5 TABLET ORAL DAILY
Refills: 0 | Status: DISCONTINUED | OUTPATIENT
Start: 2019-11-15 | End: 2019-11-16

## 2019-11-14 RX ORDER — CEFTRIAXONE 500 MG/1
1000 INJECTION, POWDER, FOR SOLUTION INTRAMUSCULAR; INTRAVENOUS ONCE
Refills: 0 | Status: COMPLETED | OUTPATIENT
Start: 2019-11-14 | End: 2019-11-14

## 2019-11-14 RX ORDER — CEFTRIAXONE 500 MG/1
1000 INJECTION, POWDER, FOR SOLUTION INTRAMUSCULAR; INTRAVENOUS EVERY 24 HOURS
Refills: 0 | Status: DISCONTINUED | OUTPATIENT
Start: 2019-11-15 | End: 2019-11-16

## 2019-11-14 RX ORDER — METOCLOPRAMIDE HCL 10 MG
10 TABLET ORAL DAILY
Refills: 0 | Status: DISCONTINUED | OUTPATIENT
Start: 2019-11-14 | End: 2019-11-14

## 2019-11-14 RX ADMIN — Medication 50 MILLIGRAM(S): at 23:21

## 2019-11-14 RX ADMIN — CEFTRIAXONE 100 MILLIGRAM(S): 500 INJECTION, POWDER, FOR SOLUTION INTRAMUSCULAR; INTRAVENOUS at 18:46

## 2019-11-14 NOTE — H&P ADULT - NSHPLABSRESULTS_GEN_ALL_CORE
7.3    4.60  )-----------( 58       ( 2019 16:51 )             22.0           133<L>  |  105  |  48<H>  ----------------------------<  158<H>  4.2   |  16<L>  |  1.49<H>    Ca    9.4      2019 16:51    TPro  7.3  /  Alb  3.6  /  TBili  0.6  /  DBili  x   /  AST  42<H>  /  ALT  47<H>  /  AlkPhos  132<H>                Urinalysis Basic - ( 2019 17:18 )    Color: Light Yellow / Appearance: Turbid / S.017 / pH: x  Gluc: x / Ketone: Negative  / Bili: Negative / Urobili: Negative   Blood: x / Protein: 100 / Nitrite: Positive   Leuk Esterase: Large / RBC: 9 /hpf / WBC >50 /HPF   Sq Epi: x / Non Sq Epi: 4 / Bacteria: Many        PT/INR - ( 2019 16:51 )   PT: 13.2 sec;   INR: 1.14 ratio         PTT - ( 2019 16:51 )  PTT:26.9 sec    Lactate Trend            CAPILLARY BLOOD GLUCOSE            < from: Xray Chest 1 View AP/PA (19 @ 17:58) >    Clear lungs.    < end of copied text > 7.3    4.60  )-----------( 58       ( 2019 16:51 )             22.0           133<L>  |  105  |  48<H>  ----------------------------<  158<H>  4.2   |  16<L>  |  1.49<H>    Ca    9.4      2019 16:51    TPro  7.3  /  Alb  3.6  /  TBili  0.6  /  DBili  x   /  AST  42<H>  /  ALT  47<H>  /  AlkPhos  132<H>                Urinalysis Basic - ( 2019 17:18 )    Color: Light Yellow / Appearance: Turbid / S.017 / pH: x  Gluc: x / Ketone: Negative  / Bili: Negative / Urobili: Negative   Blood: x / Protein: 100 / Nitrite: Positive   Leuk Esterase: Large / RBC: 9 /hpf / WBC >50 /HPF   Sq Epi: x / Non Sq Epi: 4 / Bacteria: Many        PT/INR - ( 2019 16:51 )   PT: 13.2 sec;   INR: 1.14 ratio         PTT - ( 2019 16:51 )  PTT:26.9 sec    Lactate Trend            CAPILLARY BLOOD GLUCOSE            < from: Xray Chest 1 View AP/PA (19 @ 17:58) >    Clear lungs.    < end of copied text >    EKG NSR 85, low voltage QRS Personally reviewed labs and noted in detail below: anemia with Hgb of 7.3 (normocytic),               7.3    4.60  )-----------( 58       ( 2019 16:51 )             22.0           133<L>  |  105  |  48<H>  ----------------------------<  158<H>  4.2   |  16<L>  |  1.49<H>    Ca    9.4      2019 16:51    TPro  7.3  /  Alb  3.6  /  TBili  0.6  /  DBili  x   /  AST  42<H>  /  ALT  47<H>  /  AlkPhos  132<H>                Urinalysis Basic - ( 2019 17:18 )    Color: Light Yellow / Appearance: Turbid / S.017 / pH: x  Gluc: x / Ketone: Negative  / Bili: Negative / Urobili: Negative   Blood: x / Protein: 100 / Nitrite: Positive   Leuk Esterase: Large / RBC: 9 /hpf / WBC >50 /HPF   Sq Epi: x / Non Sq Epi: 4 / Bacteria: Many        PT/INR - ( 2019 16:51 )   PT: 13.2 sec;   INR: 1.14 ratio         PTT - ( 2019 16:51 )  PTT:26.9 sec      Personally reviewed CXR: no appreciable focal consolidations    Personally reviewed EKG NSR 85, low voltage QRS

## 2019-11-14 NOTE — H&P ADULT - PROBLEM SELECTOR PLAN 5
c/w lasix, lopressor c/w lopressor EF: 40% Moderate segmental left ventricular systolic dysfunction  c/w lopressor Echo 05/17   EF: 40% Moderate segmental left ventricular systolic dysfunction  c/w lopressor Echo 05/17  EF: 40% Moderate segmental left ventricular systolic dysfunction  c/w lopressor, lasix in between blood transfusions to prevent fluid overload Echo 05/17 EF: 40% Moderate segmental left ventricular systolic dysfunction  Patient currently appears euvolemic  c/w lopressor,   -lasix in between blood transfusions to prevent volume overload

## 2019-11-14 NOTE — H&P ADULT - NSICDXPASTSURGICALHX_GEN_ALL_CORE_FT
PAST SURGICAL HISTORY:  History of colonoscopy     History of tonsillectomy     Stented coronary artery

## 2019-11-14 NOTE — H&P ADULT - NSHPSOCIALHISTORY_GEN_ALL_CORE
The pt lives with his wife; is wheelchair bound in the setting of neuropathy and associated loss of sensation in the LE. Denies hx of smoking, drinking or illicit drug use.

## 2019-11-14 NOTE — H&P ADULT - NSICDXFAMILYHX_GEN_ALL_CORE_FT
FAMILY HISTORY:  No significant family history FAMILY HISTORY:  Family history of cerebrovascular accident (CVA) in father  FH: CHF (congestive heart failure), mother

## 2019-11-14 NOTE — ED ADULT NURSE NOTE - OBJECTIVE STATEMENT
80 yo presents to the ED from home. A&Ox4, ambulatory with wife and daughter at bedside c/o weakness. pt reports being treated for MDS, diagnosed 2 years ago, on a new chemo therapy session, Azacitidine subcutaneous injections for 7 days and off for 3 weeks, last chemo was 11/5. reports generalized weakness. blood work done yesterday at [facility] showed hemoglobin of 8.6. pt has required blood transfusion under 8.5 but pt reports symptomatic which prompted ED visit. pt reports also being diagnosed with UTI yesterday. pt reports frequency in urination. denies fever, chills. afebrile during assessment. denies abd pain. abd soft nondistended nontender. pt denies any blood in stool or urine. pt reports frequent bruising. pt reports gum sensitivity. VSS upon arrival to [facility] in ED. 20G inserted in LAC. family at bedside. MD Gift at bedside. plan of care discussed. safety and comfort measures maintained.

## 2019-11-14 NOTE — H&P ADULT - PROBLEM SELECTOR PLAN 3
hold asa while anemic  c/w lopressor c/w ASA  pt not lopressor/AC c/w ASA  c/w lopressor Per previous hospital charting patient w/ hx of Afib, however in outpatient records not listed as a problem.  Obtain collateral from outpatient cardiologist.  c/w ASA  c/w lopressor

## 2019-11-14 NOTE — ED CLERICAL - NS ED CLERK NOTE PRE-ARRIVAL INFORMATION; ADDITIONAL PRE-ARRIVAL INFORMATION
CC/Reason For referral: Patient with MDS, finished chemo round yesterday c/o burning with urination and hgb 8.6, will need transfusion  Preferred Consultant(if applicable): call heme fellow  Who admits for you (if needed):na  Do you have documents you would like to fax over? na  Would you still like to speak to an ED attending? na

## 2019-11-14 NOTE — H&P ADULT - ATTENDING COMMENTS
78 yo man w/ PMH of MDS on chemo last 1 week ago, CAD with stents, HFrEF (EF 40%), DM2, HTN, HLD, CKD III, sent in by his Hematologist for low H/H and UTI. Pt states that he has been weak for past several days, and had burning urination starting yesterday. Confirmed HPI and ROS with patient.  Vitals reviewed and physically examined patient at bedside. Agree with resident findings. Patient in no acute distress. Has received 1st unit of PRBC. Lung exam: CTAB. no wheeze or rales. Speaking comfortably in full sentences. CV: RRR + S1 S2. No JVD. No LE edema. Skin Warm and dry. Pale skin tone. Abd: soft, NTND, + BS  Labs, imaging and EKG personally reviewed  Anemia: likely in setting of chemotherapy and chronic disease. Goal Hgb>8. Anticipated for total of 2 Units PRBC. Check post transfusion CBC. Monitor platelets. F/U Heme/Onc consult in AM  UTI: Reviewed outpatient UCX: no definable organism in prior culture reports. C/W ceftriaxone pending S/S of cultures.   Systolic CHF: Euvolemic. Lasix PRN in setting of blood transfusion  CKD III: Monitor Cr. Renally dose medications. Monitor I/O  Patient previously unknown to me and I was assigned to precept this case with housestaff resident, Dr. Mullins. My involvement in this case consisted only of the initial history, physical and management plan. Primary day team to assume care in AM.

## 2019-11-14 NOTE — ED PROVIDER NOTE - ATTENDING CONTRIBUTION TO CARE
79yr M hx of MDS with frequent transfusion and chemo afib on asa CAD and stents, DM2, htn, hl who is referred from heme clinic today for possible UTI and transfusion requirement. reports worsening SOB, intermittent chest tightness. also reports dysuria and frequency and hematuria. no abd pain, no back pain, no leg swelling, no change in bowel habits. denies URI sx.  exam notable for clear lungs, no extraneous heart sounds, soft abd, no CVAT. no peripheral edema.  concern for symptomatic anemia, UTI.  will resend ua here and UCx, likely treat with ceftriaxone given no hospital or rehab stay recently. if hb is low, transfuse. family expressed interest with outpatient therapy. will discuss with [facility] on the phone to explore possibility., otherwise admit

## 2019-11-14 NOTE — ED PROVIDER NOTE - CLINICAL SUMMARY MEDICAL DECISION MAKING FREE TEXT BOX
Kim Daly MD: 80 yo M PMH of MDS on chemo last 1 week ago, AF on asa, CAD with stents, MI 2011 s/p PCI, ckd, HFrEF (EF 40%), DM2, HTN, HLD sent in by his Hematologist for low H/H and UTI. Pt states that he has been weak x 1 day. rpt labs and ua. Will transfuse if still low

## 2019-11-14 NOTE — H&P ADULT - NSICDXPASTMEDICALHX_GEN_ALL_CORE_FT
PAST MEDICAL HISTORY:  ADD (attention deficit disorder)     Atrial fibrillation     CAD (coronary artery disease)     Congestive heart failure (CHF)     DM (Diabetes Mellitus)     Gout     Hypercholesterolemia     Hypertension     Hypothyroid

## 2019-11-14 NOTE — H&P ADULT - HISTORY OF PRESENT ILLNESS
Followed by Dr. Denis Hooker from heme-onc. 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 On Vidaza x7 days to be given every 28 days, currently on cycle 2 started on 10/28 Pt is a 78 yo M w/ PMH of MDS on chemo last 1 week ago, AF on asa, CAD with stents, MI 2011 s/p PCI, ckd, HFrEF (EF 40%), DM2, HTN, HLD sent in by his Hematologist for low H/H and UTI. Pt states that he has been weak for past several days, and had burning urination starting yesterday.  Pt is followed by Dr. Denis Hooker from heme-onc and Dr. Traylor from nephrology. Pt with aggressive form of MDS with 7% blasts on BM. On Vidaza x7 days to be given every 28 days, currently on cycle 2 started on 10/28. Prior to Vidaza, patient was taking Revamid (now discontinued), and noted that he seemed to have UTIs more frequently while on Revamid. Patient has been feeling weak for the past several days and had difficulty getting out of bed. The pt denies lightheadedness, SOB or dizziness. Pt also reports intermittent fevers and chills, Tmax yesterday was 100. Patient noted that his urine was cloudy and pink. Patient was seen by hematologist, who noted low Hemoglobin of 8.6 yesterday in the setting of symptoms of fatigue and weakness and the pt was advised to come to hospital.  Today the pt reports to feeling better. He stopped experiencing dysuria after a dose of ceftriaxone in the ED. Pt is a 78 yo M w/ PMH of MDS on chemo last 1 week ago, ?AFib on asa, CAD with stents, MI 2011 s/p PCI, HFrEF (EF 40%), DM2, HTN, HLD, CKD, sent in by his Hematologist for low H/H and UTI. Pt states that he has been weak for past several days, and had burning urination starting yesterday.  Pt is followed by Dr. Denis Hooker from heme-onc and Dr. Garcia from nephrology. Pt with aggressive form of MDS with 7% blasts on BM. On Vidaza x7 days to be given every 28 days, currently on cycle 2 started on 10/28. Prior to Vidaza, patient was taking Revlimid (now discontinued), and noted that he seemed to have UTIs more frequently while on Revlimid. Patient does not recall prior urine cultures with resistant organisms. Patient has been feeling weak for the past several days and had difficulty getting out of bed. The pt denies lightheadedness, SOB or dizziness. Denies abdominal pain or flank pain. Pt also reports intermittent fevers and chills, Tmax yesterday was 100. Patient noted that his urine was cloudy and pink. Patient was seen by hematologist, who noted low Hemoglobin of 8.6 yesterday in the setting of symptoms of fatigue and weakness and the pt was advised to come to hospital.  Today the pt reports to feeling better. He stopped experiencing dysuria after a dose of ceftriaxone in the ED.

## 2019-11-14 NOTE — ED PROVIDER NOTE - PHYSICAL EXAMINATION
Gen: AAOx3, non-toxic  Head: NCAT  HEENT: EOMI, PERRLA, oral mucosa moist, normal conjunctiva  Lung: CTAB, no respiratory distress, no wheezes/rhonchi/rales B/L, speaking in full sentences  CV: RRR, no murmurs, rubs or gallops  Abd: soft, NTND, no guarding, no CVA tenderness, no rebound tenderness  MSK: no visible deformities, full range of motion of all 4 exts  Neuro: No focal sensory or motor deficits  Skin: Warm, well perfused, no rash  Psych: normal affect.   ~Kim Daly MD

## 2019-11-14 NOTE — H&P ADULT - PROBLEM SELECTOR PLAN 7
c/w lasix, lopressor, amlodipine c/w  amlodipine ISS  A1C 6.9 in May 2017, will obtain repeat A1C 6.9 in May 2017, will obtain repeat  Type 2  ISS  Monitor FS  Hypoglycemia protocol

## 2019-11-14 NOTE — ED PROVIDER NOTE - NS ED ROS FT
GENERAL: No fever or chills+ fatigue, EYES: no change in vision, HEENT: no trouble speaking, CARDIAC: no chest pain, palpitation PULMONARY: no cough or + SOB, GI: no abdominal pain, no nausea, no vomiting, no diarrhea or constipation, : No changes in urination, SKIN: no rashes, NEURO: no headache,  MSK: No muscle pain ~Kim Daly MD

## 2019-11-14 NOTE — CHART NOTE - NSCHARTNOTEFT_GEN_A_CORE
Others' Prescriptions    Patient Name: Malachi Rivera YOB: 1940   Address: [address] [place] [address] Sex: Male         Rx Written    Rx Dispensed    Drug    Quantity    Days Supply    Prescriber Name              10/17/2019 10/24/2019 methylphenidate 20 mg tablet  90 30 Boutis, Kahlil COX     08/19/2019 08/24/2019 methylphenidate 20 mg tablet  90 30 Boutis, Kahlil COX     07/22/2019 07/26/2019 methylphenidate 20 mg tablet  90 30 Boutis, Kahlil COX     06/06/2019 06/10/2019 methylphenidate 20 mg tablet  90 30 Boutis, Kahlil COX     04/23/2019 04/23/2019 methylphenidate 20 mg tablet  90 30 BoutisKahlil     03/13/2019 03/23/2019 methylphenidate 20 mg tablet  90 30 Boutis, Kahlil COX     01/25/2019 01/25/2019 methylphenidate 20 mg tablet  90 30 Boutis, Kahlil COX

## 2019-11-14 NOTE — ED ADULT NURSE REASSESSMENT NOTE - NS ED NURSE REASSESS COMMENT FT1
PRBCs transfusing, other RN present to confirm. VSS recorded in flow sheet and blood transfusion record. Pt educated on signs of blood transfusion reactions such as SOB, fever, back pain and chills. Will continue to monitor.

## 2019-11-14 NOTE — H&P ADULT - PROBLEM SELECTOR PLAN 10
DVT ppx: SCDs, holding lovenox in setting of anemia  Diet: DASH/TLD DVT ppx: SCDs, holding lovenox in setting of anemia  Diet: DASH/TLD    Manpreet Mullins  PGY-2  Pager: [phone] Gout: c/w allopurinol    DVT ppx: SCDs, holding lovenox in setting of anemia  Diet: DASH/TLMARSHA Mullins  PGY-2  Pager: [phone] Gout: c/w allopurinol  CKD Stage 3: baseline about 1.2, being followed by nephro as outpatient; trend creat, renally dose meds    DVT ppx: SCDs, holding lovenox in setting of anemia  Diet: DASH/TLD    Manpreet Mullins  PGY-2  Pager: [phone] Gout: c/w allopurinol    CKD Stage 3: baseline about 1.2, being followed by nephro as outpatient; trend creat, renally dose meds    hypothryodisim: c/w synthroid  DVT ppx: SCDs, holding lovenox in setting of thrombocytopenia   Diet: DASH/TLD    Manpreet Mullins  PGY-2  Pager: [phone]

## 2019-11-14 NOTE — H&P ADULT - PROBLEM SELECTOR PLAN 1
2/2 to vidaza side effect vs MDS  Transfuse if Hgb<7  f/u hapto, LDH, retic count, iron, b12, folate  f/u with heme onc Likely 2/2 to MDS, possibly due to vidaza side effects  Transfuse for symptomatic anemia, give lasix in between blood transfusions  f/u hapto, LDH, retic count, iron, b12, folate  f/u with heme onc Likely 2/2 to MDS, possibly due to vidaza side effects  Transfuse for symptomatic anemia, give lasix in between blood transfusions  obtain hapto, LDH, uric acid to assess for tumor lysis, also obtain retic count, iron, b12, folate,   f/u with heme onc in AM Likely 2/2 to MDS, possibly due to vidaza side effects  Transfuse for symptomatic anemia/Hgb<8, give lasix in between blood transfusions  obtain hapto, LDH, uric acid to assess for tumor lysis, also obtain retic count, iron studies, b12, folate,   f/u with heme onc in AM

## 2019-11-14 NOTE — H&P ADULT - NSHPPHYSICALEXAM_GEN_ALL_CORE
VITAL SIGNS (Last 24 hrs):  T(C): 36.8 (11-14-19 @ 15:02), Max: 36.8 (11-14-19 @ 15:02)  HR: 90 (11-14-19 @ 15:02) (90 - 90)  BP: 120/67 (11-14-19 @ 15:02) (120/67 - 120/67)  RR: 16 (11-14-19 @ 15:02) (16 - 16)  SpO2: 99% (11-14-19 @ 15:02) (99% - 99%)  Wt(kg): --  Daily Height in cm: 177.8 (14 Nov 2019 15:02)    Daily     I&O's Summary VITAL SIGNS (Last 24 hrs):  T(C): 36.8 (11-14-19 @ 15:02), Max: 36.8 (11-14-19 @ 15:02)  HR: 90 (11-14-19 @ 15:02) (90 - 90)  BP: 120/67 (11-14-19 @ 15:02) (120/67 - 120/67)  RR: 16 (11-14-19 @ 15:02) (16 - 16)  SpO2: 99% (11-14-19 @ 15:02) (99% - 99%)  Wt(kg): --  Daily Height in cm: 177.8 (14 Nov 2019 15:02)    Daily     I&O's Summary    PHYSICAL EXAM:    GENERAL: Comfortable, no acute distress; appears somewhat pale  HEAD:  Normocephalic, atraumatic  HEENT: Moist mucous membranes  NECK: Supple, No JVD  NERVOUS SYSTEM:  Alert & Oriented X3, Motor Strength 5/5 B/L upper and lower extremities  CHEST/LUNG: Clear to auscultation bilaterally  HEART: Regular rate and rhythm, no murmur   ABDOMEN: Soft, Nontender, Nondistended, Bowel sounds present  EXTREMITIES:   No clubbing, cyanosis, or edema  MUSCULOSKELTAL- No muscle tenderness, no joint tenderness  SKIN- warm and dry, no rash

## 2019-11-14 NOTE — H&P ADULT - PROBLEM SELECTOR PLAN 6
ISS  A1C 6.9 in May 2017, will obtain repeat likely 2/2 to MDS  early Nov plats in 300s  continue to trend CBC likely 2/2 to MDS  early Nov platelets in 300s  continue to trend CBC

## 2019-11-14 NOTE — H&P ADULT - ASSESSMENT
80 yo M PMH of MDS on chemo last 1 week ago, AF on asa, CAD with stents, MI 2011 s/p PCI, ckd, HFrEF (EF 40%), DM2, HTN, HLD sent in by his Hematologist for low H/H and UTI. Pt states that he has been weak x 1 day. rpt labs and ua. Will transfuse if still low 80 yo M PMH of MDS on chemo last 1 week ago, AF on asa, CAD with stents, MI 2011 s/p PCI, ckd, HFrEF (EF 40%), DM2, HTN, HLD admitted for symptomatic anemia s/p 1 unit pRBC complicated by UTI

## 2019-11-14 NOTE — H&P ADULT - NSHPREVIEWOFSYSTEMS_GEN_ALL_CORE
REVIEW OF SYSTEMS:    CONSTITUTIONAL: + fevers or chills  EYES/ENT: No visual changes;  No vertigo or throat pain   NECK: No pain or stiffness  RESPIRATORY: No cough, wheezing, hemoptysis; No shortness of breath  CARDIOVASCULAR: No chest pain or palpitations  GASTROINTESTINAL: No abdominal or epigastric pain. No nausea, vomiting, or hematemesis; No diarrhea or constipation. No melena or hematochezia.  GENITOURINARY: +dysuria now resolved  NEUROLOGICAL: numbness of LE  SKIN: No itching, burning, rashes, or lesions   All other review of systems is negative unless indicated above. REVIEW OF SYSTEMS:    CONSTITUTIONAL: + fevers or chills. no sweats  EYES/ENT: No visual changes;  No vertigo, rhinnorhea, sinus congestion, throat pain   NECK: No pain or stiffness  BACK: No CVA tenderness, no spinal tenderness.   RESPIRATORY: No cough, wheezing, hemoptysis; No shortness of breath  CARDIOVASCULAR: No chest pain or palpitations  GASTROINTESTINAL: No abdominal or epigastric pain. No nausea, vomiting, or hematemesis; No diarrhea or constipation. No melena or hematochezia.  GENITOURINARY: +dysuria now resolved  NEUROLOGICAL: No headaches. +chronic numbness of LE  SKIN: No itching, burning, rashes, or lesions   HEME: No bruising easily, epistaxis, gum bleeding.   All other review of systems is negative unless indicated above.

## 2019-11-14 NOTE — ED PROVIDER NOTE - OBJECTIVE STATEMENT
Kim Daly MD: 80 yo M PMH of MDS on chemo last 1 week ago, AF on asa, CAD with stents, MI 2011 s/p PCI, ckd, HFrEF (EF 40%), DM2, HTN, HLD sent in by his Hematologist for low H/H and UTI. Pt states that he has been weak x 1 day and went to [facility] and had blood work. Pt also reported low grade fever 100.0. Pt nonexertional SOB. Report dysuria but Denies, chill, blood in stool or urine.      Hematology:  Dr. Elizondo  PCP: Dr. Cedillo

## 2019-11-15 LAB
ALBUMIN SERPL ELPH-MCNC: 3.3 G/DL — SIGNIFICANT CHANGE UP (ref 3.3–5)
ALP SERPL-CCNC: 126 U/L — HIGH (ref 40–120)
ALT FLD-CCNC: 51 U/L — HIGH (ref 10–45)
ANION GAP SERPL CALC-SCNC: 11 MMOL/L — SIGNIFICANT CHANGE UP (ref 5–17)
AST SERPL-CCNC: 46 U/L — HIGH (ref 10–40)
BILIRUB SERPL-MCNC: 0.7 MG/DL — SIGNIFICANT CHANGE UP (ref 0.2–1.2)
BUN SERPL-MCNC: 42 MG/DL — HIGH (ref 7–23)
CALCIUM SERPL-MCNC: 9 MG/DL — SIGNIFICANT CHANGE UP (ref 8.4–10.5)
CHLORIDE SERPL-SCNC: 106 MMOL/L — SIGNIFICANT CHANGE UP (ref 96–108)
CO2 SERPL-SCNC: 19 MMOL/L — LOW (ref 22–31)
CREAT SERPL-MCNC: 1.4 MG/DL — HIGH (ref 0.5–1.3)
FERRITIN SERPL-MCNC: 2831 NG/ML — HIGH (ref 30–400)
FOLATE SERPL-MCNC: 12.7 NG/ML — SIGNIFICANT CHANGE UP
GLUCOSE BLDC GLUCOMTR-MCNC: 117 MG/DL — HIGH (ref 70–99)
GLUCOSE BLDC GLUCOMTR-MCNC: 121 MG/DL — HIGH (ref 70–99)
GLUCOSE SERPL-MCNC: 121 MG/DL — HIGH (ref 70–99)
HAPTOGLOB SERPL-MCNC: 250 MG/DL — HIGH (ref 34–200)
HBA1C BLD-MCNC: 6 % — HIGH (ref 4–5.6)
HCT VFR BLD CALC: 26.7 % — LOW (ref 39–50)
HGB BLD-MCNC: 9.1 G/DL — LOW (ref 13–17)
IRON SATN MFR SERPL: 65 % — HIGH (ref 16–55)
IRON SATN MFR SERPL: 84 UG/DL — SIGNIFICANT CHANGE UP (ref 45–165)
LDH SERPL L TO P-CCNC: 141 U/L — SIGNIFICANT CHANGE UP (ref 50–242)
MAGNESIUM SERPL-MCNC: 1.6 MG/DL — SIGNIFICANT CHANGE UP (ref 1.6–2.6)
MCHC RBC-ENTMCNC: 31 PG — SIGNIFICANT CHANGE UP (ref 27–34)
MCHC RBC-ENTMCNC: 34.1 GM/DL — SIGNIFICANT CHANGE UP (ref 32–36)
MCV RBC AUTO: 90.8 FL — SIGNIFICANT CHANGE UP (ref 80–100)
NRBC # BLD: 0 /100 WBCS — SIGNIFICANT CHANGE UP (ref 0–0)
PHOSPHATE SERPL-MCNC: 3.1 MG/DL — SIGNIFICANT CHANGE UP (ref 2.5–4.5)
PLATELET # BLD AUTO: 43 K/UL — LOW (ref 150–400)
POTASSIUM SERPL-MCNC: 3.7 MMOL/L — SIGNIFICANT CHANGE UP (ref 3.5–5.3)
POTASSIUM SERPL-SCNC: 3.7 MMOL/L — SIGNIFICANT CHANGE UP (ref 3.5–5.3)
PROT SERPL-MCNC: 6.6 G/DL — SIGNIFICANT CHANGE UP (ref 6–8.3)
RBC # BLD: 2.24 M/UL — LOW (ref 4.2–5.8)
RBC # BLD: 2.94 M/UL — LOW (ref 4.2–5.8)
RBC # FLD: 16.5 % — HIGH (ref 10.3–14.5)
RETICS #: 15.7 K/UL — LOW (ref 25–125)
RETICS/RBC NFR: 0.7 % — SIGNIFICANT CHANGE UP (ref 0.5–2.5)
SODIUM SERPL-SCNC: 136 MMOL/L — SIGNIFICANT CHANGE UP (ref 135–145)
TIBC SERPL-MCNC: 129 UG/DL — LOW (ref 220–430)
UIBC SERPL-MCNC: 45 UG/DL — LOW (ref 110–370)
URATE SERPL-MCNC: 5 MG/DL — SIGNIFICANT CHANGE UP (ref 3.4–8.8)
VIT B12 SERPL-MCNC: 455 PG/ML — SIGNIFICANT CHANGE UP (ref 232–1245)
WBC # BLD: 3.11 K/UL — LOW (ref 3.8–10.5)
WBC # FLD AUTO: 3.11 K/UL — LOW (ref 3.8–10.5)

## 2019-11-15 PROCEDURE — 99223 1ST HOSP IP/OBS HIGH 75: CPT | Mod: GC

## 2019-11-15 PROCEDURE — 99233 SBSQ HOSP IP/OBS HIGH 50: CPT | Mod: GC

## 2019-11-15 RX ORDER — METHYLPHENIDATE HCL 5 MG
20 TABLET ORAL DAILY
Refills: 0 | Status: DISCONTINUED | OUTPATIENT
Start: 2019-11-15 | End: 2019-11-16

## 2019-11-15 RX ADMIN — ATORVASTATIN CALCIUM 20 MILLIGRAM(S): 80 TABLET, FILM COATED ORAL at 21:24

## 2019-11-15 RX ADMIN — Medication 100 MILLIGRAM(S): at 12:18

## 2019-11-15 RX ADMIN — CEFTRIAXONE 100 MILLIGRAM(S): 500 INJECTION, POWDER, FOR SOLUTION INTRAMUSCULAR; INTRAVENOUS at 18:03

## 2019-11-15 RX ADMIN — AMLODIPINE BESYLATE 5 MILLIGRAM(S): 2.5 TABLET ORAL at 05:05

## 2019-11-15 RX ADMIN — PANTOPRAZOLE SODIUM 40 MILLIGRAM(S): 20 TABLET, DELAYED RELEASE ORAL at 05:08

## 2019-11-15 RX ADMIN — Medication 37.5 MICROGRAM(S): at 05:06

## 2019-11-15 RX ADMIN — Medication 20 MILLIGRAM(S): at 10:06

## 2019-11-15 RX ADMIN — Medication 50 MILLIGRAM(S): at 10:09

## 2019-11-15 RX ADMIN — Medication 50 MILLIGRAM(S): at 18:04

## 2019-11-15 RX ADMIN — Medication 20 MILLIGRAM(S): at 00:29

## 2019-11-15 NOTE — PROGRESS NOTE ADULT - PROBLEM SELECTOR PLAN 2
Positive UA w/ symptoms  will f/u UCX  c/w ceftriaxone Complicated Cystitis -- patient is male.   Positive UA w/ symptoms  will f/u UCX  c/w ceftriaxone

## 2019-11-15 NOTE — PROGRESS NOTE ADULT - SUBJECTIVE AND OBJECTIVE BOX
PROVIDER CONTACT INFO:  Ary Bonilla MD  [facility] Pager: [phone]  [facility] [place] Pager: [phone]    JUAREZ , EDWARD  79y  Male      Patient is a 79y old  Male who presents with a chief complaint of Symptomatic anemia (2019 19:58)      INTERVAL HPI/OVERNIGHT EVENTS: Admitted overnight for sepsis, no other acute events    REVIEW OF SYSTEMS:    CONSTITUTIONAL: No weakness, fevers or chills  EYES/ENT: No visual changes;  No vertigo or throat pain   NECK: No pain or stiffness  RESPIRATORY: No cough, wheezing, hemoptysis; No shortness of breath  CARDIOVASCULAR: No chest pain or palpitations  GASTROINTESTINAL: No abdominal or epigastric pain. No nausea, vomiting, or hematemesis; No diarrhea or constipation. No melena or hematochezia.  GENITOURINARY: No dysuria, frequency or hematuria  NEUROLOGICAL: No numbness or weakness  SKIN: No itching, burning, rashes, or lesions   All other review of systems is negative unless indicated above.    T(C): 36.8 (11-15-19 @ 04:42), Max: 37.2 (19 @ 22:33)  HR: 83 (11-15-19 @ 04:42) (81 - 98)  BP: 135/67 (11-15-19 @ 04:42) (102/57 - 135/67)  RR: 18 (11-15-19 @ 04:42) (16 - 18)  SpO2: 95% (11-15-19 @ 04:42) (95% - 99%)  Wt(kg): --Vital Signs Last 24 Hrs  T(C): 36.8 (15 Nov 2019 04:42), Max: 37.2 (2019 22:33)  T(F): 98.3 (15 Nov 2019 04:42), Max: 98.9 (2019 22:33)  HR: 83 (15 Nov 2019 04:42) (81 - 98)  BP: 135/67 (15 Nov 2019 04:42) (102/57 - 135/67)  BP(mean): 71 (2019 20:15) (71 - 71)  RR: 18 (15 Nov 2019 04:42) (16 - 18)  SpO2: 95% (15 Nov 2019 04:42) (95% - 99%)    PHYSICAL EXAM:  GENERAL: NAD, well-groomed, well-developed  HEAD:  Atraumatic, Normocephalic  EYES: EOMI, PERRLA, conjunctiva and sclera clear  ENMT: No tonsillar erythema, exudates,; Moist mucous membranes. No lesions  NECK: Supple, No JVD, Normal thyroid  CHEST/LUNG: Clear to percussion bilaterally; No rales, rhonchi, wheezing, or rubs  HEART: Regular rate and rhythm; No murmurs, rubs, or gallops  ABDOMEN: Soft, Nontender, Nondistended; Bowel sounds present.  No hepatosplenomegaly  EXTREMITIES:  2+ Peripheral Pulses, No clubbing, cyanosis, or edema  LYMPH: No lymphadenopathy noted  SKIN: No rashes or lesions  PSYCH: Alert & Oriented x3  NERVOUS SYSTEM:  ; Motor Strength 5/5 B/L upper and lower extremities.  Sensory intact    Consultant(s) Notes Reviewed:  [x ] YES  [ ] NO  Care Discussed with Consultants/Other Providers [ x] YES  [ ] NO    LABS:                        9.1    3.11  )-----------( 43       ( 15 Nov 2019 06:34 )             26.7     11-15    136  |  106  |  42<H>  ----------------------------<  121<H>  3.7   |  19<L>  |  1.40<H>    Ca    9.0      15 Nov 2019 06:34  Phos  3.1     11-15  Mg     1.6     11-15    TPro  6.6  /  Alb  3.3  /  TBili  0.7  /  DBili  x   /  AST  46<H>  /  ALT  51<H>  /  AlkPhos  126<H>  11-15    PT/INR - ( 2019 16:51 )   PT: 13.2 sec;   INR: 1.14 ratio         PTT - ( 2019 16:51 )  PTT:26.9 sec  Urinalysis Basic - ( 2019 17:18 )    Color: Light Yellow / Appearance: Turbid / S.017 / pH: x  Gluc: x / Ketone: Negative  / Bili: Negative / Urobili: Negative   Blood: x / Protein: 100 / Nitrite: Positive   Leuk Esterase: Large / RBC: 9 /hpf / WBC >50 /HPF   Sq Epi: x / Non Sq Epi: 4 / Bacteria: Many      CAPILLARY BLOOD GLUCOSE      POCT Blood Glucose.: 139 mg/dL (15 Nov 2019 00:11)        Urinalysis Basic - ( 2019 17:18 )    Color: Light Yellow / Appearance: Turbid / S.017 / pH: x  Gluc: x / Ketone: Negative  / Bili: Negative / Urobili: Negative   Blood: x / Protein: 100 / Nitrite: Positive   Leuk Esterase: Large / RBC: 9 /hpf / WBC >50 /HPF   Sq Epi: x / Non Sq Epi: 4 / Bacteria: Many        RADIOLOGY & ADDITIONAL TESTS:    Imaging Personally Reviewed:  [ ] YES  [ ] NO PROVIDER CONTACT INFO:  Ary Bonilla MD  [facility] Pager: [phone]  [facility] [place] Pager: [phone]    JUAREZ , EDWARD  79y  Male      Patient is a 79y old  Male who presents with a chief complaint of Symptomatic anemia (2019 19:58)      INTERVAL HPI/OVERNIGHT EVENTS: Admitted overnight for sepsis, no other acute events  NO acute complaints. Improved.     REVIEW OF SYSTEMS:    CONSTITUTIONAL: No weakness, fevers or chills  EYES/ENT: No visual changes;  No vertigo or throat pain   NECK: No pain or stiffness  RESPIRATORY: No cough, wheezing, hemoptysis; No shortness of breath  CARDIOVASCULAR: No chest pain or palpitations  GASTROINTESTINAL: No abdominal or epigastric pain. No nausea, vomiting, or hematemesis; No diarrhea or constipation. No melena or hematochezia.  GENITOURINARY: No dysuria, frequency or hematuria  NEUROLOGICAL: No numbness or weakness  SKIN: No itching, burning, rashes, or lesions   All other review of systems is negative unless indicated above.    T(C): 36.8 (11-15-19 @ 04:42), Max: 37.2 (19 @ 22:33)  HR: 83 (11-15-19 @ 04:42) (81 - 98)  BP: 135/67 (11-15-19 @ 04:42) (102/57 - 135/67)  RR: 18 (11-15-19 @ 04:42) (16 - 18)  SpO2: 95% (11-15-19 @ 04:42) (95% - 99%)  Wt(kg): --Vital Signs Last 24 Hrs  T(C): 36.8 (15 Nov 2019 04:42), Max: 37.2 (2019 22:33)  T(F): 98.3 (15 Nov 2019 04:42), Max: 98.9 (2019 22:33)  HR: 83 (15 Nov 2019 04:42) (81 - 98)  BP: 135/67 (15 Nov 2019 04:42) (102/57 - 135/67)  BP(mean): 71 (2019 20:15) (71 - 71)  RR: 18 (15 Nov 2019 04:42) (16 - 18)  SpO2: 95% (15 Nov 2019 04:42) (95% - 99%)    PHYSICAL EXAM:  GENERAL: NAD, well-groomed, well-developed  HEAD:  Atraumatic, Normocephalic  EYES: EOMI, PERRLA, conjunctiva and sclera clear  ENMT: No tonsillar erythema, exudates,; Moist mucous membranes. No lesions  NECK: Supple, No JVD, Normal thyroid  CHEST/LUNG: Clear to percussion bilaterally; No rales, rhonchi, wheezing, or rubs  HEART: Regular rate and rhythm; No murmurs, rubs, or gallops  ABDOMEN: Soft, Nontender, Nondistended; Bowel sounds present.  No hepatosplenomegaly  EXTREMITIES:  2+ Peripheral Pulses, No clubbing, cyanosis, or edema  LYMPH: No lymphadenopathy noted  SKIN: No rashes or lesions  PSYCH: Alert & Oriented x3  NERVOUS SYSTEM:  ; Motor Strength 4/5 B/L upper and lower extremities.  Sensory intact    Consultant(s) Notes Reviewed:  [x ] YES  [ ] NO  Care Discussed with Consultants/Other Providers [ x] YES  [ ] NO    LABS:                        9.1    3.11  )-----------( 43       ( 15 Nov 2019 06:34 )             26.7     11-15    136  |  106  |  42<H>  ----------------------------<  121<H>  3.7   |  19<L>  |  1.40<H>    Ca    9.0      15 Nov 2019 06:34  Phos  3.1     11-15  Mg     1.6     11-15    TPro  6.6  /  Alb  3.3  /  TBili  0.7  /  DBili  x   /  AST  46<H>  /  ALT  51<H>  /  AlkPhos  126<H>  11-15    PT/INR - ( 2019 16:51 )   PT: 13.2 sec;   INR: 1.14 ratio         PTT - ( 2019 16:51 )  PTT:26.9 sec  Urinalysis Basic - ( 2019 17:18 )    Color: Light Yellow / Appearance: Turbid / S.017 / pH: x  Gluc: x / Ketone: Negative  / Bili: Negative / Urobili: Negative   Blood: x / Protein: 100 / Nitrite: Positive   Leuk Esterase: Large / RBC: 9 /hpf / WBC >50 /HPF   Sq Epi: x / Non Sq Epi: 4 / Bacteria: Many      CAPILLARY BLOOD GLUCOSE      POCT Blood Glucose.: 139 mg/dL (15 Nov 2019 00:11)        Urinalysis Basic - ( 2019 17:18 )    Color: Light Yellow / Appearance: Turbid / S.017 / pH: x  Gluc: x / Ketone: Negative  / Bili: Negative / Urobili: Negative   Blood: x / Protein: 100 / Nitrite: Positive   Leuk Esterase: Large / RBC: 9 /hpf / WBC >50 /HPF   Sq Epi: x / Non Sq Epi: 4 / Bacteria: Many        RADIOLOGY & ADDITIONAL TESTS:    Imaging Personally Reviewed:  [ ] YES  [ ] NO

## 2019-11-15 NOTE — PROGRESS NOTE ADULT - ATTENDING COMMENTS
Patient seen and examined today. I agree with the above findings, assessment, and plan with the following additions and exceptions:     Low suspicion for acute blood loss anemia. No signs or symptoms of this and we have strong suspicion for myelosuppression. Patient reports brown stool. We asked RN and patient to save stool for visualization.   Orthostatics pending.  No need for additional diuretics. Compensated HFrEF.   Hold asa given thrombocytopenia.   Obtain Ucx. Continue Ctx. Duration depends on choice of oral abx.   Heme/onc contribution to care greatly appreciated.   Rest of plan as above    Dr. Andres Canales, DO  Division of Hospital Medicine  [facility]  Pager:  [phone]

## 2019-11-15 NOTE — PROGRESS NOTE ADULT - PROBLEM SELECTOR PLAN 3
Per previous hospital charting patient w/ hx of Afib, however in outpatient records not listed as a problem.  Obtain collateral from outpatient cardiologist.  c/w ASA  c/w lopressor Per previous hospital charting patient w/ hx of Afib, however in outpatient records not listed as a problem.  Obtain collateral from outpatient cardiologist.  Hold asa given thrombocytopenia. Risk outweights benefit of resuming.   c/w lopressor

## 2019-11-15 NOTE — PROGRESS NOTE ADULT - PROBLEM SELECTOR PLAN 10
Gout: c/w allopurinol    CKD Stage 3: baseline about 1.2, being followed by nephro as outpatient; trend creat, renally dose meds    hypothryodisim: c/w synthroid  DVT ppx: SCDs, holding lovenox in setting of thrombocytopenia   Diet: DASH/TLD    Manpreet Mullins  PGY-2  Pager: [phone] Gout: c/w allopurinol    CKD Stage 3: baseline about 1.2, being followed by nephro as outpatient; trend creat, renally dose meds    hypothryodisim: c/w synthroid  DVT ppx: SCDs, holding lovenox in setting of thrombocytopenia   Diet: DASH/TLD    #D/C PLANNING ISSUES  Transitions of Care Status:  1.  Name of PCP: Dr. Goldberg.  2.  PCP Contacted on Admission: [ x] Y    [ ] N    3.  PCP contacted at Discharge: [ ] Y    [ ] N    [ ] N/A  4.  Post-Discharge Appointment Date and Location:  5.  Summary of Handoff given to PCP:

## 2019-11-15 NOTE — PROGRESS NOTE ADULT - PROBLEM SELECTOR PLAN 5
Echo 05/17 EF: 40% Moderate segmental left ventricular systolic dysfunction  Patient currently appears euvolemic  c/w lopressor,   -lasix in between blood transfusions to prevent volume overload Chronic systolic ischemic HF. Compensated.   Echo 05/17 EF: 40% Moderate segmental left ventricular systolic dysfunction  Patient currently appears euvolemic  c/w lopressor,   -lasix in between blood transfusions to prevent volume overload

## 2019-11-15 NOTE — PROGRESS NOTE ADULT - ASSESSMENT
78 yo M PMH of MDS on chemo last 1 week ago, AF on asa, CAD with stents, MI 2011 s/p PCI, ckd, HFrEF (EF 40%), DM2, HTN, HLD admitted for symptomatic anemia s/p 1 unit pRBC complicated by UTI

## 2019-11-15 NOTE — CONSULT NOTE ADULT - SUBJECTIVE AND OBJECTIVE BOX
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  His counts had improved for approx 1 year, but now decreasing. Pt now with more aggressive MDS-with 7% myelobalsts on BM bx. Started Vidaza x 7 days to be given every 28 days. cycle 2 was on 10/28.   Also hx of AF on ASA, CAD with stents, MI 2011 s/p PCI, ckd, HFrEF (EF 40%), DM2, HTN, HLD admitted for symptomatic anemia s/p 1 unit pRBC complicated by UTI, Pt states that he has been weak for past several days a/w fever and burning urination starting two days ago. Patient was seen by hematologist, who noted low Hemoglobin of 8.6 yesterday in the setting of symptoms of fatigue and weakness and the pt was advised to come to hospital.      REVIEW OF SYSTEMS:    CONSTITUTIONAL: + weakness,+ fevers or chills  EYES/ENT: No visual changes, no throat pain   RESPIRATORY: No cough, wheezing, hemoptysis; No shortness of breath  CARDIOVASCULAR: No chest pain or palpitations  GASTROINTESTINAL: No abdominal pain, nausea, vomiting, or hematemesis; No diarrhea or constipation. No melena or hematochezia.  GENITOURINARY: No dysuria, frequency or hematuria  MUSCULOSKELETAL: No joint pain.  NEUROLOGICAL: No dizziness, numbness, or weakness  SKIN: No itching, burning, rashes, or lesions   All other review of systems is negative unless indicated above.  Allergies    iodine (Anaphylaxis)  tetracycline (Short breath)  Tylenol (Flushing)    Intolerances        PAST MEDICAL & SURGICAL HISTORY:  CAD (coronary artery disease)  ADD (attention deficit disorder)  Hypothyroid  Atrial fibrillation  Congestive heart failure (CHF)  Gout  Hypercholesterolemia  DM (Diabetes Mellitus)  Hypertension  History of colonoscopy  History of tonsillectomy  Stented coronary artery      FAMILY HISTORY:  Family history of cerebrovascular accident (CVA) in father  FH: CHF (congestive heart failure): mother      Social History:          VITAL SIGNS:  Vital Signs Last 24 Hrs  T(C): 36.7 (15 Nov 2019 10:10), Max: 37.2 (14 Nov 2019 22:33)  T(F): 98 (15 Nov 2019 10:10), Max: 98.9 (14 Nov 2019 22:33)  HR: 96 (15 Nov 2019 10:10) (81 - 98)  BP: 115/68 (15 Nov 2019 10:10) (102/57 - 135/67)  BP(mean): 71 (14 Nov 2019 20:15) (71 - 71)  RR: 18 (15 Nov 2019 10:10) (16 - 18)  SpO2: 96% (15 Nov 2019 10:10) (95% - 99%)      PHYSICAL EXAM:   GENERAL: NAD, well-groomed, well-developed  HEAD:  Atraumatic, Normocephalic  EYES: EOMI, PERRLA, conjunctiva and sclera clear  ENMT: No tonsillar erythema, exudates,; Moist mucous membranes. No lesions  NECK: Supple, No JVD, Normal thyroid  CHEST/LUNG: Clear to percussion bilaterally; No rales, rhonchi, wheezing, or rubs  HEART: Regular rate and rhythm; No murmurs, rubs, or gallops  ABDOMEN: Soft, Nontender, Nondistended; Bowel sounds present.  No hepatosplenomegaly  EXTREMITIES:  2+ Peripheral Pulses, No clubbing, cyanosis, or edema  LYMPH: No lymphadenopathy noted  SKIN: No rashes or lesions  PSYCH: Alert & Oriented x3  NERVOUS SYSTEM:  ; Motor Strength 5/5 B/L upper and lower extremities.  Sensory intact                          9.1    3.11  )-----------( 43       ( 15 Nov 2019 06:34 )             26.7     11-15    136  |  106  |  42<H>  ----------------------------<  121<H>  3.7   |  19<L>  |  1.40<H>    Ca    9.0      15 Nov 2019 06:34  Phos  3.1     11-15  Mg     1.6     11-15    TPro  6.6  /  Alb  3.3  /  TBili  0.7  /  DBili  x   /  AST  46<H>  /  ALT  51<H>  /  AlkPhos  126<H>  11-15      MEDICATIONS  (STANDING):  allopurinol 100 milliGRAM(s) Oral daily  amLODIPine   Tablet 5 milliGRAM(s) Oral daily  atorvastatin 20 milliGRAM(s) Oral at bedtime  cefTRIAXone   IVPB 1000 milliGRAM(s) IV Intermittent every 24 hours  dextrose 5%. 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  His counts had improved for approx 1 year, but now decreasing. Pt now with more aggressive MDS-with 7% myelobalsts on BM bx. Started Vidaza x 7 days to be given every 28 days. cycle 2 was on 10/28.   Also hx of AF on ASA, CAD with stents, MI 2011 s/p PCI, ckd, HFrEF (EF 40%), DM2, HTN, HLD admitted for symptomatic anemia s/p 1 unit pRBC complicated by UTI, Pt states that he has been weak for past several days a/w fever and burning urination starting two days ago. Patient was seen by hematologist, who noted low Hemoglobin of 8.6 yesterday in the setting of symptoms of fatigue and weakness and the pt was advised to come to hospital. Patient s/p 1U PRBC and was started on Ceftriaxone, reports feeling much better today.      REVIEW OF SYSTEMS:    CONSTITUTIONAL: + weakness,+ fevers or chills  EYES/ENT: No visual changes, no throat pain   RESPIRATORY: No cough, wheezing, hemoptysis; No shortness of breath  CARDIOVASCULAR: No chest pain or palpitations  GASTROINTESTINAL: No abdominal pain, nausea, vomiting, or hematemesis; No diarrhea or constipation. No melena or hematochezia.  GENITOURINARY: No dysuria, frequency or hematuria  MUSCULOSKELETAL: No joint pain.  NEUROLOGICAL: No dizziness, numbness, or weakness  SKIN: No itching, burning, rashes, or lesions   All other review of systems is negative unless indicated above.  Allergies    iodine (Anaphylaxis)  tetracycline (Short breath)  Tylenol (Flushing)    Intolerances        PAST MEDICAL & SURGICAL HISTORY:  CAD (coronary artery disease)  ADD (attention deficit disorder)  Hypothyroid  Atrial fibrillation  Congestive heart failure (CHF)  Gout  Hypercholesterolemia  DM (Diabetes Mellitus)  Hypertension  History of colonoscopy  History of tonsillectomy  Stented coronary artery      FAMILY HISTORY:  Family history of cerebrovascular accident (CVA) in father  FH: CHF (congestive heart failure): mother    VITAL SIGNS:  Vital Signs Last 24 Hrs  T(C): 36.7 (15 Nov 2019 10:10), Max: 37.2 (14 Nov 2019 22:33)  T(F): 98 (15 Nov 2019 10:10), Max: 98.9 (14 Nov 2019 22:33)  HR: 96 (15 Nov 2019 10:10) (81 - 98)  BP: 115/68 (15 Nov 2019 10:10) (102/57 - 135/67)  BP(mean): 71 (14 Nov 2019 20:15) (71 - 71)  RR: 18 (15 Nov 2019 10:10) (16 - 18)  SpO2: 96% (15 Nov 2019 10:10) (95% - 99%)      PHYSICAL EXAM:   GENERAL: NAD, well-groomed, well-developed  HEAD:  Atraumatic, Normocephalic  EYES: EOMI, PERRLA, conjunctiva and sclera clear  ENMT: No tonsillar erythema, exudates,; Moist mucous membranes. No lesions  NECK: Supple, No JVD, Normal thyroid  CHEST/LUNG: Clear to percussion bilaterally; No rales, rhonchi, wheezing, or rubs  HEART: Regular rate and rhythm; No murmurs, rubs, or gallops  ABDOMEN: Soft, Nontender, Nondistended; Bowel sounds present.  No hepatosplenomegaly  EXTREMITIES:  2+ Peripheral Pulses, No clubbing, cyanosis, or edema  LYMPH: No lymphadenopathy noted  SKIN: No rashes or lesions  PSYCH: Alert & Oriented x3  NERVOUS SYSTEM:  ; Motor Strength 5/5 B/L upper and lower extremities.  Sensory intact                        9.1    3.11  )-----------( 43       ( 15 Nov 2019 06:34 )             26.7     11-15    136  |  106  |  42<H>  ----------------------------<  121<H>  3.7   |  19<L>  |  1.40<H>    Ca    9.0      15 Nov 2019 06:34  Phos  3.1     11-15  Mg     1.6     11-15    TPro  6.6  /  Alb  3.3  /  TBili  0.7  /  DBili  x   /  AST  46<H>  /  ALT  51<H>  /  AlkPhos  126<H>  11-15      MEDICATIONS  (STANDING):  allopurinol 100 milliGRAM(s) Oral daily  amLODIPine   Tablet 5 milliGRAM(s) Oral daily  atorvastatin 20 milliGRAM(s) Oral at bedtime  cefTRIAXone   IVPB 1000 milliGRAM(s) IV Intermittent every 24 hours  dextrose 5%. 1000 milliLiter(s) (50 mL/Hr) IV Continuous <Continuous>  dextrose 50% Injectable 12.5 Gram(s) IV Push once  dextrose 50% Injectable 25 Gram(s) IV Push once  dextrose 50% Injectable 25 Gram(s) IV Push once  insulin lispro (HumaLOG) corrective regimen sliding scale   SubCutaneous three times a day before meals  levothyroxine 37.5 MICROGram(s) Oral daily  methylphenidate 20 milliGRAM(s) Oral daily  metoprolol tartrate 50 milliGRAM(s) Oral two times a day  pantoprazole    Tablet 40 milliGRAM(s) Oral before breakfast

## 2019-11-15 NOTE — CONSULT NOTE ADULT - ATTENDING COMMENTS
78 yo M PMH of MDS Dx 5/2017 with isolated Del 5q, Received Revlimid in Feb-March 2018.  His counts had improved for approx 1 year, but now decreasing. Pt now with more aggressive MDS-with 7% myelobalsts on BM bx. Started Vidaza x 7 days to be given every 28 days. cycle 2 was on 10/28. Also hx of AF on ASA, CAD with stents, MI 2011 s/p PCI, ckd, HFrEF (EF 40%), DM2, HTN, HLD admitted for symptomatic anemia s/p 1 unit pRBC complicated by UTI. Continue with treatment for UTI, follow daily CBC, continue supportive care. Follow up with Dr. Elizondo on d/c.

## 2019-11-15 NOTE — PROGRESS NOTE ADULT - PROBLEM SELECTOR PLAN 1
Likely 2/2 to MDS, possibly due to vidaza side effects  Transfuse for symptomatic anemia/Hgb<8, give lasix in between blood transfusions  obtain hapto, LDH, uric acid to assess for tumor lysis, also obtain retic count, iron studies, b12, folate,   f/u with heme onc in AM Likely 2/2 to MDS, possibly due to vidaza side effects  Transfuse for symptomatic anemia/Hgb<8, give lasix in between blood transfusions  Retic index suggests inadequate bone marrow response as expected.   f/u with heme onc

## 2019-11-15 NOTE — CONSULT NOTE ADULT - ASSESSMENT
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  His counts had improved for approx 1 year, but now decreasing. Pt now with more aggressive MDS-with 7% myelobalsts on BM bx. Started Vidaza x 7 days to be given every 28 days. cycle 2 was on 10/28. Also hx of AF on ASA, CAD with stents, MI 2011 s/p PCI, ckd, HFrEF (EF 40%), DM2, HTN, HLD admitted for symptomatic anemia s/p 1 unit pRBC complicated by UTI. 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  His counts had improved for approx 1 year, but now decreasing. Pt now with more aggressive MDS-with 7% myelobalsts on BM bx. Started Vidaza x 7 days to be given every 28 days. cycle 2 was on 10/28. Also hx of AF on ASA, CAD with stents, MI 2011 s/p PCI, ckd, HFrEF (EF 40%), DM2, HTN, HLD admitted for symptomatic anemia s/p 1 unit pRBC complicated by UTI.    -Please c/w UTI treatment.  -Please follow daily CBC  -Patient will Follow up with Dr. Elizondo after discharge    Elizabeth Núñez PGY4  Heme/Onc fellow

## 2019-11-16 ENCOUNTER — TRANSCRIPTION ENCOUNTER (OUTPATIENT)
Age: 79
End: 2019-11-16

## 2019-11-16 VITALS
OXYGEN SATURATION: 97 % | TEMPERATURE: 98 F | SYSTOLIC BLOOD PRESSURE: 123 MMHG | RESPIRATION RATE: 18 BRPM | HEART RATE: 98 BPM | DIASTOLIC BLOOD PRESSURE: 73 MMHG

## 2019-11-16 LAB
ANION GAP SERPL CALC-SCNC: 13 MMOL/L — SIGNIFICANT CHANGE UP (ref 5–17)
BUN SERPL-MCNC: 42 MG/DL — HIGH (ref 7–23)
CALCIUM SERPL-MCNC: 8.7 MG/DL — SIGNIFICANT CHANGE UP (ref 8.4–10.5)
CHLORIDE SERPL-SCNC: 103 MMOL/L — SIGNIFICANT CHANGE UP (ref 96–108)
CO2 SERPL-SCNC: 20 MMOL/L — LOW (ref 22–31)
CREAT SERPL-MCNC: 1.33 MG/DL — HIGH (ref 0.5–1.3)
CULTURE RESULTS: SIGNIFICANT CHANGE UP
GLUCOSE BLDC GLUCOMTR-MCNC: 113 MG/DL — HIGH (ref 70–99)
GLUCOSE BLDC GLUCOMTR-MCNC: 127 MG/DL — HIGH (ref 70–99)
GLUCOSE BLDC GLUCOMTR-MCNC: 128 MG/DL — HIGH (ref 70–99)
GLUCOSE SERPL-MCNC: 130 MG/DL — HIGH (ref 70–99)
HCT VFR BLD CALC: 27.8 % — LOW (ref 39–50)
HGB BLD-MCNC: 9.7 G/DL — LOW (ref 13–17)
MAGNESIUM SERPL-MCNC: 1.6 MG/DL — SIGNIFICANT CHANGE UP (ref 1.6–2.6)
MCHC RBC-ENTMCNC: 31.5 PG — SIGNIFICANT CHANGE UP (ref 27–34)
MCHC RBC-ENTMCNC: 34.9 GM/DL — SIGNIFICANT CHANGE UP (ref 32–36)
MCV RBC AUTO: 90.3 FL — SIGNIFICANT CHANGE UP (ref 80–100)
NRBC # BLD: 0 /100 WBCS — SIGNIFICANT CHANGE UP (ref 0–0)
PHOSPHATE SERPL-MCNC: 3.5 MG/DL — SIGNIFICANT CHANGE UP (ref 2.5–4.5)
PLATELET # BLD AUTO: 52 K/UL — LOW (ref 150–400)
POTASSIUM SERPL-MCNC: 3.7 MMOL/L — SIGNIFICANT CHANGE UP (ref 3.5–5.3)
POTASSIUM SERPL-SCNC: 3.7 MMOL/L — SIGNIFICANT CHANGE UP (ref 3.5–5.3)
RBC # BLD: 3.08 M/UL — LOW (ref 4.2–5.8)
RBC # FLD: 16.7 % — HIGH (ref 10.3–14.5)
SODIUM SERPL-SCNC: 136 MMOL/L — SIGNIFICANT CHANGE UP (ref 135–145)
SPECIMEN SOURCE: SIGNIFICANT CHANGE UP
WBC # BLD: 3.17 K/UL — LOW (ref 3.8–10.5)
WBC # FLD AUTO: 3.17 K/UL — LOW (ref 3.8–10.5)

## 2019-11-16 PROCEDURE — 83540 ASSAY OF IRON: CPT

## 2019-11-16 PROCEDURE — 96374 THER/PROPH/DIAG INJ IV PUSH: CPT

## 2019-11-16 PROCEDURE — 85610 PROTHROMBIN TIME: CPT

## 2019-11-16 PROCEDURE — 86900 BLOOD TYPING SEROLOGIC ABO: CPT

## 2019-11-16 PROCEDURE — 80048 BASIC METABOLIC PNL TOTAL CA: CPT

## 2019-11-16 PROCEDURE — 82607 VITAMIN B-12: CPT

## 2019-11-16 PROCEDURE — 83010 ASSAY OF HAPTOGLOBIN QUANT: CPT

## 2019-11-16 PROCEDURE — 86923 COMPATIBILITY TEST ELECTRIC: CPT

## 2019-11-16 PROCEDURE — 93005 ELECTROCARDIOGRAM TRACING: CPT

## 2019-11-16 PROCEDURE — 87086 URINE CULTURE/COLONY COUNT: CPT

## 2019-11-16 PROCEDURE — 83615 LACTATE (LD) (LDH) ENZYME: CPT

## 2019-11-16 PROCEDURE — 36430 TRANSFUSION BLD/BLD COMPNT: CPT

## 2019-11-16 PROCEDURE — 99239 HOSP IP/OBS DSCHRG MGMT >30: CPT

## 2019-11-16 PROCEDURE — 80053 COMPREHEN METABOLIC PANEL: CPT

## 2019-11-16 PROCEDURE — 83735 ASSAY OF MAGNESIUM: CPT

## 2019-11-16 PROCEDURE — 82962 GLUCOSE BLOOD TEST: CPT

## 2019-11-16 PROCEDURE — 86901 BLOOD TYPING SEROLOGIC RH(D): CPT

## 2019-11-16 PROCEDURE — 99285 EMERGENCY DEPT VISIT HI MDM: CPT | Mod: 25

## 2019-11-16 PROCEDURE — 86850 RBC ANTIBODY SCREEN: CPT

## 2019-11-16 PROCEDURE — 85730 THROMBOPLASTIN TIME PARTIAL: CPT

## 2019-11-16 PROCEDURE — 81001 URINALYSIS AUTO W/SCOPE: CPT

## 2019-11-16 PROCEDURE — 85027 COMPLETE CBC AUTOMATED: CPT

## 2019-11-16 PROCEDURE — 85045 AUTOMATED RETICULOCYTE COUNT: CPT

## 2019-11-16 PROCEDURE — 84484 ASSAY OF TROPONIN QUANT: CPT

## 2019-11-16 PROCEDURE — 84100 ASSAY OF PHOSPHORUS: CPT

## 2019-11-16 PROCEDURE — 82728 ASSAY OF FERRITIN: CPT

## 2019-11-16 PROCEDURE — P9040: CPT

## 2019-11-16 PROCEDURE — 71045 X-RAY EXAM CHEST 1 VIEW: CPT

## 2019-11-16 PROCEDURE — 83550 IRON BINDING TEST: CPT

## 2019-11-16 PROCEDURE — 83036 HEMOGLOBIN GLYCOSYLATED A1C: CPT

## 2019-11-16 PROCEDURE — 82746 ASSAY OF FOLIC ACID SERUM: CPT

## 2019-11-16 PROCEDURE — 83880 ASSAY OF NATRIURETIC PEPTIDE: CPT

## 2019-11-16 PROCEDURE — 84550 ASSAY OF BLOOD/URIC ACID: CPT

## 2019-11-16 RX ORDER — METOPROLOL TARTRATE 50 MG
1 TABLET ORAL
Qty: 60 | Refills: 0
Start: 2019-11-16 | End: 2019-12-15

## 2019-11-16 RX ORDER — CEFPODOXIME PROXETIL 100 MG
1 TABLET ORAL
Qty: 16 | Refills: 0
Start: 2019-11-16 | End: 2019-11-23

## 2019-11-16 RX ORDER — ASPIRIN/CALCIUM CARB/MAGNESIUM 324 MG
1 TABLET ORAL
Qty: 0 | Refills: 0 | DISCHARGE

## 2019-11-16 RX ORDER — CEFPODOXIME PROXETIL 100 MG
1 TABLET ORAL
Qty: 24 | Refills: 0
Start: 2019-11-16 | End: 2019-11-27

## 2019-11-16 RX ADMIN — PANTOPRAZOLE SODIUM 40 MILLIGRAM(S): 20 TABLET, DELAYED RELEASE ORAL at 05:22

## 2019-11-16 RX ADMIN — Medication 50 MILLIGRAM(S): at 05:22

## 2019-11-16 RX ADMIN — Medication 100 MILLIGRAM(S): at 14:36

## 2019-11-16 RX ADMIN — Medication 37.5 MICROGRAM(S): at 05:21

## 2019-11-16 RX ADMIN — AMLODIPINE BESYLATE 5 MILLIGRAM(S): 2.5 TABLET ORAL at 05:22

## 2019-11-16 RX ADMIN — Medication 50 MILLIGRAM(S): at 18:52

## 2019-11-16 RX ADMIN — Medication 20 MILLIGRAM(S): at 09:56

## 2019-11-16 NOTE — DISCHARGE NOTE PROVIDER - CARE PROVIDERS DIRECT ADDRESSES
[email or web address]

## 2019-11-16 NOTE — DISCHARGE NOTE PROVIDER - CARE PROVIDER_API CALL
Kahlil Cedillo)  Medicine  Gen Intrnl Medicine  [address], [address]  [place] [address]  Phone: [phone]  Fax: [phone]  Follow Up Time:     En Garcia)  Nephrology  [address] [place] [address]  Phone: [phone]  Fax: [phone]  Follow Up Time:     Arline Elizondo ()  Hematology; Medical Oncology  [address] [place] [address]  Phone: [phone]  Fax: [phone]  Follow Up Time:     Juliann Francis)  Cardiovascular Disease; Interventional Cardiology  [address] [place] [address]  Phone: [phone]  Fax: [phone]  Follow Up Time:

## 2019-11-16 NOTE — DISCHARGE NOTE PROVIDER - NSDCHHNEEDSERVICE_GEN_ALL_CORE
Medication teaching and assessment/Rehabilitation services/Observation and assessment/Teaching and training

## 2019-11-16 NOTE — PROGRESS NOTE ADULT - PROBLEM SELECTOR PLAN 6
likely 2/2 to MDS  early Nov platelets in 300s  continue to trend CBC likely 2/2 to MDS  early Nov platelets in 300s  continue to trend CBC  - f/u with heme as outpatient

## 2019-11-16 NOTE — PROGRESS NOTE ADULT - PROBLEM SELECTOR PLAN 3
Per previous hospital charting patient w/ hx of Afib, however in outpatient records not listed as a problem.  Obtain collateral from outpatient cardiologist.  Hold asa given thrombocytopenia. Risk outweights benefit of resuming.   c/w lopressor Per previous hospital charting patient w/ hx of Afib, however in outpatient records not listed as a problem.  Hold asa given thrombocytopenia. Risk outweights benefit of resuming.   c/w lopressor

## 2019-11-16 NOTE — PROGRESS NOTE ADULT - PROBLEM SELECTOR PLAN 10
Gout: c/w allopurinol    CKD Stage 3: baseline about 1.2, being followed by nephro as outpatient; trend creat, renally dose meds    hypothryodisim: c/w synthroid  DVT ppx: SCDs, holding lovenox in setting of thrombocytopenia   Diet: DASH/TLD    #D/C PLANNING ISSUES  Transitions of Care Status:  1.  Name of PCP: Dr. Goldberg.  2.  PCP Contacted on Admission: [ x] Y    [ ] N    3.  PCP contacted at Discharge: [ ] Y    [ ] N    [ ] N/A  4.  Post-Discharge Appointment Date and Location:  5.  Summary of Handoff given to PCP: Gout: c/w allopurinol    CKD Stage 3: baseline about 1.2, being followed by nephro as outpatient; trend creat, renally dose meds    hypothyroidisim: c/w synthroid  DVT ppx: SCDs, holding lovenox in setting of thrombocytopenia   Diet: DASH/TLD    #D/C PLANNING ISSUES  Transitions of Care Status:  1.  Name of PCP: Dr. Goldberg.  2.  PCP Contacted on Admission: [ x] Y    [ ] N    3.  PCP contacted at Discharge: [ ] Y    [ ] N    [ ] N/A  4.  Post-Discharge Appointment Date and Location:  5.  Summary of Handoff given to PCP:

## 2019-11-16 NOTE — DISCHARGE NOTE PROVIDER - NSDCFUSCHEDAPPT_GEN_ALL_CORE_FT
JENIFFER PIZARRO SR ; 12/02/2019 ; [facility] Cardio Electro [address] JENIFFER Stewart SR ; 12/12/2019 ; [facility] Med Nephro [address] JENIFFER Stewart SR ; 02/04/2020 ; [facility] Cardio [address].

## 2019-11-16 NOTE — DISCHARGE NOTE PROVIDER - NSDCCPCAREPLAN_GEN_ALL_CORE_FT
PRINCIPAL DISCHARGE DIAGNOSIS  Diagnosis: UTI (urinary tract infection)  Assessment and Plan of Treatment: You were found to have an infection of the urine, which may be have been a partial cause of your fatigue and weakness. You were started on IV antibiotics, which improved your symptoms greatly.      SECONDARY DISCHARGE DIAGNOSES  Diagnosis: CKD (chronic kidney disease), stage III  Assessment and Plan of Treatment:     Diagnosis: MDS (myelodysplastic syndrome)  Assessment and Plan of Treatment: PRINCIPAL DISCHARGE DIAGNOSIS  Diagnosis: UTI (urinary tract infection)  Assessment and Plan of Treatment: You were found to have an infection of the urine, which may be have been a partial cause of your fatigue and weakness. You were started on IV antibiotics, which improved your symptoms greatly. You are now being transitioned to a PO antibiotic, cefpodoxime, of the same type as the one your received in the hospital, for a total course of 10 days. Please take these medications as directed, and follow up with your Nephrologist, Dr. Garcia, and PCP, Dr. Cedillo, for further management.      SECONDARY DISCHARGE DIAGNOSES  Diagnosis: CKD (chronic kidney disease), stage III  Assessment and Plan of Treatment: Your creatinine levels were found to be stable during your course in the hospital. Please follow up with your Nephrologist, Dr. Garcia, for contnued monitoring and evaluation.    Diagnosis: MDS (myelodysplastic syndrome)  Assessment and Plan of Treatment: You were evaluated by the Hematology team while in the hospital, and deemed to be stable for discharge in regards to your condition. Nonetheless, it is important that you return to [facility] on 11/18/19 to monitor your blood levels, especially your platelet levels which were found to be significantly low. Also, you are strongly advised to follow up with your hematologist, Dr. Elizondo, for further management of your condition.    Diagnosis: Atrial fibrillation  Assessment and Plan of Treatment: In light of your low platelets, your aspirin is currently being held to prevent abnormal bleeding. You were also started on Lopressor, a medication to control your heart rate to prevent your atrial fibrillation to cause your heart to beat too fast. Please follow up with either your hematologist or your cardiologist to discuss these changes and proceed with further management. PRINCIPAL DISCHARGE DIAGNOSIS  Diagnosis: UTI (urinary tract infection)  Assessment and Plan of Treatment: You were found to have an infection of the urine, which may be have been a partial cause of your fatigue and weakness. You were started on IV antibiotics, which improved your symptoms greatly. You are now being transitioned to a PO antibiotic, cefpodoxime, of the same type as the one your received in the hospital. Please finish the abx. Please take these medications as directed, and follow up with your Nephrologist, Dr. Garcia, and PCP, Dr. Cedillo, for further management.      SECONDARY DISCHARGE DIAGNOSES  Diagnosis: Atrial fibrillation  Assessment and Plan of Treatment: In light of your low platelets, your aspirin is currently being held to prevent abnormal bleeding. You were also started on Lopressor, a medication to control your heart rate to prevent your atrial fibrillation to cause your heart to beat too fast. Please follow up with either your hematologist or your cardiologist to discuss these changes and proceed with further management.    Diagnosis: CKD (chronic kidney disease), stage III  Assessment and Plan of Treatment: Your creatinine levels were found to be stable during your course in the hospital. Please follow up with your Nephrologist, Dr. Garcia, for contnued monitoring and evaluation.    Diagnosis: MDS (myelodysplastic syndrome)  Assessment and Plan of Treatment: You were evaluated by the Hematology team while in the hospital, and deemed to be stable for discharge in regards to your condition. Nonetheless, it is important that you return to [facility] for a CBC on 11/18/19 to monitor your blood levels, especially your platelet levels which were found to be significantly low. Also, you are strongly advised to follow up with your hematologist, Dr. Elizondo, for further management of your condition. PRINCIPAL DISCHARGE DIAGNOSIS  Diagnosis: UTI (urinary tract infection)  Assessment and Plan of Treatment: You were found to have an infection of the urine, which may be have been a partial cause of your fatigue and weakness. You were started on IV antibiotics, which improved your symptoms greatly. You are now being transitioned to a PO antibiotic, cefpodoxime, of the same type as the one your received in the hospital. Please finish the abx. Please take these medications as directed, and follow up with your Nephrologist, Dr. Garcia, and PCP, Dr. Cedillo, for further management.      SECONDARY DISCHARGE DIAGNOSES  Diagnosis: CKD (chronic kidney disease), stage III  Assessment and Plan of Treatment: Your creatinine levels were found to be stable during your course in the hospital. Please follow up with your Nephrologist, Dr. Garcia, for contnued monitoring and evaluation.    Diagnosis: MDS (myelodysplastic syndrome)  Assessment and Plan of Treatment: You were evaluated by the Hematology team while in the hospital, and deemed to be stable for discharge in regards to your condition. Nonetheless, it is important that you return to [facility] for a CBC on 11/18/19, as well aas 11/20 and 11/22 to monitor your blood levels, especially your platelet levels which were found to be significantly low. Also, you are strongly advised to follow up with your hematologist, Dr. Elizondo, for further management of your condition.    Diagnosis: Atrial fibrillation  Assessment and Plan of Treatment: In light of your low platelets, your aspirin is currently being held to prevent abnormal bleeding. You were also started on Lopressor, a medication to control your heart rate to prevent your atrial fibrillation to cause your heart to beat too fast. Please follow up with your cardiologist, Dr. Juliann Francis, to discuss these changes and proceed with further management.

## 2019-11-16 NOTE — PROGRESS NOTE ADULT - PROBLEM SELECTOR PLAN 5
Chronic systolic ischemic HF. Compensated.   Echo 05/17 EF: 40% Moderate segmental left ventricular systolic dysfunction  Patient currently appears euvolemic  c/w lopressor,   -lasix in between blood transfusions to prevent volume overload

## 2019-11-16 NOTE — DISCHARGE NOTE PROVIDER - PROVIDER TOKENS
PROVIDER:[TOKEN:[[id number]]],PROVIDER:[TOKEN:[[id number]]],PROVIDER:[TOKEN:[[id number]]],PROVIDER:[TOKEN:[[id number]]]

## 2019-11-16 NOTE — PROGRESS NOTE ADULT - PROBLEM SELECTOR PLAN 1
Likely 2/2 to MDS, possibly due to vidaza side effects  Transfuse for symptomatic anemia/Hgb<8, give lasix in between blood transfusions  Retic index suggests inadequate bone marrow response as expected.   - Heme/onc recs appreciated: C/w UTI tx, trend CBC, f/u with Dr. Elizondo

## 2019-11-16 NOTE — PROGRESS NOTE ADULT - PROBLEM SELECTOR PLAN 2
Complicated Cystitis -- patient is male.   Positive UA w/ symptoms  will f/u UCX  c/w ceftriaxone (11/15-) Complicated Cystitis -- patient is male.   will f/u UCX  c/w ceftriaxone for now; can consider transition to PO cefpodoxime, pending UA results Complicated Cystitis -- patient is male.   will f/u UCX  c/w ceftriaxone for now;  ransition to PO cefpodoxime.  UA obtained after first dose of abx which is why negative.

## 2019-11-16 NOTE — PROGRESS NOTE ADULT - SUBJECTIVE AND OBJECTIVE BOX
***************************************************************  Dr. Star Hatch, PGY1  Internal Medicine   [facility] Pager: [phone]  [facility] Pager: [phone]  ***************************************************************    JENIFFER PIZARRO SR  79y  MRN: [id number]    Subjective: NAEON. No acute complaints this AM.     Patient is a 79y old  Male who presents with a chief complaint of Symptomatic anemia (15 Nov 2019 10:47)      MEDICATIONS  (STANDING):  allopurinol 100 milliGRAM(s) Oral daily  amLODIPine   Tablet 5 milliGRAM(s) Oral daily  atorvastatin 20 milliGRAM(s) Oral at bedtime  cefTRIAXone   IVPB 1000 milliGRAM(s) IV Intermittent every 24 hours  dextrose 5%. 1000 milliLiter(s) (50 mL/Hr) IV Continuous <Continuous>  dextrose 50% Injectable 12.5 Gram(s) IV Push once  dextrose 50% Injectable 25 Gram(s) IV Push once  dextrose 50% Injectable 25 Gram(s) IV Push once  insulin lispro (HumaLOG) corrective regimen sliding scale   SubCutaneous three times a day before meals  levothyroxine 37.5 MICROGram(s) Oral daily  methylphenidate 20 milliGRAM(s) Oral daily  metoprolol tartrate 50 milliGRAM(s) Oral two times a day  pantoprazole    Tablet 40 milliGRAM(s) Oral before breakfast    MEDICATIONS  (PRN):  dextrose 40% Gel 15 Gram(s) Oral once PRN Blood Glucose LESS THAN 70 milliGRAM(s)/deciliter  glucagon  Injectable 1 milliGRAM(s) IntraMuscular once PRN Glucose LESS THAN 70 milligrams/deciliter  metoclopramide 10 milliGRAM(s) Oral daily PRN nausea      Objective:    Vitals: Vital Signs Last 24 Hrs  T(C): 37.3 (19 @ 04:56), Max: 37.3 (11-15-19 @ 21:41)  T(F): 99.2 (19 @ 04:56), Max: 99.2 (11-15-19 @ 21:41)  HR: 95 (19 @ 04:56) (80 - 96)  BP: 112/69 (19 @ 04:56) (107/65 - 115/68)  BP(mean): --  RR: 18 (19 @ 04:56) (18 - 20)  SpO2: 94% (19 @ 04:56) (94% - 96%)                I&O's Summary  15 Nov 2019 07:01  -  2019 07:00  --------------------------------------------------------  IN: 1350 mL / OUT: 200 mL / NET: 1150 mL        PHYSICAL EXAM:  GENERAL: NAD  HEAD:  Atraumatic, Normocephalic  EYES: EOMI, PERRLA, conjunctiva and sclera clear  CHEST/LUNG: Clear to percussion bilaterally; No rales, rhonchi, wheezing, or rubs  HEART: Regular rate and rhythm; No murmurs, rubs, or gallops  ABDOMEN: Soft, Nontender, Nondistended; Bowel sounds present  SKIN: No rashes or lesions  NERVOUS SYSTEM:  Alert & Oriented X3, Good concentration; Motor Strength 5/5 B/L upper and lower extremities                                           LABS:      136  |  103  |  42<H>  ----------------------------<  130<H>  3.7   |  20<L>  |  1.33<H>  11-15    136  |  106  |  42<H>  ----------------------------<  121<H>  3.7   |  19<L>  |  1.40<H>      133<L>  |  105  |  48<H>  ----------------------------<  158<H>  4.2   |  16<L>  |  1.49<H>    Ca    8.7      2019 07:00  Ca    9.0      15 Nov 2019 06:34  Ca    9.4      2019 16:51  Phos  3.5     -  Mg     1.6         TPro  6.6  /  Alb  3.3  /  TBili  0.7  /  DBili  x   /  AST  46<H>  /  ALT  51<H>  /  AlkPhos  126<H>  11-15  TPro  7.3  /  Alb  3.6  /  TBili  0.6  /  DBili  x   /  AST  42<H>  /  ALT  47<H>  /  AlkPhos  132<H>        PT/INR - ( 2019 16:51 )   PT: 13.2 sec;   INR: 1.14 ratio         PTT - ( 2019 16:51 )  PTT:26.9 sec              Urinalysis Basic - ( 2019 17:18 )    Color: Light Yellow / Appearance: Turbid / S.017 / pH: x  Gluc: x / Ketone: Negative  / Bili: Negative / Urobili: Negative   Blood: x / Protein: 100 / Nitrite: Positive   Leuk Esterase: Large / RBC: 9 /hpf / WBC >50 /HPF   Sq Epi: x / Non Sq Epi: 4 / Bacteria: Many               9.7    3.17  )-----------( 52       ( 2019 07:00 )             27.8                         9.1    3.11  )-----------( 43       ( 15 Nov 2019 06:34 )             26.7                         7.3    4.60  )-----------( 58       ( 2019 16:51 )             22.0     CAPILLARY BLOOD GLUCOSE      POCT Blood Glucose.: 117 mg/dL (15 Nov 2019 18:32)  POCT Blood Glucose.: 121 mg/dL (15 Nov 2019 14:17)  POCT Blood Glucose.: 126 mg/dL (15 Nov 2019 08:51)      RADIOLOGY & ADDITIONAL TESTS:    Imaging Personally Reviewed:  [ ] YES  [ ] NO      Consultants involved in case: Heme/Onc  Consultant(s) Notes Reviewed:  [x] YES  [ ] NO:   Care Discussed with Consultants/Other Providers [x] YES  [ ] NO ***************************************************************  Dr. Star Hatch, PGY1  Internal Medicine   [facility] Pager: [phone]  [facility] Pager: [phone]  ***************************************************************    JENIFFER PIZARRO SR  79y  MRN: [id number]    Subjective: NAEON. No acute complaints this AM. Feels more energetic this AM, also reports reduced burning with urination, no fevers/chills. Last BM 2days ago, takes senna at home. No n/v/d, no acute abdominal pain.    Patient is a 79y old  Male who presents with a chief complaint of Symptomatic anemia (15 Nov 2019 10:47)      MEDICATIONS  (STANDING):  allopurinol 100 milliGRAM(s) Oral daily  amLODIPine   Tablet 5 milliGRAM(s) Oral daily  atorvastatin 20 milliGRAM(s) Oral at bedtime  cefTRIAXone   IVPB 1000 milliGRAM(s) IV Intermittent every 24 hours  dextrose 5%. 1000 milliLiter(s) (50 mL/Hr) IV Continuous <Continuous>  dextrose 50% Injectable 12.5 Gram(s) IV Push once  dextrose 50% Injectable 25 Gram(s) IV Push once  dextrose 50% Injectable 25 Gram(s) IV Push once  insulin lispro (HumaLOG) corrective regimen sliding scale   SubCutaneous three times a day before meals  levothyroxine 37.5 MICROGram(s) Oral daily  methylphenidate 20 milliGRAM(s) Oral daily  metoprolol tartrate 50 milliGRAM(s) Oral two times a day  pantoprazole    Tablet 40 milliGRAM(s) Oral before breakfast    MEDICATIONS  (PRN):  dextrose 40% Gel 15 Gram(s) Oral once PRN Blood Glucose LESS THAN 70 milliGRAM(s)/deciliter  glucagon  Injectable 1 milliGRAM(s) IntraMuscular once PRN Glucose LESS THAN 70 milligrams/deciliter  metoclopramide 10 milliGRAM(s) Oral daily PRN nausea      Objective:    Vitals: Vital Signs Last 24 Hrs  T(C): 37.3 (19 @ 04:56), Max: 37.3 (11-15-19 @ 21:41)  T(F): 99.2 (19 @ 04:56), Max: 99.2 (11-15-19 @ 21:41)  HR: 95 (19 @ 04:56) (80 - 96)  BP: 112/69 (19 @ 04:56) (107/65 - 115/68)  BP(mean): --  RR: 18 (19 @ 04:56) (18 - 20)  SpO2: 94% (19 @ 04:56) (94% - 96%)                I&O's Summary  15 Nov 2019 07:01  -  2019 07:00  --------------------------------------------------------  IN: 1350 mL / OUT: 200 mL / NET: 1150 mL        PHYSICAL EXAM:  GENERAL: NAD, lying in bed comfortably  HEAD:  Atraumatic, Normocephalic  EYES: EOMI, PERRLA, conjunctiva and sclera clear  CHEST/LUNG: Clear to percussion bilaterally; No rales, rhonchi, wheezing, or rubs  HEART: Regular rate and rhythm; No murmurs, rubs, or gallops  ABDOMEN: Soft, Nondistended, faint suprapubic pain, improved from prior; Bowel sounds present  SKIN: No rashes or lesions  NERVOUS SYSTEM:  Alert & Oriented X3, Good concentration; Motor Strength 5/5 B/L upper and lower extremities                                           LABS:      136  |  103  |  42<H>  ----------------------------<  130<H>  3.7   |  20<L>  |  1.33<H>  11-15    136  |  106  |  42<H>  ----------------------------<  121<H>  3.7   |  19<L>  |  1.40<H>      133<L>  |  105  |  48<H>  ----------------------------<  158<H>  4.2   |  16<L>  |  1.49<H>    Ca    8.7      2019 07:00  Ca    9.0      15 Nov 2019 06:34  Ca    9.4      2019 16:51  Phos  3.5       Mg     1.6         TPro  6.6  /  Alb  3.3  /  TBili  0.7  /  DBili  x   /  AST  46<H>  /  ALT  51<H>  /  AlkPhos  126<H>  11-15  TPro  7.3  /  Alb  3.6  /  TBili  0.6  /  DBili  x   /  AST  42<H>  /  ALT  47<H>  /  AlkPhos  132<H>        PT/INR - ( 2019 16:51 )   PT: 13.2 sec;   INR: 1.14 ratio         PTT - ( 2019 16:51 )  PTT:26.9 sec              Urinalysis Basic - ( 2019 17:18 )    Color: Light Yellow / Appearance: Turbid / S.017 / pH: x  Gluc: x / Ketone: Negative  / Bili: Negative / Urobili: Negative   Blood: x / Protein: 100 / Nitrite: Positive   Leuk Esterase: Large / RBC: 9 /hpf / WBC >50 /HPF   Sq Epi: x / Non Sq Epi: 4 / Bacteria: Many               9.7    3.17  )-----------( 52       ( 2019 07:00 )             27.8                         9.1    3.11  )-----------( 43       ( 15 Nov 2019 06:34 )             26.7                         7.3    4.60  )-----------( 58       ( 2019 16:51 )             22.0     CAPILLARY BLOOD GLUCOSE      POCT Blood Glucose.: 117 mg/dL (15 Nov 2019 18:32)  POCT Blood Glucose.: 121 mg/dL (15 Nov 2019 14:17)  POCT Blood Glucose.: 126 mg/dL (15 Nov 2019 08:51)      RADIOLOGY & ADDITIONAL TESTS:    Imaging Personally Reviewed:  [ ] YES  [ ] NO      Consultants involved in case: Heme/Onc  Consultant(s) Notes Reviewed:  [x] YES  [ ] NO:   Care Discussed with Consultants/Other Providers [x] YES  [ ] NO ***************************************************************  Dr. Star Hatch, PGY1  Internal Medicine   [facility] Pager: [phone]  [facility] Pager: [phone]  ***************************************************************    JENIFFER PIZARRO SR  79y  MRN: [id number]    Subjective: NAEON. No acute complaints this AM. Feels more energetic this AM, also reports reduced burning with urination, no fevers/chills. Symptoms are improved however. Last BM 2days ago, takes senna at home. No n/v/d, no acute abdominal pain.    Patient is a 79y old  Male who presents with a chief complaint of Symptomatic anemia (15 Nov 2019 10:47)      MEDICATIONS  (STANDING):  allopurinol 100 milliGRAM(s) Oral daily  amLODIPine   Tablet 5 milliGRAM(s) Oral daily  atorvastatin 20 milliGRAM(s) Oral at bedtime  cefTRIAXone   IVPB 1000 milliGRAM(s) IV Intermittent every 24 hours  dextrose 5%. 1000 milliLiter(s) (50 mL/Hr) IV Continuous <Continuous>  dextrose 50% Injectable 12.5 Gram(s) IV Push once  dextrose 50% Injectable 25 Gram(s) IV Push once  dextrose 50% Injectable 25 Gram(s) IV Push once  insulin lispro (HumaLOG) corrective regimen sliding scale   SubCutaneous three times a day before meals  levothyroxine 37.5 MICROGram(s) Oral daily  methylphenidate 20 milliGRAM(s) Oral daily  metoprolol tartrate 50 milliGRAM(s) Oral two times a day  pantoprazole    Tablet 40 milliGRAM(s) Oral before breakfast    MEDICATIONS  (PRN):  dextrose 40% Gel 15 Gram(s) Oral once PRN Blood Glucose LESS THAN 70 milliGRAM(s)/deciliter  glucagon  Injectable 1 milliGRAM(s) IntraMuscular once PRN Glucose LESS THAN 70 milligrams/deciliter  metoclopramide 10 milliGRAM(s) Oral daily PRN nausea      Objective:    Vitals: Vital Signs Last 24 Hrs  T(C): 37.3 (19 @ 04:56), Max: 37.3 (11-15-19 @ 21:41)  T(F): 99.2 (19 @ 04:56), Max: 99.2 (11-15-19 @ 21:41)  HR: 95 (19 @ 04:56) (80 - 96)  BP: 112/69 (19 @ 04:56) (107/65 - 115/68)  BP(mean): --  RR: 18 (19 @ 04:56) (18 - 20)  SpO2: 94% (19 @ 04:56) (94% - 96%)                I&O's Summary  15 Nov 2019 07:01  -  2019 07:00  --------------------------------------------------------  IN: 1350 mL / OUT: 200 mL / NET: 1150 mL        PHYSICAL EXAM:  GENERAL: NAD, lying in bed comfortably. Nontoxic.   HEAD:  Atraumatic, Normocephalic  EYES: EOMI, PERRLA, conjunctiva and sclera clear  CHEST/LUNG: Clear to percussion bilaterally; No rales, rhonchi, wheezing, or rubs  HEART: Regular rate and rhythm; No murmurs, rubs, or gallops  ABDOMEN: Soft, Nondistended, faint suprapubic pain, improved from prior; Bowel sounds present. NO cva tenderness.   SKIN: No rashes or lesions  NERVOUS SYSTEM:  Alert & Oriented X3, Good concentration; Motor Strength 5/5 B/L upper and lower extremities           PSYCH: A&ox3. Normal mood. Normal affect.                                   LABS:      136  |  103  |  42<H>  ----------------------------<  130<H>  3.7   |  20<L>  |  1.33<H>  11-15    136  |  106  |  42<H>  ----------------------------<  121<H>  3.7   |  19<L>  |  1.40<H>      133<L>  |  105  |  48<H>  ----------------------------<  158<H>  4.2   |  16<L>  |  1.49<H>    Ca    8.7      2019 07:00  Ca    9.0      15 Nov 2019 06:34  Ca    9.4      2019 16:51  Phos  3.5       Mg     1.6         TPro  6.6  /  Alb  3.3  /  TBili  0.7  /  DBili  x   /  AST  46<H>  /  ALT  51<H>  /  AlkPhos  126<H>  11-15  TPro  7.3  /  Alb  3.6  /  TBili  0.6  /  DBili  x   /  AST  42<H>  /  ALT  47<H>  /  AlkPhos  132<H>        PT/INR - ( 2019 16:51 )   PT: 13.2 sec;   INR: 1.14 ratio         PTT - ( 2019 16:51 )  PTT:26.9 sec              Urinalysis Basic - ( 2019 17:18 )    Color: Light Yellow / Appearance: Turbid / S.017 / pH: x  Gluc: x / Ketone: Negative  / Bili: Negative / Urobili: Negative   Blood: x / Protein: 100 / Nitrite: Positive   Leuk Esterase: Large / RBC: 9 /hpf / WBC >50 /HPF   Sq Epi: x / Non Sq Epi: 4 / Bacteria: Many               9.7    3.17  )-----------( 52       ( 2019 07:00 )             27.8                         9.1    3.11  )-----------( 43       ( 15 Nov 2019 06:34 )             26.7                         7.3    4.60  )-----------( 58       ( 2019 16:51 )             22.0     CAPILLARY BLOOD GLUCOSE      POCT Blood Glucose.: 117 mg/dL (15 Nov 2019 18:32)  POCT Blood Glucose.: 121 mg/dL (15 Nov 2019 14:17)  POCT Blood Glucose.: 126 mg/dL (15 Nov 2019 08:51)      RADIOLOGY & ADDITIONAL TESTS:    Imaging Personally Reviewed:  [ ] YES  [ ] NO      Consultants involved in case: Heme/Onc  Consultant(s) Notes Reviewed:  [x] YES  [ ] NO:   Care Discussed with Consultants/Other Providers [x] YES  [ ] NO

## 2019-11-16 NOTE — DISCHARGE NOTE NURSING/CASE MANAGEMENT/SOCIAL WORK - PATIENT PORTAL LINK FT
You can access the FollowMyHealth Patient Portal offered by [facility] by registering at the following website: [email or web address]. By joining [employer]’s FollowMyHealth portal, you will also be able to view your health information using other applications (apps) compatible with our system.

## 2019-11-16 NOTE — PROGRESS NOTE ADULT - ATTENDING COMMENTS
Patient seen and examined today. I agree with the above findings, assessment, and plan with the following additions and exceptions:     Low suspicion for acute blood loss anemia. No signs or symptoms of this and we have strong suspicion for myelosuppression. Patient reports brown stool. Hbg stable.  Orthostatics negative.  No need for additional diuretics. Compensated HFrEF.   Hold asa given thrombocytopenia. Needs outpatient follow up with his cardiologist. Will need 1-2 more uptrended Plts before restarting asa.   Send on cefpodoxime 200 mg po bid for 14 total day course.   Heme/onc contribution to care greatly appreciated. Cleared patient for discharge.   Will go to [facility] on Monday for repeat CBC. Patient to get [facility] CBCs for now.   To follow up with Dr. Elizondo on d/c.  The importance of follow up appointments, follow up CBCs and compliance with medications was explained at length with the patient at bedside -- patient verbalized understanding that not following up with outpatient providers as soon as possible and not being compliant with medications would put him at elevated risk of readmission, morbidity, and death.   Teach back given by patient of above.  Al questions answered.   The patient is deemed medically optimized for discharge.     DISCHARGE TIME SPENT:  45 minutes.     Dr. Andres Canales DO  Division of Hospital Medicine  [facility]  Pager:  [phone] Patient seen and examined today. I agree with the above findings, assessment, and plan with the following additions and exceptions:     Low suspicion for acute blood loss anemia. No signs or symptoms of this and we have strong suspicion for myelosuppression. Patient reports brown stool. Hbg stable.  Orthostatics negative.  No need for additional diuretics. Compensated HFrEF.   Hold asa given thrombocytopenia. Needs outpatient follow up with his cardiologist. Will need 1-2 more uptrended Plts before restarting asa.   Send on cefpodoxime 200 mg po bid for 14 total day course.   Heme/onc contribution to care greatly appreciated. Cleared patient for discharge.   Will go [facility] on Monday for repeat CBC. Patient to get [facility] CBCs for now.   To follow up with Dr. Elizondo on d/c.  The importance of follow up appointments, follow up CBCs and compliance with medications was explained at length with the patient at bedside -- patient verbalized understanding that not following up with outpatient providers as soon as possible and not being compliant with medications would put him at elevated risk of readmission, morbidity, and death.   Teach back given by patient of above. Wife at bedside during discussion.   All questions answered.   The patient is deemed medically optimized for discharge.     DISCHARGE TIME SPENT:  45 minutes.     Dr. Andres Canales DO  Division of Hospital Medicine  [facility]  Pager:  [phone]

## 2019-11-16 NOTE — DISCHARGE NOTE PROVIDER - NSDCMRMEDTOKEN_GEN_ALL_CORE_FT
allopurinol 100 mg oral tablet: 1 tab(s) orally once a day  amLODIPine 5 mg oral tablet: 1 tab(s) orally once a day  cefpodoxime 200 mg oral tablet: 1 tab(s) orally 2 times a day   metFORMIN 500 mg oral tablet: 1 tab(s) orally 2 times a day  methylphenidate 20 mg oral tablet: 1 tab(s) orally 3 times a day  metoclopramide 10 mg oral tablet: 1 tab(s) orally once a day  metoprolol tartrate 50 mg oral tablet: 1 tab(s) orally 2 times a day  omeprazole 20 mg oral delayed release capsule: 1 cap(s) orally once a day  rosuvastatin 5 mg oral tablet: 1 tab(s) orally once a day  Synthroid 25 mcg (0.025 mg) oral tablet: 1.5 tab(s) orally once a day  Tradjenta 5 mg oral tablet: 1 tab(s) orally once a day

## 2019-11-16 NOTE — DISCHARGE NOTE PROVIDER - HOSPITAL COURSE
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 Pt states that he has been weak for past several days, and had burning urination starting yesterday.        Pt is followed by Dr. Denis Hooker from heme-onc and Dr. Garcia from nephrology. Pt with aggressive form of MDS with 7% blasts on BM. On Vidaza x7 days to be given every 28 days, currently on cycle 2 started on 10/28. Prior to Vidaza, patient was taking Revlimid (now discontinued), and noted that he seemed to have UTIs more frequently while on Revlimid. Patient does not recall prior urine cultures with resistant organisms. Patient has been feeling weak for the past several days and had difficulty getting out of bed. The pt denies lightheadedness, SOB or dizziness. Denies abdominal pain or flank pain. Pt also reports intermittent fevers and chills, Tmax yesterday was 100. Patient noted that his urine was cloudy and pink. Patient was seen by hematologist, who noted low Hemoglobin of 8.6 yesterday in the setting of symptoms of fatigue and weakness and the pt was advised to come to hospital.        In ED, patient afebrile, non-tachypneic, non-tachycardic, with UA positive for bacteria, LE, and WBC. Labs also notable for platelets of 52, Hgb of 9.7. Patient started on IV Ceftriaxone, ultimately receiving 2 doses during his course. On 11/16, patient reported symptomatic improvement and continued to be afebrile. He was cleared from Heme-Onc for follow up with Dr. Elizondo, as well as CBC on 11/18 given thrombocytopenia, and deemed clear for discharge with [facility]. He was given a home dose of PO Cefpodoxime for a total course of 10 days for complicated UTI. 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 Pt states that he has been weak for past several days, and had burning urination starting yesterday.        Pt is followed by Dr. Arline Elizondo and Dr. Denis Hooker from heme-onc and Dr. Garcia from nephrology. Pt with aggressive form of MDS with 7% blasts on BM. On Vidaza x7 days to be given every 28 days, currently on cycle 2 started on 10/28. Prior to Vidaza, patient was taking Revlimid (now discontinued), and noted that he seemed to have UTIs more frequently while on Revlimid. Patient does not recall prior urine cultures with resistant organisms. Patient has been feeling weak for the past several days and had difficulty getting out of bed. The pt denies lightheadedness, SOB or dizziness. Denies abdominal pain or flank pain. Pt also reports intermittent fevers and chills, Tmax yesterday was 100. Patient noted that his urine was cloudy and pink. Patient was seen by hematologist, who noted low Hemoglobin of 8.6 yesterday in the setting of symptoms of fatigue and weakness and the pt was advised to come to hospital.        In ED, patient afebrile, non-tachypneic, non-tachycardic, with UA positive for bacteria, LE, and WBC. Labs also notable for platelets of 52, Hgb of 9.7. Patient started on IV Ceftriaxone, ultimately receiving 2 doses during his course. On 11/16, patient reported symptomatic improvement and continued to be afebrile. He was cleared from Heme-Onc for follow up with Dr. Elizondo, as well as CBC on 11/18 given thrombocytopenia, and deemed clear for discharge with [facility]. He was given a home dose of PO Cefpodoxime for a total course of 10 days for complicated UTI. 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 Pt states that he has been weak for past several days, and had burning urination starting yesterday.        Pt is followed by Dr. Arline Elizondo and Dr. Denis Hooker from heme-onc and Dr. Garcia from nephrology. Pt with aggressive form of MDS with 7% blasts on BM. On Vidaza x7 days to be given every 28 days, currently on cycle 2 started on 10/28. Prior to Vidaza, patient was taking Revlimid (now discontinued), and noted that he seemed to have UTIs more frequently while on Revlimid. Patient does not recall prior urine cultures with resistant organisms. Patient has been feeling weak for the past several days and had difficulty getting out of bed. The pt denies lightheadedness, SOB or dizziness. Denies abdominal pain or flank pain. Pt also reports intermittent fevers and chills, Tmax yesterday was 100. Patient noted that his urine was cloudy and pink. Patient was seen by hematologist, who noted low Hemoglobin of 8.6 yesterday in the setting of symptoms of fatigue and weakness and the pt was advised to come to hospital.        In ED, patient afebrile, non-tachypneic, non-tachycardic, with UA positive for bacteria, LE, and WBC. Labs also notable for platelets of 52, Hgb of 9.7. Patient started on IV Ceftriaxone, ultimately receiving 2 doses during his course. On 11/16, patient reported symptomatic improvement and continued to be afebrile. He was cleared from Heme-Onc for follow up with Dr. Elizondo, as well as CBC on 11/18 given thrombocytopenia, and deemed clear for discharge with recommended [facility]. He was given a home dose of PO Cefpodoxime  for complicated UTI.        ATTENDING ATTESTATION    Low suspicion for acute blood loss anemia. No signs or symptoms of this and we have strong suspicion for myelosuppression. Patient reports brown stool. Hbg stable.    Orthostatics negative.    No need for additional diuretics. Compensated HFrEF.     Hold asa given thrombocytopenia. Needs outpatient follow up with his cardiologist. Will need 1-2 more uptrended Plts before restarting asa.     Send on cefpodoxime 200 mg po bid for 14 total day course.     Heme/onc contribution to care greatly appreciated. Cleared patient for discharge.     Will go to [facility] on Monday for repeat CBC. Patient to get [facility] CBCs for now.     To follow up with Dr. Elizondo on d/c.    The importance of follow up appointments, follow up CBCs and compliance with medications was explained at length with the patient at bedside -- patient verbalized understanding that not following up with outpatient providers as soon as possible and not being compliant with medications would put him at elevated risk of readmission, morbidity, and death.     Teach back given by patient of above. Wife at bedside during discussion.     All questions answered.     The patient is deemed medically optimized for discharge.         DISCHARGE TIME SPENT:  45 minutes.             PROBLEM LIST        Problem/plan - 1:    -ANEMIA         Problem/Plan - 2:    ·  Problem: UTI (urinary tract infection).  Plan: Complicated Cystitis          Problem/Plan - 3:    ·  Problem: Atrial fibrillation.           Problem/Plan - 4:    ·  Problem: CAD (coronary artery disease).           Problem/Plan - 5:    ·  Problem: Congestive heart failure (CHF). // Chronic systolic ischemic HF.         Problem/Plan - 6:    Problem: Thrombocytopenia.          Problem/Plan - 7:    ·  Problem: DM (Diabetes Mellitus).           Problem/Plan - 8:    ·  Problem: HTN (hypertension).  P         Problem/Plan - 9:    ·  Problem: HLD (hyperlipidemia).         Problem/Plan - 10:    Problem: Need for prophylactic measure Pt is a 80 yo M w/ PMH of MDS on chemo last 1 week ago, documented AFib on asa, CAD with stents, MI 2011 s/p PCI, HFrEF (EF 40%), DM2, HTN, HLD, CKD stage III, sent in by his Hematologist for low H/H and UTI. Pt states that he has been weak for past several days, and had burning urination starting yesterday.        Pt is followed by Dr. Arline Elizondo and Dr. Denis Hooker from heme-onc and Dr. Garcia from nephrology. Pt with aggressive form of MDS with 7% blasts on BM. On Vidaza x7 days to be given every 28 days, currently on cycle 2 started on 10/28. Prior to Vidaza, patient was taking Revlimid (now discontinued), and noted that he seemed to have UTIs more frequently while on Revlimid. Patient does not recall prior urine cultures with resistant organisms. Patient has been feeling weak for the past several days and had difficulty getting out of bed. The pt denies lightheadedness, SOB or dizziness. Denies abdominal pain or flank pain. Pt also reports intermittent fevers and chills, Tmax yesterday was 100. Patient noted that his urine was cloudy and pink. Patient was seen by hematologist, who noted low Hemoglobin of 8.6 yesterday in the setting of symptoms of fatigue and weakness and the pt was advised to come to hospital.        In ED, patient afebrile, non-tachypneic, non-tachycardic, with UA positive for bacteria, LE, and WBC. Labs also notable for platelets of 52, Hgb of 9.7. Patient started on IV Ceftriaxone, ultimately receiving 2 doses during his course. On 11/16, patient reported symptomatic improvement and continued to be afebrile. He was cleared from Heme-Onc for follow up with Dr. Elizondo, as well as CBC on 11/18 given thrombocytopenia, and deemed clear for discharge with recommended [facility]. He was given a home dose of PO Cefpodoxime for complicated UTI for a total course of 14days.        ATTENDING ATTESTATION    Low suspicion for acute blood loss anemia. No signs or symptoms of this and we have strong suspicion for myelosuppression. Patient reports brown stool. Hbg stable.    Orthostatics negative.    No need for additional diuretics. Compensated HFrEF.     Hold asa given thrombocytopenia. Needs outpatient follow up with his cardiologist. Will need 1-2 more uptrended Plts before restarting asa.     Send on cefpodoxime 200 mg po bid for 14 total day course.     Heme/onc contribution to care greatly appreciated. Cleared patient for discharge.     Will go to [facility] on Monday for repeat CBC. Patient to get [facility] CBCs for now.     To follow up with Dr. Elizondo on d/c.    The importance of follow up appointments, follow up CBCs and compliance with medications was explained at length with the patient at bedside -- patient verbalized understanding that not following up with outpatient providers as soon as possible and not being compliant with medications would put him at elevated risk of readmission, morbidity, and death.     Teach back given by patient of above. Wife at bedside during discussion.     All questions answered.     The patient is deemed medically optimized for discharge.         DISCHARGE TIME SPENT:  45 minutes.             PROBLEM LIST        Problem/plan - 1:    -ANEMIA         Problem/Plan - 2:    ·  Problem: UTI (urinary tract infection).  Plan: Complicated Cystitis          Problem/Plan - 3:    ·  Problem: Atrial fibrillation.           Problem/Plan - 4:    ·  Problem: CAD (coronary artery disease).           Problem/Plan - 5:    ·  Problem: Congestive heart failure (CHF). // Chronic systolic ischemic HF.         Problem/Plan - 6:    Problem: Thrombocytopenia.          Problem/Plan - 7:    ·  Problem: DM (Diabetes Mellitus).           Problem/Plan - 8:    ·  Problem: HTN (hypertension).  P         Problem/Plan - 9:    ·  Problem: HLD (hyperlipidemia).         Problem/Plan - 10:    Problem: Need for prophylactic measure

## 2019-11-18 ENCOUNTER — INBOUND DOCUMENT (OUTPATIENT)
Age: 79
End: 2019-11-18

## 2019-11-18 ENCOUNTER — RESULT REVIEW (OUTPATIENT)
Age: 79
End: 2019-11-18

## 2019-11-18 ENCOUNTER — APPOINTMENT (OUTPATIENT)
Dept: HEMATOLOGY ONCOLOGY | Facility: CLINIC | Age: 79
End: 2019-11-18

## 2019-11-18 ENCOUNTER — OUTPATIENT (OUTPATIENT)
Dept: OUTPATIENT SERVICES | Facility: HOSPITAL | Age: 79
LOS: 1 days | Discharge: HOME | End: 2019-11-18

## 2019-11-18 ENCOUNTER — OUTPATIENT (OUTPATIENT)
Dept: OUTPATIENT SERVICES | Facility: HOSPITAL | Age: 79
LOS: 1 days | End: 2019-11-18
Payer: MEDICARE

## 2019-11-18 DIAGNOSIS — Z90.89 ACQUIRED ABSENCE OF OTHER ORGANS: Chronic | ICD-10-CM

## 2019-11-18 DIAGNOSIS — Z95.5 PRESENCE OF CORONARY ANGIOPLASTY IMPLANT AND GRAFT: Chronic | ICD-10-CM

## 2019-11-18 DIAGNOSIS — D46.9 MYELODYSPLASTIC SYNDROME, UNSPECIFIED: ICD-10-CM

## 2019-11-18 DIAGNOSIS — Z98.89 OTHER SPECIFIED POSTPROCEDURAL STATES: Chronic | ICD-10-CM

## 2019-11-18 LAB
BASOPHILS # BLD AUTO: 0.1 K/UL — SIGNIFICANT CHANGE UP (ref 0–0.2)
BASOPHILS NFR BLD AUTO: 1 % — SIGNIFICANT CHANGE UP (ref 0–2)
EOSINOPHIL # BLD AUTO: 0.1 K/UL — SIGNIFICANT CHANGE UP (ref 0–0.5)
EOSINOPHIL NFR BLD AUTO: 1 % — SIGNIFICANT CHANGE UP (ref 0–6)
HCT VFR BLD CALC: 28.2 % — LOW (ref 39–50)
HGB BLD-MCNC: 10.3 G/DL — LOW (ref 13–17)
LYMPHOCYTES # BLD AUTO: 1 K/UL — SIGNIFICANT CHANGE UP (ref 1–3.3)
LYMPHOCYTES # BLD AUTO: 26 % — SIGNIFICANT CHANGE UP (ref 13–44)
MCHC RBC-ENTMCNC: 34.6 PG — HIGH (ref 27–34)
MCHC RBC-ENTMCNC: 36.4 G/DL — HIGH (ref 32–36)
MCV RBC AUTO: 95.1 FL — SIGNIFICANT CHANGE UP (ref 80–100)
MONOCYTES # BLD AUTO: 0.2 K/UL — SIGNIFICANT CHANGE UP (ref 0–0.9)
MONOCYTES NFR BLD AUTO: 3 % — SIGNIFICANT CHANGE UP (ref 2–14)
NEUTROPHILS # BLD AUTO: 2.1 K/UL — SIGNIFICANT CHANGE UP (ref 1.8–7.4)
NEUTROPHILS NFR BLD AUTO: 69 % — SIGNIFICANT CHANGE UP (ref 43–77)
PLAT MORPH BLD: NORMAL — SIGNIFICANT CHANGE UP
PLATELET # BLD AUTO: 63 K/UL — LOW (ref 150–400)
RBC # BLD: 2.97 M/UL — LOW (ref 4.2–5.8)
RBC # FLD: 16.1 % — HIGH (ref 10.3–14.5)
RBC BLD AUTO: SIGNIFICANT CHANGE UP
WBC # BLD: 3.5 K/UL — LOW (ref 3.8–10.5)
WBC # FLD AUTO: 3.5 K/UL — LOW (ref 3.8–10.5)

## 2019-11-19 NOTE — ASSESSMENT
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  Received transfusion on 8/17/19.  \par   \par \par BM biopsy done on 9/13/19\par Immunohistochemical stains (block 1A: CD34, p53) show increased\par CD34 positive blasts, approximately 7% of cellularity. p53 show\par >1% strongly positive cells, indicative of poor prognosis in MDS\par with del(5q).\par \par Pt now with more aggressive MDS-with 7% myelobalsts on BM bx.  Started Vidaza x 7 days to be given every 28 days; will start cycle 2 on 10/28.  \par Pt reports feeling better with more energy.  Requiring minimal platelet and RBC support. \par Appts made for count check 3 times per week.   \par Follow up in 4 weeks.\par \par \par \par

## 2019-11-19 NOTE — REVIEW OF SYSTEMS
[Fatigue] : fatigue [SOB on Exertion] : shortness of breath during exertion [Muscle Weakness] : muscle weakness [Negative] : Heme/Lymph [FreeTextEntry2] : less fatigued [de-identified] : denies neuropathy, is unsteady on feet, feels a sense of imbalance; denies dizziness [FreeTextEntry9] : generalized weakness

## 2019-11-19 NOTE — RESULTS/DATA
[FreeTextEntry1] : \par ACCESSION No:  10 [id number]\par \par JUAREZ SR, EDWARD                   3\par \par \par \par Hematopathology Report\par \par \par \par \par Final Diagnosis\par 1, 2. 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  Increased ring sideroblasts are also\par present.\par Comprehensive report with results of pending ancillary studies to\par follow.\par \par Dr. Elizondo was notified of the diagnosis on 09/19/19.\par \par Ancillary studies\par Bone marrow aspirate iron stain: Iron stores are increased;\par numerous ring sideroblasts are present (greater than 15%).\par \par Flow cytometry:  The myeloid immunophenotypic findings show\par decreased myeloid granularity, no increase in myeloid immaturity\par with aberrant myeloblasts (2% of cells), positive for HLA-DR,\par CD34, , CD33, CD13, partial CD7, and myeloid antigen\par maturation pattern with left shift.\par \par Immunohistochemical stains (block 1A: CD34, p53) show increased\par CD34 positive blasts, approximately 7% of cellularity. p53 show\par >1% strongly positive cells, indicative of poor prognosis in MDS\par with del(5q).\par \par Cytogenetics:  Pending\par \par FISH:  pending\par \par Microscopic description:\par 1. Biopsy:  Sections of bone marrow biopsy and  show\par normocellularity (30% cellularity), focal immature cells,\par trilineage hematopoiesis with maturation, myeloid predominance,\par megakaryocytes normal in number, most with small/hypolobulated\par morphology, mild perivascular plasmacytosis, and iron stores\par increased.  Bone marrow fragments are not present in clot.\par \par 2. Aspirate:  Cellular spicules are present, adequate for\par interpretation.  Blasts are increased (9%). Maturing and\par \par \par \par \par \par \par MCELHONE SR, EDWARD                   3\par \par \par \par Hematopathology Report\par \par \par \par \par mature myeloid and erythroid elements are present with myeloid\par predominance (M:E ratio (3.8:1).  Megakaryocytes appear normal in\par number with small/dysplastic morphology.\par \par Bone Marrow Aspirate Differential: (300 Cells).\par Type            %    Normal*\par Blast                9%   0-3\par Neutrophil and\par Precursors        50%  33-63\par Eosinophil           3%   1-5\par Basophil        2%   0-1\par Pronormoblast        2%   0-2\par Normoblast           15%  15-25\par Monocyte        0%   0-2\par Lymphocyte           15%  10-15\par Plasma cell          4%   0-1\par *Adult Range\par \par Comment\par Iron stain (examined to evaluate for iron stores; see microscopic\par description) and Giemsa stain (shows appropriate\par staining pattern) are performed and evaluated on block(s): 1A, 1B\par \par Verified by: Brenda Larsen\par (Electronic Signature)\par Reported on: 09/19/19 15:30 EDT, [address] [facility], [place], [place]\par [address]\par _________________________________________________________________\par \par Clinical History\par MDS\par \par Specimen(s) Submitted\par 1     Bone marrow biopsy\par 2     Bone marrow aspirate\par \par Gross Description\par 1. The specimen is received in bouin's fixative and the specimen\par container is labeled: Bone marrow biopsy.  It consists of two\par bone marrow cores measuring 0.2 and 1.0 cm in length by 0.1 cm in\par diameter.  Also present is a 0.5 x 0.4 x 0.1 cm aggregate of\par blood clot.  Entirely submitted.  Two cassettes: A = bone marrow\par core; B = blood clot.\par \par 2. Two Lao-Giemsa and one iron stained bone marrow aspirate\par smears are submitted   {10-FL-  }.\par \par \par \par \par \par \par \par JUAREZ SR, JENIFFER                   3\par \par \par \par Hematopathology Report\par \par \par \par \par In addition to other data that may appear on the specimen\par container, the label has been inspected to confirm the presence\par of the patient's name and date of birth.\par Linda Ritchie 09/13/19 20:07

## 2019-11-19 NOTE — HISTORY OF PRESENT ILLNESS
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 MRA brain: No flow-limiting stenosis or MRA evidence of aneurysm of the intracranial arteries. \par \par  [de-identified] : Patient presents for a follow up accompanied by his wife.  \par Pt is still using wheelchair but feels much better, has more energy.   His appetite is good and he has gained some weight.   He denies any chest pain, no SOB, +PUGH, no abdominal pain, no n/v/d.  \par

## 2019-11-20 ENCOUNTER — RESULT REVIEW (OUTPATIENT)
Age: 79
End: 2019-11-20

## 2019-11-20 ENCOUNTER — APPOINTMENT (OUTPATIENT)
Dept: HEMATOLOGY ONCOLOGY | Facility: CLINIC | Age: 79
End: 2019-11-20
Payer: MEDICARE

## 2019-11-20 ENCOUNTER — APPOINTMENT (OUTPATIENT)
Dept: HEMATOLOGY ONCOLOGY | Facility: CLINIC | Age: 79
End: 2019-11-20

## 2019-11-20 VITALS
WEIGHT: 164.24 LBS | BODY MASS INDEX: 23.57 KG/M2 | SYSTOLIC BLOOD PRESSURE: 119 MMHG | RESPIRATION RATE: 17 BRPM | OXYGEN SATURATION: 98 % | TEMPERATURE: 97.9 F | DIASTOLIC BLOOD PRESSURE: 76 MMHG | HEART RATE: 78 BPM

## 2019-11-20 LAB
BUN SERPL-MCNC: 42 MG/DL — HIGH (ref 7–23)
CA-I BLDA-SCNC: 1.26 MMOL/L — SIGNIFICANT CHANGE UP (ref 1.12–1.3)
CHLORIDE SERPL-SCNC: 108 MMOL/L — SIGNIFICANT CHANGE UP (ref 96–108)
CO2 SERPL-SCNC: 22 MMOL/L — SIGNIFICANT CHANGE UP (ref 22–31)
CREAT SERPL-MCNC: 1.2 MG/DL — SIGNIFICANT CHANGE UP (ref 0.5–1.3)
EOSINOPHIL # BLD AUTO: 0.1 K/UL — SIGNIFICANT CHANGE UP (ref 0–0.5)
EOSINOPHIL NFR BLD AUTO: 4 % — SIGNIFICANT CHANGE UP (ref 0–6)
GLUCOSE SERPL-MCNC: 128 MG/DL — HIGH (ref 70–99)
HCT VFR BLD CALC: 29.7 % — LOW (ref 39–50)
HGB BLD-MCNC: 10 G/DL — LOW (ref 13–17)
LYMPHOCYTES # BLD AUTO: 1.3 K/UL — SIGNIFICANT CHANGE UP (ref 1–3.3)
LYMPHOCYTES # BLD AUTO: 25 % — SIGNIFICANT CHANGE UP (ref 13–44)
MCHC RBC-ENTMCNC: 32.2 PG — SIGNIFICANT CHANGE UP (ref 27–34)
MCHC RBC-ENTMCNC: 33.6 G/DL — SIGNIFICANT CHANGE UP (ref 32–36)
MCV RBC AUTO: 95.7 FL — SIGNIFICANT CHANGE UP (ref 80–100)
MONOCYTES # BLD AUTO: 0.1 K/UL — SIGNIFICANT CHANGE UP (ref 0–0.9)
MONOCYTES NFR BLD AUTO: 8 % — SIGNIFICANT CHANGE UP (ref 2–14)
NEUTROPHILS # BLD AUTO: 2.7 K/UL — SIGNIFICANT CHANGE UP (ref 1.8–7.4)
NEUTROPHILS NFR BLD AUTO: 61 % — SIGNIFICANT CHANGE UP (ref 43–77)
NEUTS BAND # BLD: 2 % — SIGNIFICANT CHANGE UP (ref 0–8)
PLAT MORPH BLD: NORMAL — SIGNIFICANT CHANGE UP
PLATELET # BLD AUTO: 106 K/UL — LOW (ref 150–400)
POTASSIUM SERPL-MCNC: 4.6 MMOL/L — SIGNIFICANT CHANGE UP (ref 3.5–5.3)
POTASSIUM SERPL-SCNC: 4.6 MMOL/L — SIGNIFICANT CHANGE UP (ref 3.5–5.3)
RBC # BLD: 3.1 M/UL — LOW (ref 4.2–5.8)
RBC # FLD: 15.9 % — HIGH (ref 10.3–14.5)
RBC BLD AUTO: SIGNIFICANT CHANGE UP
SODIUM SERPL-SCNC: 141 MMOL/L — SIGNIFICANT CHANGE UP (ref 135–145)
WBC # BLD: 4.3 K/UL — SIGNIFICANT CHANGE UP (ref 3.8–10.5)
WBC # FLD AUTO: 4.3 K/UL — SIGNIFICANT CHANGE UP (ref 3.8–10.5)

## 2019-11-20 PROCEDURE — 99215 OFFICE O/P EST HI 40 MIN: CPT

## 2019-11-21 ENCOUNTER — MEDICATION RENEWAL (OUTPATIENT)
Age: 79
End: 2019-11-21

## 2019-11-21 RX ORDER — METFORMIN ER 500 MG 500 MG/1
500 TABLET ORAL
Qty: 180 | Refills: 3 | Status: ACTIVE | COMMUNITY
Start: 2017-03-08 | End: 1900-01-01

## 2019-11-22 ENCOUNTER — RESULT REVIEW (OUTPATIENT)
Age: 79
End: 2019-11-22

## 2019-11-22 ENCOUNTER — APPOINTMENT (OUTPATIENT)
Dept: INFUSION THERAPY | Facility: HOSPITAL | Age: 79
End: 2019-11-22

## 2019-11-22 LAB
BASOPHILS # BLD AUTO: 0.1 K/UL — SIGNIFICANT CHANGE UP (ref 0–0.2)
BASOPHILS NFR BLD AUTO: 2.2 % — HIGH (ref 0–2)
EOSINOPHIL # BLD AUTO: 0.1 K/UL — SIGNIFICANT CHANGE UP (ref 0–0.5)
EOSINOPHIL NFR BLD AUTO: 1.5 % — SIGNIFICANT CHANGE UP (ref 0–6)
HCT VFR BLD CALC: 29.7 % — LOW (ref 39–50)
HGB BLD-MCNC: 10.2 G/DL — LOW (ref 13–17)
LYMPHOCYTES # BLD AUTO: 1.2 K/UL — SIGNIFICANT CHANGE UP (ref 1–3.3)
LYMPHOCYTES # BLD AUTO: 29.8 % — SIGNIFICANT CHANGE UP (ref 13–44)
MCHC RBC-ENTMCNC: 33.1 PG — SIGNIFICANT CHANGE UP (ref 27–34)
MCHC RBC-ENTMCNC: 34.4 G/DL — SIGNIFICANT CHANGE UP (ref 32–36)
MCV RBC AUTO: 96.2 FL — SIGNIFICANT CHANGE UP (ref 80–100)
MONOCYTES # BLD AUTO: 0.1 K/UL — SIGNIFICANT CHANGE UP (ref 0–0.9)
MONOCYTES NFR BLD AUTO: 2 % — SIGNIFICANT CHANGE UP (ref 2–14)
NEUTROPHILS # BLD AUTO: 2.7 K/UL — SIGNIFICANT CHANGE UP (ref 1.8–7.4)
NEUTROPHILS NFR BLD AUTO: 64.6 % — SIGNIFICANT CHANGE UP (ref 43–77)
PLATELET # BLD AUTO: 215 K/UL — SIGNIFICANT CHANGE UP (ref 150–400)
RBC # BLD: 3.09 M/UL — LOW (ref 4.2–5.8)
RBC # FLD: 16 % — HIGH (ref 10.3–14.5)
WBC # BLD: 4.2 K/UL — SIGNIFICANT CHANGE UP (ref 3.8–10.5)
WBC # FLD AUTO: 4.2 K/UL — SIGNIFICANT CHANGE UP (ref 3.8–10.5)

## 2019-11-25 ENCOUNTER — APPOINTMENT (OUTPATIENT)
Dept: INFUSION THERAPY | Facility: HOSPITAL | Age: 79
End: 2019-11-25

## 2019-11-25 ENCOUNTER — RESULT REVIEW (OUTPATIENT)
Age: 79
End: 2019-11-25

## 2019-11-25 DIAGNOSIS — E86.0 DEHYDRATION: ICD-10-CM

## 2019-11-25 LAB
BASOPHILS # BLD AUTO: 0.1 K/UL — SIGNIFICANT CHANGE UP (ref 0–0.2)
BASOPHILS NFR BLD AUTO: 2.2 % — HIGH (ref 0–2)
EOSINOPHIL # BLD AUTO: 0.2 K/UL — SIGNIFICANT CHANGE UP (ref 0–0.5)
EOSINOPHIL NFR BLD AUTO: 6.8 % — HIGH (ref 0–6)
HCT VFR BLD CALC: 28.3 % — LOW (ref 39–50)
HGB BLD-MCNC: 9.8 G/DL — LOW (ref 13–17)
LYMPHOCYTES # BLD AUTO: 1.3 K/UL — SIGNIFICANT CHANGE UP (ref 1–3.3)
LYMPHOCYTES # BLD AUTO: 42.6 % — SIGNIFICANT CHANGE UP (ref 13–44)
MCHC RBC-ENTMCNC: 33.4 PG — SIGNIFICANT CHANGE UP (ref 27–34)
MCHC RBC-ENTMCNC: 34.8 G/DL — SIGNIFICANT CHANGE UP (ref 32–36)
MCV RBC AUTO: 96.1 FL — SIGNIFICANT CHANGE UP (ref 80–100)
MONOCYTES # BLD AUTO: 0.4 K/UL — SIGNIFICANT CHANGE UP (ref 0–0.9)
MONOCYTES NFR BLD AUTO: 14.5 % — HIGH (ref 2–14)
NEUTROPHILS # BLD AUTO: 1 K/UL — LOW (ref 1.8–7.4)
NEUTROPHILS NFR BLD AUTO: 33.9 % — LOW (ref 43–77)
PLATELET # BLD AUTO: 351 K/UL — SIGNIFICANT CHANGE UP (ref 150–400)
RBC # BLD: 2.94 M/UL — LOW (ref 4.2–5.8)
RBC # FLD: 16.6 % — HIGH (ref 10.3–14.5)
WBC # BLD: 3 K/UL — LOW (ref 3.8–10.5)
WBC # FLD AUTO: 3 K/UL — LOW (ref 3.8–10.5)

## 2019-11-29 ENCOUNTER — RESULT REVIEW (OUTPATIENT)
Age: 79
End: 2019-11-29

## 2019-11-29 ENCOUNTER — APPOINTMENT (OUTPATIENT)
Dept: HEMATOLOGY ONCOLOGY | Facility: CLINIC | Age: 79
End: 2019-11-29

## 2019-11-29 ENCOUNTER — APPOINTMENT (OUTPATIENT)
Dept: INFUSION THERAPY | Facility: HOSPITAL | Age: 79
End: 2019-11-29

## 2019-11-29 LAB
BASOPHILS # BLD AUTO: 0.1 K/UL — SIGNIFICANT CHANGE UP (ref 0–0.2)
EOSINOPHIL # BLD AUTO: 0.3 K/UL — SIGNIFICANT CHANGE UP (ref 0–0.5)
EOSINOPHIL NFR BLD AUTO: 8 % — HIGH (ref 0–6)
HCT VFR BLD CALC: 27.3 % — LOW (ref 39–50)
HGB BLD-MCNC: 9.2 G/DL — LOW (ref 13–17)
LYMPHOCYTES # BLD AUTO: 2 K/UL — SIGNIFICANT CHANGE UP (ref 1–3.3)
LYMPHOCYTES # BLD AUTO: 32 % — SIGNIFICANT CHANGE UP (ref 13–44)
MCHC RBC-ENTMCNC: 32.6 PG — SIGNIFICANT CHANGE UP (ref 27–34)
MCHC RBC-ENTMCNC: 33.8 G/DL — SIGNIFICANT CHANGE UP (ref 32–36)
MCV RBC AUTO: 96.5 FL — SIGNIFICANT CHANGE UP (ref 80–100)
MONOCYTES # BLD AUTO: 1.1 K/UL — HIGH (ref 0–0.9)
MONOCYTES NFR BLD AUTO: 16 % — HIGH (ref 2–14)
MYELOCYTES NFR BLD: 2 % — HIGH (ref 0–0)
NEUTROPHILS # BLD AUTO: 2.6 K/UL — SIGNIFICANT CHANGE UP (ref 1.8–7.4)
NEUTROPHILS NFR BLD AUTO: 41 % — LOW (ref 43–77)
NEUTS BAND # BLD: 1 % — SIGNIFICANT CHANGE UP (ref 0–8)
PLAT MORPH BLD: NORMAL — SIGNIFICANT CHANGE UP
PLATELET # BLD AUTO: 468 K/UL — HIGH (ref 150–400)
RBC # BLD: 2.83 M/UL — LOW (ref 4.2–5.8)
RBC # FLD: 17.7 % — HIGH (ref 10.3–14.5)
RBC BLD AUTO: SIGNIFICANT CHANGE UP
WBC # BLD: 6 K/UL — SIGNIFICANT CHANGE UP (ref 3.8–10.5)
WBC # FLD AUTO: 6 K/UL — SIGNIFICANT CHANGE UP (ref 3.8–10.5)

## 2019-11-30 ENCOUNTER — APPOINTMENT (OUTPATIENT)
Dept: INFUSION THERAPY | Facility: HOSPITAL | Age: 79
End: 2019-11-30

## 2019-11-30 LAB
BLD GP AB SCN SERPL QL: NEGATIVE — SIGNIFICANT CHANGE UP
RH IG SCN BLD-IMP: POSITIVE — SIGNIFICANT CHANGE UP

## 2019-11-30 PROCEDURE — 86901 BLOOD TYPING SEROLOGIC RH(D): CPT

## 2019-11-30 PROCEDURE — 86900 BLOOD TYPING SEROLOGIC ABO: CPT

## 2019-11-30 PROCEDURE — 86850 RBC ANTIBODY SCREEN: CPT

## 2019-11-30 PROCEDURE — 86923 COMPATIBILITY TEST ELECTRIC: CPT

## 2019-12-02 ENCOUNTER — RESULT REVIEW (OUTPATIENT)
Age: 79
End: 2019-12-02

## 2019-12-02 ENCOUNTER — APPOINTMENT (OUTPATIENT)
Dept: ELECTROPHYSIOLOGY | Facility: CLINIC | Age: 79
End: 2019-12-02
Payer: MEDICARE

## 2019-12-02 ENCOUNTER — APPOINTMENT (OUTPATIENT)
Dept: INFUSION THERAPY | Facility: HOSPITAL | Age: 79
End: 2019-12-02

## 2019-12-02 DIAGNOSIS — Z51.89 ENCOUNTER FOR OTHER SPECIFIED AFTERCARE: ICD-10-CM

## 2019-12-02 LAB
BASOPHILS # BLD AUTO: 0 K/UL — SIGNIFICANT CHANGE UP (ref 0–0.2)
BASOPHILS NFR BLD AUTO: 0.5 % — SIGNIFICANT CHANGE UP (ref 0–2)
EOSINOPHIL # BLD AUTO: 0.2 K/UL — SIGNIFICANT CHANGE UP (ref 0–0.5)
EOSINOPHIL NFR BLD AUTO: 3.1 % — SIGNIFICANT CHANGE UP (ref 0–6)
HCT VFR BLD CALC: 35.5 % — LOW (ref 39–50)
HGB BLD-MCNC: 12.2 G/DL — LOW (ref 13–17)
LYMPHOCYTES # BLD AUTO: 1.2 K/UL — SIGNIFICANT CHANGE UP (ref 1–3.3)
LYMPHOCYTES # BLD AUTO: 22.7 % — SIGNIFICANT CHANGE UP (ref 13–44)
MCHC RBC-ENTMCNC: 32.5 PG — SIGNIFICANT CHANGE UP (ref 27–34)
MCHC RBC-ENTMCNC: 34.3 G/DL — SIGNIFICANT CHANGE UP (ref 32–36)
MCV RBC AUTO: 94.9 FL — SIGNIFICANT CHANGE UP (ref 80–100)
MONOCYTES # BLD AUTO: 0.4 K/UL — SIGNIFICANT CHANGE UP (ref 0–0.9)
MONOCYTES NFR BLD AUTO: 7.1 % — SIGNIFICANT CHANGE UP (ref 2–14)
NEUTROPHILS # BLD AUTO: 3.6 K/UL — SIGNIFICANT CHANGE UP (ref 1.8–7.4)
NEUTROPHILS NFR BLD AUTO: 66.5 % — SIGNIFICANT CHANGE UP (ref 43–77)
PLATELET # BLD AUTO: 501 K/UL — HIGH (ref 150–400)
RBC # BLD: 3.75 M/UL — LOW (ref 4.2–5.8)
RBC # FLD: 17.1 % — HIGH (ref 10.3–14.5)
WBC # BLD: 5.4 K/UL — SIGNIFICANT CHANGE UP (ref 3.8–10.5)
WBC # FLD AUTO: 5.4 K/UL — SIGNIFICANT CHANGE UP (ref 3.8–10.5)

## 2019-12-02 PROCEDURE — 93299: CPT

## 2019-12-02 PROCEDURE — 93298 REM INTERROG DEV EVAL SCRMS: CPT

## 2019-12-05 DIAGNOSIS — D46.9 MYELODYSPLASTIC SYNDROME, UNSPECIFIED: ICD-10-CM

## 2019-12-06 ENCOUNTER — APPOINTMENT (OUTPATIENT)
Dept: INFUSION THERAPY | Facility: HOSPITAL | Age: 79
End: 2019-12-06
Payer: MEDICARE

## 2019-12-06 ENCOUNTER — INPATIENT (INPATIENT)
Facility: HOSPITAL | Age: 79
LOS: 3 days | Discharge: HOME | DRG: 391 | End: 2019-12-10
Attending: HOSPITALIST | Admitting: HOSPITALIST
Payer: MEDICARE

## 2019-12-06 VITALS
DIASTOLIC BLOOD PRESSURE: 75 MMHG | OXYGEN SATURATION: 95 % | SYSTOLIC BLOOD PRESSURE: 126 MMHG | TEMPERATURE: 98 F | RESPIRATION RATE: 17 BRPM | HEART RATE: 92 BPM

## 2019-12-06 DIAGNOSIS — Z90.89 ACQUIRED ABSENCE OF OTHER ORGANS: Chronic | ICD-10-CM

## 2019-12-06 DIAGNOSIS — R53.1 WEAKNESS: ICD-10-CM

## 2019-12-06 DIAGNOSIS — Z95.5 PRESENCE OF CORONARY ANGIOPLASTY IMPLANT AND GRAFT: Chronic | ICD-10-CM

## 2019-12-06 DIAGNOSIS — Z98.89 OTHER SPECIFIED POSTPROCEDURAL STATES: Chronic | ICD-10-CM

## 2019-12-06 LAB
ALBUMIN SERPL ELPH-MCNC: 3.5 G/DL — SIGNIFICANT CHANGE UP (ref 3.3–5)
ALP SERPL-CCNC: 223 U/L — HIGH (ref 40–120)
ALT FLD-CCNC: 54 U/L — HIGH (ref 10–45)
ANION GAP SERPL CALC-SCNC: 16 MMOL/L — SIGNIFICANT CHANGE UP (ref 5–17)
ANISOCYTOSIS BLD QL: SLIGHT — SIGNIFICANT CHANGE UP
APPEARANCE UR: CLEAR — SIGNIFICANT CHANGE UP
AST SERPL-CCNC: 50 U/L — HIGH (ref 10–40)
BACTERIA # UR AUTO: NEGATIVE — SIGNIFICANT CHANGE UP
BASE EXCESS BLDV CALC-SCNC: -0.5 MMOL/L — SIGNIFICANT CHANGE UP (ref -2–2)
BASOPHILS # BLD AUTO: 0 K/UL — SIGNIFICANT CHANGE UP (ref 0–0.2)
BASOPHILS NFR BLD AUTO: 0 % — SIGNIFICANT CHANGE UP (ref 0–2)
BILIRUB SERPL-MCNC: 0.5 MG/DL — SIGNIFICANT CHANGE UP (ref 0.2–1.2)
BILIRUB UR-MCNC: NEGATIVE — SIGNIFICANT CHANGE UP
BUN SERPL-MCNC: 37 MG/DL — HIGH (ref 7–23)
CA-I SERPL-SCNC: 1.17 MMOL/L — SIGNIFICANT CHANGE UP (ref 1.12–1.3)
CALCIUM SERPL-MCNC: 10.1 MG/DL — SIGNIFICANT CHANGE UP (ref 8.4–10.5)
CHLORIDE BLDV-SCNC: 105 MMOL/L — SIGNIFICANT CHANGE UP (ref 96–108)
CHLORIDE SERPL-SCNC: 98 MMOL/L — SIGNIFICANT CHANGE UP (ref 96–108)
CO2 BLDV-SCNC: 26 MMOL/L — SIGNIFICANT CHANGE UP (ref 22–30)
CO2 SERPL-SCNC: 19 MMOL/L — LOW (ref 22–31)
COLOR SPEC: SIGNIFICANT CHANGE UP
CREAT SERPL-MCNC: 1.22 MG/DL — SIGNIFICANT CHANGE UP (ref 0.5–1.3)
DIFF PNL FLD: NEGATIVE — SIGNIFICANT CHANGE UP
EOSINOPHIL # BLD AUTO: 0.27 K/UL — SIGNIFICANT CHANGE UP (ref 0–0.5)
EOSINOPHIL NFR BLD AUTO: 5.3 % — SIGNIFICANT CHANGE UP (ref 0–6)
EPI CELLS # UR: 1 /HPF — SIGNIFICANT CHANGE UP
GAS PNL BLDV: 130 MMOL/L — LOW (ref 135–145)
GAS PNL BLDV: SIGNIFICANT CHANGE UP
GAS PNL BLDV: SIGNIFICANT CHANGE UP
GIANT PLATELETS BLD QL SMEAR: PRESENT — SIGNIFICANT CHANGE UP
GLUCOSE BLDV-MCNC: 124 MG/DL — HIGH (ref 70–99)
GLUCOSE SERPL-MCNC: 127 MG/DL — HIGH (ref 70–99)
GLUCOSE UR QL: NEGATIVE — SIGNIFICANT CHANGE UP
HCO3 BLDV-SCNC: 24 MMOL/L — SIGNIFICANT CHANGE UP (ref 21–29)
HCT VFR BLD CALC: 34.9 % — LOW (ref 39–50)
HCT VFR BLDA CALC: 39 % — SIGNIFICANT CHANGE UP (ref 39–50)
HGB BLD CALC-MCNC: 12.5 G/DL — LOW (ref 13–17)
HGB BLD-MCNC: 11.6 G/DL — LOW (ref 13–17)
HYALINE CASTS # UR AUTO: 2 /LPF — SIGNIFICANT CHANGE UP (ref 0–2)
KETONES UR-MCNC: NEGATIVE — SIGNIFICANT CHANGE UP
LACTATE BLDV-MCNC: 2.5 MMOL/L — HIGH (ref 0.7–2)
LEUKOCYTE ESTERASE UR-ACNC: NEGATIVE — SIGNIFICANT CHANGE UP
LYMPHOCYTES # BLD AUTO: 0.86 K/UL — LOW (ref 1–3.3)
LYMPHOCYTES # BLD AUTO: 16.7 % — SIGNIFICANT CHANGE UP (ref 13–44)
MACROCYTES BLD QL: SLIGHT — SIGNIFICANT CHANGE UP
MANUAL SMEAR VERIFICATION: SIGNIFICANT CHANGE UP
MCHC RBC-ENTMCNC: 30.9 PG — SIGNIFICANT CHANGE UP (ref 27–34)
MCHC RBC-ENTMCNC: 33.2 GM/DL — SIGNIFICANT CHANGE UP (ref 32–36)
MCV RBC AUTO: 93.1 FL — SIGNIFICANT CHANGE UP (ref 80–100)
MONOCYTES # BLD AUTO: 0.13 K/UL — SIGNIFICANT CHANGE UP (ref 0–0.9)
MONOCYTES NFR BLD AUTO: 2.6 % — SIGNIFICANT CHANGE UP (ref 2–14)
NEUTROPHILS # BLD AUTO: 3.87 K/UL — SIGNIFICANT CHANGE UP (ref 1.8–7.4)
NEUTROPHILS NFR BLD AUTO: 75.4 % — SIGNIFICANT CHANGE UP (ref 43–77)
NITRITE UR-MCNC: NEGATIVE — SIGNIFICANT CHANGE UP
OTHER CELLS CSF MANUAL: 5 ML/DL — LOW (ref 18–22)
PCO2 BLDV: 42 MMHG — SIGNIFICANT CHANGE UP (ref 35–50)
PH BLDV: 7.38 — SIGNIFICANT CHANGE UP (ref 7.35–7.45)
PH UR: 5.5 — SIGNIFICANT CHANGE UP (ref 5–8)
PLAT MORPH BLD: SIGNIFICANT CHANGE UP
PLATELET # BLD AUTO: 512 K/UL — HIGH (ref 150–400)
PO2 BLDV: 22 MMHG — LOW (ref 25–45)
POIKILOCYTOSIS BLD QL AUTO: SLIGHT — SIGNIFICANT CHANGE UP
POLYCHROMASIA BLD QL SMEAR: SLIGHT — SIGNIFICANT CHANGE UP
POTASSIUM BLDV-SCNC: 4.3 MMOL/L — SIGNIFICANT CHANGE UP (ref 3.5–5.3)
POTASSIUM SERPL-MCNC: 4.8 MMOL/L — SIGNIFICANT CHANGE UP (ref 3.5–5.3)
POTASSIUM SERPL-SCNC: 4.8 MMOL/L — SIGNIFICANT CHANGE UP (ref 3.5–5.3)
PROT SERPL-MCNC: 8.7 G/DL — HIGH (ref 6–8.3)
PROT UR-MCNC: 100 — SIGNIFICANT CHANGE UP
RBC # BLD: 3.75 M/UL — LOW (ref 4.2–5.8)
RBC # FLD: 17.5 % — HIGH (ref 10.3–14.5)
RBC BLD AUTO: ABNORMAL
RBC CASTS # UR COMP ASSIST: 3 /HPF — SIGNIFICANT CHANGE UP (ref 0–4)
SAO2 % BLDV: 28 % — LOW (ref 67–88)
SCHISTOCYTES BLD QL AUTO: SLIGHT — SIGNIFICANT CHANGE UP
SODIUM SERPL-SCNC: 133 MMOL/L — LOW (ref 135–145)
SP GR SPEC: 1.02 — SIGNIFICANT CHANGE UP (ref 1.01–1.02)
UROBILINOGEN FLD QL: NEGATIVE — SIGNIFICANT CHANGE UP
WBC # BLD: 5.13 K/UL — SIGNIFICANT CHANGE UP (ref 3.8–10.5)
WBC # FLD AUTO: 5.13 K/UL — SIGNIFICANT CHANGE UP (ref 3.8–10.5)
WBC UR QL: 1 /HPF — SIGNIFICANT CHANGE UP (ref 0–5)

## 2019-12-06 PROCEDURE — 74176 CT ABD & PELVIS W/O CONTRAST: CPT | Mod: 26

## 2019-12-06 PROCEDURE — 99285 EMERGENCY DEPT VISIT HI MDM: CPT | Mod: GC

## 2019-12-06 PROCEDURE — 70450 CT HEAD/BRAIN W/O DYE: CPT | Mod: 26

## 2019-12-06 PROCEDURE — 99213 OFFICE O/P EST LOW 20 MIN: CPT

## 2019-12-06 RX ORDER — SODIUM CHLORIDE 9 MG/ML
500 INJECTION INTRAMUSCULAR; INTRAVENOUS; SUBCUTANEOUS ONCE
Refills: 0 | Status: COMPLETED | OUTPATIENT
Start: 2019-12-06 | End: 2019-12-06

## 2019-12-06 RX ADMIN — SODIUM CHLORIDE 500 MILLILITER(S): 9 INJECTION INTRAMUSCULAR; INTRAVENOUS; SUBCUTANEOUS at 20:05

## 2019-12-06 RX ADMIN — SODIUM CHLORIDE 500 MILLILITER(S): 9 INJECTION INTRAMUSCULAR; INTRAVENOUS; SUBCUTANEOUS at 23:55

## 2019-12-06 NOTE — ED PROVIDER NOTE - OBJECTIVE STATEMENT
80 year old male with pmhx MDS, ADD, hypothyroidism, AFib, CHF, gout, hypercholesterolemia, DM, HTN and pshx tonsillectomy, stented coronary artery presents to the ED c/o intermittent generalized weakness and AMS. +intermittent LLQ pain x3-4 days, worse with movement. Last BM x3 days ago, still passing flatus, no n/v. Currently just finished treatment for UTI, denies any urinary symptoms at this time but is complaining of left flank pain which is not associated with the abdominal pain. Pt was at [facility] for a transfusion due to his MDS but was subsequently referred to [facility] ED for evaluation due to deteriorating mental status prior to IV fluids for hydration, usually receives 500ccs. Family notes that the pt has been weak over the past several days and has been experiencing generalized pains. Denies falls, SOB, fever, chills, melena, hematuria. Pt has residual right-sided weakness s/p stroke x2 years ago, multiple TIAs. At time of presentation to [facility] ED, pt is at baseline mental status, able to converse. , taken by EMS PTA. Not currently on steroids. Last had chemotherapy x3 days ago, next appointment scheduled for next Monday but family will push it off due to AMS. PMD: Dr. Kahlil Ortega. 80 year old male with pmhx MDS, ADD, hypothyroidism, AFib, CHF, gout, hypercholesterolemia, DM, HTN and pshx tonsillectomy, stented coronary artery presents to the ED c/o worsening weakness and letahrgy. +intermittent LLQ pain x3-4 days, worse with movement. Last BM x3 days ago, still passing flatus, no n/v. Currently just finished treatment for UTI, denies any urinary symptoms at this time but is complaining of left flank pain which is not associated with the abdominal pain. Pt was at [facility] for a transfusion due to his MDS but was subsequently referred to [facility] ED for evaluation due to deteriorating mental status prior to IV fluids for hydration, usually receives 500ccs. Family notes that the pt has been weak over the past several days and has been experiencing generalized pains. Denies falls, SOB, fever, chills, melena, hematuria. Pt has residual right-sided weakness s/p stroke x2 years ago, multiple TIAs. At time of presentation to [facility] ED, pt is at baseline mental status, able to converse. , taken by EMS PTA. Not currently on steroids. Last had chemotherapy x3 days ago, next appointment scheduled for next Monday but family will push it off due to AMS. PMD: Dr. Kahlil Ortega.

## 2019-12-06 NOTE — ED PROVIDER NOTE - CLINICAL SUMMARY MEDICAL DECISION MAKING FREE TEXT BOX
80 year old male with pmhx MDS, ADD, hypothyroidism, AFib, CHF, gout, hypercholesterolemia, DM, HTN and pshx tonsillectomy, stented coronary artery presents to the ED c/o intermittent generalized weakness and AMS. Pt is non-toxic appearing with PE significant for LLQ pain, left flank tenderness. Given pt's recent treatment for UTI with new left flank pain, there is a concern for partially treated UTI leading to pyelonephritis. Pt also with LLQ pain and tenderness to exam, possibly diverticulitis, although pt has not had a BM in x3 days, so the pain may be attributed to constipation. Given age, will obtain CT-abdomen to evaluate for pathologies. Low suspicion for aortic dissection or aneurysm given lack of abdominal masses and no radiation of abdominal pain. Will obtain CT-head to r/o any bleeds or new strokes.

## 2019-12-06 NOTE — ED PROVIDER NOTE - ATTENDING CONTRIBUTION TO CARE
Agree with above, Jl Reed MD, FACEP   Based on patient's history and physical exam, as well as the results of today's workup, I feel that patient warrants admission to the hospital for further workup/evaluation and continued management. I discussed the findings of today's workup with the patient and addressed the patient's questions and concerns. The patient was agreeable with admission. Our team spoke with the inpatient receiving team who accepted the patient for admission and subsequently took over the patient's care at the time of admission.

## 2019-12-06 NOTE — ED PROVIDER NOTE - CARE PLAN
Principal Discharge DX:	Generalized weakness Principal Discharge DX:	Generalized weakness  Goal:	left lower quadrant pain

## 2019-12-06 NOTE — ED ADULT NURSE NOTE - EXTENSIONS OF SELF_ADULT
Subjective:  no distress  still has right pigtail catheter      MEDICATIONS  (STANDING):  ALBUTerol/ipratropium for Nebulization 3 milliLiter(s) Nebulizer every 6 hours  aspirin  chewable 81 milliGRAM(s) Oral daily  atorvastatin 20 milliGRAM(s) Oral at bedtime  cefTRIAXone Injectable. 1000 milliGRAM(s) IV Push every 24 hours  escitalopram 20 milliGRAM(s) Oral daily  finasteride 5 milliGRAM(s) Oral daily  furosemide    Tablet 20 milliGRAM(s) Oral daily  heparin  Injectable 5000 Unit(s) SubCutaneous every 12 hours  methylPREDNISolone sodium succinate Injectable 40 milliGRAM(s) IV Push daily  potassium chloride    Tablet ER 20 milliEquivalent(s) Oral daily  tamsulosin 0.4 milliGRAM(s) Oral at bedtime  traZODone 100 milliGRAM(s) Oral at bedtime    MEDICATIONS  (PRN):      Allergies    No Known Allergies    Intolerances        REVIEW OF SYSTEMS:    CONSTITUTIONAL:  As per HPI.  HEENT:  Eyes:  No diplopia or blurred vision. ENT:  No earache, sore throat or runny nose.  CARDIOVASCULAR:  No pressure, squeezing, tightness, heaviness or aching about the chest, neck, axilla or epigastrium.  RESPIRATORY:  No cough, shortness of breath, PND or orthopnea.  GASTROINTESTINAL:  No nausea, vomiting or diarrhea.  GENITOURINARY:  No dysuria, frequency or urgency.  MUSCULOSKELETAL:  no joint pain, deformity, tenderness  EXTREMITIES: no clubbing cyanosis,edema  SKIN:  No change in skin, hair or nails.  NEUROLOGIC:  No paresthesias, fasciculations, seizures or weakness.  PSYCHIATRIC:  No disorder of thought or mood.  ENDOCRINE:  No heat or cold intolerance, polyuria or polydipsia.  HEMATOLOGICAL:  No easy bruising or bleedings:    Vital Signs Last 24 Hrs  T(C): 36.2 (06 Jul 2018 05:24), Max: 37 (05 Jul 2018 10:40)  T(F): 97.2 (06 Jul 2018 05:24), Max: 98.6 (05 Jul 2018 10:40)  HR: 75 (06 Jul 2018 05:24) (70 - 96)  BP: 110/49 (06 Jul 2018 05:24) (110/49 - 157/69)  BP(mean): --  RR: 18 (06 Jul 2018 05:24) (17 - 18)  SpO2: 100% (06 Jul 2018 05:24) (91% - 100%)    PHYSICAL EXAMINATION:  SKIN: no rashes  HEAD: NC/AT  EYES: PERRLA, EOMI  EARS: TM's intact  NOSE: no abnormalities  NECK:  Supple. No lymphadenopathy. Jugular venous pressure not elevated. Carotids equal.   HEART:   S1S2 reg  CHEST: decreased BS right with ronchi; chest tube no leak  ABDOMEN:  Soft and nontender.   EXTREMITIES:  no C/C/E  NEURO: no focal deficts       LABS:                        9.7    16.00 )-----------( 243      ( 06 Jul 2018 07:11 )             30.7     07-06    140  |  101  |  31<H>  ----------------------------<  87  4.0   |  34<H>  |  0.83    Ca    7.7<L>      06 Jul 2018 07:11            RADIOLOGY & ADDITIONAL TESTS: None

## 2019-12-06 NOTE — ED CLERICAL - NS ED CLERK NOTE PRE-ARRIVAL INFORMATION; ADDITIONAL PRE-ARRIVAL INFORMATION
CC/Reason For referral:  generalized weakness. decreased p/o intake. hx of recent uti. new onset flank pain.  Preferred Consultant(if applicable): hem/onc fellow  Who admits for you (if needed):  Do you have documents you would like to fax over? no  Would you still like to speak to an ED attending? please call after patient is seen and ask for covering

## 2019-12-06 NOTE — ED PROVIDER NOTE - PHYSICAL EXAMINATION
General: Non-toxic appearing, no acute distress. Able to answer questions appropriately.   Heart: Regular rate and rhythm. No murmurs, rubs, or gallops.  Lungs: CTAB, no wheezes or rhonchi.  Abdomen: LLQ tenderness to palpation, left-sided CVA tenderness.   Eyes: PERRL, EOMI.  Neuro: AAOx2, bilateral lower extremity 3/5 strength. CN II-XII intact.  Extremities: No lower extremity swelling, 2+ DP and radial pulses.  Skin: No rashes or lesions.

## 2019-12-07 DIAGNOSIS — I10 ESSENTIAL (PRIMARY) HYPERTENSION: ICD-10-CM

## 2019-12-07 DIAGNOSIS — R53.1 WEAKNESS: ICD-10-CM

## 2019-12-07 DIAGNOSIS — E03.9 HYPOTHYROIDISM, UNSPECIFIED: ICD-10-CM

## 2019-12-07 DIAGNOSIS — Z29.9 ENCOUNTER FOR PROPHYLACTIC MEASURES, UNSPECIFIED: ICD-10-CM

## 2019-12-07 DIAGNOSIS — R09.89 OTHER SPECIFIED SYMPTOMS AND SIGNS INVOLVING THE CIRCULATORY AND RESPIRATORY SYSTEMS: ICD-10-CM

## 2019-12-07 DIAGNOSIS — M10.9 GOUT, UNSPECIFIED: ICD-10-CM

## 2019-12-07 DIAGNOSIS — R10.32 LEFT LOWER QUADRANT PAIN: ICD-10-CM

## 2019-12-07 DIAGNOSIS — E11.69 TYPE 2 DIABETES MELLITUS WITH OTHER SPECIFIED COMPLICATION: ICD-10-CM

## 2019-12-07 DIAGNOSIS — I25.10 ATHEROSCLEROTIC HEART DISEASE OF NATIVE CORONARY ARTERY WITHOUT ANGINA PECTORIS: ICD-10-CM

## 2019-12-07 DIAGNOSIS — D46.9 MYELODYSPLASTIC SYNDROME, UNSPECIFIED: ICD-10-CM

## 2019-12-07 LAB
ANION GAP SERPL CALC-SCNC: 14 MMOL/L — SIGNIFICANT CHANGE UP (ref 5–17)
BASE EXCESS BLDV CALC-SCNC: -0.9 MMOL/L — SIGNIFICANT CHANGE UP (ref -2–2)
BASOPHILS # BLD AUTO: 0.05 K/UL — SIGNIFICANT CHANGE UP (ref 0–0.2)
BASOPHILS NFR BLD AUTO: 1.2 % — SIGNIFICANT CHANGE UP (ref 0–2)
BUN SERPL-MCNC: 37 MG/DL — HIGH (ref 7–23)
CA-I SERPL-SCNC: 1.21 MMOL/L — SIGNIFICANT CHANGE UP (ref 1.12–1.3)
CALCIUM SERPL-MCNC: 8.3 MG/DL — LOW (ref 8.4–10.5)
CHLORIDE BLDV-SCNC: 102 MMOL/L — SIGNIFICANT CHANGE UP (ref 96–108)
CHLORIDE SERPL-SCNC: 101 MMOL/L — SIGNIFICANT CHANGE UP (ref 96–108)
CO2 BLDV-SCNC: 24 MMOL/L — SIGNIFICANT CHANGE UP (ref 22–30)
CO2 SERPL-SCNC: 21 MMOL/L — LOW (ref 22–31)
CREAT SERPL-MCNC: 1.25 MG/DL — SIGNIFICANT CHANGE UP (ref 0.5–1.3)
EOSINOPHIL # BLD AUTO: 0.25 K/UL — SIGNIFICANT CHANGE UP (ref 0–0.5)
EOSINOPHIL NFR BLD AUTO: 6.2 % — HIGH (ref 0–6)
GAS PNL BLDV: 134 MMOL/L — LOW (ref 135–145)
GAS PNL BLDV: SIGNIFICANT CHANGE UP
GAS PNL BLDV: SIGNIFICANT CHANGE UP
GLUCOSE BLDV-MCNC: 105 MG/DL — HIGH (ref 70–99)
GLUCOSE SERPL-MCNC: 124 MG/DL — HIGH (ref 70–99)
HCO3 BLDV-SCNC: 23 MMOL/L — SIGNIFICANT CHANGE UP (ref 21–29)
HCT VFR BLD CALC: 29 % — LOW (ref 39–50)
HCT VFR BLDA CALC: 31 % — LOW (ref 39–50)
HGB BLD CALC-MCNC: 9.9 G/DL — LOW (ref 13–17)
HGB BLD-MCNC: 9.6 G/DL — LOW (ref 13–17)
IMM GRANULOCYTES NFR BLD AUTO: 2.5 % — HIGH (ref 0–1.5)
LACTATE BLDV-MCNC: 1.4 MMOL/L — SIGNIFICANT CHANGE UP (ref 0.7–2)
LYMPHOCYTES # BLD AUTO: 1.07 K/UL — SIGNIFICANT CHANGE UP (ref 1–3.3)
LYMPHOCYTES # BLD AUTO: 26.4 % — SIGNIFICANT CHANGE UP (ref 13–44)
MAGNESIUM SERPL-MCNC: 2 MG/DL — SIGNIFICANT CHANGE UP (ref 1.6–2.6)
MCHC RBC-ENTMCNC: 31.3 PG — SIGNIFICANT CHANGE UP (ref 27–34)
MCHC RBC-ENTMCNC: 33.1 GM/DL — SIGNIFICANT CHANGE UP (ref 32–36)
MCV RBC AUTO: 94.5 FL — SIGNIFICANT CHANGE UP (ref 80–100)
MONOCYTES # BLD AUTO: 0.33 K/UL — SIGNIFICANT CHANGE UP (ref 0–0.9)
MONOCYTES NFR BLD AUTO: 8.1 % — SIGNIFICANT CHANGE UP (ref 2–14)
NEUTROPHILS # BLD AUTO: 2.26 K/UL — SIGNIFICANT CHANGE UP (ref 1.8–7.4)
NEUTROPHILS NFR BLD AUTO: 55.6 % — SIGNIFICANT CHANGE UP (ref 43–77)
PCO2 BLDV: 35 MMHG — SIGNIFICANT CHANGE UP (ref 35–50)
PH BLDV: 7.43 — SIGNIFICANT CHANGE UP (ref 7.35–7.45)
PLATELET # BLD AUTO: 477 K/UL — HIGH (ref 150–400)
PO2 BLDV: 30 MMHG — SIGNIFICANT CHANGE UP (ref 25–45)
POTASSIUM BLDV-SCNC: 4 MMOL/L — SIGNIFICANT CHANGE UP (ref 3.5–5.3)
POTASSIUM SERPL-MCNC: 4 MMOL/L — SIGNIFICANT CHANGE UP (ref 3.5–5.3)
POTASSIUM SERPL-SCNC: 4 MMOL/L — SIGNIFICANT CHANGE UP (ref 3.5–5.3)
RBC # BLD: 3.07 M/UL — LOW (ref 4.2–5.8)
RBC # FLD: 17.4 % — HIGH (ref 10.3–14.5)
SAO2 % BLDV: 53 % — LOW (ref 67–88)
SODIUM SERPL-SCNC: 136 MMOL/L — SIGNIFICANT CHANGE UP (ref 135–145)
TSH SERPL-MCNC: 9.11 UIU/ML — HIGH (ref 0.27–4.2)
WBC # BLD: 4.06 K/UL — SIGNIFICANT CHANGE UP (ref 3.8–10.5)
WBC # FLD AUTO: 4.06 K/UL — SIGNIFICANT CHANGE UP (ref 3.8–10.5)

## 2019-12-07 PROCEDURE — 99223 1ST HOSP IP/OBS HIGH 75: CPT

## 2019-12-07 PROCEDURE — 71045 X-RAY EXAM CHEST 1 VIEW: CPT | Mod: 26

## 2019-12-07 RX ORDER — DEXTROSE 50 % IN WATER 50 %
25 SYRINGE (ML) INTRAVENOUS ONCE
Refills: 0 | Status: DISCONTINUED | OUTPATIENT
Start: 2019-12-07 | End: 2019-12-10

## 2019-12-07 RX ORDER — LEVOTHYROXINE SODIUM 125 MCG
25 TABLET ORAL DAILY
Refills: 0 | Status: DISCONTINUED | OUTPATIENT
Start: 2019-12-07 | End: 2019-12-09

## 2019-12-07 RX ORDER — GLUCAGON INJECTION, SOLUTION 0.5 MG/.1ML
1 INJECTION, SOLUTION SUBCUTANEOUS ONCE
Refills: 0 | Status: DISCONTINUED | OUTPATIENT
Start: 2019-12-07 | End: 2019-12-10

## 2019-12-07 RX ORDER — INSULIN LISPRO 100/ML
VIAL (ML) SUBCUTANEOUS AT BEDTIME
Refills: 0 | Status: DISCONTINUED | OUTPATIENT
Start: 2019-12-07 | End: 2019-12-10

## 2019-12-07 RX ORDER — PANTOPRAZOLE SODIUM 20 MG/1
40 TABLET, DELAYED RELEASE ORAL
Refills: 0 | Status: DISCONTINUED | OUTPATIENT
Start: 2019-12-07 | End: 2019-12-10

## 2019-12-07 RX ORDER — ATORVASTATIN CALCIUM 80 MG/1
20 TABLET, FILM COATED ORAL DAILY
Refills: 0 | Status: DISCONTINUED | OUTPATIENT
Start: 2019-12-07 | End: 2019-12-10

## 2019-12-07 RX ORDER — METOPROLOL TARTRATE 50 MG
50 TABLET ORAL
Refills: 0 | Status: DISCONTINUED | OUTPATIENT
Start: 2019-12-07 | End: 2019-12-10

## 2019-12-07 RX ORDER — DEXTROSE 50 % IN WATER 50 %
15 SYRINGE (ML) INTRAVENOUS ONCE
Refills: 0 | Status: DISCONTINUED | OUTPATIENT
Start: 2019-12-07 | End: 2019-12-10

## 2019-12-07 RX ORDER — POLYETHYLENE GLYCOL 3350 17 G/17G
17 POWDER, FOR SOLUTION ORAL DAILY
Refills: 0 | Status: DISCONTINUED | OUTPATIENT
Start: 2019-12-07 | End: 2019-12-10

## 2019-12-07 RX ORDER — INSULIN LISPRO 100/ML
VIAL (ML) SUBCUTANEOUS
Refills: 0 | Status: DISCONTINUED | OUTPATIENT
Start: 2019-12-07 | End: 2019-12-10

## 2019-12-07 RX ORDER — ALLOPURINOL 300 MG
100 TABLET ORAL DAILY
Refills: 0 | Status: DISCONTINUED | OUTPATIENT
Start: 2019-12-07 | End: 2019-12-10

## 2019-12-07 RX ORDER — DEXTROSE 50 % IN WATER 50 %
12.5 SYRINGE (ML) INTRAVENOUS ONCE
Refills: 0 | Status: DISCONTINUED | OUTPATIENT
Start: 2019-12-07 | End: 2019-12-10

## 2019-12-07 RX ORDER — SODIUM CHLORIDE 9 MG/ML
1000 INJECTION, SOLUTION INTRAVENOUS
Refills: 0 | Status: DISCONTINUED | OUTPATIENT
Start: 2019-12-07 | End: 2019-12-10

## 2019-12-07 RX ORDER — METHYLPHENIDATE HCL 5 MG
20 TABLET ORAL THREE TIMES A DAY
Refills: 0 | Status: DISCONTINUED | OUTPATIENT
Start: 2019-12-07 | End: 2019-12-10

## 2019-12-07 RX ORDER — ASPIRIN/CALCIUM CARB/MAGNESIUM 324 MG
81 TABLET ORAL DAILY
Refills: 0 | Status: DISCONTINUED | OUTPATIENT
Start: 2019-12-07 | End: 2019-12-10

## 2019-12-07 RX ORDER — AMLODIPINE BESYLATE 2.5 MG/1
5 TABLET ORAL DAILY
Refills: 0 | Status: DISCONTINUED | OUTPATIENT
Start: 2019-12-07 | End: 2019-12-10

## 2019-12-07 RX ADMIN — PANTOPRAZOLE SODIUM 40 MILLIGRAM(S): 20 TABLET, DELAYED RELEASE ORAL at 05:05

## 2019-12-07 RX ADMIN — Medication 1 ENEMA: at 00:39

## 2019-12-07 RX ADMIN — Medication 50 MILLIGRAM(S): at 05:05

## 2019-12-07 RX ADMIN — Medication 50 MILLIGRAM(S): at 18:48

## 2019-12-07 RX ADMIN — POLYETHYLENE GLYCOL 3350 17 GRAM(S): 17 POWDER, FOR SOLUTION ORAL at 12:21

## 2019-12-07 RX ADMIN — Medication 25 MICROGRAM(S): at 05:05

## 2019-12-07 RX ADMIN — Medication 81 MILLIGRAM(S): at 12:21

## 2019-12-07 RX ADMIN — AMLODIPINE BESYLATE 5 MILLIGRAM(S): 2.5 TABLET ORAL at 05:05

## 2019-12-07 RX ADMIN — Medication 100 MILLIGRAM(S): at 12:21

## 2019-12-07 RX ADMIN — Medication 20 MILLIGRAM(S): at 08:56

## 2019-12-07 RX ADMIN — Medication 2: at 13:12

## 2019-12-07 RX ADMIN — ATORVASTATIN CALCIUM 20 MILLIGRAM(S): 80 TABLET, FILM COATED ORAL at 22:20

## 2019-12-07 NOTE — H&P ADULT - NSHPLABSRESULTS_GEN_ALL_CORE
I have reviewed the labs, imaging and EKG. EKG with NSR HR 95, QTc 452, non-specific ST segment findings

## 2019-12-07 NOTE — H&P ADULT - HISTORY OF PRESENT ILLNESS
80 year old male with pmhx MDS on vidaza, ADD, hypothyroidism, AFib, CHF, gout, hypercholesterolemia, hx of c. diff, DM, HTN and pshx tonsillectomy, CAD p/w worsening weakness, AMS and LLQ abd pain x 3-4 days. Pt was being treated for UTI for past 2 weeks completing last dose of antibiotics yesterday. During the last week or so, pt has been weaker than usual and seemed more confused as well. Then for the past 3-4 days he started to complain of sharp LLQ abd pain aggravated by movement. Pt was at [facility] for a transfusion due to his MDS but was subsequently referred to [facility] ED for evaluation due to deteriorating mental status prior to IV fluids for hydration, usually receives 500ccs. 80 year old male with pmhx MDS on vidaza, ADD, hypothyroidism, AFib, CHF, gout, hypercholesterolemia, hx of c. diff, DM, HTN and pshx tonsillectomy, CAD p/w worsening weakness, AMS and LLQ abd pain x 3-4 days. Pt was being treated for UTI for past 2 weeks completing last dose of antibiotics yesterday. During the last week or so, pt has been weaker than usual and seemed more confused as well. Then for the past 3-4 days he started to complain of sharp LLQ abd pain aggravated by movement. Pt was at [facility] for a transfusion due to his MDS but was subsequently referred to [facility] ED for evaluation due to deteriorating mental status prior to IV fluids for hydration, usually receives 500ccs. Denies any fevers, chills or cough at home. Has had episodes of similar constipation in the past as well.     In ER: Given , fleet enema

## 2019-12-07 NOTE — H&P ADULT - PROBLEM SELECTOR PROBLEM 5
Type 2 diabetes mellitus with other specified complication, without long-term current use of insulin Coronary artery disease involving native coronary artery of native heart without angina pectoris

## 2019-12-07 NOTE — H&P ADULT - PROBLEM SELECTOR PLAN 6
-Trend vital signs  -Cont. amlodipine On PO meds at home  -Fingersticks and sliding scale for now  -A1c

## 2019-12-07 NOTE — H&P ADULT - PROBLEM SELECTOR PROBLEM 6
Essential hypertension Type 2 diabetes mellitus with other specified complication, without long-term current use of insulin

## 2019-12-07 NOTE — H&P ADULT - PROBLEM SELECTOR PLAN 2
Seems to be related to constipation. Abd pain now resolved s/p large BM in ER. Wife states she gives stool softener at home but cannot recall name  -Will start miralax daily for now  -Low threshold to increase dose  -Advised increased PO water as well

## 2019-12-07 NOTE — ED ADULT NURSE REASSESSMENT NOTE - STATUS
admitting team MD at bedside with pt/awaiting bed, no change
awaiting bed, no change/pt and family made aware of admission and waiting for inpt bed assignment

## 2019-12-07 NOTE — H&P ADULT - PROBLEM SELECTOR PLAN 1
Unclear etiology. Seems multifactorial in setting of dehydration and constipation. Possibly complicated by recent UTI also but currently seems resolved. Wheelchair bound at baseline but can transition with assistance. Note clean UA. Last dose of antibiotics was yesterday.   -Cont. to monitor  -F/u UCx, BCx  -PT consult  -Will start miralax standing  -Trend fever curve and cbc

## 2019-12-07 NOTE — H&P ADULT - ASSESSMENT
80 year old male with pmhx MDS on vidaza, ADD, hypothyroidism, AFib, CHF, gout, hypercholesterolemia, hx of c. diff, DM, HTN and pshx tonsillectomy, CAD p/w worsening weakness, AMS and LLQ abd pain x 3-4 days thought to be related to constipation

## 2019-12-07 NOTE — CHART NOTE - NSCHARTNOTEFT_GEN_A_CORE
Patient seen and examined at bedside.  Feeling much better overall.  Had BM yesterday, and abdominal discomfort has resolved.  F/U cultures.   Needs PT consult.   Encouraged PO intake.

## 2019-12-07 NOTE — H&P ADULT - NSHPSOCIALHISTORY_GEN_ALL_CORE
Former smoker quit 30 years ago. Denies ETOH. Lives with wife. Relatively wheelchair dependent due to weakness

## 2019-12-07 NOTE — H&P ADULT - NSHPPHYSICALEXAM_GEN_ALL_CORE
Vital Signs Last 24 Hrs  T(C): 36.4 (12-06-19 @ 23:52), Max: 37.6 (12-06-19 @ 19:12)  T(F): 97.6 (12-06-19 @ 23:52), Max: 99.6 (12-06-19 @ 19:12)  HR: 89 (12-06-19 @ 23:52) (89 - 92)  BP: 130/64 (12-06-19 @ 23:52) (120/63 - 130/64)  BP(mean): --  RR: 15 (12-06-19 @ 23:52) (15 - 17)  SpO2: 97% (12-06-19 @ 23:52) (94% - 97%)

## 2019-12-07 NOTE — H&P ADULT - PROBLEM SELECTOR PLAN 4
-Cont. asa  -Cont. statin S/p Vidaza Nov 5th. Next dose delayed due to current symptoms meant to start again next week. Follows at [facility].  -CBC seems similar to baseline

## 2019-12-07 NOTE — H&P ADULT - PROBLEM SELECTOR PROBLEM 4
Coronary artery disease involving native coronary artery of native heart without angina pectoris MDS (myelodysplastic syndrome)

## 2019-12-07 NOTE — H&P ADULT - NSICDXFAMILYHX_GEN_ALL_CORE_FT
FAMILY HISTORY:  Family history of cerebrovascular accident (CVA) in father  FH: CHF (congestive heart failure), mother

## 2019-12-08 LAB
ANION GAP SERPL CALC-SCNC: 9 MMOL/L — SIGNIFICANT CHANGE UP (ref 5–17)
BASE EXCESS BLDV CALC-SCNC: -0.9 MMOL/L — SIGNIFICANT CHANGE UP (ref -2–2)
BUN SERPL-MCNC: 35 MG/DL — HIGH (ref 7–23)
CA-I SERPL-SCNC: 1.29 MMOL/L — SIGNIFICANT CHANGE UP (ref 1.12–1.3)
CALCIUM SERPL-MCNC: 9.1 MG/DL — SIGNIFICANT CHANGE UP (ref 8.4–10.5)
CHLORIDE BLDV-SCNC: 108 MMOL/L — SIGNIFICANT CHANGE UP (ref 96–108)
CHLORIDE SERPL-SCNC: 102 MMOL/L — SIGNIFICANT CHANGE UP (ref 96–108)
CO2 BLDV-SCNC: 26 MMOL/L — SIGNIFICANT CHANGE UP (ref 22–30)
CO2 SERPL-SCNC: 23 MMOL/L — SIGNIFICANT CHANGE UP (ref 22–31)
CREAT SERPL-MCNC: 1.25 MG/DL — SIGNIFICANT CHANGE UP (ref 0.5–1.3)
GAS PNL BLDV: 139 MMOL/L — SIGNIFICANT CHANGE UP (ref 135–145)
GAS PNL BLDV: SIGNIFICANT CHANGE UP
GAS PNL BLDV: SIGNIFICANT CHANGE UP
GLUCOSE BLDV-MCNC: 133 MG/DL — HIGH (ref 70–99)
GLUCOSE SERPL-MCNC: 133 MG/DL — HIGH (ref 70–99)
HCO3 BLDV-SCNC: 24 MMOL/L — SIGNIFICANT CHANGE UP (ref 21–29)
HCT VFR BLD CALC: 32.7 % — LOW (ref 39–50)
HCT VFR BLDA CALC: 34 % — LOW (ref 39–50)
HGB BLD CALC-MCNC: 11.1 G/DL — LOW (ref 13–17)
HGB BLD-MCNC: 10.7 G/DL — LOW (ref 13–17)
LACTATE BLDV-MCNC: 2 MMOL/L — SIGNIFICANT CHANGE UP (ref 0.7–2)
MCHC RBC-ENTMCNC: 31 PG — SIGNIFICANT CHANGE UP (ref 27–34)
MCHC RBC-ENTMCNC: 32.7 GM/DL — SIGNIFICANT CHANGE UP (ref 32–36)
MCV RBC AUTO: 94.8 FL — SIGNIFICANT CHANGE UP (ref 80–100)
OTHER CELLS CSF MANUAL: 9 ML/DL — LOW (ref 18–22)
PCO2 BLDV: 44 MMHG — SIGNIFICANT CHANGE UP (ref 35–50)
PH BLDV: 7.36 — SIGNIFICANT CHANGE UP (ref 7.35–7.45)
PLATELET # BLD AUTO: 485 K/UL — HIGH (ref 150–400)
PO2 BLDV: 35 MMHG — SIGNIFICANT CHANGE UP (ref 25–45)
POTASSIUM BLDV-SCNC: 4.7 MMOL/L — SIGNIFICANT CHANGE UP (ref 3.5–5.3)
POTASSIUM SERPL-MCNC: 5.1 MMOL/L — SIGNIFICANT CHANGE UP (ref 3.5–5.3)
POTASSIUM SERPL-SCNC: 5.1 MMOL/L — SIGNIFICANT CHANGE UP (ref 3.5–5.3)
RBC # BLD: 3.45 M/UL — LOW (ref 4.2–5.8)
RBC # FLD: 17.3 % — HIGH (ref 10.3–14.5)
SAO2 % BLDV: 59 % — LOW (ref 67–88)
SODIUM SERPL-SCNC: 134 MMOL/L — LOW (ref 135–145)
WBC # BLD: 4.23 K/UL — SIGNIFICANT CHANGE UP (ref 3.8–10.5)
WBC # FLD AUTO: 4.23 K/UL — SIGNIFICANT CHANGE UP (ref 3.8–10.5)

## 2019-12-08 PROCEDURE — 99233 SBSQ HOSP IP/OBS HIGH 50: CPT

## 2019-12-08 RX ORDER — IBUPROFEN 200 MG
400 TABLET ORAL ONCE
Refills: 0 | Status: COMPLETED | OUTPATIENT
Start: 2019-12-08 | End: 2019-12-08

## 2019-12-08 RX ADMIN — POLYETHYLENE GLYCOL 3350 17 GRAM(S): 17 POWDER, FOR SOLUTION ORAL at 13:17

## 2019-12-08 RX ADMIN — Medication 25 MICROGRAM(S): at 06:53

## 2019-12-08 RX ADMIN — Medication 50 MILLIGRAM(S): at 18:49

## 2019-12-08 RX ADMIN — Medication 400 MILLIGRAM(S): at 09:34

## 2019-12-08 RX ADMIN — Medication 20 MILLIGRAM(S): at 06:53

## 2019-12-08 RX ADMIN — Medication 400 MILLIGRAM(S): at 20:06

## 2019-12-08 RX ADMIN — Medication 400 MILLIGRAM(S): at 09:03

## 2019-12-08 RX ADMIN — Medication 400 MILLIGRAM(S): at 19:36

## 2019-12-08 RX ADMIN — AMLODIPINE BESYLATE 5 MILLIGRAM(S): 2.5 TABLET ORAL at 06:53

## 2019-12-08 RX ADMIN — PANTOPRAZOLE SODIUM 40 MILLIGRAM(S): 20 TABLET, DELAYED RELEASE ORAL at 06:53

## 2019-12-08 RX ADMIN — Medication 81 MILLIGRAM(S): at 13:17

## 2019-12-08 RX ADMIN — Medication 100 MILLIGRAM(S): at 13:16

## 2019-12-08 RX ADMIN — Medication 50 MILLIGRAM(S): at 06:53

## 2019-12-08 RX ADMIN — Medication 20 MILLIGRAM(S): at 21:29

## 2019-12-08 RX ADMIN — ATORVASTATIN CALCIUM 20 MILLIGRAM(S): 80 TABLET, FILM COATED ORAL at 13:16

## 2019-12-08 NOTE — PHYSICAL THERAPY INITIAL EVALUATION ADULT - GENERAL OBSERVATIONS, REHAB EVAL
Pt received in recliner in NAD, however, with c/o of intermittent pain in R shldr and R ankle w/movement. Wife Carmita present.

## 2019-12-08 NOTE — PHYSICAL THERAPY INITIAL EVALUATION ADULT - ADDITIONAL COMMENTS
Pt lives with wife in pvt home with 3 luis steps to enter and 1 HR. Wife states pt has been non ambulatory for over 1.5 years but can take negotiated short distance with Min A and/or AD. Pt was able to desc/asc 3 steps in and out of home one month ago with 1 HR and Min A, w/c at top/bottom of steps for use to car or inside home. Pt lives on first flr and has no steps inside. Wife states pt need assist for all ADLs/IADLs and is the main caregiver and her children help. Son lives in apt below home.

## 2019-12-08 NOTE — PROGRESS NOTE ADULT - PROBLEM SELECTOR PLAN 1
-Unclear etiology. Seems multifactorial in setting of dehydration and constipation. Possibly complicated by recent UTI also but currently seems resolved. Wheelchair bound at baseline but can transition with assistance.   -remains afebrile  -UC not indicative of active UTI   -likely overall deconditioning  -pt willing to work with PT, eval is pending

## 2019-12-08 NOTE — PROGRESS NOTE ADULT - PROBLEM SELECTOR PLAN 4
S/p Vidaza Nov 5th. Next dose delayed due to current symptoms meant to start again next week. Follows at [facility].  -CBC seems similar to baseline

## 2019-12-08 NOTE — PHYSICAL THERAPY INITIAL EVALUATION ADULT - DIAGNOSIS, PT EVAL
pt has decrease activity tolerance, decrease functional mobility capacity, decrease strength, impaired balance

## 2019-12-08 NOTE — PROGRESS NOTE ADULT - ASSESSMENT
80 year old male with pmhx MDS on vidaza, ADD, hypothyroidism, AFib, CHF, gout, hypercholesterolemia, hx of c. diff, DM, HTN and pshx tonsillectomy, CAD p/w worsening weakness, AMS and LLQ abd pain x 3-4 days thought to be related to constipation, now resolved:

## 2019-12-08 NOTE — PROGRESS NOTE ADULT - SUBJECTIVE AND OBJECTIVE BOX
Patient is a 79y old  Male who presents with a chief complaint of AMS, LLQ x3 days (07 Dec 2019 00:41)    SUBJECTIVE / OVERNIGHT EVENTS: no acute events overnight, feels weak and tired overall. Good oral intake. Wife is at bedside.     MEDICATIONS  (STANDING):  allopurinol 100 milliGRAM(s) Oral daily  amLODIPine   Tablet 5 milliGRAM(s) Oral daily  aspirin enteric coated 81 milliGRAM(s) Oral daily  atorvastatin 20 milliGRAM(s) Oral daily  dextrose 5%. 1000 milliLiter(s) (50 mL/Hr) IV Continuous <Continuous>  dextrose 50% Injectable 12.5 Gram(s) IV Push once  dextrose 50% Injectable 25 Gram(s) IV Push once  dextrose 50% Injectable 25 Gram(s) IV Push once  insulin lispro (HumaLOG) corrective regimen sliding scale   SubCutaneous three times a day before meals  insulin lispro (HumaLOG) corrective regimen sliding scale   SubCutaneous at bedtime  levothyroxine 25 MICROGram(s) Oral daily  methylphenidate 20 milliGRAM(s) Oral three times a day  metoprolol tartrate 50 milliGRAM(s) Oral two times a day  pantoprazole    Tablet 40 milliGRAM(s) Oral before breakfast  polyethylene glycol 3350 17 Gram(s) Oral daily    MEDICATIONS  (PRN):  dextrose 40% Gel 15 Gram(s) Oral once PRN Blood Glucose LESS THAN 70 milliGRAM(s)/deciliter  glucagon  Injectable 1 milliGRAM(s) IntraMuscular once PRN Glucose LESS THAN 70 milligrams/deciliter      Vital Signs Last 24 Hrs  T(C): 36.5 (08 Dec 2019 10:23), Max: 36.8 (08 Dec 2019 06:49)  T(F): 97.7 (08 Dec 2019 10:23), Max: 98.2 (08 Dec 2019 06:49)  HR: 76 (08 Dec 2019 10:23) (68 - 93)  BP: 115/72 (08 Dec 2019 10:23) (115/72 - 133/82)  BP(mean): --  RR: 18 (08 Dec 2019 10:23) (18 - 19)  SpO2: 93% (08 Dec 2019 10:23) (93% - 97%)  CAPILLARY BLOOD GLUCOSE      POCT Blood Glucose.: 143 mg/dL (08 Dec 2019 12:51)  POCT Blood Glucose.: 111 mg/dL (08 Dec 2019 09:14)  POCT Blood Glucose.: 118 mg/dL (07 Dec 2019 22:09)  POCT Blood Glucose.: 117 mg/dL (07 Dec 2019 19:22)    I&O's Summary    07 Dec 2019 07:01  -  08 Dec 2019 07:00  --------------------------------------------------------  IN: 380 mL / OUT: 350 mL / NET: 30 mL      PHYSICAL EXAM:  GENERAL: frail appearing elderly male in NAD   EYES: conjunctiva and sclera clear  NECK: Supple, No JVD  CHEST/LUNG: Clear to auscultation bilaterally; No wheeze  HEART: +S1/S2, reg   ABDOMEN: Soft, Nontender, Nondistended; Bowel sounds present  EXTREMITIES:  no edema   PSYCH: AAOx3    LABS:                        10.7   4.23  )-----------( 485      ( 08 Dec 2019 09:29 )             32.7     12-    134<L>  |  102  |  35<H>  ----------------------------<  133<H>  5.1   |  23  |  1.25    Ca    9.1      08 Dec 2019 07:20  Mg     2.0         TPro  8.7<H>  /  Alb  3.5  /  TBili  0.5  /  DBili  x   /  AST  50<H>  /  ALT  54<H>  /  AlkPhos  223<H>  12-06          Urinalysis Basic - ( 06 Dec 2019 21:52 )    Color: Light Yellow / Appearance: Clear / S.020 / pH: x  Gluc: x / Ketone: Negative  / Bili: Negative / Urobili: Negative   Blood: x / Protein: 100 / Nitrite: Negative   Leuk Esterase: Negative / RBC: 3 /hpf / WBC 1 /HPF   Sq Epi: x / Non Sq Epi: 1 /hpf / Bacteria: Negative

## 2019-12-08 NOTE — PHYSICAL THERAPY INITIAL EVALUATION ADULT - PLANNED THERAPY INTERVENTIONS, PT EVAL
GOAL: Pt will be able to asc/desc 3 steps with/Min A 1HR within 2 weeks./gait training/strengthening/transfer training

## 2019-12-08 NOTE — PHYSICAL THERAPY INITIAL EVALUATION ADULT - ACTIVE RANGE OF MOTION EXAMINATION, REHAB EVAL
bilateral upper extremity Active ROM was WFL (within functional limits)/in seated/bilateral  lower extremity Active ROM was WFL (within functional limits)

## 2019-12-08 NOTE — PHYSICAL THERAPY INITIAL EVALUATION ADULT - CRITERIA FOR SKILLED THERAPEUTIC INTERVENTIONS
impairments found/Home w/HPT/risk reduction/prevention/rehab potential/anticipated discharge recommendation

## 2019-12-09 ENCOUNTER — APPOINTMENT (OUTPATIENT)
Dept: INFUSION THERAPY | Facility: HOSPITAL | Age: 79
End: 2019-12-09

## 2019-12-09 LAB
-  AMIKACIN: SIGNIFICANT CHANGE UP
-  AZTREONAM: SIGNIFICANT CHANGE UP
-  CEFEPIME: SIGNIFICANT CHANGE UP
-  CEFTAZIDIME: SIGNIFICANT CHANGE UP
-  CIPROFLOXACIN: SIGNIFICANT CHANGE UP
-  GENTAMICIN: SIGNIFICANT CHANGE UP
-  IMIPENEM: SIGNIFICANT CHANGE UP
-  LEVOFLOXACIN: SIGNIFICANT CHANGE UP
-  MEROPENEM: SIGNIFICANT CHANGE UP
-  PIPERACILLIN/TAZOBACTAM: SIGNIFICANT CHANGE UP
-  TOBRAMYCIN: SIGNIFICANT CHANGE UP
CULTURE RESULTS: SIGNIFICANT CHANGE UP
GLUCOSE BLDC GLUCOMTR-MCNC: 114 MG/DL — HIGH (ref 70–99)
GLUCOSE BLDC GLUCOMTR-MCNC: 116 MG/DL — HIGH (ref 70–99)
GLUCOSE BLDC GLUCOMTR-MCNC: 149 MG/DL — HIGH (ref 70–99)
METHOD TYPE: SIGNIFICANT CHANGE UP
ORGANISM # SPEC MICROSCOPIC CNT: SIGNIFICANT CHANGE UP
ORGANISM # SPEC MICROSCOPIC CNT: SIGNIFICANT CHANGE UP
SPECIMEN SOURCE: SIGNIFICANT CHANGE UP
T4 AB SER-ACNC: 7.2 UG/DL — SIGNIFICANT CHANGE UP (ref 4.6–12)

## 2019-12-09 PROCEDURE — 99233 SBSQ HOSP IP/OBS HIGH 50: CPT

## 2019-12-09 RX ORDER — IBUPROFEN 200 MG
400 TABLET ORAL ONCE
Refills: 0 | Status: COMPLETED | OUTPATIENT
Start: 2019-12-09 | End: 2019-12-09

## 2019-12-09 RX ORDER — SODIUM CHLORIDE 9 MG/ML
1000 INJECTION INTRAMUSCULAR; INTRAVENOUS; SUBCUTANEOUS
Refills: 0 | Status: DISCONTINUED | OUTPATIENT
Start: 2019-12-09 | End: 2019-12-10

## 2019-12-09 RX ORDER — LEVOTHYROXINE SODIUM 125 MCG
37.5 TABLET ORAL DAILY
Refills: 0 | Status: DISCONTINUED | OUTPATIENT
Start: 2019-12-10 | End: 2019-12-10

## 2019-12-09 RX ADMIN — Medication 400 MILLIGRAM(S): at 21:18

## 2019-12-09 RX ADMIN — PANTOPRAZOLE SODIUM 40 MILLIGRAM(S): 20 TABLET, DELAYED RELEASE ORAL at 05:44

## 2019-12-09 RX ADMIN — Medication 100 MILLIGRAM(S): at 13:31

## 2019-12-09 RX ADMIN — Medication 25 MICROGRAM(S): at 05:44

## 2019-12-09 RX ADMIN — Medication 81 MILLIGRAM(S): at 13:31

## 2019-12-09 RX ADMIN — Medication 50 MILLIGRAM(S): at 06:08

## 2019-12-09 RX ADMIN — Medication 20 MILLIGRAM(S): at 21:19

## 2019-12-09 RX ADMIN — ATORVASTATIN CALCIUM 20 MILLIGRAM(S): 80 TABLET, FILM COATED ORAL at 13:31

## 2019-12-09 RX ADMIN — SODIUM CHLORIDE 60 MILLILITER(S): 9 INJECTION INTRAMUSCULAR; INTRAVENOUS; SUBCUTANEOUS at 16:00

## 2019-12-09 RX ADMIN — Medication 50 MILLIGRAM(S): at 17:44

## 2019-12-09 RX ADMIN — Medication 400 MILLIGRAM(S): at 14:13

## 2019-12-09 RX ADMIN — Medication 400 MILLIGRAM(S): at 13:43

## 2019-12-09 RX ADMIN — Medication 400 MILLIGRAM(S): at 06:13

## 2019-12-09 RX ADMIN — POLYETHYLENE GLYCOL 3350 17 GRAM(S): 17 POWDER, FOR SOLUTION ORAL at 13:31

## 2019-12-09 RX ADMIN — Medication 400 MILLIGRAM(S): at 21:50

## 2019-12-09 RX ADMIN — AMLODIPINE BESYLATE 5 MILLIGRAM(S): 2.5 TABLET ORAL at 06:08

## 2019-12-09 RX ADMIN — Medication 20 MILLIGRAM(S): at 05:42

## 2019-12-09 RX ADMIN — Medication 20 MILLIGRAM(S): at 13:31

## 2019-12-09 RX ADMIN — Medication 400 MILLIGRAM(S): at 05:43

## 2019-12-09 NOTE — PROGRESS NOTE ADULT - ASSESSMENT
80 year old male with pmhx MDS on vidaza, ADD, hypothyroidism, AFib, CHF, gout, hypercholesterolemia, hx of c. diff, DM, HTN and pshx tonsillectomy, CAD p/w worsening weakness, AMS and LLQ abd pain x 3-4 days thought to be related to constipation, now resolved, PT recommends home PT, discharge planning tomorrow anticipated.

## 2019-12-09 NOTE — PROGRESS NOTE ADULT - PROBLEM SELECTOR PLAN 1
-Unclear etiology. Seems multifactorial in setting of dehydration and constipation. Wheelchair bound at baseline but can transition with assistance.   -remains afebrile  -UA not indicative of active UTI and afebrile/normal wbc.  Will defer treating + urine culture at this time to avoid future resistant organisms.  -likely overall deconditioning  -PT recommends home PT  spoke with wife and CM about HHA eval.  Emailed hematologist regarding overall treatment plan with wife describing progressive decline over past 8 months.  Also discussed with wife potential for hospice eval in future if he continues to have progressive decline and is unable to tolerate further treatments.  Her goal is to keep him at home.  Does not want trial of rehab/nursing home.  Have not discussed advanced directives in past.

## 2019-12-09 NOTE — PROGRESS NOTE ADULT - SUBJECTIVE AND OBJECTIVE BOX
[facility] Division of Hospital Medicine  Lily Lim MD  Pager (GIOVANNY-DREW, 8M-7P): [phone]  Other Times:  [phone]    Patient is a 79y old  Male who presents with a chief complaint of AMS, LLQ x3 days (08 Dec 2019 13:17)      SUBJECTIVE / OVERNIGHT EVENTS:   overall progressive decline over past 8 months.  Physical therapy was too exhausting for him and would cause him to spend 2-3 days in bed.  In past 4 months has been wheelchair dependent at home.  Since admission abdominal pain resolved after bowel movements. Still at baseline weakness.  Has not discussed advanced directives in past with his family and defers conversations to his wife.    ADDITIONAL REVIEW OF SYSTEMS:    MEDICATIONS  (STANDING):  allopurinol 100 milliGRAM(s) Oral daily  amLODIPine   Tablet 5 milliGRAM(s) Oral daily  aspirin enteric coated 81 milliGRAM(s) Oral daily  atorvastatin 20 milliGRAM(s) Oral daily  dextrose 5%. 1000 milliLiter(s) (50 mL/Hr) IV Continuous <Continuous>  dextrose 50% Injectable 12.5 Gram(s) IV Push once  dextrose 50% Injectable 25 Gram(s) IV Push once  dextrose 50% Injectable 25 Gram(s) IV Push once  insulin lispro (HumaLOG) corrective regimen sliding scale   SubCutaneous three times a day before meals  insulin lispro (HumaLOG) corrective regimen sliding scale   SubCutaneous at bedtime  levothyroxine 25 MICROGram(s) Oral daily  methylphenidate 20 milliGRAM(s) Oral three times a day  metoprolol tartrate 50 milliGRAM(s) Oral two times a day  pantoprazole    Tablet 40 milliGRAM(s) Oral before breakfast  polyethylene glycol 3350 17 Gram(s) Oral daily    MEDICATIONS  (PRN):  dextrose 40% Gel 15 Gram(s) Oral once PRN Blood Glucose LESS THAN 70 milliGRAM(s)/deciliter  glucagon  Injectable 1 milliGRAM(s) IntraMuscular once PRN Glucose LESS THAN 70 milligrams/deciliter      CAPILLARY BLOOD GLUCOSE      POCT Blood Glucose.: 116 mg/dL (09 Dec 2019 14:09)  POCT Blood Glucose.: 116 mg/dL (09 Dec 2019 08:10)  POCT Blood Glucose.: 141 mg/dL (08 Dec 2019 21:37)  POCT Blood Glucose.: 126 mg/dL (08 Dec 2019 18:26)    I&O's Summary    08 Dec 2019 07:01  -  09 Dec 2019 07:00  --------------------------------------------------------  IN: 1210 mL / OUT: 1850 mL / NET: -640 mL    09 Dec 2019 07:01  -  09 Dec 2019 14:26  --------------------------------------------------------  IN: 340 mL / OUT: 800 mL / NET: -460 mL        PHYSICAL EXAM:  Vital Signs Last 24 Hrs  T(C): 36.6 (09 Dec 2019 12:32), Max: 36.8 (09 Dec 2019 05:44)  T(F): 97.8 (09 Dec 2019 12:32), Max: 98.3 (09 Dec 2019 05:44)  HR: 63 (09 Dec 2019 12:32) (63 - 75)  BP: 119/81 (09 Dec 2019 12:32) (110/72 - 157/76)  BP(mean): --  RR: 18 (09 Dec 2019 12:32) (18 - 18)  SpO2: 95% (09 Dec 2019 12:32) (94% - 97%)  GENERAL: frail appearing elderly male in NAD   EYES: conjunctiva and sclera clear  NECK: Supple, No JVD  CHEST/LUNG: Clear to auscultation bilaterally; No wheeze  HEART: +S1/S2, reg   ABDOMEN: Soft, Nontender, Nondistended; Bowel sounds present  EXTREMITIES:  no edema   PSYCH: AAOx3    LABS:                        10.7   4.23  )-----------( 485      ( 08 Dec 2019 09:29 )             32.7     12-08    134<L>  |  102  |  35<H>  ----------------------------<  133<H>  5.1   |  23  |  1.25    Ca    9.1      08 Dec 2019 07:20                Culture - Urine (collected 07 Dec 2019 00:44)  Source: .Urine Clean Catch (Midstream)  Final Report (09 Dec 2019 08:48):    10,000 - 49,000 CFU/mL Pseudomonas aeruginosa  Organism: Pseudomonas aeruginosa (09 Dec 2019 08:48)  Organism: Pseudomonas aeruginosa (09 Dec 2019 08:48)    Culture - Blood (collected 06 Dec 2019 22:56)  Source: .Blood Blood-Venous  Preliminary Report (07 Dec 2019 23:01):    No growth to date.    Culture - Blood (collected 06 Dec 2019 22:20)  Source: .Blood Blood-Peripheral  Preliminary Report (07 Dec 2019 23:01):    No growth to date.        RADIOLOGY & ADDITIONAL TESTS:  Results Reviewed:   Imaging Personally Reviewed:  Electrocardiogram Personally Reviewed:    COORDINATION OF CARE:  Care Discussed with Consultants/Other Providers [Y/N]: emailed Dr Elizondo  Prior or Outpatient Records Reviewed [Y/N]:

## 2019-12-09 NOTE — PROGRESS NOTE ADULT - PROBLEM SELECTOR PLAN 4
S/p Vidaza Nov 5th. Next dose delayed due to current symptoms meant to start again next week. Follows at [facility].  -CBC seems similar to baseline  -sent email to Dr Elizondo to further discuss overall treatment plan

## 2019-12-10 ENCOUNTER — TRANSCRIPTION ENCOUNTER (OUTPATIENT)
Age: 79
End: 2019-12-10

## 2019-12-10 ENCOUNTER — APPOINTMENT (OUTPATIENT)
Dept: INFUSION THERAPY | Facility: HOSPITAL | Age: 79
End: 2019-12-10

## 2019-12-10 VITALS
DIASTOLIC BLOOD PRESSURE: 80 MMHG | SYSTOLIC BLOOD PRESSURE: 126 MMHG | TEMPERATURE: 98 F | HEART RATE: 76 BPM | RESPIRATION RATE: 18 BRPM | OXYGEN SATURATION: 96 %

## 2019-12-10 LAB
24R-OH-CALCIDIOL SERPL-MCNC: 39.6 NG/ML — SIGNIFICANT CHANGE UP (ref 30–80)
ANION GAP SERPL CALC-SCNC: 12 MMOL/L — SIGNIFICANT CHANGE UP (ref 5–17)
BASOPHILS # BLD AUTO: 0.05 K/UL — SIGNIFICANT CHANGE UP (ref 0–0.2)
BASOPHILS NFR BLD AUTO: 1.1 % — SIGNIFICANT CHANGE UP (ref 0–2)
BUN SERPL-MCNC: 28 MG/DL — HIGH (ref 7–23)
CALCIUM SERPL-MCNC: 9.2 MG/DL — SIGNIFICANT CHANGE UP (ref 8.4–10.5)
CHLORIDE SERPL-SCNC: 104 MMOL/L — SIGNIFICANT CHANGE UP (ref 96–108)
CO2 SERPL-SCNC: 20 MMOL/L — LOW (ref 22–31)
CREAT SERPL-MCNC: 1.16 MG/DL — SIGNIFICANT CHANGE UP (ref 0.5–1.3)
EOSINOPHIL # BLD AUTO: 0.28 K/UL — SIGNIFICANT CHANGE UP (ref 0–0.5)
EOSINOPHIL NFR BLD AUTO: 6.2 % — HIGH (ref 0–6)
FOLATE SERPL-MCNC: 5.4 NG/ML — SIGNIFICANT CHANGE UP
GLUCOSE BLDC GLUCOMTR-MCNC: 115 MG/DL — HIGH (ref 70–99)
GLUCOSE BLDC GLUCOMTR-MCNC: 92 MG/DL — SIGNIFICANT CHANGE UP (ref 70–99)
GLUCOSE SERPL-MCNC: 130 MG/DL — HIGH (ref 70–99)
HCT VFR BLD CALC: 29.2 % — LOW (ref 39–50)
HGB BLD-MCNC: 9.7 G/DL — LOW (ref 13–17)
IMM GRANULOCYTES NFR BLD AUTO: 1.3 % — SIGNIFICANT CHANGE UP (ref 0–1.5)
LYMPHOCYTES # BLD AUTO: 1.14 K/UL — SIGNIFICANT CHANGE UP (ref 1–3.3)
LYMPHOCYTES # BLD AUTO: 25.2 % — SIGNIFICANT CHANGE UP (ref 13–44)
MAGNESIUM SERPL-MCNC: 1.8 MG/DL — SIGNIFICANT CHANGE UP (ref 1.6–2.6)
MCHC RBC-ENTMCNC: 31.5 PG — SIGNIFICANT CHANGE UP (ref 27–34)
MCHC RBC-ENTMCNC: 33.2 GM/DL — SIGNIFICANT CHANGE UP (ref 32–36)
MCV RBC AUTO: 94.8 FL — SIGNIFICANT CHANGE UP (ref 80–100)
MONOCYTES # BLD AUTO: 0.32 K/UL — SIGNIFICANT CHANGE UP (ref 0–0.9)
MONOCYTES NFR BLD AUTO: 7.1 % — SIGNIFICANT CHANGE UP (ref 2–14)
NEUTROPHILS # BLD AUTO: 2.67 K/UL — SIGNIFICANT CHANGE UP (ref 1.8–7.4)
NEUTROPHILS NFR BLD AUTO: 59.1 % — SIGNIFICANT CHANGE UP (ref 43–77)
PHOSPHATE SERPL-MCNC: 3.1 MG/DL — SIGNIFICANT CHANGE UP (ref 2.5–4.5)
PLATELET # BLD AUTO: 489 K/UL — HIGH (ref 150–400)
POTASSIUM SERPL-MCNC: 4.4 MMOL/L — SIGNIFICANT CHANGE UP (ref 3.5–5.3)
POTASSIUM SERPL-SCNC: 4.4 MMOL/L — SIGNIFICANT CHANGE UP (ref 3.5–5.3)
RBC # BLD: 3.08 M/UL — LOW (ref 4.2–5.8)
RBC # FLD: 17.1 % — HIGH (ref 10.3–14.5)
SODIUM SERPL-SCNC: 136 MMOL/L — SIGNIFICANT CHANGE UP (ref 135–145)
VIT B12 SERPL-MCNC: 585 PG/ML — SIGNIFICANT CHANGE UP (ref 232–1245)
WBC # BLD: 4.52 K/UL — SIGNIFICANT CHANGE UP (ref 3.8–10.5)
WBC # FLD AUTO: 4.52 K/UL — SIGNIFICANT CHANGE UP (ref 3.8–10.5)

## 2019-12-10 PROCEDURE — 80048 BASIC METABOLIC PNL TOTAL CA: CPT

## 2019-12-10 PROCEDURE — 82947 ASSAY GLUCOSE BLOOD QUANT: CPT

## 2019-12-10 PROCEDURE — 85027 COMPLETE CBC AUTOMATED: CPT

## 2019-12-10 PROCEDURE — 99285 EMERGENCY DEPT VISIT HI MDM: CPT

## 2019-12-10 PROCEDURE — 81001 URINALYSIS AUTO W/SCOPE: CPT

## 2019-12-10 PROCEDURE — 82330 ASSAY OF CALCIUM: CPT

## 2019-12-10 PROCEDURE — 97161 PT EVAL LOW COMPLEX 20 MIN: CPT

## 2019-12-10 PROCEDURE — 82803 BLOOD GASES ANY COMBINATION: CPT

## 2019-12-10 PROCEDURE — 84436 ASSAY OF TOTAL THYROXINE: CPT

## 2019-12-10 PROCEDURE — 82607 VITAMIN B-12: CPT

## 2019-12-10 PROCEDURE — 82746 ASSAY OF FOLIC ACID SERUM: CPT

## 2019-12-10 PROCEDURE — 84443 ASSAY THYROID STIM HORMONE: CPT

## 2019-12-10 PROCEDURE — 87086 URINE CULTURE/COLONY COUNT: CPT

## 2019-12-10 PROCEDURE — 71045 X-RAY EXAM CHEST 1 VIEW: CPT

## 2019-12-10 PROCEDURE — 84295 ASSAY OF SERUM SODIUM: CPT

## 2019-12-10 PROCEDURE — 83735 ASSAY OF MAGNESIUM: CPT

## 2019-12-10 PROCEDURE — 80053 COMPREHEN METABOLIC PANEL: CPT

## 2019-12-10 PROCEDURE — 84132 ASSAY OF SERUM POTASSIUM: CPT

## 2019-12-10 PROCEDURE — 87186 SC STD MICRODIL/AGAR DIL: CPT

## 2019-12-10 PROCEDURE — 87040 BLOOD CULTURE FOR BACTERIA: CPT

## 2019-12-10 PROCEDURE — 99239 HOSP IP/OBS DSCHRG MGMT >30: CPT

## 2019-12-10 PROCEDURE — 82435 ASSAY OF BLOOD CHLORIDE: CPT

## 2019-12-10 PROCEDURE — 84100 ASSAY OF PHOSPHORUS: CPT

## 2019-12-10 PROCEDURE — 85014 HEMATOCRIT: CPT

## 2019-12-10 PROCEDURE — 82962 GLUCOSE BLOOD TEST: CPT

## 2019-12-10 PROCEDURE — 83605 ASSAY OF LACTIC ACID: CPT

## 2019-12-10 PROCEDURE — 82306 VITAMIN D 25 HYDROXY: CPT

## 2019-12-10 PROCEDURE — 74176 CT ABD & PELVIS W/O CONTRAST: CPT

## 2019-12-10 PROCEDURE — 70450 CT HEAD/BRAIN W/O DYE: CPT

## 2019-12-10 RX ORDER — METOCLOPRAMIDE HCL 10 MG
1 TABLET ORAL
Qty: 0 | Refills: 0 | DISCHARGE

## 2019-12-10 RX ORDER — CYCLOBENZAPRINE HYDROCHLORIDE 10 MG/1
1 TABLET, FILM COATED ORAL
Qty: 15 | Refills: 0
Start: 2019-12-10 | End: 2019-12-24

## 2019-12-10 RX ORDER — METHYLPHENIDATE HCL 5 MG
1 TABLET ORAL
Qty: 0 | Refills: 0 | DISCHARGE
Start: 2019-12-10

## 2019-12-10 RX ORDER — POLYETHYLENE GLYCOL 3350 17 G/17G
17 POWDER, FOR SOLUTION ORAL
Qty: 0 | Refills: 0 | DISCHARGE
Start: 2019-12-10

## 2019-12-10 RX ORDER — IBUPROFEN 200 MG
1 TABLET ORAL
Qty: 0 | Refills: 0 | DISCHARGE
Start: 2019-12-10

## 2019-12-10 RX ORDER — IBUPROFEN 200 MG
400 TABLET ORAL ONCE
Refills: 0 | Status: COMPLETED | OUTPATIENT
Start: 2019-12-10 | End: 2019-12-10

## 2019-12-10 RX ORDER — METHYLPHENIDATE HCL 5 MG
1 TABLET ORAL
Qty: 0 | Refills: 0 | DISCHARGE

## 2019-12-10 RX ORDER — CYCLOBENZAPRINE HYDROCHLORIDE 10 MG/1
5 TABLET, FILM COATED ORAL DAILY
Refills: 0 | Status: DISCONTINUED | OUTPATIENT
Start: 2019-12-10 | End: 2019-12-10

## 2019-12-10 RX ORDER — ASPIRIN/CALCIUM CARB/MAGNESIUM 324 MG
1 TABLET ORAL
Qty: 0 | Refills: 0 | DISCHARGE
Start: 2019-12-10

## 2019-12-10 RX ORDER — ASPIRIN/CALCIUM CARB/MAGNESIUM 324 MG
1 TABLET ORAL
Qty: 0 | Refills: 0 | DISCHARGE

## 2019-12-10 RX ORDER — ALLOPURINOL 300 MG
1 TABLET ORAL
Qty: 0 | Refills: 0 | DISCHARGE
Start: 2019-12-10

## 2019-12-10 RX ORDER — ALLOPURINOL 300 MG
1 TABLET ORAL
Qty: 0 | Refills: 0 | DISCHARGE

## 2019-12-10 RX ORDER — LEVOTHYROXINE SODIUM 125 MCG
37.5 TABLET ORAL
Qty: 0 | Refills: 0 | DISCHARGE
Start: 2019-12-10

## 2019-12-10 RX ORDER — METOPROLOL TARTRATE 50 MG
1 TABLET ORAL
Qty: 0 | Refills: 0 | DISCHARGE
Start: 2019-12-10

## 2019-12-10 RX ORDER — IBUPROFEN 200 MG
400 TABLET ORAL EVERY 8 HOURS
Refills: 0 | Status: DISCONTINUED | OUTPATIENT
Start: 2019-12-10 | End: 2019-12-10

## 2019-12-10 RX ORDER — AMLODIPINE BESYLATE 2.5 MG/1
1 TABLET ORAL
Qty: 0 | Refills: 0 | DISCHARGE
Start: 2019-12-10

## 2019-12-10 RX ORDER — LEVOTHYROXINE SODIUM 125 MCG
1.5 TABLET ORAL
Qty: 0 | Refills: 0 | DISCHARGE

## 2019-12-10 RX ORDER — AMLODIPINE BESYLATE 2.5 MG/1
1 TABLET ORAL
Qty: 0 | Refills: 0 | DISCHARGE

## 2019-12-10 RX ADMIN — Medication 50 MILLIGRAM(S): at 05:05

## 2019-12-10 RX ADMIN — Medication 81 MILLIGRAM(S): at 12:24

## 2019-12-10 RX ADMIN — POLYETHYLENE GLYCOL 3350 17 GRAM(S): 17 POWDER, FOR SOLUTION ORAL at 12:25

## 2019-12-10 RX ADMIN — Medication 400 MILLIGRAM(S): at 17:59

## 2019-12-10 RX ADMIN — Medication 100 MILLIGRAM(S): at 12:24

## 2019-12-10 RX ADMIN — AMLODIPINE BESYLATE 5 MILLIGRAM(S): 2.5 TABLET ORAL at 05:06

## 2019-12-10 RX ADMIN — Medication 400 MILLIGRAM(S): at 05:06

## 2019-12-10 RX ADMIN — Medication 20 MILLIGRAM(S): at 05:14

## 2019-12-10 RX ADMIN — Medication 37.5 MICROGRAM(S): at 05:06

## 2019-12-10 RX ADMIN — PANTOPRAZOLE SODIUM 40 MILLIGRAM(S): 20 TABLET, DELAYED RELEASE ORAL at 05:06

## 2019-12-10 RX ADMIN — ATORVASTATIN CALCIUM 20 MILLIGRAM(S): 80 TABLET, FILM COATED ORAL at 12:25

## 2019-12-10 RX ADMIN — Medication 50 MILLIGRAM(S): at 17:59

## 2019-12-10 RX ADMIN — Medication 400 MILLIGRAM(S): at 05:45

## 2019-12-10 NOTE — DISCHARGE NOTE PROVIDER - NSFOLLOWUPCLINICS_GEN_ALL_ED_FT
[facility]  Hematology/Oncology  [address] [id number]  Phone: [phone]  Fax:   Follow Up Time:

## 2019-12-10 NOTE — CHART NOTE - NSCHARTNOTEFT_GEN_A_CORE
CC: weakness    S: overall feels much stronger today.  Had rough night due to neck pain from muscle spasm and took a long time to get ibuprofen.  Once he got ibuprofen pain improved.  Has full ROM of neck today, no numbness/tingling/weakness of upper extremities.    O: AVSS  MSK: FROM neck, no cervical spine tenderness  CV: RRR  Resp: CTAB/L, no wheeze/acc m's  Abd: s/nd/nt  Ext: no edema    A/P: 80 year old male with pmhx MDS on vidaza, ADD, hypothyroidism, AFib, CHF, gout, hypercholesterolemia, hx of c. diff, DM, HTN and pshx tonsillectomy, CAD p/w worsening weakness, AMS and LLQ abd pain x 3-4 days likely metabolic encephalopathy due to dehydration and constipation, resolved with bowel regimen.  His weakness seemed multifactorial in setting of dehydration and constipation. Wheelchair bound at baseline but can transition with assistance. No evidence of infection as he remained afebrile, UA not indicative of active UTI and afebrile/normal wbc.  Will defer treating + urine culture at this time to avoid future resistant organisms.  PT recommends home PT on discharge, and he will have a HHA assessment on discharge as well. Overall his wife has noted progressive decline over past 8 months.  I recommended they follow up with their hematology team about ongoing treatment plans for his known MDS on discharge.  He did not require transfusions as inpatient. They are requesting referral for new hematologist and number was provided to them for making this request.    For his hypothyroidism, his TSH was elevated and synthroid was increased to 37.5 mcg.  He will need repeat TSH in 6weeks.  His DM2 was controlled, with a1c 6 from 11/2019.    For his muscle spasm, it was relieved with ibuprofen which patient is requesting prn.  Also started flexeril prn.    Transitions of Care Status:  1.  Name of PCP: Yolie Elizondo  2.  PCP Contacted on Admission: [ ] Y    [ ] N    3.  PCP contacted at Discharge: [x ] Y    [ ] N    [ ] N/A  4.  Post-Discharge Appointment Date and Location:  5.  Summary of Handoff given to PCP: emailed.  Patient requesting new heme referral.  I spoke with Dr Hudson who said they should call Jumana Lynch at [facility] themselves to make this request.    discharge time: 50min.  Discharge plan reviewed with PATRICIA Johnson.    Beeper: [phone]

## 2019-12-10 NOTE — DISCHARGE NOTE PROVIDER - NSDCFUSCHEDAPPT_GEN_ALL_CORE_FT
MCELHONE SR, EDWARD C ; 12/11/2019 ; NPP [facility]  MCELHONE SR, EDWARD C ; 12/12/2019 ; NPP Med Nephro [address] JUAREZ VALLADARES, EDWARD C ; 12/12/2019 ; NPP [facility]  MCELHONE SR, EDWARD C ; 12/13/2019 ; NPP [facility]  MCELHONE SR, EDWARD C ; 12/16/2019 ; NPP [facility]  MCELHONE SR, EDWARD C ; 02/04/2020 ; NPP Cardio [address]

## 2019-12-10 NOTE — DISCHARGE NOTE NURSING/CASE MANAGEMENT/SOCIAL WORK - PATIENT PORTAL LINK FT
You can access the FollowMyHealth Patient Portal offered by [facility] by registering at the following website: [email or web address]. By joining [facility]’s FollowMyHealth portal, you will also be able to view your health information using other applications (apps) compatible with our system.

## 2019-12-10 NOTE — DISCHARGE NOTE PROVIDER - NSDCMRMEDTOKEN_GEN_ALL_CORE_FT
allopurinol 100 mg oral tablet: 1 tab(s) orally once a day  amLODIPine 5 mg oral tablet: 1 tab(s) orally once a day  aspirin 81 mg oral tablet: 1 tab(s) orally once a day  metFORMIN 500 mg oral tablet: 1 tab(s) orally 2 times a day  methylphenidate 20 mg oral tablet: 1 tab(s) orally 3 times a day  metoclopramide 10 mg oral tablet: 1 tab(s) orally once a day  metoprolol tartrate 50 mg oral tablet: 1 tab(s) orally 2 times a day  omeprazole 20 mg oral delayed release capsule: 1 cap(s) orally once a day  rosuvastatin 5 mg oral tablet: 1 tab(s) orally once a day  Synthroid 25 mcg (0.025 mg) oral tablet: 1.5 tab(s) orally once a day  Tradjenta 5 mg oral tablet: 1 tab(s) orally once a day allopurinol 100 mg oral tablet: 1 tab(s) orally once a day  amLODIPine 5 mg oral tablet: 1 tab(s) orally once a day  aspirin 81 mg oral delayed release tablet: 1 tab(s) orally once a day  cyclobenzaprine 5 mg oral tablet: 1 tab(s) orally once a day, As needed, Muscle Spasm  ibuprofen 400 mg oral tablet: 1 tab(s) orally every 8 hours, As needed, Moderate Pain (4 - 6)  levothyroxine: 37.5 microgram(s) orally once a day  metFORMIN 500 mg oral tablet: 1 tab(s) orally 2 times a day  methylphenidate 20 mg oral tablet: 1 tab(s) orally 3 times a day  metoprolol tartrate 50 mg oral tablet: 1 tab(s) orally 2 times a day  omeprazole 20 mg oral delayed release capsule: 1 cap(s) orally once a day  polyethylene glycol 3350 oral powder for reconstitution: 17 gram(s) orally once a day  rosuvastatin 5 mg oral tablet: 1 tab(s) orally once a day  Tradjenta 5 mg oral tablet: 1 tab(s) orally once a day

## 2019-12-10 NOTE — DISCHARGE NOTE PROVIDER - HOSPITAL COURSE
80 year old male with pmhx MDS on vidaza, ADD, hypothyroidism, AFib, CHF, gout, hypercholesterolemia, hx of c. diff, DM, HTN and pshx tonsillectomy, CAD p/w worsening weakness, AMS and LLQ abd pain x 3-4 days likely metabolic encephalopathy due to dehydration and constipation, resolved with bowel regimen.  His weakness seemed multifactorial in setting of dehydration and constipation. Wheelchair bound at baseline but can transition with assistance. No evidence of infection as he remained afebrile, UA not indicative of active UTI and afebrile/normal wbc.  Will defer treating + urine culture at this time to avoid future resistant organisms.    PT recommends home PT on discharge, and he will have a HHA assessment on discharge as well. Overall his wife has noted progressive decline over past 8 months.  I recommended they follow up with their hematology team about ongoing treatment plans for his known MDS on discharge.  He did not require transfusions as inpatient. They are requesting referral for new hematologist and number was provided to them for making this request.        For his hypothyroidism, his TSH was elevated and synthroid was increased to 37.5 mcg.  He will need repeat TSH in 6weeks.    His DM2 was controlled, with a1c 6 from 11/2019.

## 2019-12-10 NOTE — DISCHARGE NOTE PROVIDER - NSDCCPCAREPLAN_GEN_ALL_CORE_FT
PRINCIPAL DISCHARGE DIAGNOSIS  Diagnosis: Generalized weakness  Assessment and Plan of Treatment: continue home Physical therapy  stay adequately hydrated      SECONDARY DISCHARGE DIAGNOSES  Diagnosis: Hypothyroid  Assessment and Plan of Treatment: your synthroid dose was INCREASED to 37.5 mcg daily.  You will need labwork for rechecking your TSH in 6 weeks    Diagnosis: MDS (myelodysplastic syndrome)  Assessment and Plan of Treatment: If you would like to change hematologists, call the [facility] number and ask to speak with Jumana Lynch.  She will arrange for you to meet with a different hematologist

## 2019-12-10 NOTE — CDI QUERY NOTE - NSCDIOTHERTXTBX_GEN_ALL_CORE_HH
This patient presented with c/o altered mental status and  abdominal pain. He was found to be dehydrated and constipated. He was treated with IVF and an enema was given. Documentation in the Medicine progress notes indicates the AMS and LLQ was likely due to constipation and has now resolved.    Please document the medical diagnosis for this neurological condition of AMS in the setting of constipation. Please document if this  was ;    Metabolic encephalopathy  Dementia with behavioral disturbance  other ( please specify condition)

## 2019-12-10 NOTE — DISCHARGE NOTE NURSING/CASE MANAGEMENT/SOCIAL WORK - NSSCTYPOFSERV_GEN_ALL_CORE
Home physical therapy.  An RN will contact you prior to your initial visit.  Please call for any questions

## 2019-12-11 ENCOUNTER — APPOINTMENT (OUTPATIENT)
Dept: INFUSION THERAPY | Facility: HOSPITAL | Age: 79
End: 2019-12-11

## 2019-12-11 LAB
CULTURE RESULTS: SIGNIFICANT CHANGE UP
CULTURE RESULTS: SIGNIFICANT CHANGE UP
SPECIMEN SOURCE: SIGNIFICANT CHANGE UP
SPECIMEN SOURCE: SIGNIFICANT CHANGE UP

## 2019-12-12 ENCOUNTER — APPOINTMENT (OUTPATIENT)
Dept: INFUSION THERAPY | Facility: HOSPITAL | Age: 79
End: 2019-12-12

## 2019-12-13 ENCOUNTER — RESULT REVIEW (OUTPATIENT)
Age: 79
End: 2019-12-13

## 2019-12-13 ENCOUNTER — APPOINTMENT (OUTPATIENT)
Dept: INFUSION THERAPY | Facility: HOSPITAL | Age: 79
End: 2019-12-13

## 2019-12-13 LAB
BASOPHILS # BLD AUTO: 0.1 K/UL — SIGNIFICANT CHANGE UP (ref 0–0.2)
BASOPHILS NFR BLD AUTO: 1 % — SIGNIFICANT CHANGE UP (ref 0–2)
EOSINOPHIL # BLD AUTO: 0 K/UL — SIGNIFICANT CHANGE UP (ref 0–0.5)
EOSINOPHIL NFR BLD AUTO: 5 % — SIGNIFICANT CHANGE UP (ref 0–6)
HCT VFR BLD CALC: 32 % — LOW (ref 39–50)
HGB BLD-MCNC: 10.7 G/DL — LOW (ref 13–17)
LG PLATELETS BLD QL AUTO: SLIGHT — SIGNIFICANT CHANGE UP
LYMPHOCYTES # BLD AUTO: 1.7 K/UL — SIGNIFICANT CHANGE UP (ref 1–3.3)
LYMPHOCYTES # BLD AUTO: 31 % — SIGNIFICANT CHANGE UP (ref 13–44)
MCHC RBC-ENTMCNC: 32.1 PG — SIGNIFICANT CHANGE UP (ref 27–34)
MCHC RBC-ENTMCNC: 33.3 G/DL — SIGNIFICANT CHANGE UP (ref 32–36)
MCV RBC AUTO: 96.3 FL — SIGNIFICANT CHANGE UP (ref 80–100)
MONOCYTES # BLD AUTO: 0.4 K/UL — SIGNIFICANT CHANGE UP (ref 0–0.9)
MONOCYTES NFR BLD AUTO: 2 % — SIGNIFICANT CHANGE UP (ref 2–14)
NEUTROPHILS # BLD AUTO: 2.5 K/UL — SIGNIFICANT CHANGE UP (ref 1.8–7.4)
NEUTROPHILS NFR BLD AUTO: 61 % — SIGNIFICANT CHANGE UP (ref 43–77)
PLAT MORPH BLD: NORMAL — SIGNIFICANT CHANGE UP
PLATELET # BLD AUTO: 440 K/UL — HIGH (ref 150–400)
RBC # BLD: 3.32 M/UL — LOW (ref 4.2–5.8)
RBC # FLD: 16.7 % — HIGH (ref 10.3–14.5)
RBC BLD AUTO: SIGNIFICANT CHANGE UP
WBC # BLD: 4.6 K/UL — SIGNIFICANT CHANGE UP (ref 3.8–10.5)
WBC # FLD AUTO: 4.6 K/UL — SIGNIFICANT CHANGE UP (ref 3.8–10.5)

## 2019-12-16 ENCOUNTER — RESULT REVIEW (OUTPATIENT)
Age: 79
End: 2019-12-16

## 2019-12-16 ENCOUNTER — LABORATORY RESULT (OUTPATIENT)
Age: 79
End: 2019-12-16

## 2019-12-16 ENCOUNTER — APPOINTMENT (OUTPATIENT)
Dept: HEMATOLOGY ONCOLOGY | Facility: CLINIC | Age: 79
End: 2019-12-16
Payer: MEDICARE

## 2019-12-16 ENCOUNTER — APPOINTMENT (OUTPATIENT)
Dept: INFUSION THERAPY | Facility: HOSPITAL | Age: 79
End: 2019-12-16

## 2019-12-16 VITALS
TEMPERATURE: 97.6 F | SYSTOLIC BLOOD PRESSURE: 132 MMHG | HEART RATE: 61 BPM | BODY MASS INDEX: 22.52 KG/M2 | OXYGEN SATURATION: 99 % | DIASTOLIC BLOOD PRESSURE: 85 MMHG | RESPIRATION RATE: 17 BRPM | WEIGHT: 156.97 LBS

## 2019-12-16 DIAGNOSIS — Z51.11 ENCOUNTER FOR ANTINEOPLASTIC CHEMOTHERAPY: ICD-10-CM

## 2019-12-16 DIAGNOSIS — R11.2 NAUSEA WITH VOMITING, UNSPECIFIED: ICD-10-CM

## 2019-12-16 LAB
BASOPHILS # BLD AUTO: 0 K/UL — SIGNIFICANT CHANGE UP (ref 0–0.2)
BASOPHILS NFR BLD AUTO: 0.7 % — SIGNIFICANT CHANGE UP (ref 0–2)
EOSINOPHIL # BLD AUTO: 0.2 K/UL — SIGNIFICANT CHANGE UP (ref 0–0.5)
EOSINOPHIL NFR BLD AUTO: 5.2 % — SIGNIFICANT CHANGE UP (ref 0–6)
HCT VFR BLD CALC: 30 % — LOW (ref 39–50)
HGB BLD-MCNC: 10.1 G/DL — LOW (ref 13–17)
LYMPHOCYTES # BLD AUTO: 1.7 K/UL — SIGNIFICANT CHANGE UP (ref 1–3.3)
LYMPHOCYTES # BLD AUTO: 34.8 % — SIGNIFICANT CHANGE UP (ref 13–44)
MCHC RBC-ENTMCNC: 32.4 PG — SIGNIFICANT CHANGE UP (ref 27–34)
MCHC RBC-ENTMCNC: 33.8 G/DL — SIGNIFICANT CHANGE UP (ref 32–36)
MCV RBC AUTO: 96.1 FL — SIGNIFICANT CHANGE UP (ref 80–100)
MONOCYTES # BLD AUTO: 0.2 K/UL — SIGNIFICANT CHANGE UP (ref 0–0.9)
MONOCYTES NFR BLD AUTO: 5.1 % — SIGNIFICANT CHANGE UP (ref 2–14)
NEUTROPHILS # BLD AUTO: 2.6 K/UL — SIGNIFICANT CHANGE UP (ref 1.8–7.4)
NEUTROPHILS NFR BLD AUTO: 54.2 % — SIGNIFICANT CHANGE UP (ref 43–77)
PLAT MORPH BLD: NORMAL — SIGNIFICANT CHANGE UP
PLATELET # BLD AUTO: 390 K/UL — SIGNIFICANT CHANGE UP (ref 150–400)
RBC # BLD: 3.13 M/UL — LOW (ref 4.2–5.8)
RBC # FLD: 16.5 % — HIGH (ref 10.3–14.5)
RBC BLD AUTO: SIGNIFICANT CHANGE UP
WBC # BLD: 4.8 K/UL — SIGNIFICANT CHANGE UP (ref 3.8–10.5)
WBC # FLD AUTO: 4.8 K/UL — SIGNIFICANT CHANGE UP (ref 3.8–10.5)

## 2019-12-16 PROCEDURE — 99215 OFFICE O/P EST HI 40 MIN: CPT

## 2019-12-16 RX ORDER — FOSFOMYCIN TROMETHAMINE 3 G/1
3 POWDER ORAL DAILY
Qty: 2 | Refills: 0 | Status: ACTIVE | COMMUNITY
Start: 2019-12-16 | End: 1900-01-01

## 2019-12-17 ENCOUNTER — APPOINTMENT (OUTPATIENT)
Dept: NEPHROLOGY | Facility: CLINIC | Age: 79
End: 2019-12-17
Payer: MEDICARE

## 2019-12-17 ENCOUNTER — OUTPATIENT (OUTPATIENT)
Dept: OUTPATIENT SERVICES | Facility: HOSPITAL | Age: 79
LOS: 1 days | Discharge: HOME | End: 2019-12-17

## 2019-12-17 VITALS
BODY MASS INDEX: 22.33 KG/M2 | OXYGEN SATURATION: 98 % | HEIGHT: 70 IN | SYSTOLIC BLOOD PRESSURE: 91 MMHG | DIASTOLIC BLOOD PRESSURE: 58 MMHG | WEIGHT: 156 LBS | HEART RATE: 62 BPM

## 2019-12-17 DIAGNOSIS — Z95.5 PRESENCE OF CORONARY ANGIOPLASTY IMPLANT AND GRAFT: Chronic | ICD-10-CM

## 2019-12-17 DIAGNOSIS — Z90.89 ACQUIRED ABSENCE OF OTHER ORGANS: Chronic | ICD-10-CM

## 2019-12-17 DIAGNOSIS — Z98.89 OTHER SPECIFIED POSTPROCEDURAL STATES: Chronic | ICD-10-CM

## 2019-12-17 DIAGNOSIS — D46.9 MYELODYSPLASTIC SYNDROME, UNSPECIFIED: ICD-10-CM

## 2019-12-17 PROCEDURE — 99214 OFFICE O/P EST MOD 30 MIN: CPT

## 2019-12-17 NOTE — ASSESSMENT
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  Repeat bone marrow biopsy with MDS with excess blasts.  He has received 2 cycles of vidaza with improvement in his anemia. \par Uncertain etiology of his bouts of weakness/lethargy.  Doubt vidaza as he is day 24 of treatment.  \par For now would hold further vidaza, continue supportive measures with transfusions.  \par HGB 10 today, no need for transfusion. \par Resume aspirin as his PLT count is 106K. Advised his wife and daughter to hold aspirin if his PLT fall below 50K\par Hold cycle 3 until December.  He is recommended to follow up with his PCP/nephrology.  Plan for hydration twice weekly and following his BMP to ensure no worsening in his renal function.  Encourage nutrition. \par BMBx after cycle 4 of vidaza. \par Discussed the flow of the practice, assisted patient with making all of the appointments.  Expressed concern about the need for the patient's wife to call if there are immediate issues.  \par He should see his PCP/neurology/nephrology to ensure no other issues are occurring- doubt his symptoms are all due to his disease/treatment.  If they are not happy, can assist with them to attain a second opinion.  \par All questions/concerns answered to our best ability.

## 2019-12-17 NOTE — REVIEW OF SYSTEMS
[Fatigue] : fatigue [Muscle Weakness] : muscle weakness [Difficulty Walking] : difficulty walking [Negative] : Respiratory [FreeTextEntry9] : generalized weakness [FreeTextEntry2] : lethargic  [de-identified] : unsteadiness, neuropathy

## 2019-12-17 NOTE — ASSESSMENT
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  Repeat marrow revealed MDS with excess blasts, continued del5q.  \par He had been treated with 2 cycles of vidaza, last 10/28, day 50.  He had tolerated treatment well, counts overall appear better but he is unable to continue due to bout of AMS/lethargy.  Prior to this hospitalization he had had a dx of UTI, now with dysuria, + urine culture.  Discussed with ID- would recommend treatment, all sensitivities are for IV therapy.  Will give a dose of fosfomycin and again 48 hours later.  Repeat UA/urine culture today.  \par Recommend follow up with his PCP, Dr. Cedillo.  \par For now his hemoglobin has remained stable, continue transfusional support and continue to hold vidaza until he is better. \par Will continue with IV hydration twice a week.  \par Reevaluate patient in 4 weeks- if better will resume vidaza, if not consider bone marrow biopsy. \par

## 2019-12-17 NOTE — RESULTS/DATA
[FreeTextEntry1] : \par ACCESSION No:  [id number]\par \par JUAREZ SR, EDWARD                   3\par \par \par \par Hematopathology Report\par \par \par \par \par Final Diagnosis\par 1, 2. 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  Increased ring sideroblasts are also\par present.\par Comprehensive report with results of pending ancillary studies to\par follow.\par \par Dr. Elizondo was notified of the diagnosis on 09/19/19.\par \par Ancillary studies\par Bone marrow aspirate iron stain: Iron stores are increased;\par numerous ring sideroblasts are present (greater than 15%).\par \par Flow cytometry:  The myeloid immunophenotypic findings show\par decreased myeloid granularity, no increase in myeloid immaturity\par with aberrant myeloblasts (2% of cells), positive for HLA-DR,\par CD34, , CD33, CD13, partial CD7, and myeloid antigen\par maturation pattern with left shift.\par \par Immunohistochemical stains (block 1A: CD34, p53) show increased\par CD34 positive blasts, approximately 7% of cellularity. p53 show\par >1% strongly positive cells, indicative of poor prognosis in MDS\par with del(5q).\par \par Cytogenetics:  Pending\par \par FISH:  pending\par \par Microscopic description:\par 1. Biopsy:  Sections of bone marrow biopsy and  show\par normocellularity (30% cellularity), focal immature cells,\par trilineage hematopoiesis with maturation, myeloid predominance,\par megakaryocytes normal in number, most with small/hypolobulated\par morphology, mild perivascular plasmacytosis, and iron stores\par increased.  Bone marrow fragments are not present in clot.\par \par 2. Aspirate:  Cellular spicules are present, adequate for\par interpretation.  Blasts are increased (9%). Maturing and\par \par \par \par \par \par \par MCELHONE SR, EDWARD                   3\par \par \par \par Hematopathology Report\par \par \par \par \par mature myeloid and erythroid elements are present with myeloid\par predominance (M:E ratio (3.8:1).  Megakaryocytes appear normal in\par number with small/dysplastic morphology.\par \par Bone Marrow Aspirate Differential: (300 Cells).\par Type            %    Normal*\par Blast                9%   0-3\par Neutrophil and\par Precursors        50%  33-63\par Eosinophil           3%   1-5\par Basophil        2%   0-1\par Pronormoblast        2%   0-2\par Normoblast           15%  15-25\par Monocyte        0%   0-2\par Lymphocyte           15%  10-15\par Plasma cell          4%   0-1\par *Adult Range\par \par Comment\par Iron stain (examined to evaluate for iron stores; see microscopic\par description) and Giemsa stain (shows appropriate\par staining pattern) are performed and evaluated on block(s): 1A, 1B\par \par Verified by: Brenda Larsen\par (Electronic Signature)\par Reported on: 09/19/19 15:30 EDT, 6 [facility], [place], [place]\par [address]\par _________________________________________________________________\par \par Clinical History\par MDS\par \par Specimen(s) Submitted\par 1     Bone marrow biopsy\par 2     Bone marrow aspirate\par \par Gross Description\par 1. The specimen is received in bouin's fixative and the specimen\par container is labeled: Bone marrow biopsy.  It consists of two\par bone marrow cores measuring 0.2 and 1.0 cm in length by 0.1 cm in\par diameter.  Also present is a 0.5 x 0.4 x 0.1 cm aggregate of\par blood clot.  Entirely submitted.  Two cassettes: A = bone marrow\par core; B = blood clot.\par \par 2. Two Lao-Giemsa and one iron stained bone marrow aspirate\par smears are submitted     {10-FL-    }.\par \par \par \par \par \par \par \par JUAREZ SR, JENIFFER                   3\par \par \par \par Hematopathology Report\par \par \par \par \par In addition to other data that may appear on the specimen\par container, the label has been inspected to confirm the presence\par of the patient's name and date of birth.\par Linda Ritchie 09/13/19 20:07

## 2019-12-17 NOTE — REVIEW OF SYSTEMS
[FreeTextEntry8] : pain while urinating [Fatigue] : fatigue [Recent Change In Weight] : ~T recent weight change [Muscle Weakness] : muscle weakness [Difficulty Walking] : difficulty walking [Negative] : Allergic/Immunologic [Incontinence] : no incontinence [Dysuria] : dysuria [FreeTextEntry2] : lethargic, 8lb weight loss  [FreeTextEntry9] : generalized weakness [de-identified] : is unsteady on feet, feels a sense of imbalance; denies dizziness +neuropathy

## 2019-12-17 NOTE — RESULTS/DATA
[FreeTextEntry1] : \par ACCESSION No:  [id number]\par \par JUAREZ SR, EDWARD                   3\par \par \par \par Hematopathology Report\par \par \par \par \par Final Diagnosis\par 1, 2. 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  Increased ring sideroblasts are also\par present.\par Comprehensive report with results of pending ancillary studies to\par follow.\par \par Dr. Elizondo was notified of the diagnosis on 09/19/19.\par \par Ancillary studies\par Bone marrow aspirate iron stain: Iron stores are increased;\par numerous ring sideroblasts are present (greater than 15%).\par \par Flow cytometry:  The myeloid immunophenotypic findings show\par decreased myeloid granularity, no increase in myeloid immaturity\par with aberrant myeloblasts (2% of cells), positive for HLA-DR,\par CD34, , CD33, CD13, partial CD7, and myeloid antigen\par maturation pattern with left shift.\par \par Immunohistochemical stains (block 1A: CD34, p53) show increased\par CD34 positive blasts, approximately 7% of cellularity. p53 show\par >1% strongly positive cells, indicative of poor prognosis in MDS\par with del(5q).\par \par Cytogenetics:  Pending\par \par FISH:  pending\par \par Microscopic description:\par 1. Biopsy:  Sections of bone marrow biopsy and  show\par normocellularity (30% cellularity), focal immature cells,\par trilineage hematopoiesis with maturation, myeloid predominance,\par megakaryocytes normal in number, most with small/hypolobulated\par morphology, mild perivascular plasmacytosis, and iron stores\par increased.  Bone marrow fragments are not present in clot.\par \par 2. Aspirate:  Cellular spicules are present, adequate for\par interpretation.  Blasts are increased (9%). Maturing and\par \par \par \par \par \par \par MCELHONE SR, EDWARD                   3\par \par \par \par Hematopathology Report\par \par \par \par \par mature myeloid and erythroid elements are present with myeloid\par predominance (M:E ratio (3.8:1).  Megakaryocytes appear normal in\par number with small/dysplastic morphology.\par \par Bone Marrow Aspirate Differential: (300 Cells).\par Type            %    Normal*\par Blast                9%   0-3\par Neutrophil and\par Precursors        50%  33-63\par Eosinophil           3%   1-5\par Basophil        2%   0-1\par Pronormoblast        2%   0-2\par Normoblast           15%  15-25\par Monocyte        0%   0-2\par Lymphocyte           15%  10-15\par Plasma cell          4%   0-1\par *Adult Range\par \par Comment\par Iron stain (examined to evaluate for iron stores; see microscopic\par description) and Giemsa stain (shows appropriate\par staining pattern) are performed and evaluated on block(s): 1A, 1B\par \par Verified by: Brenda Larsen\par (Electronic Signature)\par Reported on: 09/19/19 15:30 EDT, 6 [facility], [place], [place]\par [address]\par _________________________________________________________________\par \par Clinical History\par MDS\par \par Specimen(s) Submitted\par 1     Bone marrow biopsy\par 2     Bone marrow aspirate\par \par Gross Description\par 1. The specimen is received in bouin's fixative and the specimen\par container is labeled: Bone marrow biopsy.  It consists of two\par bone marrow cores measuring 0.2 and 1.0 cm in length by 0.1 cm in\par diameter.  Also present is a 0.5 x 0.4 x 0.1 cm aggregate of\par blood clot.  Entirely submitted.  Two cassettes: A = bone marrow\par core; B = blood clot.\par \par 2. Two Lao-Giemsa and one iron stained bone marrow aspirate\par smears are submitted    {10-FL-   }.\par \par \par \par \par \par \par \par JUAREZ SR, JENIFFER                   3\par \par \par \par Hematopathology Report\par \par \par \par \par In addition to other data that may appear on the specimen\par container, the label has been inspected to confirm the presence\par of the patient's name and date of birth.\par Linda Ritchie 09/13/19 20:07

## 2019-12-17 NOTE — ADDENDUM
[FreeTextEntry1] : Documented by Shaila Varela acting as a scribe for Dr. Elizondo on 12/16/2019\par \par All medical record entries made by the Scribe were at my, Dr. Elizondo's, direction and\par personally dictated by me on 12/16/2019. I have reviewed the chart and agree that the record\par accurately reflects my personal performance of the history, physical exam, procedure and imaging.

## 2019-12-17 NOTE — HISTORY OF PRESENT ILLNESS
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 MRA brain: No flow-limiting stenosis or MRA evidence of aneurysm of the intracranial arteries. \par \par Transfusion independent until 8/2019. Repeat bone marrow biopsy 9/13/19- Myelodysplastic syndrome (history of isolated del(5q))\par with progression to MDS with excess blasts- approximately 7% of cellularity. p53 show>1% strongly positive cells\par Abnormal male karyotype- 46,XY,del(5)(q13q33)  {20  }\par  [de-identified] : Patient presents for a follow up appointment, he is accompanied by his wife and daughter.  He had received 2 cycles of vidaza, the last on 10/28.  His wife feels that he has been feeling more lethargic and has lower extremity weakness/neuropathy.  He was being evaluated by Dr. Bray who encouraged him to follow up with Dr. Garcia- who recommended that he follow up.  Overall they feel that he had tolerating Vidaza well, but notes bouts of increasing fatigue and dehydration.  They feels his appetite waxes and wanes and he is unable to hydrate properly.  Continues to ambulate via wheelchair. His wife also states that he was suffering from a UTI with fever ranging from 99-100F. \par Otherwise, today he feels better, appetite is stable. Is currently D24 of C2. He denies any chest pain, no SOB, +PUGH, no abdominal pain, no n/v/d.  \par They feel frustrated because of inability to bring him back and forth easily, felt that there was miscommunication with our nurse and themselves regarding the need for follow up and continuing to have lab checks.

## 2019-12-17 NOTE — HISTORY OF PRESENT ILLNESS
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 MRA brain: No flow-limiting stenosis or MRA evidence of aneurysm of the intracranial arteries. \par \par Transfusion independent until 8/2019.  Repeat bone marrow biopsy 9/13/19- Myelodysplastic syndrome (history of isolated del(5q))\par with progression to MDS with excess blasts- approximately 7% of cellularity. p53 show>1% strongly positive cells\par Abnormal male karyotype- 46,XY,del(5)(q13q33) {20 }\par  [de-identified] : Patient presents for a follow up appointment, he is accompanied by his wife and daughter. He was recently hospitalized on 12/6 to 12/10 for worsening weakness, AMS & LLQ abd pain, and constipation - discharge summary stated below.  For a 5 days admission, he was hydrated and given a bowel regimen.  He was recommended to hospice but the family is unaware of reasoning and refused.  He had a positive urine culture for pseudomonas, negative UA so no treatment was recommended.  The family states he has had repeated episodes of weakness/lethargy that waxes and wanes.  He has not seen his PCP as of yet.  He states he does note dysuria, no discoloration or change in smell.  Today his wife and daughter states that he appears improved and less lethargic.  Lower extremity neuropathy unchanged. He continues to have bouts of lethargy, often has trouble getting out of bed and getting into his chair.  His wife notes that his appetite is poor, 8lb weight los since his last visit. He denies any chest pain, no SOB, +PUGH, no abdominal pain, no n/v/d, no fever/chills.\par \par \par Discharge Summary: \par 80 year old male with pmhx MDS on vidaza, ADD, hypothyroidism, AFib, CHF, gout, hypercholesterolemia, hx of c. diff, DM, HTN and pshx tonsillectomy, CAD p/w worsening weakness, AMS and LLQ abd pain x 3-4 days likely metabolic encephalopathy due to dehydration and constipation, resolved with bowel regimen.  His weakness seemed multifactorial in setting of dehydration and constipation. Wheelchair bound at baseline but can transition with assistance. No evidence of infection as he remained afebrile, UA not indicative of active UTI and afebrile/normal wbc.  Will defer treating + urine culture at this time to avoid future resistant organisms. \par PT recommends home PT on discharge, and he will have a HHA assessment on discharge as well. Overall his wife has noted progressive decline over past 8 months.  I recommended they follow up with their hematology team about ongoing treatment plans for his known MDS on discharge.  He did not require transfusions as inpatient. They are requesting referral for new hematologist and number was provided to them for making this request. \par For his hypothyroidism, his TSH was elevated and synthroid was increased to 37.5 mcg.  He will need repeat TSH in 6 weeks. His DM2 was controlled, with a1c 6 from 11/2019. \par \par

## 2019-12-17 NOTE — PHYSICAL EXAM
[General Appearance - In No Acute Distress] : in no acute distress [PERRL With Normal Accommodation] : pupils were equal in size, round, and reactive to light [Sclera] : the sclera and conjunctiva were normal [FreeTextEntry1] : wheelchair bound [Oropharynx] : the oropharynx was normal [Extraocular Movements] : extraocular movements were intact [Outer Ear] : the ears and nose were normal in appearance [Jugular Venous Distention Increased] : there was no jugular-venous distention [Neck Cervical Mass (___cm)] : no neck mass was observed [Neck Appearance] : the appearance of the neck was normal [Thyroid Nodule] : there were no palpable thyroid nodules [Thyroid Diffuse Enlargement] : the thyroid was not enlarged [] : no respiratory distress [Heart Rate And Rhythm] : heart rate was normal and rhythm regular [Auscultation Breath Sounds / Voice Sounds] : lungs were clear to auscultation bilaterally [Heart Sounds] : normal S1 and S2 [Heart Sounds Gallop] : no gallops [Murmurs] : no murmurs [Cervical Lymph Nodes Enlarged Anterior Bilaterally] : anterior cervical [Cervical Lymph Nodes Enlarged Posterior Bilaterally] : posterior cervical [Heart Sounds Pericardial Friction Rub] : no pericardial rub [Supraclavicular Lymph Nodes Enlarged Bilaterally] : supraclavicular [Axillary Lymph Nodes Enlarged Bilaterally] : axillary [Femoral Lymph Nodes Enlarged Bilaterally] : femoral [Inguinal Lymph Nodes Enlarged Bilaterally] : inguinal [No CVA Tenderness] : no ~M costovertebral angle tenderness [Oriented To Time, Place, And Person] : oriented to person, place, and time

## 2019-12-17 NOTE — ASSESSMENT
[FreeTextEntry1] : Mr. PIZARRO SR is a 79 year old male with known CKD with likely due to solitary kidney and DMII and chronic vascular disease. No signs of JOYCE at this point.  Since 2017, pt has new dx of MDS and polyneuropathy. Overall, not doing well but stable. \par \par JOYCE has resolved\par CKD:- now best crt in 1,2 range and stable\par labs done and stable as of yesterday at heme office\par Hyperkalemia- resolved\par Meds reviewed:- dose all meds to CKD eGFR and for now stable. Lasix as needed only for wt changes\par UTI that are frequent- might require ID eval given neurogenic bladder and frequent UTI requiring admissions\par Referral given\par \par f/u as needed or 1 year

## 2019-12-17 NOTE — PHYSICAL EXAM
[Restricted in physically strenuous activity but ambulatory and able to carry out work of a light or sedentary nature] : Status 1- Restricted in physically strenuous activity but ambulatory and able to carry out work of a light or sedentary nature, e.g., light house work, office work [Normal] : affect appropriate [de-identified] : no suprapubic tenderness, no CVA tenderness

## 2019-12-17 NOTE — HISTORY OF PRESENT ILLNESS
[FreeTextEntry1] : Mr. JUAREZ VALLADARES is a 79 year old male with CAD, myelodysplastic syndrome here for f/u for his JOYCE and CKD. He has had CAD for years, DMII and HTN and hyperlipidemia for sometime. He has good A1c control and only on 2 oral agents. He has no known prior proteinuria, if so very minimal at 100-300mg range. He has had up and down crt in 1.3-1.9 range since 2014 and most recently in 1.3 range. He has a solitary R kidney and L kidney since old scans noted from 2014 onwards dysplastic and atrophic. He had no major issues except CAD till 2017. Bone marrow biopsy 5/11/17- Myelodysplastic syndrome (MDS) with isolated del(5q)  Patient admitted in 2017 or anemia and then for acute CVA. 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 MRA brain: No flow-limiting stenosis or MRA evidence of aneurysm of the intracranial arteries. A workup didn't reveal anything specific but then had several transfusions and short course of revlimid and given mild disease, he was just told to stay on conservative management. While this was going on, he had on and off balance concerns. Several falls were noted. He was dx with cervical myelopathy vs. diabetic polyneuropathy, he probably has elements of both per neurology. MRI is to be done as well. He is now wheelchair bound and feels scared to walk as his risk of falling. His meds were reviewed. His wt has been stable and he takes lasix as needed 20mg po qd. He does admit to high K diet recently and his K was in 5 range and crt stable 1.3mg/dl.  No n/v. No decrease in appetite, no tremors. No edema worsening. no decreased sleep. No foamy urine. no hematuria, no dysuria. no confusion. No SOB, PUGH. No wt loss. He has no hx of BPH but he does have trouble urinating on and off since being on a wheelchair and has increased dribbling. He has nocturia as well. \par \par Since last visit, his hgb was rechecked and started on vidaza for his MDS. He required PBCS and few admissions for UTI as well that are frequent. His JOYCE completely resolved. most recent lab work showed crt of 1.2mg/dl. Most recently had UTI and required IV abx in [facility] and then now on fosfomycin. \par \par

## 2019-12-19 ENCOUNTER — MEDICATION RENEWAL (OUTPATIENT)
Age: 79
End: 2019-12-19

## 2019-12-20 ENCOUNTER — APPOINTMENT (OUTPATIENT)
Dept: INFUSION THERAPY | Facility: HOSPITAL | Age: 79
End: 2019-12-20

## 2019-12-20 ENCOUNTER — RESULT REVIEW (OUTPATIENT)
Age: 79
End: 2019-12-20

## 2019-12-20 LAB
BASOPHILS # BLD AUTO: 0.1 K/UL — SIGNIFICANT CHANGE UP (ref 0–0.2)
BASOPHILS NFR BLD AUTO: 2 % — SIGNIFICANT CHANGE UP (ref 0–2)
EOSINOPHIL # BLD AUTO: 0.2 K/UL — SIGNIFICANT CHANGE UP (ref 0–0.5)
EOSINOPHIL NFR BLD AUTO: 3.9 % — SIGNIFICANT CHANGE UP (ref 0–6)
HCT VFR BLD CALC: 28.9 % — LOW (ref 39–50)
HGB BLD-MCNC: 9.7 G/DL — LOW (ref 13–17)
LYMPHOCYTES # BLD AUTO: 1.7 K/UL — SIGNIFICANT CHANGE UP (ref 1–3.3)
LYMPHOCYTES # BLD AUTO: 28.3 % — SIGNIFICANT CHANGE UP (ref 13–44)
MCHC RBC-ENTMCNC: 32.7 PG — SIGNIFICANT CHANGE UP (ref 27–34)
MCHC RBC-ENTMCNC: 33.5 G/DL — SIGNIFICANT CHANGE UP (ref 32–36)
MCV RBC AUTO: 97.5 FL — SIGNIFICANT CHANGE UP (ref 80–100)
MONOCYTES # BLD AUTO: 0.2 K/UL — SIGNIFICANT CHANGE UP (ref 0–0.9)
MONOCYTES NFR BLD AUTO: 2.6 % — SIGNIFICANT CHANGE UP (ref 2–14)
NEUTROPHILS # BLD AUTO: 3.7 K/UL — SIGNIFICANT CHANGE UP (ref 1.8–7.4)
NEUTROPHILS NFR BLD AUTO: 63.3 % — SIGNIFICANT CHANGE UP (ref 43–77)
PLATELET # BLD AUTO: 243 K/UL — SIGNIFICANT CHANGE UP (ref 150–400)
RBC # BLD: 2.96 M/UL — LOW (ref 4.2–5.8)
RBC # FLD: 17 % — HIGH (ref 10.3–14.5)
WBC # BLD: 5.9 K/UL — SIGNIFICANT CHANGE UP (ref 3.8–10.5)
WBC # FLD AUTO: 5.9 K/UL — SIGNIFICANT CHANGE UP (ref 3.8–10.5)

## 2019-12-23 ENCOUNTER — RESULT REVIEW (OUTPATIENT)
Age: 79
End: 2019-12-23

## 2019-12-23 ENCOUNTER — APPOINTMENT (OUTPATIENT)
Dept: INFUSION THERAPY | Facility: HOSPITAL | Age: 79
End: 2019-12-23

## 2019-12-23 DIAGNOSIS — E86.0 DEHYDRATION: ICD-10-CM

## 2019-12-23 LAB
BLD GP AB SCN SERPL QL: NEGATIVE — SIGNIFICANT CHANGE UP
EOSINOPHIL NFR BLD AUTO: 2 % — SIGNIFICANT CHANGE UP (ref 0–6)
HCT VFR BLD CALC: 26.2 % — LOW (ref 39–50)
HGB BLD-MCNC: 9.1 G/DL — LOW (ref 13–17)
LYMPHOCYTES # BLD AUTO: 31 % — SIGNIFICANT CHANGE UP (ref 13–44)
MCHC RBC-ENTMCNC: 33.2 PG — SIGNIFICANT CHANGE UP (ref 27–34)
MCHC RBC-ENTMCNC: 34.8 G/DL — SIGNIFICANT CHANGE UP (ref 32–36)
MCV RBC AUTO: 95.4 FL — SIGNIFICANT CHANGE UP (ref 80–100)
MONOCYTES NFR BLD AUTO: 5 % — SIGNIFICANT CHANGE UP (ref 2–14)
NEUTROPHILS # BLD AUTO: SIGNIFICANT CHANGE UP (ref 1.8–7.4)
NEUTROPHILS NFR BLD AUTO: 62 % — SIGNIFICANT CHANGE UP (ref 43–77)
PLAT MORPH BLD: NORMAL — SIGNIFICANT CHANGE UP
PLATELET # BLD AUTO: 249 K/UL — SIGNIFICANT CHANGE UP (ref 150–400)
RBC # BLD: 2.74 M/UL — LOW (ref 4.2–5.8)
RBC # FLD: 17.1 % — HIGH (ref 10.3–14.5)
RBC BLD AUTO: SIGNIFICANT CHANGE UP
RH IG SCN BLD-IMP: POSITIVE — SIGNIFICANT CHANGE UP
WBC # BLD: 4.8 K/UL — SIGNIFICANT CHANGE UP (ref 3.8–10.5)
WBC # FLD AUTO: 4.8 K/UL — SIGNIFICANT CHANGE UP (ref 3.8–10.5)

## 2019-12-24 ENCOUNTER — APPOINTMENT (OUTPATIENT)
Dept: INFUSION THERAPY | Facility: HOSPITAL | Age: 79
End: 2019-12-24

## 2019-12-26 DIAGNOSIS — Z51.89 ENCOUNTER FOR OTHER SPECIFIED AFTERCARE: ICD-10-CM

## 2019-12-27 ENCOUNTER — RESULT REVIEW (OUTPATIENT)
Age: 79
End: 2019-12-27

## 2019-12-27 ENCOUNTER — APPOINTMENT (OUTPATIENT)
Dept: INFUSION THERAPY | Facility: HOSPITAL | Age: 79
End: 2019-12-27

## 2019-12-27 LAB
BASOPHILS # BLD AUTO: 0.1 K/UL — SIGNIFICANT CHANGE UP (ref 0–0.2)
BASOPHILS NFR BLD AUTO: 2 % — SIGNIFICANT CHANGE UP (ref 0–2)
EOSINOPHIL # BLD AUTO: 0.2 K/UL — SIGNIFICANT CHANGE UP (ref 0–0.5)
EOSINOPHIL NFR BLD AUTO: 5.6 % — SIGNIFICANT CHANGE UP (ref 0–6)
HCT VFR BLD CALC: 32.9 % — LOW (ref 39–50)
HGB BLD-MCNC: 10.9 G/DL — LOW (ref 13–17)
LYMPHOCYTES # BLD AUTO: 1.3 K/UL — SIGNIFICANT CHANGE UP (ref 1–3.3)
LYMPHOCYTES # BLD AUTO: 32.5 % — SIGNIFICANT CHANGE UP (ref 13–44)
MCHC RBC-ENTMCNC: 32.3 PG — SIGNIFICANT CHANGE UP (ref 27–34)
MCHC RBC-ENTMCNC: 33.2 G/DL — SIGNIFICANT CHANGE UP (ref 32–36)
MCV RBC AUTO: 97.4 FL — SIGNIFICANT CHANGE UP (ref 80–100)
MONOCYTES # BLD AUTO: 0.1 K/UL — SIGNIFICANT CHANGE UP (ref 0–0.9)
MONOCYTES NFR BLD AUTO: 2.8 % — SIGNIFICANT CHANGE UP (ref 2–14)
NEUTROPHILS # BLD AUTO: 2.4 K/UL — SIGNIFICANT CHANGE UP (ref 1.8–7.4)
NEUTROPHILS NFR BLD AUTO: 57.1 % — SIGNIFICANT CHANGE UP (ref 43–77)
PLATELET # BLD AUTO: 267 K/UL — SIGNIFICANT CHANGE UP (ref 150–400)
RBC # BLD: 3.38 M/UL — LOW (ref 4.2–5.8)
RBC # FLD: 16.5 % — HIGH (ref 10.3–14.5)
WBC # BLD: 4.1 K/UL — SIGNIFICANT CHANGE UP (ref 3.8–10.5)
WBC # FLD AUTO: 4.1 K/UL — SIGNIFICANT CHANGE UP (ref 3.8–10.5)

## 2019-12-30 ENCOUNTER — RESULT REVIEW (OUTPATIENT)
Age: 79
End: 2019-12-30

## 2019-12-30 ENCOUNTER — APPOINTMENT (OUTPATIENT)
Dept: INFUSION THERAPY | Facility: HOSPITAL | Age: 79
End: 2019-12-30

## 2019-12-30 LAB
BASOPHILS # BLD AUTO: 0 K/UL — SIGNIFICANT CHANGE UP (ref 0–0.2)
BASOPHILS NFR BLD AUTO: 1 % — SIGNIFICANT CHANGE UP (ref 0–2)
EOSINOPHIL # BLD AUTO: 0.3 K/UL — SIGNIFICANT CHANGE UP (ref 0–0.5)
EOSINOPHIL NFR BLD AUTO: 6.3 % — HIGH (ref 0–6)
HCT VFR BLD CALC: 30.6 % — LOW (ref 39–50)
HGB BLD-MCNC: 10.3 G/DL — LOW (ref 13–17)
LYMPHOCYTES # BLD AUTO: 1.4 K/UL — SIGNIFICANT CHANGE UP (ref 1–3.3)
LYMPHOCYTES # BLD AUTO: 31.9 % — SIGNIFICANT CHANGE UP (ref 13–44)
MCHC RBC-ENTMCNC: 33.1 PG — SIGNIFICANT CHANGE UP (ref 27–34)
MCHC RBC-ENTMCNC: 33.7 G/DL — SIGNIFICANT CHANGE UP (ref 32–36)
MCV RBC AUTO: 98.3 FL — SIGNIFICANT CHANGE UP (ref 80–100)
MONOCYTES # BLD AUTO: 0.3 K/UL — SIGNIFICANT CHANGE UP (ref 0–0.9)
MONOCYTES NFR BLD AUTO: 6 % — SIGNIFICANT CHANGE UP (ref 2–14)
NEUTROPHILS # BLD AUTO: 2.4 K/UL — SIGNIFICANT CHANGE UP (ref 1.8–7.4)
NEUTROPHILS NFR BLD AUTO: 54.8 % — SIGNIFICANT CHANGE UP (ref 43–77)
PLATELET # BLD AUTO: 248 K/UL — SIGNIFICANT CHANGE UP (ref 150–400)
RBC # BLD: 3.11 M/UL — LOW (ref 4.2–5.8)
RBC # FLD: 16.7 % — HIGH (ref 10.3–14.5)
WBC # BLD: 4.5 K/UL — SIGNIFICANT CHANGE UP (ref 3.8–10.5)
WBC # FLD AUTO: 4.5 K/UL — SIGNIFICANT CHANGE UP (ref 3.8–10.5)

## 2019-12-30 RX ORDER — LINAGLIPTIN 5 MG/1
1 TABLET, FILM COATED ORAL
Qty: 0 | Refills: 0 | DISCHARGE

## 2019-12-30 RX ORDER — METFORMIN HYDROCHLORIDE 850 MG/1
1 TABLET ORAL
Qty: 0 | Refills: 0 | DISCHARGE

## 2019-12-31 ENCOUNTER — APPOINTMENT (OUTPATIENT)
Dept: INFECTIOUS DISEASE | Facility: CLINIC | Age: 79
End: 2019-12-31
Payer: MEDICARE

## 2019-12-31 VITALS
SYSTOLIC BLOOD PRESSURE: 126 MMHG | TEMPERATURE: 97.5 F | OXYGEN SATURATION: 99 % | WEIGHT: 158 LBS | HEART RATE: 76 BPM | HEIGHT: 70 IN | BODY MASS INDEX: 22.62 KG/M2 | DIASTOLIC BLOOD PRESSURE: 61 MMHG

## 2019-12-31 PROCEDURE — 99205 OFFICE O/P NEW HI 60 MIN: CPT

## 2020-01-01 ENCOUNTER — LABORATORY RESULT (OUTPATIENT)
Age: 80
End: 2020-01-01

## 2020-01-01 ENCOUNTER — RESULT REVIEW (OUTPATIENT)
Age: 80
End: 2020-01-01

## 2020-01-01 ENCOUNTER — OUTPATIENT (OUTPATIENT)
Dept: OUTPATIENT SERVICES | Facility: HOSPITAL | Age: 80
LOS: 1 days | End: 2020-01-01
Payer: MEDICARE

## 2020-01-01 ENCOUNTER — NON-APPOINTMENT (OUTPATIENT)
Age: 80
End: 2020-01-01

## 2020-01-01 ENCOUNTER — APPOINTMENT (OUTPATIENT)
Dept: INFUSION THERAPY | Facility: HOSPITAL | Age: 80
End: 2020-01-01

## 2020-01-01 ENCOUNTER — APPOINTMENT (OUTPATIENT)
Dept: HEMATOLOGY ONCOLOGY | Facility: CLINIC | Age: 80
End: 2020-01-01
Payer: MEDICARE

## 2020-01-01 ENCOUNTER — APPOINTMENT (OUTPATIENT)
Dept: CARDIOLOGY | Facility: CLINIC | Age: 80
End: 2020-01-01
Payer: MEDICARE

## 2020-01-01 ENCOUNTER — INPATIENT (INPATIENT)
Facility: HOSPITAL | Age: 80
LOS: 1 days | Discharge: HOME | End: 2020-08-21
Attending: HOSPITALIST | Admitting: HOSPITALIST
Payer: MEDICARE

## 2020-01-01 ENCOUNTER — APPOINTMENT (OUTPATIENT)
Dept: HEMATOLOGY ONCOLOGY | Facility: CLINIC | Age: 80
End: 2020-01-01

## 2020-01-01 ENCOUNTER — APPOINTMENT (OUTPATIENT)
Dept: CT IMAGING | Facility: IMAGING CENTER | Age: 80
End: 2020-01-01
Payer: MEDICARE

## 2020-01-01 ENCOUNTER — APPOINTMENT (OUTPATIENT)
Dept: ELECTROPHYSIOLOGY | Facility: CLINIC | Age: 80
End: 2020-01-01
Payer: MEDICARE

## 2020-01-01 ENCOUNTER — OUTPATIENT (OUTPATIENT)
Dept: OUTPATIENT SERVICES | Facility: HOSPITAL | Age: 80
LOS: 1 days | Discharge: HOME | End: 2020-01-01

## 2020-01-01 ENCOUNTER — APPOINTMENT (OUTPATIENT)
Dept: NEUROLOGY | Facility: CLINIC | Age: 80
End: 2020-01-01
Payer: MEDICARE

## 2020-01-01 ENCOUNTER — RX RENEWAL (OUTPATIENT)
Age: 80
End: 2020-01-01

## 2020-01-01 ENCOUNTER — TRANSCRIPTION ENCOUNTER (OUTPATIENT)
Age: 80
End: 2020-01-01

## 2020-01-01 ENCOUNTER — APPOINTMENT (OUTPATIENT)
Dept: INTERNAL MEDICINE | Facility: CLINIC | Age: 80
End: 2020-01-01

## 2020-01-01 ENCOUNTER — APPOINTMENT (OUTPATIENT)
Dept: INFECTIOUS DISEASE | Facility: CLINIC | Age: 80
End: 2020-01-01
Payer: MEDICARE

## 2020-01-01 VITALS
TEMPERATURE: 97.3 F | OXYGEN SATURATION: 98 % | DIASTOLIC BLOOD PRESSURE: 62 MMHG | RESPIRATION RATE: 16 BRPM | HEART RATE: 64 BPM | SYSTOLIC BLOOD PRESSURE: 131 MMHG | HEIGHT: 71 IN

## 2020-01-01 VITALS
DIASTOLIC BLOOD PRESSURE: 73 MMHG | SYSTOLIC BLOOD PRESSURE: 127 MMHG | HEART RATE: 65 BPM | HEIGHT: 60 IN | BODY MASS INDEX: 33.38 KG/M2 | WEIGHT: 170 LBS | OXYGEN SATURATION: 98 %

## 2020-01-01 VITALS
SYSTOLIC BLOOD PRESSURE: 152 MMHG | OXYGEN SATURATION: 100 % | TEMPERATURE: 99 F | RESPIRATION RATE: 16 BRPM | DIASTOLIC BLOOD PRESSURE: 63 MMHG | HEART RATE: 74 BPM

## 2020-01-01 VITALS
OXYGEN SATURATION: 95 % | HEART RATE: 78 BPM | TEMPERATURE: 97.5 F | SYSTOLIC BLOOD PRESSURE: 138 MMHG | DIASTOLIC BLOOD PRESSURE: 71 MMHG | RESPIRATION RATE: 16 BRPM

## 2020-01-01 VITALS
HEIGHT: 71 IN | TEMPERATURE: 97.2 F | HEART RATE: 59 BPM | WEIGHT: 170 LBS | RESPIRATION RATE: 16 BRPM | DIASTOLIC BLOOD PRESSURE: 68 MMHG | OXYGEN SATURATION: 97 % | BODY MASS INDEX: 23.8 KG/M2 | SYSTOLIC BLOOD PRESSURE: 131 MMHG

## 2020-01-01 VITALS — HEART RATE: 66 BPM | DIASTOLIC BLOOD PRESSURE: 62 MMHG | SYSTOLIC BLOOD PRESSURE: 130 MMHG

## 2020-01-01 VITALS
SYSTOLIC BLOOD PRESSURE: 133 MMHG | BODY MASS INDEX: 24.07 KG/M2 | TEMPERATURE: 98.4 F | HEART RATE: 82 BPM | RESPIRATION RATE: 16 BRPM | DIASTOLIC BLOOD PRESSURE: 71 MMHG | WEIGHT: 167.75 LBS | OXYGEN SATURATION: 100 %

## 2020-01-01 VITALS
WEIGHT: 170 LBS | DIASTOLIC BLOOD PRESSURE: 65 MMHG | SYSTOLIC BLOOD PRESSURE: 116 MMHG | HEIGHT: 70 IN | BODY MASS INDEX: 24.34 KG/M2 | HEART RATE: 58 BPM

## 2020-01-01 VITALS — TEMPERATURE: 97.2 F

## 2020-01-01 DIAGNOSIS — Z95.5 PRESENCE OF CORONARY ANGIOPLASTY IMPLANT AND GRAFT: Chronic | ICD-10-CM

## 2020-01-01 DIAGNOSIS — Z83.518 FAMILY HISTORY OF OTHER SPECIFIED EYE DISORDER: ICD-10-CM

## 2020-01-01 DIAGNOSIS — Z98.890 OTHER SPECIFIED POSTPROCEDURAL STATES: Chronic | ICD-10-CM

## 2020-01-01 DIAGNOSIS — Z98.89 OTHER SPECIFIED POSTPROCEDURAL STATES: Chronic | ICD-10-CM

## 2020-01-01 DIAGNOSIS — E03.9 HYPOTHYROIDISM, UNSPECIFIED: ICD-10-CM

## 2020-01-01 DIAGNOSIS — Z90.89 ACQUIRED ABSENCE OF OTHER ORGANS: Chronic | ICD-10-CM

## 2020-01-01 DIAGNOSIS — D46.9 MYELODYSPLASTIC SYNDROME, UNSPECIFIED: ICD-10-CM

## 2020-01-01 DIAGNOSIS — Z86.79 PERSONAL HISTORY OF OTHER DISEASES OF THE CIRCULATORY SYSTEM: ICD-10-CM

## 2020-01-01 DIAGNOSIS — Z95.818 PRESENCE OF OTHER CARDIAC IMPLANTS AND GRAFTS: Chronic | ICD-10-CM

## 2020-01-01 DIAGNOSIS — D64.9 ANEMIA, UNSPECIFIED: ICD-10-CM

## 2020-01-01 DIAGNOSIS — E11.42 TYPE 2 DIABETES MELLITUS WITH DIABETIC POLYNEUROPATHY: ICD-10-CM

## 2020-01-01 DIAGNOSIS — I48.91 UNSPECIFIED ATRIAL FIBRILLATION: ICD-10-CM

## 2020-01-01 DIAGNOSIS — E78.00 PURE HYPERCHOLESTEROLEMIA, UNSPECIFIED: ICD-10-CM

## 2020-01-01 DIAGNOSIS — E11.9 TYPE 2 DIABETES MELLITUS WITHOUT COMPLICATIONS: ICD-10-CM

## 2020-01-01 DIAGNOSIS — Z51.89 ENCOUNTER FOR OTHER SPECIFIED AFTERCARE: ICD-10-CM

## 2020-01-01 DIAGNOSIS — G95.9 DISEASE OF SPINAL CORD, UNSPECIFIED: ICD-10-CM

## 2020-01-01 DIAGNOSIS — Z02.9 ENCOUNTER FOR ADMINISTRATIVE EXAMINATIONS, UNSPECIFIED: ICD-10-CM

## 2020-01-01 DIAGNOSIS — I50.9 HEART FAILURE, UNSPECIFIED: ICD-10-CM

## 2020-01-01 DIAGNOSIS — I10 ESSENTIAL (PRIMARY) HYPERTENSION: ICD-10-CM

## 2020-01-01 DIAGNOSIS — D50.9 IRON DEFICIENCY ANEMIA, UNSPECIFIED: ICD-10-CM

## 2020-01-01 DIAGNOSIS — S06.5X9A TRAUMATIC SUBDURAL HEMORRHAGE WITH LOSS OF CONSCIOUSNESS OF UNSPECIFIED DURATION, INITIAL ENCOUNTER: ICD-10-CM

## 2020-01-01 DIAGNOSIS — Z29.9 ENCOUNTER FOR PROPHYLACTIC MEASURES, UNSPECIFIED: ICD-10-CM

## 2020-01-01 DIAGNOSIS — Z86.39 PERSONAL HISTORY OF OTHER ENDOCRINE, NUTRITIONAL AND METABOLIC DISEASE: ICD-10-CM

## 2020-01-01 DIAGNOSIS — N18.3 CHRONIC KIDNEY DISEASE, STAGE 3 (MODERATE): ICD-10-CM

## 2020-01-01 LAB
25(OH)D3 SERPL-MCNC: 31.9 NG/ML
ALBUMIN SERPL ELPH-MCNC: 3.6 G/DL — SIGNIFICANT CHANGE UP (ref 3.3–5)
ALBUMIN SERPL ELPH-MCNC: 3.9 G/DL
ALP BLD-CCNC: 184 U/L
ALP SERPL-CCNC: 152 U/L — HIGH (ref 40–120)
ALT FLD-CCNC: 56 U/L — HIGH (ref 4–41)
ALT SERPL-CCNC: 74 U/L
ANION GAP SERPL CALC-SCNC: 11 MMO/L — SIGNIFICANT CHANGE UP (ref 7–14)
ANION GAP SERPL CALC-SCNC: 11 MMOL/L
ANION GAP SERPL CALC-SCNC: 12 MMO/L — SIGNIFICANT CHANGE UP (ref 7–14)
ANION GAP SERPL CALC-SCNC: 14 MMO/L — SIGNIFICANT CHANGE UP (ref 7–14)
APTT BLD: 32.8 SEC — SIGNIFICANT CHANGE UP (ref 27–36.3)
AST SERPL-CCNC: 42 U/L — HIGH (ref 4–40)
AST SERPL-CCNC: 46 U/L
BACTERIA UR CULT: NORMAL
BASOPHILS # BLD AUTO: 0.05 K/UL — SIGNIFICANT CHANGE UP (ref 0–0.2)
BASOPHILS # BLD AUTO: 0.06 K/UL — SIGNIFICANT CHANGE UP (ref 0–0.2)
BASOPHILS # BLD AUTO: 0.06 K/UL — SIGNIFICANT CHANGE UP (ref 0–0.2)
BASOPHILS # BLD AUTO: 0.08 K/UL — SIGNIFICANT CHANGE UP (ref 0–0.2)
BASOPHILS # BLD AUTO: 0.09 K/UL — SIGNIFICANT CHANGE UP (ref 0–0.2)
BASOPHILS # BLD AUTO: 0.1 K/UL — SIGNIFICANT CHANGE UP (ref 0–0.2)
BASOPHILS # BLD AUTO: 0.1 K/UL — SIGNIFICANT CHANGE UP (ref 0–0.2)
BASOPHILS # BLD AUTO: 0.11 K/UL — SIGNIFICANT CHANGE UP (ref 0–0.2)
BASOPHILS NFR BLD AUTO: 0.9 % — SIGNIFICANT CHANGE UP (ref 0–2)
BASOPHILS NFR BLD AUTO: 0.9 % — SIGNIFICANT CHANGE UP (ref 0–2)
BASOPHILS NFR BLD AUTO: 1 % — SIGNIFICANT CHANGE UP (ref 0–2)
BASOPHILS NFR BLD AUTO: 1.1 % — SIGNIFICANT CHANGE UP (ref 0–2)
BASOPHILS NFR BLD AUTO: 1.2 % — SIGNIFICANT CHANGE UP (ref 0–2)
BASOPHILS NFR BLD AUTO: 1.4 % — SIGNIFICANT CHANGE UP (ref 0–2)
BASOPHILS NFR BLD AUTO: 1.6 % — SIGNIFICANT CHANGE UP (ref 0–2)
BASOPHILS NFR BLD AUTO: 1.7 % — SIGNIFICANT CHANGE UP (ref 0–2)
BASOPHILS NFR SPEC: 1.8 % — SIGNIFICANT CHANGE UP (ref 0–2)
BASOPHILS NFR SPEC: 1.8 % — SIGNIFICANT CHANGE UP (ref 0–2)
BILIRUB DIRECT SERPL-MCNC: 0.2 MG/DL — SIGNIFICANT CHANGE UP (ref 0.1–0.2)
BILIRUB SERPL-MCNC: 0.6 MG/DL
BILIRUB SERPL-MCNC: 0.8 MG/DL — SIGNIFICANT CHANGE UP (ref 0.2–1.2)
BLASTS # FLD: 0 % — SIGNIFICANT CHANGE UP (ref 0–0)
BLASTS # FLD: 0 % — SIGNIFICANT CHANGE UP (ref 0–0)
BLD GP AB SCN SERPL QL: NEGATIVE — SIGNIFICANT CHANGE UP
BUN SERPL-MCNC: 48 MG/DL — HIGH (ref 7–23)
BUN SERPL-MCNC: 55 MG/DL
BUN SERPL-MCNC: 56 MG/DL — HIGH (ref 7–23)
BUN SERPL-MCNC: 58 MG/DL — HIGH (ref 7–23)
BUN SERPL-MCNC: 60 MG/DL — HIGH (ref 7–23)
CA-I BLDA-SCNC: 1.08 MMOL/L — LOW (ref 1.12–1.3)
CALCIUM SERPL-MCNC: 8.8 MG/DL — SIGNIFICANT CHANGE UP (ref 8.4–10.5)
CALCIUM SERPL-MCNC: 9.3 MG/DL
CALCIUM SERPL-MCNC: 9.3 MG/DL — SIGNIFICANT CHANGE UP (ref 8.4–10.5)
CALCIUM SERPL-MCNC: 9.4 MG/DL — SIGNIFICANT CHANGE UP (ref 8.4–10.5)
CHLORIDE SERPL-SCNC: 104 MMOL/L — SIGNIFICANT CHANGE UP (ref 98–107)
CHLORIDE SERPL-SCNC: 107 MMOL/L — SIGNIFICANT CHANGE UP (ref 98–107)
CHLORIDE SERPL-SCNC: 108 MMOL/L
CHLORIDE SERPL-SCNC: 108 MMOL/L — HIGH (ref 98–107)
CHLORIDE SERPL-SCNC: 110 MMOL/L — HIGH (ref 96–108)
CHOLEST SERPL-MCNC: 99 MG/DL
CHOLEST/HDLC SERPL: 2.9 RATIO
CO2 SERPL-SCNC: 20 MMOL/L
CO2 SERPL-SCNC: 20 MMOL/L — LOW (ref 22–31)
CO2 SERPL-SCNC: 21 MMOL/L — LOW (ref 22–31)
CO2 SERPL-SCNC: 22 MMOL/L — SIGNIFICANT CHANGE UP (ref 22–31)
CO2 SERPL-SCNC: 22 MMOL/L — SIGNIFICANT CHANGE UP (ref 22–31)
CREAT SERPL-MCNC: 1 MG/DL — SIGNIFICANT CHANGE UP (ref 0.5–1.3)
CREAT SERPL-MCNC: 1.13 MG/DL — SIGNIFICANT CHANGE UP (ref 0.5–1.3)
CREAT SERPL-MCNC: 1.36 MG/DL — HIGH (ref 0.5–1.3)
CREAT SERPL-MCNC: 1.39 MG/DL — HIGH (ref 0.5–1.3)
CREAT SERPL-MCNC: 1.43 MG/DL
EOSINOPHIL # BLD AUTO: 0.22 K/UL — SIGNIFICANT CHANGE UP (ref 0–0.5)
EOSINOPHIL # BLD AUTO: 0.22 K/UL — SIGNIFICANT CHANGE UP (ref 0–0.5)
EOSINOPHIL # BLD AUTO: 0.24 K/UL — SIGNIFICANT CHANGE UP (ref 0–0.5)
EOSINOPHIL # BLD AUTO: 0.24 K/UL — SIGNIFICANT CHANGE UP (ref 0–0.5)
EOSINOPHIL # BLD AUTO: 0.25 K/UL — SIGNIFICANT CHANGE UP (ref 0–0.5)
EOSINOPHIL # BLD AUTO: 0.25 K/UL — SIGNIFICANT CHANGE UP (ref 0–0.5)
EOSINOPHIL # BLD AUTO: 0.27 K/UL — SIGNIFICANT CHANGE UP (ref 0–0.5)
EOSINOPHIL # BLD AUTO: 0.28 K/UL — SIGNIFICANT CHANGE UP (ref 0–0.5)
EOSINOPHIL # BLD AUTO: 0.29 K/UL — SIGNIFICANT CHANGE UP (ref 0–0.5)
EOSINOPHIL # BLD AUTO: 0.32 K/UL — SIGNIFICANT CHANGE UP (ref 0–0.5)
EOSINOPHIL # BLD AUTO: 0.33 K/UL — SIGNIFICANT CHANGE UP (ref 0–0.5)
EOSINOPHIL # BLD AUTO: 0.38 K/UL — SIGNIFICANT CHANGE UP (ref 0–0.5)
EOSINOPHIL NFR BLD AUTO: 3.2 % — SIGNIFICANT CHANGE UP (ref 0–6)
EOSINOPHIL NFR BLD AUTO: 3.6 % — SIGNIFICANT CHANGE UP (ref 0–6)
EOSINOPHIL NFR BLD AUTO: 3.7 % — SIGNIFICANT CHANGE UP (ref 0–6)
EOSINOPHIL NFR BLD AUTO: 3.8 % — SIGNIFICANT CHANGE UP (ref 0–6)
EOSINOPHIL NFR BLD AUTO: 3.9 % — SIGNIFICANT CHANGE UP (ref 0–6)
EOSINOPHIL NFR BLD AUTO: 3.9 % — SIGNIFICANT CHANGE UP (ref 0–6)
EOSINOPHIL NFR BLD AUTO: 4.2 % — SIGNIFICANT CHANGE UP (ref 0–6)
EOSINOPHIL NFR BLD AUTO: 4.7 % — SIGNIFICANT CHANGE UP (ref 0–6)
EOSINOPHIL NFR BLD AUTO: 5 % — SIGNIFICANT CHANGE UP (ref 0–6)
EOSINOPHIL NFR BLD AUTO: 5.1 % — SIGNIFICANT CHANGE UP (ref 0–6)
EOSINOPHIL NFR BLD AUTO: 5.5 % — SIGNIFICANT CHANGE UP (ref 0–6)
EOSINOPHIL NFR BLD AUTO: 5.5 % — SIGNIFICANT CHANGE UP (ref 0–6)
EOSINOPHIL NFR BLD AUTO: 5.6 % — SIGNIFICANT CHANGE UP (ref 0–6)
EOSINOPHIL NFR BLD AUTO: 6.5 % — HIGH (ref 0–6)
EOSINOPHIL NFR FLD: 6.3 % — HIGH (ref 0–6)
EOSINOPHIL NFR FLD: 6.3 % — HIGH (ref 0–6)
ESTIMATED AVERAGE GLUCOSE: 151 MG/DL
GIANT PLATELETS BLD QL SMEAR: PRESENT — SIGNIFICANT CHANGE UP
GIANT PLATELETS BLD QL SMEAR: PRESENT — SIGNIFICANT CHANGE UP
GLUCOSE BLDC GLUCOMTR-MCNC: 136 MG/DL — HIGH (ref 70–99)
GLUCOSE BLDC GLUCOMTR-MCNC: 139 MG/DL — HIGH (ref 70–99)
GLUCOSE SERPL-MCNC: 104 MG/DL — HIGH (ref 70–99)
GLUCOSE SERPL-MCNC: 128 MG/DL — HIGH (ref 70–99)
GLUCOSE SERPL-MCNC: 131 MG/DL
GLUCOSE SERPL-MCNC: 133 MG/DL — HIGH (ref 70–99)
GLUCOSE SERPL-MCNC: 138 MG/DL — HIGH (ref 70–99)
HBA1C BLD-MCNC: 6.5 % — HIGH (ref 4–5.6)
HBA1C MFR BLD HPLC: 6.9 %
HCT VFR BLD CALC: 23.9 % — LOW (ref 39–50)
HCT VFR BLD CALC: 24.2 % — LOW (ref 39–50)
HCT VFR BLD CALC: 24.3 % — LOW (ref 39–50)
HCT VFR BLD CALC: 25.1 % — LOW (ref 39–50)
HCT VFR BLD CALC: 25.2 % — LOW (ref 39–50)
HCT VFR BLD CALC: 26.7 % — LOW (ref 39–50)
HCT VFR BLD CALC: 26.9 % — LOW (ref 39–50)
HCT VFR BLD CALC: 27.1 % — LOW (ref 39–50)
HCT VFR BLD CALC: 27.1 % — LOW (ref 39–50)
HCT VFR BLD CALC: 27.2 % — LOW (ref 39–50)
HCT VFR BLD CALC: 28.1 % — LOW (ref 39–50)
HCT VFR BLD CALC: 28.1 % — LOW (ref 39–50)
HCT VFR BLD CALC: 28.3 % — LOW (ref 39–50)
HCT VFR BLD CALC: 30.4 % — LOW (ref 39–50)
HCT VFR BLD CALC: 30.4 % — LOW (ref 39–50)
HCT VFR BLD CALC: 31 % — LOW (ref 39–50)
HDLC SERPL-MCNC: 34 MG/DL
HGB BLD-MCNC: 10.3 G/DL — LOW (ref 13–17)
HGB BLD-MCNC: 10.3 G/DL — LOW (ref 13–17)
HGB BLD-MCNC: 10.5 G/DL — LOW (ref 13–17)
HGB BLD-MCNC: 7.8 G/DL — LOW (ref 13–17)
HGB BLD-MCNC: 8 G/DL — LOW (ref 13–17)
HGB BLD-MCNC: 8.2 G/DL — LOW (ref 13–17)
HGB BLD-MCNC: 8.6 G/DL — LOW (ref 13–17)
HGB BLD-MCNC: 8.8 G/DL — LOW (ref 13–17)
HGB BLD-MCNC: 8.9 G/DL — LOW (ref 13–17)
HGB BLD-MCNC: 8.9 G/DL — LOW (ref 13–17)
HGB BLD-MCNC: 9 G/DL — LOW (ref 13–17)
HGB BLD-MCNC: 9.1 G/DL — LOW (ref 13–17)
HGB BLD-MCNC: 9.1 G/DL — LOW (ref 13–17)
HGB BLD-MCNC: 9.5 G/DL — LOW (ref 13–17)
IMM GRANULOCYTES NFR BLD AUTO: 0.5 % — SIGNIFICANT CHANGE UP (ref 0–1.5)
IMM GRANULOCYTES NFR BLD AUTO: 0.6 % — SIGNIFICANT CHANGE UP (ref 0–1.5)
IMM GRANULOCYTES NFR BLD AUTO: 0.7 % — SIGNIFICANT CHANGE UP (ref 0–1.5)
IMM GRANULOCYTES NFR BLD AUTO: 0.7 % — SIGNIFICANT CHANGE UP (ref 0–1.5)
IMM GRANULOCYTES NFR BLD AUTO: 0.9 % — SIGNIFICANT CHANGE UP (ref 0–1.5)
IMM GRANULOCYTES NFR BLD AUTO: 0.9 % — SIGNIFICANT CHANGE UP (ref 0–1.5)
IMM GRANULOCYTES NFR BLD AUTO: 1 % — SIGNIFICANT CHANGE UP (ref 0–1.5)
IMM GRANULOCYTES NFR BLD AUTO: 1.1 % — SIGNIFICANT CHANGE UP (ref 0–1.5)
IMM GRANULOCYTES NFR BLD AUTO: 1.2 % — SIGNIFICANT CHANGE UP (ref 0–1.5)
IMM GRANULOCYTES NFR BLD AUTO: 1.5 % — SIGNIFICANT CHANGE UP (ref 0–1.5)
IMM GRANULOCYTES NFR BLD AUTO: 1.8 % — HIGH (ref 0–1.5)
IMM GRANULOCYTES NFR BLD AUTO: 2.2 % — HIGH (ref 0–1.5)
INR BLD: 1.15 — SIGNIFICANT CHANGE UP (ref 0.88–1.16)
LDLC SERPL CALC-MCNC: 50 MG/DL
LEVETIRACETAM SERPL-MCNC: 6.7 MCG/ML — LOW (ref 12–46)
LYMPHOCYTES # BLD AUTO: 1.11 K/UL — SIGNIFICANT CHANGE UP (ref 1–3.3)
LYMPHOCYTES # BLD AUTO: 1.14 K/UL — SIGNIFICANT CHANGE UP (ref 1–3.3)
LYMPHOCYTES # BLD AUTO: 1.15 K/UL — SIGNIFICANT CHANGE UP (ref 1–3.3)
LYMPHOCYTES # BLD AUTO: 1.19 K/UL — SIGNIFICANT CHANGE UP (ref 1–3.3)
LYMPHOCYTES # BLD AUTO: 1.2 K/UL — SIGNIFICANT CHANGE UP (ref 1–3.3)
LYMPHOCYTES # BLD AUTO: 1.21 K/UL — SIGNIFICANT CHANGE UP (ref 1–3.3)
LYMPHOCYTES # BLD AUTO: 1.27 K/UL — SIGNIFICANT CHANGE UP (ref 1–3.3)
LYMPHOCYTES # BLD AUTO: 1.32 K/UL — SIGNIFICANT CHANGE UP (ref 1–3.3)
LYMPHOCYTES # BLD AUTO: 1.36 K/UL — SIGNIFICANT CHANGE UP (ref 1–3.3)
LYMPHOCYTES # BLD AUTO: 1.39 K/UL — SIGNIFICANT CHANGE UP (ref 1–3.3)
LYMPHOCYTES # BLD AUTO: 1.44 K/UL — SIGNIFICANT CHANGE UP (ref 1–3.3)
LYMPHOCYTES # BLD AUTO: 1.5 K/UL — SIGNIFICANT CHANGE UP (ref 1–3.3)
LYMPHOCYTES # BLD AUTO: 1.52 K/UL — SIGNIFICANT CHANGE UP (ref 1–3.3)
LYMPHOCYTES # BLD AUTO: 1.59 K/UL — SIGNIFICANT CHANGE UP (ref 1–3.3)
LYMPHOCYTES # BLD AUTO: 17.2 % — SIGNIFICANT CHANGE UP (ref 13–44)
LYMPHOCYTES # BLD AUTO: 17.4 % — SIGNIFICANT CHANGE UP (ref 13–44)
LYMPHOCYTES # BLD AUTO: 18 % — SIGNIFICANT CHANGE UP (ref 13–44)
LYMPHOCYTES # BLD AUTO: 18.7 % — SIGNIFICANT CHANGE UP (ref 13–44)
LYMPHOCYTES # BLD AUTO: 19.1 % — SIGNIFICANT CHANGE UP (ref 13–44)
LYMPHOCYTES # BLD AUTO: 19.9 % — SIGNIFICANT CHANGE UP (ref 13–44)
LYMPHOCYTES # BLD AUTO: 21.2 % — SIGNIFICANT CHANGE UP (ref 13–44)
LYMPHOCYTES # BLD AUTO: 22.1 % — SIGNIFICANT CHANGE UP (ref 13–44)
LYMPHOCYTES # BLD AUTO: 22.8 % — SIGNIFICANT CHANGE UP (ref 13–44)
LYMPHOCYTES # BLD AUTO: 23 % — SIGNIFICANT CHANGE UP (ref 13–44)
LYMPHOCYTES # BLD AUTO: 23.5 % — SIGNIFICANT CHANGE UP (ref 13–44)
LYMPHOCYTES # BLD AUTO: 24.6 % — SIGNIFICANT CHANGE UP (ref 13–44)
LYMPHOCYTES # BLD AUTO: 25 % — SIGNIFICANT CHANGE UP (ref 13–44)
LYMPHOCYTES # BLD AUTO: 25.3 % — SIGNIFICANT CHANGE UP (ref 13–44)
LYMPHOCYTES NFR SPEC AUTO: 9 % — LOW (ref 13–44)
LYMPHOCYTES NFR SPEC AUTO: 9 % — LOW (ref 13–44)
MAGNESIUM SERPL-MCNC: 1.7 MG/DL — SIGNIFICANT CHANGE UP (ref 1.6–2.6)
MAGNESIUM SERPL-MCNC: 1.7 MG/DL — SIGNIFICANT CHANGE UP (ref 1.6–2.6)
MCHC RBC-ENTMCNC: 28.1 PG — SIGNIFICANT CHANGE UP (ref 27–34)
MCHC RBC-ENTMCNC: 28.1 PG — SIGNIFICANT CHANGE UP (ref 27–34)
MCHC RBC-ENTMCNC: 28.4 PG — SIGNIFICANT CHANGE UP (ref 27–34)
MCHC RBC-ENTMCNC: 28.4 PG — SIGNIFICANT CHANGE UP (ref 27–34)
MCHC RBC-ENTMCNC: 28.6 PG — SIGNIFICANT CHANGE UP (ref 27–34)
MCHC RBC-ENTMCNC: 28.7 PG — SIGNIFICANT CHANGE UP (ref 27–34)
MCHC RBC-ENTMCNC: 28.9 PG — SIGNIFICANT CHANGE UP (ref 27–34)
MCHC RBC-ENTMCNC: 28.9 PG — SIGNIFICANT CHANGE UP (ref 27–34)
MCHC RBC-ENTMCNC: 29.2 PG — SIGNIFICANT CHANGE UP (ref 27–34)
MCHC RBC-ENTMCNC: 29.3 PG — SIGNIFICANT CHANGE UP (ref 27–34)
MCHC RBC-ENTMCNC: 29.4 PG — SIGNIFICANT CHANGE UP (ref 27–34)
MCHC RBC-ENTMCNC: 29.5 PG — SIGNIFICANT CHANGE UP (ref 27–34)
MCHC RBC-ENTMCNC: 29.6 PG — SIGNIFICANT CHANGE UP (ref 27–34)
MCHC RBC-ENTMCNC: 29.7 PG — SIGNIFICANT CHANGE UP (ref 27–34)
MCHC RBC-ENTMCNC: 29.9 PG — SIGNIFICANT CHANGE UP (ref 27–34)
MCHC RBC-ENTMCNC: 29.9 PG — SIGNIFICANT CHANGE UP (ref 27–34)
MCHC RBC-ENTMCNC: 32.4 G/DL — SIGNIFICANT CHANGE UP (ref 32–36)
MCHC RBC-ENTMCNC: 32.4 GM/DL — SIGNIFICANT CHANGE UP (ref 32–36)
MCHC RBC-ENTMCNC: 32.4 GM/DL — SIGNIFICANT CHANGE UP (ref 32–36)
MCHC RBC-ENTMCNC: 32.5 G/DL — SIGNIFICANT CHANGE UP (ref 32–36)
MCHC RBC-ENTMCNC: 32.6 GM/DL — SIGNIFICANT CHANGE UP (ref 32–36)
MCHC RBC-ENTMCNC: 32.8 G/DL — SIGNIFICANT CHANGE UP (ref 32–36)
MCHC RBC-ENTMCNC: 32.9 GM/DL — SIGNIFICANT CHANGE UP (ref 32–36)
MCHC RBC-ENTMCNC: 33.3 G/DL — SIGNIFICANT CHANGE UP (ref 32–36)
MCHC RBC-ENTMCNC: 33.5 G/DL — SIGNIFICANT CHANGE UP (ref 32–36)
MCHC RBC-ENTMCNC: 33.6 G/DL — SIGNIFICANT CHANGE UP (ref 32–36)
MCHC RBC-ENTMCNC: 33.9 % — SIGNIFICANT CHANGE UP (ref 32–36)
MCHC RBC-ENTMCNC: 33.9 % — SIGNIFICANT CHANGE UP (ref 32–36)
MCHC RBC-ENTMCNC: 33.9 G/DL — SIGNIFICANT CHANGE UP (ref 32–36)
MCHC RBC-ENTMCNC: 33.9 GM/DL — SIGNIFICANT CHANGE UP (ref 32–36)
MCHC RBC-ENTMCNC: 34.3 % — SIGNIFICANT CHANGE UP (ref 32–36)
MCHC RBC-ENTMCNC: 34.9 % — SIGNIFICANT CHANGE UP (ref 32–36)
MCV RBC AUTO: 82.8 FL — SIGNIFICANT CHANGE UP (ref 80–100)
MCV RBC AUTO: 84.8 FL — SIGNIFICANT CHANGE UP (ref 80–100)
MCV RBC AUTO: 85.2 FL — SIGNIFICANT CHANGE UP (ref 80–100)
MCV RBC AUTO: 85.7 FL — SIGNIFICANT CHANGE UP (ref 80–100)
MCV RBC AUTO: 86.4 FL — SIGNIFICANT CHANGE UP (ref 80–100)
MCV RBC AUTO: 87.4 FL — SIGNIFICANT CHANGE UP (ref 80–100)
MCV RBC AUTO: 87.7 FL — SIGNIFICANT CHANGE UP (ref 80–100)
MCV RBC AUTO: 88.4 FL — SIGNIFICANT CHANGE UP (ref 80–100)
MCV RBC AUTO: 88.5 FL — SIGNIFICANT CHANGE UP (ref 80–100)
MCV RBC AUTO: 89.2 FL — SIGNIFICANT CHANGE UP (ref 80–100)
MCV RBC AUTO: 89.4 FL — SIGNIFICANT CHANGE UP (ref 80–100)
MCV RBC AUTO: 90.4 FL — SIGNIFICANT CHANGE UP (ref 80–100)
MCV RBC AUTO: 90.7 FL — SIGNIFICANT CHANGE UP (ref 80–100)
MCV RBC AUTO: 92.4 FL — SIGNIFICANT CHANGE UP (ref 80–100)
METAMYELOCYTES # FLD: 0 % — SIGNIFICANT CHANGE UP (ref 0–1)
METAMYELOCYTES # FLD: 0 % — SIGNIFICANT CHANGE UP (ref 0–1)
MONOCYTES # BLD AUTO: 0.55 K/UL — SIGNIFICANT CHANGE UP (ref 0–0.9)
MONOCYTES # BLD AUTO: 0.64 K/UL — SIGNIFICANT CHANGE UP (ref 0–0.9)
MONOCYTES # BLD AUTO: 0.68 K/UL — SIGNIFICANT CHANGE UP (ref 0–0.9)
MONOCYTES # BLD AUTO: 0.69 K/UL — SIGNIFICANT CHANGE UP (ref 0–0.9)
MONOCYTES # BLD AUTO: 0.73 K/UL — SIGNIFICANT CHANGE UP (ref 0–0.9)
MONOCYTES # BLD AUTO: 0.73 K/UL — SIGNIFICANT CHANGE UP (ref 0–0.9)
MONOCYTES # BLD AUTO: 0.74 K/UL — SIGNIFICANT CHANGE UP (ref 0–0.9)
MONOCYTES # BLD AUTO: 0.74 K/UL — SIGNIFICANT CHANGE UP (ref 0–0.9)
MONOCYTES # BLD AUTO: 0.78 K/UL — SIGNIFICANT CHANGE UP (ref 0–0.9)
MONOCYTES # BLD AUTO: 0.83 K/UL — SIGNIFICANT CHANGE UP (ref 0–0.9)
MONOCYTES # BLD AUTO: 0.85 K/UL — SIGNIFICANT CHANGE UP (ref 0–0.9)
MONOCYTES # BLD AUTO: 0.87 K/UL — SIGNIFICANT CHANGE UP (ref 0–0.9)
MONOCYTES # BLD AUTO: 0.87 K/UL — SIGNIFICANT CHANGE UP (ref 0–0.9)
MONOCYTES # BLD AUTO: 0.89 K/UL — SIGNIFICANT CHANGE UP (ref 0–0.9)
MONOCYTES NFR BLD AUTO: 10.6 % — SIGNIFICANT CHANGE UP (ref 2–14)
MONOCYTES NFR BLD AUTO: 11.1 % — SIGNIFICANT CHANGE UP (ref 2–14)
MONOCYTES NFR BLD AUTO: 11.3 % — SIGNIFICANT CHANGE UP (ref 2–14)
MONOCYTES NFR BLD AUTO: 11.4 % — SIGNIFICANT CHANGE UP (ref 2–14)
MONOCYTES NFR BLD AUTO: 12 % — SIGNIFICANT CHANGE UP (ref 2–14)
MONOCYTES NFR BLD AUTO: 12.2 % — SIGNIFICANT CHANGE UP (ref 2–14)
MONOCYTES NFR BLD AUTO: 12.4 % — SIGNIFICANT CHANGE UP (ref 2–14)
MONOCYTES NFR BLD AUTO: 12.5 % — SIGNIFICANT CHANGE UP (ref 2–14)
MONOCYTES NFR BLD AUTO: 13.2 % — SIGNIFICANT CHANGE UP (ref 2–14)
MONOCYTES NFR BLD AUTO: 13.4 % — SIGNIFICANT CHANGE UP (ref 2–14)
MONOCYTES NFR BLD AUTO: 13.5 % — SIGNIFICANT CHANGE UP (ref 2–14)
MONOCYTES NFR BLD AUTO: 13.6 % — SIGNIFICANT CHANGE UP (ref 2–14)
MONOCYTES NFR BLD AUTO: 13.9 % — SIGNIFICANT CHANGE UP (ref 2–14)
MONOCYTES NFR BLD AUTO: 9.6 % — SIGNIFICANT CHANGE UP (ref 2–14)
MONOCYTES NFR BLD: 9.9 % — HIGH (ref 2–9)
MONOCYTES NFR BLD: 9.9 % — HIGH (ref 2–9)
MYELOCYTES NFR BLD: 0 % — SIGNIFICANT CHANGE UP (ref 0–0)
MYELOCYTES NFR BLD: 0 % — SIGNIFICANT CHANGE UP (ref 0–0)
NEUTROPHIL AB SER-ACNC: 67.6 % — SIGNIFICANT CHANGE UP (ref 43–77)
NEUTROPHIL AB SER-ACNC: 67.6 % — SIGNIFICANT CHANGE UP (ref 43–77)
NEUTROPHILS # BLD AUTO: 2.76 K/UL — SIGNIFICANT CHANGE UP (ref 1.8–7.4)
NEUTROPHILS # BLD AUTO: 3.18 K/UL — SIGNIFICANT CHANGE UP (ref 1.8–7.4)
NEUTROPHILS # BLD AUTO: 3.19 K/UL — SIGNIFICANT CHANGE UP (ref 1.8–7.4)
NEUTROPHILS # BLD AUTO: 3.26 K/UL — SIGNIFICANT CHANGE UP (ref 1.8–7.4)
NEUTROPHILS # BLD AUTO: 3.48 K/UL — SIGNIFICANT CHANGE UP (ref 1.8–7.4)
NEUTROPHILS # BLD AUTO: 3.55 K/UL — SIGNIFICANT CHANGE UP (ref 1.8–7.4)
NEUTROPHILS # BLD AUTO: 3.75 K/UL — SIGNIFICANT CHANGE UP (ref 1.8–7.4)
NEUTROPHILS # BLD AUTO: 3.97 K/UL — SIGNIFICANT CHANGE UP (ref 1.8–7.4)
NEUTROPHILS # BLD AUTO: 3.98 K/UL — SIGNIFICANT CHANGE UP (ref 1.8–7.4)
NEUTROPHILS # BLD AUTO: 4 K/UL — SIGNIFICANT CHANGE UP (ref 1.8–7.4)
NEUTROPHILS # BLD AUTO: 4.04 K/UL — SIGNIFICANT CHANGE UP (ref 1.8–7.4)
NEUTROPHILS # BLD AUTO: 4.08 K/UL — SIGNIFICANT CHANGE UP (ref 1.8–7.4)
NEUTROPHILS # BLD AUTO: 4.13 K/UL — SIGNIFICANT CHANGE UP (ref 1.8–7.4)
NEUTROPHILS # BLD AUTO: 4.5 K/UL — SIGNIFICANT CHANGE UP (ref 1.8–7.4)
NEUTROPHILS NFR BLD AUTO: 53.9 % — SIGNIFICANT CHANGE UP (ref 43–77)
NEUTROPHILS NFR BLD AUTO: 54.4 % — SIGNIFICANT CHANGE UP (ref 43–77)
NEUTROPHILS NFR BLD AUTO: 56.3 % — SIGNIFICANT CHANGE UP (ref 43–77)
NEUTROPHILS NFR BLD AUTO: 56.8 % — SIGNIFICANT CHANGE UP (ref 43–77)
NEUTROPHILS NFR BLD AUTO: 58 % — SIGNIFICANT CHANGE UP (ref 43–77)
NEUTROPHILS NFR BLD AUTO: 58.6 % — SIGNIFICANT CHANGE UP (ref 43–77)
NEUTROPHILS NFR BLD AUTO: 59.5 % — SIGNIFICANT CHANGE UP (ref 43–77)
NEUTROPHILS NFR BLD AUTO: 60.2 % — SIGNIFICANT CHANGE UP (ref 43–77)
NEUTROPHILS NFR BLD AUTO: 60.4 % — SIGNIFICANT CHANGE UP (ref 43–77)
NEUTROPHILS NFR BLD AUTO: 60.5 % — SIGNIFICANT CHANGE UP (ref 43–77)
NEUTROPHILS NFR BLD AUTO: 62.4 % — SIGNIFICANT CHANGE UP (ref 43–77)
NEUTROPHILS NFR BLD AUTO: 63.4 % — SIGNIFICANT CHANGE UP (ref 43–77)
NEUTROPHILS NFR BLD AUTO: 63.9 % — SIGNIFICANT CHANGE UP (ref 43–77)
NEUTROPHILS NFR BLD AUTO: 64.7 % — SIGNIFICANT CHANGE UP (ref 43–77)
NEUTS BAND # BLD: 0 % — SIGNIFICANT CHANGE UP (ref 0–6)
NEUTS BAND # BLD: 0 % — SIGNIFICANT CHANGE UP (ref 0–6)
NRBC # BLD: 0 /100 WBCS — SIGNIFICANT CHANGE UP (ref 0–0)
NRBC # BLD: 1 /100WBC — SIGNIFICANT CHANGE UP
NRBC # BLD: 1 /100WBC — SIGNIFICANT CHANGE UP
NRBC # FLD: 0 K/UL — SIGNIFICANT CHANGE UP (ref 0–0)
OTHER - HEMATOLOGY %: 0 — SIGNIFICANT CHANGE UP
OTHER - HEMATOLOGY %: 0 — SIGNIFICANT CHANGE UP
PHOSPHATE SERPL-MCNC: 3.3 MG/DL — SIGNIFICANT CHANGE UP (ref 2.5–4.5)
PHOSPHATE SERPL-MCNC: 3.5 MG/DL — SIGNIFICANT CHANGE UP (ref 2.5–4.5)
PLATELET # BLD AUTO: 243 K/UL — SIGNIFICANT CHANGE UP (ref 150–400)
PLATELET # BLD AUTO: 257 K/UL — SIGNIFICANT CHANGE UP (ref 150–400)
PLATELET # BLD AUTO: 290 K/UL — SIGNIFICANT CHANGE UP (ref 150–400)
PLATELET # BLD AUTO: 300 K/UL — SIGNIFICANT CHANGE UP (ref 150–400)
PLATELET # BLD AUTO: 313 K/UL — SIGNIFICANT CHANGE UP (ref 150–400)
PLATELET # BLD AUTO: 321 K/UL — SIGNIFICANT CHANGE UP (ref 150–400)
PLATELET # BLD AUTO: 322 K/UL — SIGNIFICANT CHANGE UP (ref 150–400)
PLATELET # BLD AUTO: 323 K/UL — SIGNIFICANT CHANGE UP (ref 150–400)
PLATELET # BLD AUTO: 342 K/UL — SIGNIFICANT CHANGE UP (ref 150–400)
PLATELET # BLD AUTO: 344 K/UL — SIGNIFICANT CHANGE UP (ref 150–400)
PLATELET # BLD AUTO: 344 K/UL — SIGNIFICANT CHANGE UP (ref 150–400)
PLATELET # BLD AUTO: 350 K/UL — SIGNIFICANT CHANGE UP (ref 150–400)
PLATELET # BLD AUTO: 364 K/UL — SIGNIFICANT CHANGE UP (ref 150–400)
PLATELET # BLD AUTO: 366 K/UL — SIGNIFICANT CHANGE UP (ref 150–400)
PLATELET # BLD AUTO: 371 K/UL — SIGNIFICANT CHANGE UP (ref 150–400)
PLATELET # BLD AUTO: 377 K/UL — SIGNIFICANT CHANGE UP (ref 150–400)
PLATELET COUNT - ESTIMATE: NORMAL — SIGNIFICANT CHANGE UP
PLATELET COUNT - ESTIMATE: NORMAL — SIGNIFICANT CHANGE UP
PMV BLD: 10.5 FL — SIGNIFICANT CHANGE UP (ref 7–13)
PMV BLD: 10.9 FL — SIGNIFICANT CHANGE UP (ref 7–13)
PMV BLD: 10.9 FL — SIGNIFICANT CHANGE UP (ref 7–13)
PMV BLD: 11.2 FL — SIGNIFICANT CHANGE UP (ref 7–13)
POTASSIUM SERPL-MCNC: 3.9 MMOL/L — SIGNIFICANT CHANGE UP (ref 3.5–5.3)
POTASSIUM SERPL-MCNC: 4.1 MMOL/L — SIGNIFICANT CHANGE UP (ref 3.5–5.3)
POTASSIUM SERPL-MCNC: 4.5 MMOL/L — SIGNIFICANT CHANGE UP (ref 3.5–5.3)
POTASSIUM SERPL-MCNC: 5.7 MMOL/L — HIGH (ref 3.5–5.3)
POTASSIUM SERPL-SCNC: 3.9 MMOL/L — SIGNIFICANT CHANGE UP (ref 3.5–5.3)
POTASSIUM SERPL-SCNC: 4.1 MMOL/L — SIGNIFICANT CHANGE UP (ref 3.5–5.3)
POTASSIUM SERPL-SCNC: 4.5 MMOL/L — SIGNIFICANT CHANGE UP (ref 3.5–5.3)
POTASSIUM SERPL-SCNC: 5.6 MMOL/L
POTASSIUM SERPL-SCNC: 5.7 MMOL/L — HIGH (ref 3.5–5.3)
PROMYELOCYTES # FLD: 0 % — SIGNIFICANT CHANGE UP (ref 0–0)
PROMYELOCYTES # FLD: 0 % — SIGNIFICANT CHANGE UP (ref 0–0)
PROT SERPL-MCNC: 6.7 G/DL — SIGNIFICANT CHANGE UP (ref 6–8.3)
PROT SERPL-MCNC: 6.9 G/DL
PROTHROM AB SERPL-ACNC: 13.1 SEC — SIGNIFICANT CHANGE UP (ref 10.6–13.6)
RBC # BLD: 2.68 M/UL — LOW (ref 4.2–5.8)
RBC # BLD: 2.7 M/UL — LOW (ref 4.2–5.8)
RBC # BLD: 2.76 M/UL — LOW (ref 4.2–5.8)
RBC # BLD: 2.97 M/UL — LOW (ref 4.2–5.8)
RBC # BLD: 3.02 M/UL — LOW (ref 4.2–5.8)
RBC # BLD: 3.03 M/UL — LOW (ref 4.2–5.8)
RBC # BLD: 3.03 M/UL — LOW (ref 4.2–5.8)
RBC # BLD: 3.04 M/UL — LOW (ref 4.2–5.8)
RBC # BLD: 3.05 M/UL — LOW (ref 4.2–5.8)
RBC # BLD: 3.1 M/UL — LOW (ref 4.2–5.8)
RBC # BLD: 3.11 M/UL — LOW (ref 4.2–5.8)
RBC # BLD: 3.14 M/UL — LOW (ref 4.2–5.8)
RBC # BLD: 3.32 M/UL — LOW (ref 4.2–5.8)
RBC # BLD: 3.59 M/UL — LOW (ref 4.2–5.8)
RBC # BLD: 3.67 M/UL — LOW (ref 4.2–5.8)
RBC # BLD: 3.67 M/UL — LOW (ref 4.2–5.8)
RBC # FLD: 13.3 % — SIGNIFICANT CHANGE UP (ref 10.3–14.5)
RBC # FLD: 13.8 % — SIGNIFICANT CHANGE UP (ref 10.3–14.5)
RBC # FLD: 13.9 % — SIGNIFICANT CHANGE UP (ref 10.3–14.5)
RBC # FLD: 14 % — SIGNIFICANT CHANGE UP (ref 10.3–14.5)
RBC # FLD: 14.2 % — SIGNIFICANT CHANGE UP (ref 10.3–14.5)
RBC # FLD: 14.4 % — SIGNIFICANT CHANGE UP (ref 10.3–14.5)
RBC # FLD: 14.6 % — HIGH (ref 10.3–14.5)
RBC # FLD: 14.7 % — HIGH (ref 10.3–14.5)
RBC # FLD: 14.7 % — HIGH (ref 10.3–14.5)
RBC # FLD: 14.9 % — HIGH (ref 10.3–14.5)
RBC # FLD: 15 % — HIGH (ref 10.3–14.5)
RBC # FLD: 15.3 % — HIGH (ref 10.3–14.5)
RH IG SCN BLD-IMP: POSITIVE — SIGNIFICANT CHANGE UP
SARS-COV-2 IGG SERPL IA-ACNC: 0.12 INDEX
SARS-COV-2 IGG SERPL QL IA: NEGATIVE
SARS-COV-2 RNA SPEC QL NAA+PROBE: SIGNIFICANT CHANGE UP
SODIUM SERPL-SCNC: 136 MMOL/L — SIGNIFICANT CHANGE UP (ref 135–145)
SODIUM SERPL-SCNC: 138 MMOL/L
SODIUM SERPL-SCNC: 141 MMOL/L — SIGNIFICANT CHANGE UP (ref 135–145)
SODIUM SERPL-SCNC: 141 MMOL/L — SIGNIFICANT CHANGE UP (ref 135–145)
SODIUM SERPL-SCNC: 142 MMOL/L — SIGNIFICANT CHANGE UP (ref 135–145)
TRIGL SERPL-MCNC: 79 MG/DL
VARIANT LYMPHS # BLD: 5.4 % — SIGNIFICANT CHANGE UP
VARIANT LYMPHS # BLD: 5.4 % — SIGNIFICANT CHANGE UP
WBC # BLD: 5.07 K/UL — SIGNIFICANT CHANGE UP (ref 3.8–10.5)
WBC # BLD: 5.65 K/UL — SIGNIFICANT CHANGE UP (ref 3.8–10.5)
WBC # BLD: 5.75 K/UL — SIGNIFICANT CHANGE UP (ref 3.8–10.5)
WBC # BLD: 5.75 K/UL — SIGNIFICANT CHANGE UP (ref 3.8–10.5)
WBC # BLD: 5.92 K/UL — SIGNIFICANT CHANGE UP (ref 3.8–10.5)
WBC # BLD: 5.97 K/UL — SIGNIFICANT CHANGE UP (ref 3.8–10.5)
WBC # BLD: 6.38 K/UL — SIGNIFICANT CHANGE UP (ref 3.8–10.5)
WBC # BLD: 6.41 K/UL — SIGNIFICANT CHANGE UP (ref 3.8–10.5)
WBC # BLD: 6.43 K/UL — SIGNIFICANT CHANGE UP (ref 3.8–10.5)
WBC # BLD: 6.46 K/UL — SIGNIFICANT CHANGE UP (ref 3.8–10.5)
WBC # BLD: 6.6 K/UL — SIGNIFICANT CHANGE UP (ref 3.8–10.5)
WBC # BLD: 6.62 K/UL — SIGNIFICANT CHANGE UP (ref 3.8–10.5)
WBC # BLD: 6.96 K/UL — SIGNIFICANT CHANGE UP (ref 3.8–10.5)
WBC # BLD: 7.45 K/UL — SIGNIFICANT CHANGE UP (ref 3.8–10.5)
WBC # FLD AUTO: 5.07 K/UL — SIGNIFICANT CHANGE UP (ref 3.8–10.5)
WBC # FLD AUTO: 5.65 K/UL — SIGNIFICANT CHANGE UP (ref 3.8–10.5)
WBC # FLD AUTO: 5.75 K/UL — SIGNIFICANT CHANGE UP (ref 3.8–10.5)
WBC # FLD AUTO: 5.75 K/UL — SIGNIFICANT CHANGE UP (ref 3.8–10.5)
WBC # FLD AUTO: 5.92 K/UL — SIGNIFICANT CHANGE UP (ref 3.8–10.5)
WBC # FLD AUTO: 5.97 K/UL — SIGNIFICANT CHANGE UP (ref 3.8–10.5)
WBC # FLD AUTO: 6.38 K/UL — SIGNIFICANT CHANGE UP (ref 3.8–10.5)
WBC # FLD AUTO: 6.41 K/UL — SIGNIFICANT CHANGE UP (ref 3.8–10.5)
WBC # FLD AUTO: 6.43 K/UL — SIGNIFICANT CHANGE UP (ref 3.8–10.5)
WBC # FLD AUTO: 6.46 K/UL — SIGNIFICANT CHANGE UP (ref 3.8–10.5)
WBC # FLD AUTO: 6.6 K/UL — SIGNIFICANT CHANGE UP (ref 3.8–10.5)
WBC # FLD AUTO: 6.62 K/UL — SIGNIFICANT CHANGE UP (ref 3.8–10.5)
WBC # FLD AUTO: 6.96 K/UL — SIGNIFICANT CHANGE UP (ref 3.8–10.5)
WBC # FLD AUTO: 7.45 K/UL — SIGNIFICANT CHANGE UP (ref 3.8–10.5)

## 2020-01-01 PROCEDURE — 93010 ELECTROCARDIOGRAM REPORT: CPT

## 2020-01-01 PROCEDURE — 99223 1ST HOSP IP/OBS HIGH 75: CPT | Mod: GC

## 2020-01-01 PROCEDURE — 86850 RBC ANTIBODY SCREEN: CPT

## 2020-01-01 PROCEDURE — 86900 BLOOD TYPING SEROLOGIC ABO: CPT

## 2020-01-01 PROCEDURE — 86901 BLOOD TYPING SEROLOGIC RH(D): CPT

## 2020-01-01 PROCEDURE — 86923 COMPATIBILITY TEST ELECTRIC: CPT

## 2020-01-01 PROCEDURE — G2066: CPT

## 2020-01-01 PROCEDURE — 70450 CT HEAD/BRAIN W/O DYE: CPT

## 2020-01-01 PROCEDURE — 70450 CT HEAD/BRAIN W/O DYE: CPT | Mod: 26,77

## 2020-01-01 PROCEDURE — 93298 REM INTERROG DEV EVAL SCRMS: CPT

## 2020-01-01 PROCEDURE — 99214 OFFICE O/P EST MOD 30 MIN: CPT

## 2020-01-01 PROCEDURE — 99239 HOSP IP/OBS DSCHRG MGMT >30: CPT

## 2020-01-01 PROCEDURE — 99222 1ST HOSP IP/OBS MODERATE 55: CPT | Mod: GC

## 2020-01-01 PROCEDURE — 99223 1ST HOSP IP/OBS HIGH 75: CPT

## 2020-01-01 PROCEDURE — 70450 CT HEAD/BRAIN W/O DYE: CPT | Mod: 26

## 2020-01-01 PROCEDURE — G2066: CPT | Mod: PD

## 2020-01-01 PROCEDURE — 99214 OFFICE O/P EST MOD 30 MIN: CPT | Mod: PD

## 2020-01-01 PROCEDURE — 12345: CPT | Mod: NC

## 2020-01-01 PROCEDURE — 72125 CT NECK SPINE W/O DYE: CPT | Mod: 26

## 2020-01-01 PROCEDURE — 99213 OFFICE O/P EST LOW 20 MIN: CPT

## 2020-01-01 PROCEDURE — 93298 REM INTERROG DEV EVAL SCRMS: CPT | Mod: PD

## 2020-01-01 RX ORDER — LEVETIRACETAM 250 MG/1
1 TABLET, FILM COATED ORAL
Qty: 0 | Refills: 0 | DISCHARGE

## 2020-01-01 RX ORDER — BLOOD SUGAR DIAGNOSTIC
STRIP MISCELLANEOUS
Qty: 300 | Refills: 2 | Status: ACTIVE | COMMUNITY
Start: 2017-05-25 | End: 1900-01-01

## 2020-01-01 RX ORDER — LANCETS 28 GAUGE
EACH MISCELLANEOUS
Qty: 2 | Refills: 3 | Status: ACTIVE | COMMUNITY
Start: 2019-04-23 | End: 1900-01-01

## 2020-01-01 RX ORDER — METOCLOPRAMIDE HCL 10 MG
10 TABLET ORAL DAILY
Refills: 0 | Status: DISCONTINUED | OUTPATIENT
Start: 2020-01-01 | End: 2020-01-01

## 2020-01-01 RX ORDER — ATORVASTATIN CALCIUM 80 MG/1
20 TABLET, FILM COATED ORAL AT BEDTIME
Refills: 0 | Status: DISCONTINUED | OUTPATIENT
Start: 2020-01-01 | End: 2020-01-01

## 2020-01-01 RX ORDER — ALLOPURINOL 300 MG
100 TABLET ORAL DAILY
Refills: 0 | Status: DISCONTINUED | OUTPATIENT
Start: 2020-01-01 | End: 2020-01-01

## 2020-01-01 RX ORDER — DESMOPRESSIN ACETATE 0.1 MG/1
32 TABLET ORAL ONCE
Refills: 0 | Status: COMPLETED | OUTPATIENT
Start: 2020-01-01 | End: 2020-01-01

## 2020-01-01 RX ORDER — FOSFOMYCIN TROMETHAMINE 3 G/1
3 POWDER ORAL DAILY
Qty: 1 | Refills: 0 | Status: ACTIVE | COMMUNITY
Start: 2020-01-01 | End: 1900-01-01

## 2020-01-01 RX ORDER — LIDOCAINE 4 G/100G
1 CREAM TOPICAL DAILY
Refills: 0 | Status: DISCONTINUED | OUTPATIENT
Start: 2020-01-01 | End: 2020-01-01

## 2020-01-01 RX ORDER — LEVETIRACETAM 250 MG/1
1 TABLET, FILM COATED ORAL
Qty: 60 | Refills: 0
Start: 2020-01-01 | End: 2020-01-01

## 2020-01-01 RX ORDER — SODIUM CHLORIDE 9 MG/ML
3 INJECTION INTRAMUSCULAR; INTRAVENOUS; SUBCUTANEOUS EVERY 8 HOURS
Refills: 0 | Status: DISCONTINUED | OUTPATIENT
Start: 2020-01-01 | End: 2020-01-01

## 2020-01-01 RX ORDER — AMLODIPINE BESYLATE 2.5 MG/1
5 TABLET ORAL DAILY
Refills: 0 | Status: DISCONTINUED | OUTPATIENT
Start: 2020-01-01 | End: 2020-01-01

## 2020-01-01 RX ORDER — LEVOTHYROXINE SODIUM 125 MCG
37.5 TABLET ORAL DAILY
Refills: 0 | Status: DISCONTINUED | OUTPATIENT
Start: 2020-01-01 | End: 2020-01-01

## 2020-01-01 RX ORDER — ROSUVASTATIN CALCIUM 5 MG/1
5 TABLET, FILM COATED ORAL
Qty: 90 | Refills: 3 | Status: ACTIVE | COMMUNITY
Start: 2017-05-30 | End: 1900-01-01

## 2020-01-01 RX ORDER — METHYLPHENIDATE HCL 5 MG
20 TABLET ORAL THREE TIMES A DAY
Refills: 0 | Status: DISCONTINUED | OUTPATIENT
Start: 2020-01-01 | End: 2020-01-01

## 2020-01-01 RX ORDER — METOCLOPRAMIDE HCL 10 MG
1 TABLET ORAL
Qty: 0 | Refills: 0 | DISCHARGE

## 2020-01-01 RX ORDER — PANTOPRAZOLE SODIUM 20 MG/1
40 TABLET, DELAYED RELEASE ORAL
Refills: 0 | Status: DISCONTINUED | OUTPATIENT
Start: 2020-01-01 | End: 2020-01-01

## 2020-01-01 RX ORDER — LEVETIRACETAM 250 MG/1
500 TABLET, FILM COATED ORAL EVERY 12 HOURS
Refills: 0 | Status: DISCONTINUED | OUTPATIENT
Start: 2020-01-01 | End: 2020-01-01

## 2020-01-01 RX ORDER — LEVETIRACETAM 500 MG/1
500 TABLET, FILM COATED ORAL
Qty: 60 | Refills: 0 | Status: ACTIVE | COMMUNITY
Start: 2020-01-01

## 2020-01-01 RX ORDER — AMLODIPINE BESYLATE 5 MG/1
5 TABLET ORAL
Qty: 90 | Refills: 3 | Status: ACTIVE | COMMUNITY
Start: 2018-06-19 | End: 1900-01-01

## 2020-01-01 RX ORDER — METHYLPHENIDATE HCL 5 MG
20 TABLET ORAL
Refills: 0 | Status: DISCONTINUED | OUTPATIENT
Start: 2020-01-01 | End: 2020-01-01

## 2020-01-01 RX ORDER — METOPROLOL TARTRATE 50 MG
50 TABLET ORAL
Refills: 0 | Status: DISCONTINUED | OUTPATIENT
Start: 2020-01-01 | End: 2020-01-01

## 2020-01-01 RX ADMIN — Medication 50 MILLIGRAM(S): at 06:14

## 2020-01-01 RX ADMIN — Medication 100 MILLIGRAM(S): at 12:28

## 2020-01-01 RX ADMIN — Medication 20 MILLIGRAM(S): at 06:14

## 2020-01-01 RX ADMIN — Medication 50 MILLIGRAM(S): at 16:58

## 2020-01-01 RX ADMIN — LEVETIRACETAM 400 MILLIGRAM(S): 250 TABLET, FILM COATED ORAL at 06:13

## 2020-01-01 RX ADMIN — LEVETIRACETAM 400 MILLIGRAM(S): 250 TABLET, FILM COATED ORAL at 07:37

## 2020-01-01 RX ADMIN — SODIUM CHLORIDE 3 MILLILITER(S): 9 INJECTION INTRAMUSCULAR; INTRAVENOUS; SUBCUTANEOUS at 05:00

## 2020-01-01 RX ADMIN — PANTOPRAZOLE SODIUM 40 MILLIGRAM(S): 20 TABLET, DELAYED RELEASE ORAL at 07:38

## 2020-01-01 RX ADMIN — Medication 20 MILLIGRAM(S): at 07:38

## 2020-01-01 RX ADMIN — Medication 50 MILLIGRAM(S): at 07:38

## 2020-01-01 RX ADMIN — LEVETIRACETAM 400 MILLIGRAM(S): 250 TABLET, FILM COATED ORAL at 18:54

## 2020-01-01 RX ADMIN — Medication 37.5 MICROGRAM(S): at 07:37

## 2020-01-01 RX ADMIN — Medication 37.5 MICROGRAM(S): at 06:14

## 2020-01-01 RX ADMIN — LEVETIRACETAM 400 MILLIGRAM(S): 250 TABLET, FILM COATED ORAL at 20:07

## 2020-01-01 RX ADMIN — PANTOPRAZOLE SODIUM 40 MILLIGRAM(S): 20 TABLET, DELAYED RELEASE ORAL at 06:14

## 2020-01-01 RX ADMIN — Medication 100 MILLIGRAM(S): at 12:14

## 2020-01-01 RX ADMIN — SODIUM CHLORIDE 3 MILLILITER(S): 9 INJECTION INTRAMUSCULAR; INTRAVENOUS; SUBCUTANEOUS at 14:35

## 2020-01-01 RX ADMIN — AMLODIPINE BESYLATE 5 MILLIGRAM(S): 2.5 TABLET ORAL at 06:14

## 2020-01-01 RX ADMIN — SODIUM CHLORIDE 3 MILLILITER(S): 9 INJECTION INTRAMUSCULAR; INTRAVENOUS; SUBCUTANEOUS at 13:00

## 2020-01-01 RX ADMIN — Medication 50 MILLIGRAM(S): at 18:54

## 2020-01-01 RX ADMIN — ATORVASTATIN CALCIUM 20 MILLIGRAM(S): 80 TABLET, FILM COATED ORAL at 22:53

## 2020-01-01 RX ADMIN — SODIUM CHLORIDE 3 MILLILITER(S): 9 INJECTION INTRAMUSCULAR; INTRAVENOUS; SUBCUTANEOUS at 06:13

## 2020-01-01 RX ADMIN — DESMOPRESSIN ACETATE 232 MICROGRAM(S): 0.1 TABLET ORAL at 23:26

## 2020-01-01 RX ADMIN — SODIUM CHLORIDE 3 MILLILITER(S): 9 INJECTION INTRAMUSCULAR; INTRAVENOUS; SUBCUTANEOUS at 21:34

## 2020-01-01 RX ADMIN — AMLODIPINE BESYLATE 5 MILLIGRAM(S): 2.5 TABLET ORAL at 07:37

## 2020-01-02 ENCOUNTER — APPOINTMENT (OUTPATIENT)
Dept: ELECTROPHYSIOLOGY | Facility: CLINIC | Age: 80
End: 2020-01-02
Payer: MEDICARE

## 2020-01-02 PROCEDURE — 93298 REM INTERROG DEV EVAL SCRMS: CPT

## 2020-01-02 PROCEDURE — G2066: CPT

## 2020-01-03 ENCOUNTER — APPOINTMENT (OUTPATIENT)
Dept: INFUSION THERAPY | Facility: HOSPITAL | Age: 80
End: 2020-01-03

## 2020-01-03 ENCOUNTER — RESULT REVIEW (OUTPATIENT)
Age: 80
End: 2020-01-03

## 2020-01-03 LAB
BASOPHILS # BLD AUTO: 0 K/UL — SIGNIFICANT CHANGE UP (ref 0–0.2)
BASOPHILS NFR BLD AUTO: 0.6 % — SIGNIFICANT CHANGE UP (ref 0–2)
EOSINOPHIL # BLD AUTO: 0.4 K/UL — SIGNIFICANT CHANGE UP (ref 0–0.5)
EOSINOPHIL NFR BLD AUTO: 9.2 % — HIGH (ref 0–6)
HCT VFR BLD CALC: 29.1 % — LOW (ref 39–50)
HGB BLD-MCNC: 9.6 G/DL — LOW (ref 13–17)
LYMPHOCYTES # BLD AUTO: 1.7 K/UL — SIGNIFICANT CHANGE UP (ref 1–3.3)
LYMPHOCYTES # BLD AUTO: 38.1 % — SIGNIFICANT CHANGE UP (ref 13–44)
MCHC RBC-ENTMCNC: 33.2 G/DL — SIGNIFICANT CHANGE UP (ref 32–36)
MCHC RBC-ENTMCNC: 33.2 PG — SIGNIFICANT CHANGE UP (ref 27–34)
MCV RBC AUTO: 99.8 FL — SIGNIFICANT CHANGE UP (ref 80–100)
MONOCYTES # BLD AUTO: 0.3 K/UL — SIGNIFICANT CHANGE UP (ref 0–0.9)
MONOCYTES NFR BLD AUTO: 7.5 % — SIGNIFICANT CHANGE UP (ref 2–14)
NEUTROPHILS # BLD AUTO: 2 K/UL — SIGNIFICANT CHANGE UP (ref 1.8–7.4)
NEUTROPHILS NFR BLD AUTO: 44.6 % — SIGNIFICANT CHANGE UP (ref 43–77)
PLATELET # BLD AUTO: 273 K/UL — SIGNIFICANT CHANGE UP (ref 150–400)
RBC # BLD: 2.91 M/UL — LOW (ref 4.2–5.8)
RBC # FLD: 17.5 % — HIGH (ref 10.3–14.5)
WBC # BLD: 4.4 K/UL — SIGNIFICANT CHANGE UP (ref 3.8–10.5)
WBC # FLD AUTO: 4.4 K/UL — SIGNIFICANT CHANGE UP (ref 3.8–10.5)

## 2020-01-05 ENCOUNTER — OUTPATIENT (OUTPATIENT)
Dept: OUTPATIENT SERVICES | Facility: HOSPITAL | Age: 80
LOS: 1 days | End: 2020-01-05

## 2020-01-05 DIAGNOSIS — Z98.89 OTHER SPECIFIED POSTPROCEDURAL STATES: Chronic | ICD-10-CM

## 2020-01-05 DIAGNOSIS — Z95.5 PRESENCE OF CORONARY ANGIOPLASTY IMPLANT AND GRAFT: Chronic | ICD-10-CM

## 2020-01-05 DIAGNOSIS — Z90.89 ACQUIRED ABSENCE OF OTHER ORGANS: Chronic | ICD-10-CM

## 2020-01-06 ENCOUNTER — RESULT REVIEW (OUTPATIENT)
Age: 80
End: 2020-01-06

## 2020-01-06 ENCOUNTER — APPOINTMENT (OUTPATIENT)
Dept: INFUSION THERAPY | Facility: HOSPITAL | Age: 80
End: 2020-01-06

## 2020-01-06 LAB
BASOPHILS # BLD AUTO: 0 K/UL — SIGNIFICANT CHANGE UP (ref 0–0.2)
BASOPHILS NFR BLD AUTO: 0.1 % — SIGNIFICANT CHANGE UP (ref 0–2)
BLD GP AB SCN SERPL QL: NEGATIVE — SIGNIFICANT CHANGE UP
EOSINOPHIL # BLD AUTO: 0.3 K/UL — SIGNIFICANT CHANGE UP (ref 0–0.5)
EOSINOPHIL NFR BLD AUTO: 7.9 % — HIGH (ref 0–6)
HCT VFR BLD CALC: 26.5 % — LOW (ref 39–50)
HGB BLD-MCNC: 8.9 G/DL — LOW (ref 13–17)
LYMPHOCYTES # BLD AUTO: 1.5 K/UL — SIGNIFICANT CHANGE UP (ref 1–3.3)
LYMPHOCYTES # BLD AUTO: 34.1 % — SIGNIFICANT CHANGE UP (ref 13–44)
MCHC RBC-ENTMCNC: 33.5 G/DL — SIGNIFICANT CHANGE UP (ref 32–36)
MCHC RBC-ENTMCNC: 33.5 PG — SIGNIFICANT CHANGE UP (ref 27–34)
MCV RBC AUTO: 100 FL — SIGNIFICANT CHANGE UP (ref 80–100)
MONOCYTES # BLD AUTO: 0.2 K/UL — SIGNIFICANT CHANGE UP (ref 0–0.9)
MONOCYTES NFR BLD AUTO: 4.2 % — SIGNIFICANT CHANGE UP (ref 2–14)
NEUTROPHILS # BLD AUTO: 2.4 K/UL — SIGNIFICANT CHANGE UP (ref 1.8–7.4)
NEUTROPHILS NFR BLD AUTO: 53.6 % — SIGNIFICANT CHANGE UP (ref 43–77)
PLATELET # BLD AUTO: 268 K/UL — SIGNIFICANT CHANGE UP (ref 150–400)
RBC # BLD: 2.65 M/UL — LOW (ref 4.2–5.8)
RBC # FLD: 17.5 % — HIGH (ref 10.3–14.5)
RH IG SCN BLD-IMP: POSITIVE — SIGNIFICANT CHANGE UP
WBC # BLD: 4.4 K/UL — SIGNIFICANT CHANGE UP (ref 3.8–10.5)
WBC # FLD AUTO: 4.4 K/UL — SIGNIFICANT CHANGE UP (ref 3.8–10.5)

## 2020-01-08 ENCOUNTER — APPOINTMENT (OUTPATIENT)
Dept: INFUSION THERAPY | Facility: HOSPITAL | Age: 80
End: 2020-01-08

## 2020-01-10 ENCOUNTER — RESULT REVIEW (OUTPATIENT)
Age: 80
End: 2020-01-10

## 2020-01-10 ENCOUNTER — APPOINTMENT (OUTPATIENT)
Dept: INFUSION THERAPY | Facility: HOSPITAL | Age: 80
End: 2020-01-10

## 2020-01-10 LAB
BASOPHILS # BLD AUTO: 0 K/UL — SIGNIFICANT CHANGE UP (ref 0–0.2)
BASOPHILS NFR BLD AUTO: 0.5 % — SIGNIFICANT CHANGE UP (ref 0–2)
EOSINOPHIL # BLD AUTO: 0.3 K/UL — SIGNIFICANT CHANGE UP (ref 0–0.5)
EOSINOPHIL NFR BLD AUTO: 6.1 % — HIGH (ref 0–6)
HCT VFR BLD CALC: 34.6 % — LOW (ref 39–50)
HGB BLD-MCNC: 11.9 G/DL — LOW (ref 13–17)
LYMPHOCYTES # BLD AUTO: 1.4 K/UL — SIGNIFICANT CHANGE UP (ref 1–3.3)
LYMPHOCYTES # BLD AUTO: 31.6 % — SIGNIFICANT CHANGE UP (ref 13–44)
MCHC RBC-ENTMCNC: 33 PG — SIGNIFICANT CHANGE UP (ref 27–34)
MCHC RBC-ENTMCNC: 34.4 G/DL — SIGNIFICANT CHANGE UP (ref 32–36)
MCV RBC AUTO: 95.8 FL — SIGNIFICANT CHANGE UP (ref 80–100)
MONOCYTES # BLD AUTO: 0.3 K/UL — SIGNIFICANT CHANGE UP (ref 0–0.9)
MONOCYTES NFR BLD AUTO: 7.1 % — SIGNIFICANT CHANGE UP (ref 2–14)
NEUTROPHILS # BLD AUTO: 2.5 K/UL — SIGNIFICANT CHANGE UP (ref 1.8–7.4)
NEUTROPHILS NFR BLD AUTO: 54.6 % — SIGNIFICANT CHANGE UP (ref 43–77)
PLATELET # BLD AUTO: 209 K/UL — SIGNIFICANT CHANGE UP (ref 150–400)
RBC # BLD: 3.61 M/UL — LOW (ref 4.2–5.8)
RBC # FLD: 16.1 % — HIGH (ref 10.3–14.5)
WBC # BLD: 4.5 K/UL — SIGNIFICANT CHANGE UP (ref 3.8–10.5)
WBC # FLD AUTO: 4.5 K/UL — SIGNIFICANT CHANGE UP (ref 3.8–10.5)

## 2020-01-13 ENCOUNTER — RESULT REVIEW (OUTPATIENT)
Age: 80
End: 2020-01-13

## 2020-01-13 ENCOUNTER — OUTPATIENT (OUTPATIENT)
Dept: OUTPATIENT SERVICES | Facility: HOSPITAL | Age: 80
LOS: 1 days | Discharge: HOME | End: 2020-01-13

## 2020-01-13 ENCOUNTER — APPOINTMENT (OUTPATIENT)
Dept: INFUSION THERAPY | Facility: HOSPITAL | Age: 80
End: 2020-01-13

## 2020-01-13 DIAGNOSIS — Z90.89 ACQUIRED ABSENCE OF OTHER ORGANS: Chronic | ICD-10-CM

## 2020-01-13 DIAGNOSIS — D46.9 MYELODYSPLASTIC SYNDROME, UNSPECIFIED: ICD-10-CM

## 2020-01-13 DIAGNOSIS — Z95.5 PRESENCE OF CORONARY ANGIOPLASTY IMPLANT AND GRAFT: Chronic | ICD-10-CM

## 2020-01-13 DIAGNOSIS — Z98.89 OTHER SPECIFIED POSTPROCEDURAL STATES: Chronic | ICD-10-CM

## 2020-01-13 LAB
BASOPHILS # BLD AUTO: 0 K/UL — SIGNIFICANT CHANGE UP (ref 0–0.2)
BASOPHILS NFR BLD AUTO: 0.3 % — SIGNIFICANT CHANGE UP (ref 0–2)
EOSINOPHIL # BLD AUTO: 0.2 K/UL — SIGNIFICANT CHANGE UP (ref 0–0.5)
EOSINOPHIL NFR BLD AUTO: 4.8 % — SIGNIFICANT CHANGE UP (ref 0–6)
HCT VFR BLD CALC: 30.4 % — LOW (ref 39–50)
HGB BLD-MCNC: 10.5 G/DL — LOW (ref 13–17)
LYMPHOCYTES # BLD AUTO: 1.2 K/UL — SIGNIFICANT CHANGE UP (ref 1–3.3)
LYMPHOCYTES # BLD AUTO: 32 % — SIGNIFICANT CHANGE UP (ref 13–44)
MCHC RBC-ENTMCNC: 33.6 PG — SIGNIFICANT CHANGE UP (ref 27–34)
MCHC RBC-ENTMCNC: 34.5 G/DL — SIGNIFICANT CHANGE UP (ref 32–36)
MCV RBC AUTO: 97.4 FL — SIGNIFICANT CHANGE UP (ref 80–100)
MONOCYTES # BLD AUTO: 0.2 K/UL — SIGNIFICANT CHANGE UP (ref 0–0.9)
MONOCYTES NFR BLD AUTO: 6.1 % — SIGNIFICANT CHANGE UP (ref 2–14)
NEUTROPHILS # BLD AUTO: 2.2 K/UL — SIGNIFICANT CHANGE UP (ref 1.8–7.4)
NEUTROPHILS NFR BLD AUTO: 56.8 % — SIGNIFICANT CHANGE UP (ref 43–77)
PLATELET # BLD AUTO: 187 K/UL — SIGNIFICANT CHANGE UP (ref 150–400)
RBC # BLD: 3.12 M/UL — LOW (ref 4.2–5.8)
RBC # FLD: 16.3 % — HIGH (ref 10.3–14.5)
WBC # BLD: 3.9 K/UL — SIGNIFICANT CHANGE UP (ref 3.8–10.5)
WBC # FLD AUTO: 3.9 K/UL — SIGNIFICANT CHANGE UP (ref 3.8–10.5)

## 2020-01-14 DIAGNOSIS — E86.0 DEHYDRATION: ICD-10-CM

## 2020-01-17 ENCOUNTER — RESULT REVIEW (OUTPATIENT)
Age: 80
End: 2020-01-17

## 2020-01-17 ENCOUNTER — APPOINTMENT (OUTPATIENT)
Dept: INFUSION THERAPY | Facility: HOSPITAL | Age: 80
End: 2020-01-17

## 2020-01-17 LAB
BASOPHILS # BLD AUTO: 0 K/UL — SIGNIFICANT CHANGE UP (ref 0–0.2)
BASOPHILS NFR BLD AUTO: 0.8 % — SIGNIFICANT CHANGE UP (ref 0–2)
EOSINOPHIL # BLD AUTO: 0.4 K/UL — SIGNIFICANT CHANGE UP (ref 0–0.5)
EOSINOPHIL NFR BLD AUTO: 5.8 % — SIGNIFICANT CHANGE UP (ref 0–6)
HCT VFR BLD CALC: 32.9 % — LOW (ref 39–50)
HGB BLD-MCNC: 11.5 G/DL — LOW (ref 13–17)
LYMPHOCYTES # BLD AUTO: 1.4 K/UL — SIGNIFICANT CHANGE UP (ref 1–3.3)
LYMPHOCYTES # BLD AUTO: 22.6 % — SIGNIFICANT CHANGE UP (ref 13–44)
MCHC RBC-ENTMCNC: 33.6 PG — SIGNIFICANT CHANGE UP (ref 27–34)
MCHC RBC-ENTMCNC: 35 G/DL — SIGNIFICANT CHANGE UP (ref 32–36)
MCV RBC AUTO: 96.3 FL — SIGNIFICANT CHANGE UP (ref 80–100)
MONOCYTES # BLD AUTO: 0.2 K/UL — SIGNIFICANT CHANGE UP (ref 0–0.9)
MONOCYTES NFR BLD AUTO: 3.5 % — SIGNIFICANT CHANGE UP (ref 2–14)
NEUTROPHILS # BLD AUTO: 4.1 K/UL — SIGNIFICANT CHANGE UP (ref 1.8–7.4)
NEUTROPHILS NFR BLD AUTO: 67.3 % — SIGNIFICANT CHANGE UP (ref 43–77)
PLATELET # BLD AUTO: 214 K/UL — SIGNIFICANT CHANGE UP (ref 150–400)
RBC # BLD: 3.42 M/UL — LOW (ref 4.2–5.8)
RBC # FLD: 16.4 % — HIGH (ref 10.3–14.5)
WBC # BLD: 6 K/UL — SIGNIFICANT CHANGE UP (ref 3.8–10.5)
WBC # FLD AUTO: 6 K/UL — SIGNIFICANT CHANGE UP (ref 3.8–10.5)

## 2020-01-21 ENCOUNTER — RESULT REVIEW (OUTPATIENT)
Age: 80
End: 2020-01-21

## 2020-01-21 ENCOUNTER — APPOINTMENT (OUTPATIENT)
Dept: INFUSION THERAPY | Facility: HOSPITAL | Age: 80
End: 2020-01-21

## 2020-01-21 LAB
BASOPHILS # BLD AUTO: 0 K/UL — SIGNIFICANT CHANGE UP (ref 0–0.2)
BASOPHILS NFR BLD AUTO: 1.2 % — SIGNIFICANT CHANGE UP (ref 0–2)
EOSINOPHIL # BLD AUTO: 0.2 K/UL — SIGNIFICANT CHANGE UP (ref 0–0.5)
EOSINOPHIL NFR BLD AUTO: 4.1 % — SIGNIFICANT CHANGE UP (ref 0–6)
HCT VFR BLD CALC: 33.4 % — LOW (ref 39–50)
HGB BLD-MCNC: 11.3 G/DL — LOW (ref 13–17)
LYMPHOCYTES # BLD AUTO: 1.3 K/UL — SIGNIFICANT CHANGE UP (ref 1–3.3)
LYMPHOCYTES # BLD AUTO: 29.8 % — SIGNIFICANT CHANGE UP (ref 13–44)
MCHC RBC-ENTMCNC: 32.7 PG — SIGNIFICANT CHANGE UP (ref 27–34)
MCHC RBC-ENTMCNC: 33.7 G/DL — SIGNIFICANT CHANGE UP (ref 32–36)
MCV RBC AUTO: 97.1 FL — SIGNIFICANT CHANGE UP (ref 80–100)
MONOCYTES # BLD AUTO: 0.3 K/UL — SIGNIFICANT CHANGE UP (ref 0–0.9)
MONOCYTES NFR BLD AUTO: 7 % — SIGNIFICANT CHANGE UP (ref 2–14)
NEUTROPHILS # BLD AUTO: 2.5 K/UL — SIGNIFICANT CHANGE UP (ref 1.8–7.4)
NEUTROPHILS NFR BLD AUTO: 57.9 % — SIGNIFICANT CHANGE UP (ref 43–77)
PLATELET # BLD AUTO: 271 K/UL — SIGNIFICANT CHANGE UP (ref 150–400)
RBC # BLD: 3.45 M/UL — LOW (ref 4.2–5.8)
RBC # FLD: 16.3 % — HIGH (ref 10.3–14.5)
WBC # BLD: 4.3 K/UL — SIGNIFICANT CHANGE UP (ref 3.8–10.5)
WBC # FLD AUTO: 4.3 K/UL — SIGNIFICANT CHANGE UP (ref 3.8–10.5)

## 2020-01-22 ENCOUNTER — OUTPATIENT (OUTPATIENT)
Dept: OUTPATIENT SERVICES | Facility: HOSPITAL | Age: 80
LOS: 1 days | End: 2020-01-22
Payer: MEDICARE

## 2020-01-22 ENCOUNTER — RESULT REVIEW (OUTPATIENT)
Age: 80
End: 2020-01-22

## 2020-01-22 ENCOUNTER — APPOINTMENT (OUTPATIENT)
Dept: HEMATOLOGY ONCOLOGY | Facility: CLINIC | Age: 80
End: 2020-01-22
Payer: MEDICARE

## 2020-01-22 VITALS
TEMPERATURE: 97.6 F | BODY MASS INDEX: 23.09 KG/M2 | HEART RATE: 69 BPM | RESPIRATION RATE: 17 BRPM | WEIGHT: 160.93 LBS | SYSTOLIC BLOOD PRESSURE: 123 MMHG | OXYGEN SATURATION: 100 % | DIASTOLIC BLOOD PRESSURE: 72 MMHG

## 2020-01-22 DIAGNOSIS — Z98.89 OTHER SPECIFIED POSTPROCEDURAL STATES: Chronic | ICD-10-CM

## 2020-01-22 DIAGNOSIS — D46.9 MYELODYSPLASTIC SYNDROME, UNSPECIFIED: ICD-10-CM

## 2020-01-22 DIAGNOSIS — Z90.89 ACQUIRED ABSENCE OF OTHER ORGANS: Chronic | ICD-10-CM

## 2020-01-22 DIAGNOSIS — Z95.5 PRESENCE OF CORONARY ANGIOPLASTY IMPLANT AND GRAFT: Chronic | ICD-10-CM

## 2020-01-22 LAB
BASOPHILS # BLD AUTO: 0.1 K/UL — SIGNIFICANT CHANGE UP (ref 0–0.2)
BASOPHILS NFR BLD AUTO: 1.2 % — SIGNIFICANT CHANGE UP (ref 0–2)
BLD GP AB SCN SERPL QL: NEGATIVE — SIGNIFICANT CHANGE UP
EOSINOPHIL # BLD AUTO: 0.2 K/UL — SIGNIFICANT CHANGE UP (ref 0–0.5)
EOSINOPHIL NFR BLD AUTO: 4.4 % — SIGNIFICANT CHANGE UP (ref 0–6)
HCT VFR BLD CALC: 32.4 % — LOW (ref 39–50)
HGB BLD-MCNC: 10.9 G/DL — LOW (ref 13–17)
LYMPHOCYTES # BLD AUTO: 1.2 K/UL — SIGNIFICANT CHANGE UP (ref 1–3.3)
LYMPHOCYTES # BLD AUTO: 24.8 % — SIGNIFICANT CHANGE UP (ref 13–44)
MCHC RBC-ENTMCNC: 32.9 PG — SIGNIFICANT CHANGE UP (ref 27–34)
MCHC RBC-ENTMCNC: 33.6 G/DL — SIGNIFICANT CHANGE UP (ref 32–36)
MCV RBC AUTO: 97.9 FL — SIGNIFICANT CHANGE UP (ref 80–100)
MONOCYTES # BLD AUTO: 0.3 K/UL — SIGNIFICANT CHANGE UP (ref 0–0.9)
MONOCYTES NFR BLD AUTO: 6.2 % — SIGNIFICANT CHANGE UP (ref 2–14)
NEUTROPHILS # BLD AUTO: 2.9 K/UL — SIGNIFICANT CHANGE UP (ref 1.8–7.4)
NEUTROPHILS NFR BLD AUTO: 63.3 % — SIGNIFICANT CHANGE UP (ref 43–77)
PLATELET # BLD AUTO: 296 K/UL — SIGNIFICANT CHANGE UP (ref 150–400)
RBC # BLD: 3.3 M/UL — LOW (ref 4.2–5.8)
RBC # FLD: 16.1 % — HIGH (ref 10.3–14.5)
RH IG SCN BLD-IMP: POSITIVE — SIGNIFICANT CHANGE UP
WBC # BLD: 4.6 K/UL — SIGNIFICANT CHANGE UP (ref 3.8–10.5)
WBC # FLD AUTO: 4.6 K/UL — SIGNIFICANT CHANGE UP (ref 3.8–10.5)

## 2020-01-22 PROCEDURE — 86850 RBC ANTIBODY SCREEN: CPT

## 2020-01-22 PROCEDURE — 86901 BLOOD TYPING SEROLOGIC RH(D): CPT

## 2020-01-22 PROCEDURE — 86900 BLOOD TYPING SEROLOGIC ABO: CPT

## 2020-01-22 PROCEDURE — 86923 COMPATIBILITY TEST ELECTRIC: CPT

## 2020-01-22 PROCEDURE — 99214 OFFICE O/P EST MOD 30 MIN: CPT

## 2020-01-22 NOTE — ADDENDUM
[FreeTextEntry1] : Documented by Shaila Varela acting as a scribe for Dr. Elizondo on 01/22/2020\par \par All medical record entries made by the Scribe were at my, Dr. Elizondo's, direction and\par personally dictated by me on 01/22/2020. I have reviewed the chart and agree that the record\par accurately reflects my personal performance of the history, physical exam, procedure and imaging.

## 2020-01-22 NOTE — HISTORY OF PRESENT ILLNESS
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 MRA brain: No flow-limiting stenosis or MRA evidence of aneurysm of the intracranial arteries. \par \par Transfusion independent until 8/2019.  Repeat bone marrow biopsy 9/13/19- Myelodysplastic syndrome (history of isolated del(5q))\par with progression to MDS with excess blasts- approximately 7% of cellularity. p53 show>1% strongly positive cells\par Abnormal male karyotype- 46,XY,del(5)(q13q33)  {20  }\par  [de-identified] : Patient presents for a follow up appointment, he is accompanied by his wife. He is doing well today, his wife states he has been feeling better for the last 2 weeks, increased energy, ability to assist with transfers.  His appetite is improved, he has gained weight.  His wife notes he was seen by Infectious Diseases, Dr. Valentin.  He continues to complain of bouts of lethargy. He continues to ambulate via wheelchair, complains of unsteadiness when attempting to ambulate, is doing strengthening exercises at home.  Lower extremity neuropathy unchanged. He denies any chest pain, no SOB, +PUGH, no abdominal pain, no n/v/d, no fever/chills, no dysuria.\par

## 2020-01-22 NOTE — PHYSICAL EXAM
[Restricted in physically strenuous activity but ambulatory and able to carry out work of a light or sedentary nature] : Status 1- Restricted in physically strenuous activity but ambulatory and able to carry out work of a light or sedentary nature, e.g., light house work, office work [Normal] : affect appropriate [de-identified] : no suprapubic tenderness, no CVA tenderness

## 2020-01-22 NOTE — ASSESSMENT
[FreeTextEntry1] : This is a 79 year old male with myelodysplastic syndrome with 5q deletion, low risk disease, IPSS score of 2.\par He received treatment with Revlimid in March-April 2018.\par His counts had improved for approx 1 year, but worsened towards Aug 2019.  Repeat marrow revealed MDS with excess blasts, continued del5q.  \par He had been treated with 2 cycles of vidaza, last 10/28.  \par His lethargy improved, suspect more from uncontrolled DM, dehydration, UTI.  He is improved.  We have had an extensive discussion regarding continuing treatment vs observation.  Explained that without treatment, his disease is likely to progress, though he may worsen with therapy as well.  \par As his counts are stable, not requiring transfusions, plan to begin the next cycle of vidaza on 2/3- 5 days, dose reduced.  \par Will continue with IV hydration twice a week.  \par Follow up monthly. \par Appointments made.\par

## 2020-01-23 NOTE — ASSESSMENT
[FreeTextEntry1] : Mr. PIZARRO is a pleasant 79 year old male presenting for followup for UTI.\par \par Was hospitalized Nov 2019 with UTI and treated with ceftriaxone. Urine cx negative at that time, but collected after abx started.\par \par Again hospitalized in Dec 2019 and urine culture with PA with unremarkable UA. Was not treated in the hospital, but later was seen and treated with fosfomycin.\par \par Now presenting for followup. Has no complaints. Denies dysuria, no suprapubic tenderness.\par \par Recommend:\par UTI\par -Recent UA/ urine culture unremarkable\par -Continue to monitor for any urinary symptoms\par -If symptomatic can recheck UA/ urine culture\par \par MDS\par -Follows with the [facility]\par \par RTC in 2-3 months or sooner if symptomatic\par

## 2020-01-23 NOTE — HISTORY OF PRESENT ILLNESS
[FreeTextEntry1] : Mr. Kesha Walker is a pleasant 79 year old male with CAD, myelodysplastic syndrome, CAD, DM2, HTN, and Hyperlipidemia sent by his Nephrologist Dr. Garcia for UTI. \par \par He was admitted 11/14- 11/16/2019 for UTI. UA with pyuria >50WBC, Urine culture with genital sandie. Was treated with ceftriaxone and discharged with oral cefpodoxime. \par \par Again admitted Dec 6- Dec 10, 2019 for weakness, found to have a negative UA, but urine culture with PA 10-49,000. He states that after leaving the hospital, was given a course of fosfomycin for UTI.\par \par He states in terms of infections, he had a liver abscess in 2010 that grew Strep suspected from a dental source. \par \par He remains with neuropathy of his bilateral LE, and weakness of his LE, wheelchair bound. \par \par States last received Vidaza on 11/5/19.\par \par Today he states he has no urinary complaints. Denies dysuria. No suprapubic pain. Most recent UA unremarkable, urine culture with normal urogenital sandie from 12/23/19.\par \par \par \par

## 2020-01-23 NOTE — CONSULT LETTER
[Dear  ___] : Dear  [unfilled], [Consult Letter:] : I had the pleasure of evaluating your patient, [unfilled]. [Please see my note below.] : Please see my note below. [( Thank you for referring [unfilled] for consultation for _____ )] : Thank you for referring [unfilled] for consultation for [unfilled] [Consult Closing:] : Thank you very much for allowing me to participate in the care of this patient.  If you have any questions, please do not hesitate to contact me. [Sincerely,] : Sincerely, [FreeTextEntry3] : Ray Valentin MD\par Attending Physician\par Division of Infectious Diseases\par [address]\[place] [address]\par Office: [phone]\par Fax: [phone]\par Email: [email or web address]\par \par [facility]\par Visit us at [facility]\par \par

## 2020-01-25 ENCOUNTER — APPOINTMENT (OUTPATIENT)
Dept: INFUSION THERAPY | Facility: HOSPITAL | Age: 80
End: 2020-01-25

## 2020-01-27 ENCOUNTER — APPOINTMENT (OUTPATIENT)
Dept: INFUSION THERAPY | Facility: HOSPITAL | Age: 80
End: 2020-01-27

## 2020-01-27 ENCOUNTER — APPOINTMENT (OUTPATIENT)
Dept: HEMATOLOGY ONCOLOGY | Facility: CLINIC | Age: 80
End: 2020-01-27

## 2020-01-27 ENCOUNTER — RESULT REVIEW (OUTPATIENT)
Age: 80
End: 2020-01-27

## 2020-01-27 LAB
BASOPHILS # BLD AUTO: 0.1 K/UL — SIGNIFICANT CHANGE UP (ref 0–0.2)
BASOPHILS NFR BLD AUTO: 0.9 % — SIGNIFICANT CHANGE UP (ref 0–2)
EOSINOPHIL # BLD AUTO: 0.5 K/UL — SIGNIFICANT CHANGE UP (ref 0–0.5)
EOSINOPHIL NFR BLD AUTO: 6.6 % — HIGH (ref 0–6)
HCT VFR BLD CALC: 29.4 % — LOW (ref 39–50)
HGB BLD-MCNC: 10 G/DL — LOW (ref 13–17)
LYMPHOCYTES # BLD AUTO: 1.6 K/UL — SIGNIFICANT CHANGE UP (ref 1–3.3)
LYMPHOCYTES # BLD AUTO: 22.4 % — SIGNIFICANT CHANGE UP (ref 13–44)
MCHC RBC-ENTMCNC: 32.9 PG — SIGNIFICANT CHANGE UP (ref 27–34)
MCHC RBC-ENTMCNC: 34.1 G/DL — SIGNIFICANT CHANGE UP (ref 32–36)
MCV RBC AUTO: 96.6 FL — SIGNIFICANT CHANGE UP (ref 80–100)
MONOCYTES # BLD AUTO: 0.5 K/UL — SIGNIFICANT CHANGE UP (ref 0–0.9)
MONOCYTES NFR BLD AUTO: 7 % — SIGNIFICANT CHANGE UP (ref 2–14)
NEUTROPHILS # BLD AUTO: 4.5 K/UL — SIGNIFICANT CHANGE UP (ref 1.8–7.4)
NEUTROPHILS NFR BLD AUTO: 63.2 % — SIGNIFICANT CHANGE UP (ref 43–77)
PLATELET # BLD AUTO: 273 K/UL — SIGNIFICANT CHANGE UP (ref 150–400)
RBC # BLD: 3.04 M/UL — LOW (ref 4.2–5.8)
RBC # FLD: 16.4 % — HIGH (ref 10.3–14.5)
WBC # BLD: 7 K/UL — SIGNIFICANT CHANGE UP (ref 3.8–10.5)
WBC # FLD AUTO: 7 K/UL — SIGNIFICANT CHANGE UP (ref 3.8–10.5)

## 2020-01-29 ENCOUNTER — FORM ENCOUNTER (OUTPATIENT)
Age: 80
End: 2020-01-29

## 2020-01-30 ENCOUNTER — RESULT REVIEW (OUTPATIENT)
Age: 80
End: 2020-01-30

## 2020-01-30 ENCOUNTER — APPOINTMENT (OUTPATIENT)
Dept: HEMATOLOGY ONCOLOGY | Facility: CLINIC | Age: 80
End: 2020-01-30

## 2020-01-30 ENCOUNTER — APPOINTMENT (OUTPATIENT)
Dept: INFUSION THERAPY | Facility: HOSPITAL | Age: 80
End: 2020-01-30

## 2020-01-30 ENCOUNTER — OUTPATIENT (OUTPATIENT)
Dept: OUTPATIENT SERVICES | Facility: HOSPITAL | Age: 80
LOS: 1 days | End: 2020-01-30
Payer: MEDICARE

## 2020-01-30 ENCOUNTER — APPOINTMENT (OUTPATIENT)
Dept: RADIOLOGY | Facility: CLINIC | Age: 80
End: 2020-01-30
Payer: MEDICARE

## 2020-01-30 DIAGNOSIS — Z95.5 PRESENCE OF CORONARY ANGIOPLASTY IMPLANT AND GRAFT: Chronic | ICD-10-CM

## 2020-01-30 DIAGNOSIS — S59.902A UNSPECIFIED INJURY OF LEFT ELBOW, INITIAL ENCOUNTER: ICD-10-CM

## 2020-01-30 DIAGNOSIS — Z90.89 ACQUIRED ABSENCE OF OTHER ORGANS: Chronic | ICD-10-CM

## 2020-01-30 DIAGNOSIS — Z98.89 OTHER SPECIFIED POSTPROCEDURAL STATES: Chronic | ICD-10-CM

## 2020-01-30 LAB
BASOPHILS # BLD AUTO: 0 K/UL — SIGNIFICANT CHANGE UP (ref 0–0.2)
BASOPHILS NFR BLD AUTO: 0.2 % — SIGNIFICANT CHANGE UP (ref 0–2)
EOSINOPHIL # BLD AUTO: 0.2 K/UL — SIGNIFICANT CHANGE UP (ref 0–0.5)
EOSINOPHIL NFR BLD AUTO: 2.4 % — SIGNIFICANT CHANGE UP (ref 0–6)
HCT VFR BLD CALC: 28.3 % — LOW (ref 39–50)
HGB BLD-MCNC: 9.5 G/DL — LOW (ref 13–17)
LYMPHOCYTES # BLD AUTO: 1.3 K/UL — SIGNIFICANT CHANGE UP (ref 1–3.3)
LYMPHOCYTES # BLD AUTO: 15.5 % — SIGNIFICANT CHANGE UP (ref 13–44)
MCHC RBC-ENTMCNC: 32.4 PG — SIGNIFICANT CHANGE UP (ref 27–34)
MCHC RBC-ENTMCNC: 33.6 G/DL — SIGNIFICANT CHANGE UP (ref 32–36)
MCV RBC AUTO: 96.3 FL — SIGNIFICANT CHANGE UP (ref 80–100)
MONOCYTES # BLD AUTO: 0.7 K/UL — SIGNIFICANT CHANGE UP (ref 0–0.9)
MONOCYTES NFR BLD AUTO: 8.6 % — SIGNIFICANT CHANGE UP (ref 2–14)
NEUTROPHILS # BLD AUTO: 6.2 K/UL — SIGNIFICANT CHANGE UP (ref 1.8–7.4)
NEUTROPHILS NFR BLD AUTO: 73.3 % — SIGNIFICANT CHANGE UP (ref 43–77)
PLATELET # BLD AUTO: 267 K/UL — SIGNIFICANT CHANGE UP (ref 150–400)
RBC # BLD: 2.94 M/UL — LOW (ref 4.2–5.8)
RBC # FLD: 16.3 % — HIGH (ref 10.3–14.5)
WBC # BLD: 8.4 K/UL — SIGNIFICANT CHANGE UP (ref 3.8–10.5)
WBC # FLD AUTO: 8.4 K/UL — SIGNIFICANT CHANGE UP (ref 3.8–10.5)

## 2020-01-30 PROCEDURE — 73080 X-RAY EXAM OF ELBOW: CPT

## 2020-01-30 PROCEDURE — 73080 X-RAY EXAM OF ELBOW: CPT | Mod: 26,LT

## 2020-02-03 ENCOUNTER — RESULT REVIEW (OUTPATIENT)
Age: 80
End: 2020-02-03

## 2020-02-03 ENCOUNTER — APPOINTMENT (OUTPATIENT)
Dept: INFUSION THERAPY | Facility: HOSPITAL | Age: 80
End: 2020-02-03

## 2020-02-03 ENCOUNTER — OUTPATIENT (OUTPATIENT)
Dept: OUTPATIENT SERVICES | Facility: HOSPITAL | Age: 80
LOS: 1 days | End: 2020-02-03
Payer: MEDICARE

## 2020-02-03 ENCOUNTER — APPOINTMENT (OUTPATIENT)
Dept: ELECTROPHYSIOLOGY | Facility: CLINIC | Age: 80
End: 2020-02-03
Payer: MEDICARE

## 2020-02-03 DIAGNOSIS — Z90.89 ACQUIRED ABSENCE OF OTHER ORGANS: Chronic | ICD-10-CM

## 2020-02-03 DIAGNOSIS — D64.9 ANEMIA, UNSPECIFIED: ICD-10-CM

## 2020-02-03 DIAGNOSIS — Z95.5 PRESENCE OF CORONARY ANGIOPLASTY IMPLANT AND GRAFT: Chronic | ICD-10-CM

## 2020-02-03 DIAGNOSIS — Z98.89 OTHER SPECIFIED POSTPROCEDURAL STATES: Chronic | ICD-10-CM

## 2020-02-03 LAB
BASOPHILS # BLD AUTO: 0 K/UL — SIGNIFICANT CHANGE UP (ref 0–0.2)
BASOPHILS NFR BLD AUTO: 1 % — SIGNIFICANT CHANGE UP (ref 0–2)
BLD GP AB SCN SERPL QL: NEGATIVE — SIGNIFICANT CHANGE UP
EOSINOPHIL # BLD AUTO: 0.2 K/UL — SIGNIFICANT CHANGE UP (ref 0–0.5)
EOSINOPHIL NFR BLD AUTO: 5 % — SIGNIFICANT CHANGE UP (ref 0–6)
HCT VFR BLD CALC: 28.4 % — LOW (ref 39–50)
HGB BLD-MCNC: 9.8 G/DL — LOW (ref 13–17)
LYMPHOCYTES # BLD AUTO: 1 K/UL — SIGNIFICANT CHANGE UP (ref 1–3.3)
LYMPHOCYTES # BLD AUTO: 14 % — SIGNIFICANT CHANGE UP (ref 13–44)
MCHC RBC-ENTMCNC: 33.2 PG — SIGNIFICANT CHANGE UP (ref 27–34)
MCHC RBC-ENTMCNC: 34.4 G/DL — SIGNIFICANT CHANGE UP (ref 32–36)
MCV RBC AUTO: 96.6 FL — SIGNIFICANT CHANGE UP (ref 80–100)
MONOCYTES # BLD AUTO: 0.2 K/UL — SIGNIFICANT CHANGE UP (ref 0–0.9)
MONOCYTES NFR BLD AUTO: 8 % — SIGNIFICANT CHANGE UP (ref 2–14)
NEUTROPHILS # BLD AUTO: 3.9 K/UL — SIGNIFICANT CHANGE UP (ref 1.8–7.4)
NEUTROPHILS NFR BLD AUTO: 66 % — SIGNIFICANT CHANGE UP (ref 43–77)
NEUTS BAND # BLD: 6 % — SIGNIFICANT CHANGE UP (ref 0–8)
PLAT MORPH BLD: NORMAL — SIGNIFICANT CHANGE UP
PLATELET # BLD AUTO: 313 K/UL — SIGNIFICANT CHANGE UP (ref 150–400)
RBC # BLD: 2.94 M/UL — LOW (ref 4.2–5.8)
RBC # FLD: 16.3 % — HIGH (ref 10.3–14.5)
RBC BLD AUTO: SIGNIFICANT CHANGE UP
RH IG SCN BLD-IMP: POSITIVE — SIGNIFICANT CHANGE UP
WBC # BLD: 5.3 K/UL — SIGNIFICANT CHANGE UP (ref 3.8–10.5)
WBC # FLD AUTO: 5.3 K/UL — SIGNIFICANT CHANGE UP (ref 3.8–10.5)

## 2020-02-03 PROCEDURE — 93298 REM INTERROG DEV EVAL SCRMS: CPT

## 2020-02-03 PROCEDURE — G2066: CPT

## 2020-02-04 ENCOUNTER — APPOINTMENT (OUTPATIENT)
Dept: INFUSION THERAPY | Facility: HOSPITAL | Age: 80
End: 2020-02-04

## 2020-02-04 ENCOUNTER — APPOINTMENT (OUTPATIENT)
Dept: CARDIOLOGY | Facility: CLINIC | Age: 80
End: 2020-02-04

## 2020-02-05 ENCOUNTER — APPOINTMENT (OUTPATIENT)
Dept: INFUSION THERAPY | Facility: HOSPITAL | Age: 80
End: 2020-02-05

## 2020-02-05 DIAGNOSIS — Z51.11 ENCOUNTER FOR ANTINEOPLASTIC CHEMOTHERAPY: ICD-10-CM

## 2020-02-05 DIAGNOSIS — Z51.89 ENCOUNTER FOR OTHER SPECIFIED AFTERCARE: ICD-10-CM

## 2020-02-05 DIAGNOSIS — R11.2 NAUSEA WITH VOMITING, UNSPECIFIED: ICD-10-CM

## 2020-02-06 ENCOUNTER — APPOINTMENT (OUTPATIENT)
Dept: INFUSION THERAPY | Facility: HOSPITAL | Age: 80
End: 2020-02-06

## 2020-02-06 ENCOUNTER — RESULT REVIEW (OUTPATIENT)
Age: 80
End: 2020-02-06

## 2020-02-06 LAB
ANISOCYTOSIS BLD QL: SLIGHT — SIGNIFICANT CHANGE UP
BASOPHILS # BLD AUTO: 0.1 K/UL — SIGNIFICANT CHANGE UP (ref 0–0.2)
ELLIPTOCYTES BLD QL SMEAR: SLIGHT — SIGNIFICANT CHANGE UP
EOSINOPHIL # BLD AUTO: 0 K/UL — SIGNIFICANT CHANGE UP (ref 0–0.5)
EOSINOPHIL NFR BLD AUTO: 6 % — SIGNIFICANT CHANGE UP (ref 0–6)
HCT VFR BLD CALC: 27.3 % — LOW (ref 39–50)
HGB BLD-MCNC: 9.2 G/DL — LOW (ref 13–17)
HYPOCHROMIA BLD QL: SLIGHT — SIGNIFICANT CHANGE UP
LYMPHOCYTES # BLD AUTO: 1.2 K/UL — SIGNIFICANT CHANGE UP (ref 1–3.3)
LYMPHOCYTES # BLD AUTO: 20 % — SIGNIFICANT CHANGE UP (ref 13–44)
MACROCYTES BLD QL: SLIGHT — SIGNIFICANT CHANGE UP
MCHC RBC-ENTMCNC: 32.2 PG — SIGNIFICANT CHANGE UP (ref 27–34)
MCHC RBC-ENTMCNC: 33.7 G/DL — SIGNIFICANT CHANGE UP (ref 32–36)
MCV RBC AUTO: 95.6 FL — SIGNIFICANT CHANGE UP (ref 80–100)
MICROCYTES BLD QL: SLIGHT — SIGNIFICANT CHANGE UP
MONOCYTES # BLD AUTO: 0.2 K/UL — SIGNIFICANT CHANGE UP (ref 0–0.9)
MONOCYTES NFR BLD AUTO: 4 % — SIGNIFICANT CHANGE UP (ref 2–14)
NEUTROPHILS # BLD AUTO: 4.9 K/UL — SIGNIFICANT CHANGE UP (ref 1.8–7.4)
NEUTROPHILS NFR BLD AUTO: 70 % — SIGNIFICANT CHANGE UP (ref 43–77)
PLAT MORPH BLD: NORMAL — SIGNIFICANT CHANGE UP
PLATELET # BLD AUTO: 289 K/UL — SIGNIFICANT CHANGE UP (ref 150–400)
POIKILOCYTOSIS BLD QL AUTO: SLIGHT — SIGNIFICANT CHANGE UP
POLYCHROMASIA BLD QL SMEAR: SLIGHT — SIGNIFICANT CHANGE UP
RBC # BLD: 2.86 M/UL — LOW (ref 4.2–5.8)
RBC # FLD: 16.7 % — HIGH (ref 10.3–14.5)
RBC BLD AUTO: ABNORMAL
WBC # BLD: 6.4 K/UL — SIGNIFICANT CHANGE UP (ref 3.8–10.5)
WBC # FLD AUTO: 6.4 K/UL — SIGNIFICANT CHANGE UP (ref 3.8–10.5)

## 2020-02-07 ENCOUNTER — RESULT REVIEW (OUTPATIENT)
Age: 80
End: 2020-02-07

## 2020-02-07 ENCOUNTER — APPOINTMENT (OUTPATIENT)
Dept: INFUSION THERAPY | Facility: HOSPITAL | Age: 80
End: 2020-02-07

## 2020-02-07 LAB
ANISOCYTOSIS BLD QL: SLIGHT — SIGNIFICANT CHANGE UP
BASOPHILS # BLD AUTO: 0 K/UL — SIGNIFICANT CHANGE UP (ref 0–0.2)
BLD GP AB SCN SERPL QL: NEGATIVE — SIGNIFICANT CHANGE UP
ELLIPTOCYTES BLD QL SMEAR: SLIGHT — SIGNIFICANT CHANGE UP
EOSINOPHIL # BLD AUTO: 0 K/UL — SIGNIFICANT CHANGE UP (ref 0–0.5)
EOSINOPHIL NFR BLD AUTO: 3 % — SIGNIFICANT CHANGE UP (ref 0–6)
HCT VFR BLD CALC: 29.2 % — LOW (ref 39–50)
HGB BLD-MCNC: 10.3 G/DL — LOW (ref 13–17)
HYPOCHROMIA BLD QL: SLIGHT — SIGNIFICANT CHANGE UP
LYMPHOCYTES # BLD AUTO: 1.2 K/UL — SIGNIFICANT CHANGE UP (ref 1–3.3)
LYMPHOCYTES # BLD AUTO: 16 % — SIGNIFICANT CHANGE UP (ref 13–44)
MACROCYTES BLD QL: SLIGHT — SIGNIFICANT CHANGE UP
MCHC RBC-ENTMCNC: 33.3 PG — SIGNIFICANT CHANGE UP (ref 27–34)
MCHC RBC-ENTMCNC: 35.2 G/DL — SIGNIFICANT CHANGE UP (ref 32–36)
MCV RBC AUTO: 94.5 FL — SIGNIFICANT CHANGE UP (ref 80–100)
MICROCYTES BLD QL: SLIGHT — SIGNIFICANT CHANGE UP
MONOCYTES # BLD AUTO: 0.2 K/UL — SIGNIFICANT CHANGE UP (ref 0–0.9)
MONOCYTES NFR BLD AUTO: 12 % — SIGNIFICANT CHANGE UP (ref 2–14)
NEUTROPHILS # BLD AUTO: 4.4 K/UL — SIGNIFICANT CHANGE UP (ref 1.8–7.4)
NEUTROPHILS NFR BLD AUTO: 68 % — SIGNIFICANT CHANGE UP (ref 43–77)
NEUTS BAND # BLD: 1 % — SIGNIFICANT CHANGE UP (ref 0–8)
PLAT MORPH BLD: NORMAL — SIGNIFICANT CHANGE UP
PLATELET # BLD AUTO: 251 K/UL — SIGNIFICANT CHANGE UP (ref 150–400)
POIKILOCYTOSIS BLD QL AUTO: SLIGHT — SIGNIFICANT CHANGE UP
POLYCHROMASIA BLD QL SMEAR: SLIGHT — SIGNIFICANT CHANGE UP
RBC # BLD: 3.09 M/UL — LOW (ref 4.2–5.8)
RBC # FLD: 16.5 % — HIGH (ref 10.3–14.5)
RBC BLD AUTO: ABNORMAL
RH IG SCN BLD-IMP: POSITIVE — SIGNIFICANT CHANGE UP
WBC # BLD: 5.9 K/UL — SIGNIFICANT CHANGE UP (ref 3.8–10.5)
WBC # FLD AUTO: 5.9 K/UL — SIGNIFICANT CHANGE UP (ref 3.8–10.5)

## 2020-02-08 ENCOUNTER — APPOINTMENT (OUTPATIENT)
Dept: INFUSION THERAPY | Facility: HOSPITAL | Age: 80
End: 2020-02-08

## 2020-02-10 ENCOUNTER — RESULT REVIEW (OUTPATIENT)
Age: 80
End: 2020-02-10

## 2020-02-10 ENCOUNTER — APPOINTMENT (OUTPATIENT)
Dept: INFUSION THERAPY | Facility: HOSPITAL | Age: 80
End: 2020-02-10

## 2020-02-10 LAB
BASOPHILS # BLD AUTO: 0 K/UL — SIGNIFICANT CHANGE UP (ref 0–0.2)
BASOPHILS NFR BLD AUTO: 1 % — SIGNIFICANT CHANGE UP (ref 0–2)
EOSINOPHIL # BLD AUTO: 0.2 K/UL — SIGNIFICANT CHANGE UP (ref 0–0.5)
EOSINOPHIL NFR BLD AUTO: 3.8 % — SIGNIFICANT CHANGE UP (ref 0–6)
HCT VFR BLD CALC: 31.2 % — LOW (ref 39–50)
HGB BLD-MCNC: 10.8 G/DL — LOW (ref 13–17)
LYMPHOCYTES # BLD AUTO: 1.4 K/UL — SIGNIFICANT CHANGE UP (ref 1–3.3)
LYMPHOCYTES # BLD AUTO: 30.8 % — SIGNIFICANT CHANGE UP (ref 13–44)
MCHC RBC-ENTMCNC: 32.6 PG — SIGNIFICANT CHANGE UP (ref 27–34)
MCHC RBC-ENTMCNC: 34.7 G/DL — SIGNIFICANT CHANGE UP (ref 32–36)
MCV RBC AUTO: 94.1 FL — SIGNIFICANT CHANGE UP (ref 80–100)
MONOCYTES # BLD AUTO: 0.2 K/UL — SIGNIFICANT CHANGE UP (ref 0–0.9)
MONOCYTES NFR BLD AUTO: 3.8 % — SIGNIFICANT CHANGE UP (ref 2–14)
NEUTROPHILS # BLD AUTO: 2.8 K/UL — SIGNIFICANT CHANGE UP (ref 1.8–7.4)
NEUTROPHILS NFR BLD AUTO: 60.6 % — SIGNIFICANT CHANGE UP (ref 43–77)
PLATELET # BLD AUTO: 232 K/UL — SIGNIFICANT CHANGE UP (ref 150–400)
RBC # BLD: 3.32 M/UL — LOW (ref 4.2–5.8)
RBC # FLD: 15.9 % — HIGH (ref 10.3–14.5)
WBC # BLD: 4.6 K/UL — SIGNIFICANT CHANGE UP (ref 3.8–10.5)
WBC # FLD AUTO: 4.6 K/UL — SIGNIFICANT CHANGE UP (ref 3.8–10.5)

## 2020-02-12 ENCOUNTER — OUTPATIENT (OUTPATIENT)
Dept: OUTPATIENT SERVICES | Facility: HOSPITAL | Age: 80
LOS: 1 days | Discharge: HOME | End: 2020-02-12

## 2020-02-12 DIAGNOSIS — D46.9 MYELODYSPLASTIC SYNDROME, UNSPECIFIED: ICD-10-CM

## 2020-02-12 DIAGNOSIS — Z95.5 PRESENCE OF CORONARY ANGIOPLASTY IMPLANT AND GRAFT: Chronic | ICD-10-CM

## 2020-02-12 DIAGNOSIS — Z90.89 ACQUIRED ABSENCE OF OTHER ORGANS: Chronic | ICD-10-CM

## 2020-02-12 DIAGNOSIS — Z98.89 OTHER SPECIFIED POSTPROCEDURAL STATES: Chronic | ICD-10-CM

## 2020-02-14 ENCOUNTER — RESULT REVIEW (OUTPATIENT)
Age: 80
End: 2020-02-14

## 2020-02-14 ENCOUNTER — APPOINTMENT (OUTPATIENT)
Dept: INFUSION THERAPY | Facility: HOSPITAL | Age: 80
End: 2020-02-14

## 2020-02-14 LAB
BASOPHILS # BLD AUTO: 0.1 K/UL — SIGNIFICANT CHANGE UP (ref 0–0.2)
BASOPHILS NFR BLD AUTO: 1.5 % — SIGNIFICANT CHANGE UP (ref 0–2)
EOSINOPHIL # BLD AUTO: 0.2 K/UL — SIGNIFICANT CHANGE UP (ref 0–0.5)
EOSINOPHIL NFR BLD AUTO: 3.5 % — SIGNIFICANT CHANGE UP (ref 0–6)
HCT VFR BLD CALC: 28.4 % — LOW (ref 39–50)
HGB BLD-MCNC: 9.9 G/DL — LOW (ref 13–17)
LYMPHOCYTES # BLD AUTO: 1.6 K/UL — SIGNIFICANT CHANGE UP (ref 1–3.3)
LYMPHOCYTES # BLD AUTO: 35.5 % — SIGNIFICANT CHANGE UP (ref 13–44)
MCHC RBC-ENTMCNC: 32.7 PG — SIGNIFICANT CHANGE UP (ref 27–34)
MCHC RBC-ENTMCNC: 34.7 G/DL — SIGNIFICANT CHANGE UP (ref 32–36)
MCV RBC AUTO: 94.1 FL — SIGNIFICANT CHANGE UP (ref 80–100)
MONOCYTES # BLD AUTO: 0.2 K/UL — SIGNIFICANT CHANGE UP (ref 0–0.9)
MONOCYTES NFR BLD AUTO: 3.3 % — SIGNIFICANT CHANGE UP (ref 2–14)
NEUTROPHILS # BLD AUTO: 2.5 K/UL — SIGNIFICANT CHANGE UP (ref 1.8–7.4)
NEUTROPHILS NFR BLD AUTO: 56.1 % — SIGNIFICANT CHANGE UP (ref 43–77)
PLATELET # BLD AUTO: 115 K/UL — LOW (ref 150–400)
RBC # BLD: 3.02 M/UL — LOW (ref 4.2–5.8)
RBC # FLD: 15.5 % — HIGH (ref 10.3–14.5)
WBC # BLD: 4.5 K/UL — SIGNIFICANT CHANGE UP (ref 3.8–10.5)
WBC # FLD AUTO: 4.5 K/UL — SIGNIFICANT CHANGE UP (ref 3.8–10.5)

## 2020-02-18 ENCOUNTER — LABORATORY RESULT (OUTPATIENT)
Age: 80
End: 2020-02-18

## 2020-02-18 ENCOUNTER — RESULT REVIEW (OUTPATIENT)
Age: 80
End: 2020-02-18

## 2020-02-18 ENCOUNTER — APPOINTMENT (OUTPATIENT)
Dept: INFUSION THERAPY | Facility: HOSPITAL | Age: 80
End: 2020-02-18

## 2020-02-18 DIAGNOSIS — E86.0 DEHYDRATION: ICD-10-CM

## 2020-02-18 LAB
BASOPHILS # BLD AUTO: 0.1 K/UL — SIGNIFICANT CHANGE UP (ref 0–0.2)
BASOPHILS NFR BLD AUTO: 1.3 % — SIGNIFICANT CHANGE UP (ref 0–2)
BLD GP AB SCN SERPL QL: NEGATIVE — SIGNIFICANT CHANGE UP
EOSINOPHIL # BLD AUTO: 0.2 K/UL — SIGNIFICANT CHANGE UP (ref 0–0.5)
EOSINOPHIL NFR BLD AUTO: 4.2 % — SIGNIFICANT CHANGE UP (ref 0–6)
HCT VFR BLD CALC: 27 % — LOW (ref 39–50)
HGB BLD-MCNC: 9.4 G/DL — LOW (ref 13–17)
LYMPHOCYTES # BLD AUTO: 1.5 K/UL — SIGNIFICANT CHANGE UP (ref 1–3.3)
LYMPHOCYTES # BLD AUTO: 32.7 % — SIGNIFICANT CHANGE UP (ref 13–44)
MCHC RBC-ENTMCNC: 32.9 PG — SIGNIFICANT CHANGE UP (ref 27–34)
MCHC RBC-ENTMCNC: 34.8 G/DL — SIGNIFICANT CHANGE UP (ref 32–36)
MCV RBC AUTO: 94.4 FL — SIGNIFICANT CHANGE UP (ref 80–100)
MONOCYTES # BLD AUTO: 0.1 K/UL — SIGNIFICANT CHANGE UP (ref 0–0.9)
MONOCYTES NFR BLD AUTO: 3.1 % — SIGNIFICANT CHANGE UP (ref 2–14)
NEUTROPHILS # BLD AUTO: 2.7 K/UL — SIGNIFICANT CHANGE UP (ref 1.8–7.4)
NEUTROPHILS NFR BLD AUTO: 58.6 % — SIGNIFICANT CHANGE UP (ref 43–77)
PLATELET # BLD AUTO: 69 K/UL — LOW (ref 150–400)
RBC # BLD: 2.86 M/UL — LOW (ref 4.2–5.8)
RBC # FLD: 15.7 % — HIGH (ref 10.3–14.5)
RH IG SCN BLD-IMP: POSITIVE — SIGNIFICANT CHANGE UP
WBC # BLD: 4.6 K/UL — SIGNIFICANT CHANGE UP (ref 3.8–10.5)
WBC # FLD AUTO: 4.6 K/UL — SIGNIFICANT CHANGE UP (ref 3.8–10.5)

## 2020-02-21 ENCOUNTER — APPOINTMENT (OUTPATIENT)
Dept: INFUSION THERAPY | Facility: HOSPITAL | Age: 80
End: 2020-02-21

## 2020-02-24 ENCOUNTER — RESULT REVIEW (OUTPATIENT)
Age: 80
End: 2020-02-24

## 2020-02-24 ENCOUNTER — APPOINTMENT (OUTPATIENT)
Dept: HEMATOLOGY ONCOLOGY | Facility: CLINIC | Age: 80
End: 2020-02-24
Payer: MEDICARE

## 2020-02-24 ENCOUNTER — APPOINTMENT (OUTPATIENT)
Dept: INFUSION THERAPY | Facility: HOSPITAL | Age: 80
End: 2020-02-24

## 2020-02-24 VITALS
SYSTOLIC BLOOD PRESSURE: 123 MMHG | DIASTOLIC BLOOD PRESSURE: 72 MMHG | RESPIRATION RATE: 16 BRPM | OXYGEN SATURATION: 100 % | WEIGHT: 163.36 LBS | HEART RATE: 61 BPM | BODY MASS INDEX: 23.44 KG/M2 | TEMPERATURE: 97.6 F

## 2020-02-24 DIAGNOSIS — Z51.89 ENCOUNTER FOR OTHER SPECIFIED AFTERCARE: ICD-10-CM

## 2020-02-24 LAB
BASOPHILS # BLD AUTO: 0.1 K/UL — SIGNIFICANT CHANGE UP (ref 0–0.2)
BASOPHILS NFR BLD AUTO: 1.8 % — SIGNIFICANT CHANGE UP (ref 0–2)
BLD GP AB SCN SERPL QL: NEGATIVE — SIGNIFICANT CHANGE UP
EOSINOPHIL # BLD AUTO: 0.2 K/UL — SIGNIFICANT CHANGE UP (ref 0–0.5)
EOSINOPHIL NFR BLD AUTO: 3.8 % — SIGNIFICANT CHANGE UP (ref 0–6)
HCT VFR BLD CALC: 28.4 % — LOW (ref 39–50)
HGB BLD-MCNC: 9.7 G/DL — LOW (ref 13–17)
LYMPHOCYTES # BLD AUTO: 1.4 K/UL — SIGNIFICANT CHANGE UP (ref 1–3.3)
LYMPHOCYTES # BLD AUTO: 27.6 % — SIGNIFICANT CHANGE UP (ref 13–44)
MCHC RBC-ENTMCNC: 32.1 PG — SIGNIFICANT CHANGE UP (ref 27–34)
MCHC RBC-ENTMCNC: 34.1 G/DL — SIGNIFICANT CHANGE UP (ref 32–36)
MCV RBC AUTO: 93.9 FL — SIGNIFICANT CHANGE UP (ref 80–100)
MONOCYTES # BLD AUTO: 0 K/UL — SIGNIFICANT CHANGE UP (ref 0–0.9)
MONOCYTES NFR BLD AUTO: 0.5 % — LOW (ref 2–14)
NEUTROPHILS # BLD AUTO: 3.4 K/UL — SIGNIFICANT CHANGE UP (ref 1.8–7.4)
NEUTROPHILS NFR BLD AUTO: 66.3 % — SIGNIFICANT CHANGE UP (ref 43–77)
PLATELET # BLD AUTO: 130 K/UL — LOW (ref 150–400)
RBC # BLD: 3.02 M/UL — LOW (ref 4.2–5.8)
RBC # FLD: 15.2 % — HIGH (ref 10.3–14.5)
RH IG SCN BLD-IMP: POSITIVE — SIGNIFICANT CHANGE UP
WBC # BLD: 5.1 K/UL — SIGNIFICANT CHANGE UP (ref 3.8–10.5)
WBC # FLD AUTO: 5.1 K/UL — SIGNIFICANT CHANGE UP (ref 3.8–10.5)

## 2020-02-24 PROCEDURE — 99214 OFFICE O/P EST MOD 30 MIN: CPT

## 2020-02-24 NOTE — PHYSICAL EXAM
[Restricted in physically strenuous activity but ambulatory and able to carry out work of a light or sedentary nature] : Status 1- Restricted in physically strenuous activity but ambulatory and able to carry out work of a light or sedentary nature, e.g., light house work, office work [Normal] : affect appropriate [de-identified] : no suprapubic tenderness, no CVA tenderness

## 2020-02-24 NOTE — ADDENDUM
[FreeTextEntry1] : Documented by Shaila Varela acting as a scribe for Dr. Elizondo on 02/24/2020\par \par All medical record entries made by the Scribe were at my, Dr. Elizondo's, direction and\par personally dictated by me on 02/24/2020. I have reviewed the chart and agree that the record\par accurately reflects my personal performance of the history, physical exam, procedure and imaging.

## 2020-02-24 NOTE — REVIEW OF SYSTEMS
[Fatigue] : fatigue [Muscle Weakness] : muscle weakness [Difficulty Walking] : difficulty walking [Negative] : Heme/Lymph [Fever] : no fever [Night Sweats] : no night sweats [Chills] : no chills [Recent Change In Weight] : ~T no recent weight change [Incontinence] : no incontinence [FreeTextEntry9] : generalized weakness [de-identified] : is unsteady on feet, feels a sense of imbalance; denies dizziness +neuropathy no

## 2020-02-24 NOTE — HISTORY OF PRESENT ILLNESS
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 MRA brain: No flow-limiting stenosis or MRA evidence of aneurysm of the intracranial arteries. \par \par Transfusion independent until 8/2019.  Repeat bone marrow biopsy 9/13/19- Myelodysplastic syndrome (history of isolated del(5q))\par with progression to MDS with excess blasts- approximately 7% of cellularity. p53 show>1% strongly positive cells\par Abnormal male karyotype- 46,XY,del(5)(q13q33)   {20   }\par  [de-identified] : Patient presents for a follow up appointment, he is accompanied by his wife. He is doing well today, currently cycle 3 day 22.  He has tolerated this treatment better, only one day of diarrhea, nausea that resolved.  His appetite is improved, his sugars have been stable off the hypoglycemic agents.  He continues to ambulate via wheelchair, complains of unsteadiness when attempting to ambulate, is doing strengthening exercises at home.  Able to perform more ADL's per his wife.  Lower extremity neuropathy unchanged. He denies any chest pain, no SOB, +PUGH, no abdominal pain, no n/v/d, no fever/chills, no dysuria.

## 2020-02-24 NOTE — ASSESSMENT
[FreeTextEntry1] : This is a 79 year old male with myelodysplastic syndrome with 5q deletion, low risk disease, IPSS score of 2.\par He received treatment with Revlimid in March-April 2018.\par His counts had improved for approx 1 year, but worsened towards Aug 2019.  Repeat marrow revealed MDS with excess blasts, continued del5q.  \par He is currently cycle 3, day 22 of vidaza.  Gaps in treatment due to UTI/ARF/weakness. \par Plan to continue twice weekly hydration, may begin to taper as he is tolerating more po. \par Plan for blood transfusion on Thursday.  Will check his counts every Monday/Friday.  \par Plan for blood if his Hg <10.  \par Will give cycle 4 on 3/9- 5 days, dose reduced. \par Plan for a bone marrow biopsy the week of 3/23.\par Appt made.  \par He will follow up in 1 month.

## 2020-02-25 PROCEDURE — 86900 BLOOD TYPING SEROLOGIC ABO: CPT

## 2020-02-25 PROCEDURE — 86850 RBC ANTIBODY SCREEN: CPT

## 2020-02-25 PROCEDURE — 86923 COMPATIBILITY TEST ELECTRIC: CPT

## 2020-02-25 PROCEDURE — 86901 BLOOD TYPING SEROLOGIC RH(D): CPT

## 2020-02-26 ENCOUNTER — APPOINTMENT (OUTPATIENT)
Dept: INFUSION THERAPY | Facility: HOSPITAL | Age: 80
End: 2020-02-26
Payer: MEDICARE

## 2020-02-26 ENCOUNTER — APPOINTMENT (OUTPATIENT)
Dept: HEMATOLOGY ONCOLOGY | Facility: CLINIC | Age: 80
End: 2020-02-26

## 2020-02-26 ENCOUNTER — RESULT REVIEW (OUTPATIENT)
Age: 80
End: 2020-02-26

## 2020-02-26 LAB
BASOPHILS # BLD AUTO: 0.1 K/UL — SIGNIFICANT CHANGE UP (ref 0–0.2)
BASOPHILS NFR BLD AUTO: 1.6 % — SIGNIFICANT CHANGE UP (ref 0–2)
EOSINOPHIL # BLD AUTO: 0.2 K/UL — SIGNIFICANT CHANGE UP (ref 0–0.5)
EOSINOPHIL NFR BLD AUTO: 4.2 % — SIGNIFICANT CHANGE UP (ref 0–6)
HCT VFR BLD CALC: 28 % — LOW (ref 39–50)
HGB BLD-MCNC: 9.7 G/DL — LOW (ref 13–17)
LYMPHOCYTES # BLD AUTO: 1.6 K/UL — SIGNIFICANT CHANGE UP (ref 1–3.3)
LYMPHOCYTES # BLD AUTO: 35.9 % — SIGNIFICANT CHANGE UP (ref 13–44)
MCHC RBC-ENTMCNC: 32.7 PG — SIGNIFICANT CHANGE UP (ref 27–34)
MCHC RBC-ENTMCNC: 34.7 G/DL — SIGNIFICANT CHANGE UP (ref 32–36)
MCV RBC AUTO: 94.3 FL — SIGNIFICANT CHANGE UP (ref 80–100)
MONOCYTES # BLD AUTO: 0.1 K/UL — SIGNIFICANT CHANGE UP (ref 0–0.9)
MONOCYTES NFR BLD AUTO: 2 % — SIGNIFICANT CHANGE UP (ref 2–14)
NEUTROPHILS # BLD AUTO: 2.5 K/UL — SIGNIFICANT CHANGE UP (ref 1.8–7.4)
NEUTROPHILS NFR BLD AUTO: 56.3 % — SIGNIFICANT CHANGE UP (ref 43–77)
PLATELET # BLD AUTO: 166 K/UL — SIGNIFICANT CHANGE UP (ref 150–400)
RBC # BLD: 2.97 M/UL — LOW (ref 4.2–5.8)
RBC # FLD: 15.2 % — HIGH (ref 10.3–14.5)
WBC # BLD: 4.4 K/UL — SIGNIFICANT CHANGE UP (ref 3.8–10.5)
WBC # FLD AUTO: 4.4 K/UL — SIGNIFICANT CHANGE UP (ref 3.8–10.5)

## 2020-02-26 PROCEDURE — G0008: CPT

## 2020-02-28 ENCOUNTER — APPOINTMENT (OUTPATIENT)
Dept: INFUSION THERAPY | Facility: HOSPITAL | Age: 80
End: 2020-02-28

## 2020-03-02 ENCOUNTER — APPOINTMENT (OUTPATIENT)
Dept: INFUSION THERAPY | Facility: HOSPITAL | Age: 80
End: 2020-03-02

## 2020-03-05 ENCOUNTER — APPOINTMENT (OUTPATIENT)
Dept: ELECTROPHYSIOLOGY | Facility: CLINIC | Age: 80
End: 2020-03-05
Payer: MEDICARE

## 2020-03-05 PROCEDURE — 93298 REM INTERROG DEV EVAL SCRMS: CPT

## 2020-03-05 PROCEDURE — G2066: CPT

## 2020-03-06 ENCOUNTER — RESULT REVIEW (OUTPATIENT)
Age: 80
End: 2020-03-06

## 2020-03-06 ENCOUNTER — APPOINTMENT (OUTPATIENT)
Dept: INFUSION THERAPY | Facility: HOSPITAL | Age: 80
End: 2020-03-06

## 2020-03-06 LAB
BASOPHILS # BLD AUTO: 0.07 K/UL — SIGNIFICANT CHANGE UP (ref 0–0.2)
BASOPHILS NFR BLD AUTO: 1.2 % — SIGNIFICANT CHANGE UP (ref 0–2)
EOSINOPHIL # BLD AUTO: 0.25 K/UL — SIGNIFICANT CHANGE UP (ref 0–0.5)
EOSINOPHIL NFR BLD AUTO: 4.2 % — SIGNIFICANT CHANGE UP (ref 0–6)
HCT VFR BLD CALC: 31 % — LOW (ref 39–50)
HGB BLD-MCNC: 10.1 G/DL — LOW (ref 13–17)
IMM GRANULOCYTES NFR BLD AUTO: 1.2 % — SIGNIFICANT CHANGE UP (ref 0–1.5)
LYMPHOCYTES # BLD AUTO: 1.34 K/UL — SIGNIFICANT CHANGE UP (ref 1–3.3)
LYMPHOCYTES # BLD AUTO: 22.7 % — SIGNIFICANT CHANGE UP (ref 13–44)
MCHC RBC-ENTMCNC: 30.8 PG — SIGNIFICANT CHANGE UP (ref 27–34)
MCHC RBC-ENTMCNC: 32.6 GM/DL — SIGNIFICANT CHANGE UP (ref 32–36)
MCV RBC AUTO: 94.5 FL — SIGNIFICANT CHANGE UP (ref 80–100)
MONOCYTES # BLD AUTO: 0.28 K/UL — SIGNIFICANT CHANGE UP (ref 0–0.9)
MONOCYTES NFR BLD AUTO: 4.7 % — SIGNIFICANT CHANGE UP (ref 2–14)
NEUTROPHILS # BLD AUTO: 3.9 K/UL — SIGNIFICANT CHANGE UP (ref 1.8–7.4)
NEUTROPHILS NFR BLD AUTO: 66 % — SIGNIFICANT CHANGE UP (ref 43–77)
NRBC # BLD: 0 /100 WBCS — SIGNIFICANT CHANGE UP (ref 0–0)
PLATELET # BLD AUTO: 462 K/UL — HIGH (ref 150–400)
RBC # BLD: 3.28 M/UL — LOW (ref 4.2–5.8)
RBC # FLD: 16.5 % — HIGH (ref 10.3–14.5)
WBC # BLD: 5.91 K/UL — SIGNIFICANT CHANGE UP (ref 3.8–10.5)
WBC # FLD AUTO: 5.91 K/UL — SIGNIFICANT CHANGE UP (ref 3.8–10.5)

## 2020-03-09 ENCOUNTER — RESULT REVIEW (OUTPATIENT)
Age: 80
End: 2020-03-09

## 2020-03-09 ENCOUNTER — OUTPATIENT (OUTPATIENT)
Dept: OUTPATIENT SERVICES | Facility: HOSPITAL | Age: 80
LOS: 1 days | End: 2020-03-09
Payer: MEDICARE

## 2020-03-09 ENCOUNTER — APPOINTMENT (OUTPATIENT)
Dept: INFUSION THERAPY | Facility: HOSPITAL | Age: 80
End: 2020-03-09

## 2020-03-09 ENCOUNTER — APPOINTMENT (OUTPATIENT)
Dept: HEMATOLOGY ONCOLOGY | Facility: CLINIC | Age: 80
End: 2020-03-09

## 2020-03-09 DIAGNOSIS — Z98.89 OTHER SPECIFIED POSTPROCEDURAL STATES: Chronic | ICD-10-CM

## 2020-03-09 DIAGNOSIS — D46.9 MYELODYSPLASTIC SYNDROME, UNSPECIFIED: ICD-10-CM

## 2020-03-09 DIAGNOSIS — Z90.89 ACQUIRED ABSENCE OF OTHER ORGANS: Chronic | ICD-10-CM

## 2020-03-09 DIAGNOSIS — Z95.5 PRESENCE OF CORONARY ANGIOPLASTY IMPLANT AND GRAFT: Chronic | ICD-10-CM

## 2020-03-09 LAB
BASOPHILS # BLD AUTO: 0.04 K/UL — SIGNIFICANT CHANGE UP (ref 0–0.2)
BASOPHILS NFR BLD AUTO: 0.9 % — SIGNIFICANT CHANGE UP (ref 0–2)
BLD GP AB SCN SERPL QL: NEGATIVE — SIGNIFICANT CHANGE UP
EOSINOPHIL # BLD AUTO: 0.35 K/UL — SIGNIFICANT CHANGE UP (ref 0–0.5)
EOSINOPHIL NFR BLD AUTO: 8 % — HIGH (ref 0–6)
HCT VFR BLD CALC: 26.8 % — LOW (ref 39–50)
HGB BLD-MCNC: 8.8 G/DL — LOW (ref 13–17)
IMM GRANULOCYTES NFR BLD AUTO: 0.7 % — SIGNIFICANT CHANGE UP (ref 0–1.5)
LYMPHOCYTES # BLD AUTO: 1.11 K/UL — SIGNIFICANT CHANGE UP (ref 1–3.3)
LYMPHOCYTES # BLD AUTO: 25.3 % — SIGNIFICANT CHANGE UP (ref 13–44)
MCHC RBC-ENTMCNC: 30.7 PG — SIGNIFICANT CHANGE UP (ref 27–34)
MCHC RBC-ENTMCNC: 32.8 GM/DL — SIGNIFICANT CHANGE UP (ref 32–36)
MCV RBC AUTO: 93.4 FL — SIGNIFICANT CHANGE UP (ref 80–100)
MONOCYTES # BLD AUTO: 0.36 K/UL — SIGNIFICANT CHANGE UP (ref 0–0.9)
MONOCYTES NFR BLD AUTO: 8.2 % — SIGNIFICANT CHANGE UP (ref 2–14)
NEUTROPHILS # BLD AUTO: 2.49 K/UL — SIGNIFICANT CHANGE UP (ref 1.8–7.4)
NEUTROPHILS NFR BLD AUTO: 56.9 % — SIGNIFICANT CHANGE UP (ref 43–77)
NRBC # BLD: 0 /100 WBCS — SIGNIFICANT CHANGE UP (ref 0–0)
PLATELET # BLD AUTO: 492 K/UL — HIGH (ref 150–400)
RBC # BLD: 2.87 M/UL — LOW (ref 4.2–5.8)
RBC # FLD: 16.6 % — HIGH (ref 10.3–14.5)
RH IG SCN BLD-IMP: POSITIVE — SIGNIFICANT CHANGE UP
WBC # BLD: 4.38 K/UL — SIGNIFICANT CHANGE UP (ref 3.8–10.5)
WBC # FLD AUTO: 4.38 K/UL — SIGNIFICANT CHANGE UP (ref 3.8–10.5)

## 2020-03-11 ENCOUNTER — APPOINTMENT (OUTPATIENT)
Dept: INFUSION THERAPY | Facility: HOSPITAL | Age: 80
End: 2020-03-11

## 2020-03-13 ENCOUNTER — OUTPATIENT (OUTPATIENT)
Dept: OUTPATIENT SERVICES | Facility: HOSPITAL | Age: 80
LOS: 1 days | Discharge: HOME | End: 2020-03-13

## 2020-03-13 ENCOUNTER — APPOINTMENT (OUTPATIENT)
Dept: INFUSION THERAPY | Facility: HOSPITAL | Age: 80
End: 2020-03-13

## 2020-03-13 DIAGNOSIS — Z98.89 OTHER SPECIFIED POSTPROCEDURAL STATES: Chronic | ICD-10-CM

## 2020-03-13 DIAGNOSIS — D46.9 MYELODYSPLASTIC SYNDROME, UNSPECIFIED: ICD-10-CM

## 2020-03-13 DIAGNOSIS — Z90.89 ACQUIRED ABSENCE OF OTHER ORGANS: Chronic | ICD-10-CM

## 2020-03-13 DIAGNOSIS — Z95.5 PRESENCE OF CORONARY ANGIOPLASTY IMPLANT AND GRAFT: Chronic | ICD-10-CM

## 2020-03-16 ENCOUNTER — APPOINTMENT (OUTPATIENT)
Dept: INFUSION THERAPY | Facility: HOSPITAL | Age: 80
End: 2020-03-16

## 2020-03-16 ENCOUNTER — LABORATORY RESULT (OUTPATIENT)
Age: 80
End: 2020-03-16

## 2020-03-20 ENCOUNTER — LABORATORY RESULT (OUTPATIENT)
Age: 80
End: 2020-03-20

## 2020-03-20 ENCOUNTER — APPOINTMENT (OUTPATIENT)
Dept: INFUSION THERAPY | Facility: HOSPITAL | Age: 80
End: 2020-03-20

## 2020-03-24 ENCOUNTER — APPOINTMENT (OUTPATIENT)
Dept: HEMATOLOGY ONCOLOGY | Facility: CLINIC | Age: 80
End: 2020-03-24

## 2020-03-25 ENCOUNTER — LABORATORY RESULT (OUTPATIENT)
Age: 80
End: 2020-03-25

## 2020-03-25 ENCOUNTER — RX RENEWAL (OUTPATIENT)
Age: 80
End: 2020-03-25

## 2020-03-31 ENCOUNTER — LABORATORY RESULT (OUTPATIENT)
Age: 80
End: 2020-03-31

## 2020-04-01 ENCOUNTER — RESULT REVIEW (OUTPATIENT)
Age: 80
End: 2020-04-01

## 2020-04-01 ENCOUNTER — APPOINTMENT (OUTPATIENT)
Dept: HEMATOLOGY ONCOLOGY | Facility: CLINIC | Age: 80
End: 2020-04-01

## 2020-04-01 ENCOUNTER — LABORATORY RESULT (OUTPATIENT)
Age: 80
End: 2020-04-01

## 2020-04-01 LAB
BASOPHILS # BLD AUTO: 0.03 K/UL — SIGNIFICANT CHANGE UP (ref 0–0.2)
BASOPHILS NFR BLD AUTO: 0.6 % — SIGNIFICANT CHANGE UP (ref 0–2)
BLD GP AB SCN SERPL QL: NEGATIVE — SIGNIFICANT CHANGE UP
EOSINOPHIL # BLD AUTO: 0.21 K/UL — SIGNIFICANT CHANGE UP (ref 0–0.5)
EOSINOPHIL NFR BLD AUTO: 4.2 % — SIGNIFICANT CHANGE UP (ref 0–6)
HCT VFR BLD CALC: 22.2 % — LOW (ref 39–50)
HGB BLD-MCNC: 7.5 G/DL — LOW (ref 13–17)
IMM GRANULOCYTES NFR BLD AUTO: 0.4 % — SIGNIFICANT CHANGE UP (ref 0–1.5)
LYMPHOCYTES # BLD AUTO: 1.05 K/UL — SIGNIFICANT CHANGE UP (ref 1–3.3)
LYMPHOCYTES # BLD AUTO: 20.8 % — SIGNIFICANT CHANGE UP (ref 13–44)
MCHC RBC-ENTMCNC: 31.1 PG — SIGNIFICANT CHANGE UP (ref 27–34)
MCHC RBC-ENTMCNC: 33.8 GM/DL — SIGNIFICANT CHANGE UP (ref 32–36)
MCV RBC AUTO: 92.1 FL — SIGNIFICANT CHANGE UP (ref 80–100)
MONOCYTES # BLD AUTO: 0.55 K/UL — SIGNIFICANT CHANGE UP (ref 0–0.9)
MONOCYTES NFR BLD AUTO: 10.9 % — SIGNIFICANT CHANGE UP (ref 2–14)
NEUTROPHILS # BLD AUTO: 3.19 K/UL — SIGNIFICANT CHANGE UP (ref 1.8–7.4)
NEUTROPHILS NFR BLD AUTO: 63.1 % — SIGNIFICANT CHANGE UP (ref 43–77)
NRBC # BLD: 0 /100 WBCS — SIGNIFICANT CHANGE UP (ref 0–0)
PLATELET # BLD AUTO: 353 K/UL — SIGNIFICANT CHANGE UP (ref 150–400)
RBC # BLD: 2.41 M/UL — LOW (ref 4.2–5.8)
RBC # FLD: 17.4 % — HIGH (ref 10.3–14.5)
RH IG SCN BLD-IMP: POSITIVE — SIGNIFICANT CHANGE UP
WBC # BLD: 5.05 K/UL — SIGNIFICANT CHANGE UP (ref 3.8–10.5)
WBC # FLD AUTO: 5.05 K/UL — SIGNIFICANT CHANGE UP (ref 3.8–10.5)

## 2020-04-01 PROCEDURE — 86900 BLOOD TYPING SEROLOGIC ABO: CPT

## 2020-04-01 PROCEDURE — 86850 RBC ANTIBODY SCREEN: CPT

## 2020-04-01 PROCEDURE — 86923 COMPATIBILITY TEST ELECTRIC: CPT

## 2020-04-01 PROCEDURE — 86901 BLOOD TYPING SEROLOGIC RH(D): CPT

## 2020-04-02 ENCOUNTER — APPOINTMENT (OUTPATIENT)
Dept: INFUSION THERAPY | Facility: HOSPITAL | Age: 80
End: 2020-04-02

## 2020-04-06 ENCOUNTER — APPOINTMENT (OUTPATIENT)
Dept: ELECTROPHYSIOLOGY | Facility: CLINIC | Age: 80
End: 2020-04-06
Payer: MEDICARE

## 2020-04-06 PROCEDURE — G2066: CPT

## 2020-04-06 PROCEDURE — 93298 REM INTERROG DEV EVAL SCRMS: CPT

## 2020-04-07 ENCOUNTER — OUTPATIENT (OUTPATIENT)
Dept: OUTPATIENT SERVICES | Facility: HOSPITAL | Age: 80
LOS: 1 days | Discharge: HOME | End: 2020-04-07

## 2020-04-07 DIAGNOSIS — Z98.89 OTHER SPECIFIED POSTPROCEDURAL STATES: Chronic | ICD-10-CM

## 2020-04-07 DIAGNOSIS — D46.9 MYELODYSPLASTIC SYNDROME, UNSPECIFIED: ICD-10-CM

## 2020-04-07 DIAGNOSIS — Z95.5 PRESENCE OF CORONARY ANGIOPLASTY IMPLANT AND GRAFT: Chronic | ICD-10-CM

## 2020-04-07 DIAGNOSIS — Z90.89 ACQUIRED ABSENCE OF OTHER ORGANS: Chronic | ICD-10-CM

## 2020-04-08 ENCOUNTER — LABORATORY RESULT (OUTPATIENT)
Age: 80
End: 2020-04-08

## 2020-04-08 RX ORDER — METOCLOPRAMIDE 10 MG/1
10 TABLET ORAL EVERY 6 HOURS
Qty: 60 | Refills: 1 | Status: ACTIVE | COMMUNITY
Start: 2019-09-26 | End: 1900-01-01

## 2020-04-09 LAB
ALBUMIN SERPL ELPH-MCNC: 3.6 G/DL
ALBUMIN SERPL ELPH-MCNC: 3.7 G/DL
ALBUMIN SERPL ELPH-MCNC: 3.7 G/DL
ALBUMIN SERPL ELPH-MCNC: 3.8 G/DL
ALBUMIN SERPL ELPH-MCNC: 4.2 G/DL
ALP BLD-CCNC: 137 U/L
ALP BLD-CCNC: 146 U/L
ALP BLD-CCNC: 157 U/L
ALP BLD-CCNC: 162 U/L
ALP BLD-CCNC: 163 U/L
ALT SERPL-CCNC: 18 U/L
ALT SERPL-CCNC: 29 U/L
ALT SERPL-CCNC: 30 U/L
ALT SERPL-CCNC: 38 U/L
ALT SERPL-CCNC: 38 U/L
ANION GAP SERPL CALC-SCNC: 11 MMOL/L
ANION GAP SERPL CALC-SCNC: 12 MMOL/L
ANION GAP SERPL CALC-SCNC: 14 MMOL/L
ANION GAP SERPL CALC-SCNC: 16 MMOL/L
APPEARANCE: ABNORMAL
APPEARANCE: CLEAR
AST SERPL-CCNC: 18 U/L
AST SERPL-CCNC: 25 U/L
AST SERPL-CCNC: 26 U/L
AST SERPL-CCNC: 28 U/L
AST SERPL-CCNC: 33 U/L
BACTERIA UR CULT: ABNORMAL
BACTERIA UR CULT: NORMAL
BACTERIA: NEGATIVE
BILIRUB DIRECT SERPL-MCNC: 0.1 MG/DL
BILIRUB INDIRECT SERPL-MCNC: 0.3 MG/DL
BILIRUB SERPL-MCNC: 0.4 MG/DL
BILIRUB SERPL-MCNC: 0.5 MG/DL
BILIRUB SERPL-MCNC: 0.5 MG/DL
BILIRUB SERPL-MCNC: 0.9 MG/DL
BILIRUB SERPL-MCNC: 1.1 MG/DL
BILIRUBIN URINE: NEGATIVE
BILIRUBIN URINE: NEGATIVE
BLOOD URINE: ABNORMAL
BLOOD URINE: NEGATIVE
BUN SERPL-MCNC: 32 MG/DL
BUN SERPL-MCNC: 36 MG/DL
BUN SERPL-MCNC: 39 MG/DL
BUN SERPL-MCNC: 46 MG/DL
CALCIUM SERPL-MCNC: 8.8 MG/DL
CALCIUM SERPL-MCNC: 9.1 MG/DL
CALCIUM SERPL-MCNC: 9.4 MG/DL
CALCIUM SERPL-MCNC: 9.5 MG/DL
CHLORIDE SERPL-SCNC: 104 MMOL/L
CHLORIDE SERPL-SCNC: 105 MMOL/L
CO2 SERPL-SCNC: 17 MMOL/L
CO2 SERPL-SCNC: 21 MMOL/L
CO2 SERPL-SCNC: 22 MMOL/L
CO2 SERPL-SCNC: 23 MMOL/L
COLOR: NORMAL
COLOR: YELLOW
CREAT SERPL-MCNC: 1.2 MG/DL
CREAT SERPL-MCNC: 1.22 MG/DL
CREAT SERPL-MCNC: 1.24 MG/DL
CREAT SERPL-MCNC: 1.48 MG/DL
FERRITIN SERPL-MCNC: 1791 NG/ML
FOLATE SERPL-MCNC: >20 NG/ML
GLUCOSE QUALITATIVE U: NEGATIVE
GLUCOSE QUALITATIVE U: NEGATIVE
GLUCOSE SERPL-MCNC: 136 MG/DL
GLUCOSE SERPL-MCNC: 139 MG/DL
GLUCOSE SERPL-MCNC: 141 MG/DL
GLUCOSE SERPL-MCNC: 148 MG/DL
HAPTOGLOB SERPL-MCNC: 125 MG/DL
HBV CORE IGG+IGM SER QL: NONREACTIVE
HBV SURFACE AB SER QL: NONREACTIVE
HBV SURFACE AG SER QL: NONREACTIVE
HCV AB SER QL: NONREACTIVE
HCV RNA SERPL NAA DL=5-ACNC: NOT DETECTED IU/ML
HCV RNA SERPL NAA+PROBE-LOG IU: NOT DETECTED LOGIU/ML
HCV S/CO RATIO: 0.38 S/CO
HYALINE CASTS: 1 /LPF
IRON SATN MFR SERPL: NORMAL %
IRON SERPL-MCNC: 191 UG/DL
KETONES URINE: NEGATIVE
KETONES URINE: NEGATIVE
LDH SERPL-CCNC: 148 U/L
LDH SERPL-CCNC: 161 U/L
LEUKOCYTE ESTERASE URINE: ABNORMAL
LEUKOCYTE ESTERASE URINE: NEGATIVE
MICROSCOPIC-UA: NORMAL
NITRITE URINE: NEGATIVE
NITRITE URINE: NEGATIVE
PH URINE: 5.5
PH URINE: 6
POTASSIUM SERPL-SCNC: 4.2 MMOL/L
POTASSIUM SERPL-SCNC: 4.5 MMOL/L
POTASSIUM SERPL-SCNC: 4.6 MMOL/L
POTASSIUM SERPL-SCNC: 4.7 MMOL/L
PROT SERPL-MCNC: 6.4 G/DL
PROT SERPL-MCNC: 6.7 G/DL
PROT SERPL-MCNC: 6.9 G/DL
PROT SERPL-MCNC: 7 G/DL
PROT SERPL-MCNC: 7.1 G/DL
PROTEIN URINE: ABNORMAL
PROTEIN URINE: NORMAL
RED BLOOD CELLS URINE: 1 /HPF
SODIUM SERPL-SCNC: 137 MMOL/L
SODIUM SERPL-SCNC: 138 MMOL/L
SODIUM SERPL-SCNC: 139 MMOL/L
SODIUM SERPL-SCNC: 139 MMOL/L
SPECIFIC GRAVITY URINE: 1.01
SPECIFIC GRAVITY URINE: 1.02
SQUAMOUS EPITHELIAL CELLS: 0 /HPF
TIBC SERPL-MCNC: NORMAL UG/DL
UIBC SERPL-MCNC: <20 UG/DL
URATE SERPL-MCNC: 5.5 MG/DL
UROBILINOGEN URINE: NORMAL
UROBILINOGEN URINE: NORMAL
VIT B12 SERPL-MCNC: 805 PG/ML
WHITE BLOOD CELLS URINE: 2 /HPF

## 2020-04-10 ENCOUNTER — APPOINTMENT (OUTPATIENT)
Dept: HEMATOLOGY ONCOLOGY | Facility: CLINIC | Age: 80
End: 2020-04-10

## 2020-04-10 ENCOUNTER — RESULT REVIEW (OUTPATIENT)
Age: 80
End: 2020-04-10

## 2020-04-10 ENCOUNTER — OUTPATIENT (OUTPATIENT)
Dept: OUTPATIENT SERVICES | Facility: HOSPITAL | Age: 80
LOS: 1 days | End: 2020-04-10
Payer: MEDICARE

## 2020-04-10 DIAGNOSIS — Z90.89 ACQUIRED ABSENCE OF OTHER ORGANS: Chronic | ICD-10-CM

## 2020-04-10 DIAGNOSIS — D46.9 MYELODYSPLASTIC SYNDROME, UNSPECIFIED: ICD-10-CM

## 2020-04-10 DIAGNOSIS — Z95.5 PRESENCE OF CORONARY ANGIOPLASTY IMPLANT AND GRAFT: Chronic | ICD-10-CM

## 2020-04-10 DIAGNOSIS — Z98.89 OTHER SPECIFIED POSTPROCEDURAL STATES: Chronic | ICD-10-CM

## 2020-04-10 LAB
BASOPHILS # BLD AUTO: 0.05 K/UL — SIGNIFICANT CHANGE UP (ref 0–0.2)
BASOPHILS NFR BLD AUTO: 1 % — SIGNIFICANT CHANGE UP (ref 0–2)
BLD GP AB SCN SERPL QL: NEGATIVE — SIGNIFICANT CHANGE UP
EOSINOPHIL # BLD AUTO: 0.29 K/UL — SIGNIFICANT CHANGE UP (ref 0–0.5)
EOSINOPHIL NFR BLD AUTO: 5.6 % — SIGNIFICANT CHANGE UP (ref 0–6)
HCT VFR BLD CALC: 27.1 % — LOW (ref 39–50)
HGB BLD-MCNC: 8.8 G/DL — LOW (ref 13–17)
IMM GRANULOCYTES NFR BLD AUTO: 0.8 % — SIGNIFICANT CHANGE UP (ref 0–1.5)
LYMPHOCYTES # BLD AUTO: 1.36 K/UL — SIGNIFICANT CHANGE UP (ref 1–3.3)
LYMPHOCYTES # BLD AUTO: 26.5 % — SIGNIFICANT CHANGE UP (ref 13–44)
MCHC RBC-ENTMCNC: 30.4 PG — SIGNIFICANT CHANGE UP (ref 27–34)
MCHC RBC-ENTMCNC: 32.5 GM/DL — SIGNIFICANT CHANGE UP (ref 32–36)
MCV RBC AUTO: 93.8 FL — SIGNIFICANT CHANGE UP (ref 80–100)
MONOCYTES # BLD AUTO: 0.51 K/UL — SIGNIFICANT CHANGE UP (ref 0–0.9)
MONOCYTES NFR BLD AUTO: 9.9 % — SIGNIFICANT CHANGE UP (ref 2–14)
NEUTROPHILS # BLD AUTO: 2.89 K/UL — SIGNIFICANT CHANGE UP (ref 1.8–7.4)
NEUTROPHILS NFR BLD AUTO: 56.2 % — SIGNIFICANT CHANGE UP (ref 43–77)
NRBC # BLD: 0 /100 WBCS — SIGNIFICANT CHANGE UP (ref 0–0)
PLATELET # BLD AUTO: 305 K/UL — SIGNIFICANT CHANGE UP (ref 150–400)
RBC # BLD: 2.89 M/UL — LOW (ref 4.2–5.8)
RBC # FLD: 17.8 % — HIGH (ref 10.3–14.5)
RH IG SCN BLD-IMP: POSITIVE — SIGNIFICANT CHANGE UP
WBC # BLD: 5.14 K/UL — SIGNIFICANT CHANGE UP (ref 3.8–10.5)
WBC # FLD AUTO: 5.14 K/UL — SIGNIFICANT CHANGE UP (ref 3.8–10.5)

## 2020-04-11 ENCOUNTER — APPOINTMENT (OUTPATIENT)
Dept: INFUSION THERAPY | Facility: HOSPITAL | Age: 80
End: 2020-04-11

## 2020-04-14 ENCOUNTER — APPOINTMENT (OUTPATIENT)
Dept: HEMATOLOGY ONCOLOGY | Facility: CLINIC | Age: 80
End: 2020-04-14

## 2020-04-14 ENCOUNTER — APPOINTMENT (OUTPATIENT)
Dept: INFUSION THERAPY | Facility: HOSPITAL | Age: 80
End: 2020-04-14

## 2020-04-14 DIAGNOSIS — Z51.11 ENCOUNTER FOR ANTINEOPLASTIC CHEMOTHERAPY: ICD-10-CM

## 2020-04-15 ENCOUNTER — APPOINTMENT (OUTPATIENT)
Dept: INFUSION THERAPY | Facility: HOSPITAL | Age: 80
End: 2020-04-15

## 2020-04-16 ENCOUNTER — LABORATORY RESULT (OUTPATIENT)
Age: 80
End: 2020-04-16

## 2020-04-16 ENCOUNTER — APPOINTMENT (OUTPATIENT)
Dept: INFUSION THERAPY | Facility: HOSPITAL | Age: 80
End: 2020-04-16

## 2020-04-17 ENCOUNTER — APPOINTMENT (OUTPATIENT)
Dept: INFUSION THERAPY | Facility: HOSPITAL | Age: 80
End: 2020-04-17

## 2020-04-18 ENCOUNTER — APPOINTMENT (OUTPATIENT)
Dept: INFUSION THERAPY | Facility: HOSPITAL | Age: 80
End: 2020-04-18

## 2020-04-23 ENCOUNTER — LABORATORY RESULT (OUTPATIENT)
Age: 80
End: 2020-04-23

## 2020-04-27 ENCOUNTER — RESULT REVIEW (OUTPATIENT)
Age: 80
End: 2020-04-27

## 2020-04-27 ENCOUNTER — APPOINTMENT (OUTPATIENT)
Dept: HEMATOLOGY ONCOLOGY | Facility: CLINIC | Age: 80
End: 2020-04-27

## 2020-04-27 LAB
BASOPHILS # BLD AUTO: 0.03 K/UL — SIGNIFICANT CHANGE UP (ref 0–0.2)
BASOPHILS NFR BLD AUTO: 0.6 % — SIGNIFICANT CHANGE UP (ref 0–2)
BLD GP AB SCN SERPL QL: NEGATIVE — SIGNIFICANT CHANGE UP
EOSINOPHIL # BLD AUTO: 0.25 K/UL — SIGNIFICANT CHANGE UP (ref 0–0.5)
EOSINOPHIL NFR BLD AUTO: 5.3 % — SIGNIFICANT CHANGE UP (ref 0–6)
HCT VFR BLD CALC: 26.7 % — LOW (ref 39–50)
HGB BLD-MCNC: 8.8 G/DL — LOW (ref 13–17)
IMM GRANULOCYTES NFR BLD AUTO: 0.8 % — SIGNIFICANT CHANGE UP (ref 0–1.5)
LYMPHOCYTES # BLD AUTO: 1.19 K/UL — SIGNIFICANT CHANGE UP (ref 1–3.3)
LYMPHOCYTES # BLD AUTO: 25.1 % — SIGNIFICANT CHANGE UP (ref 13–44)
MCHC RBC-ENTMCNC: 30 PG — SIGNIFICANT CHANGE UP (ref 27–34)
MCHC RBC-ENTMCNC: 33 GM/DL — SIGNIFICANT CHANGE UP (ref 32–36)
MCV RBC AUTO: 91.1 FL — SIGNIFICANT CHANGE UP (ref 80–100)
MONOCYTES # BLD AUTO: 0.63 K/UL — SIGNIFICANT CHANGE UP (ref 0–0.9)
MONOCYTES NFR BLD AUTO: 13.3 % — SIGNIFICANT CHANGE UP (ref 2–14)
NEUTROPHILS # BLD AUTO: 2.6 K/UL — SIGNIFICANT CHANGE UP (ref 1.8–7.4)
NEUTROPHILS NFR BLD AUTO: 54.9 % — SIGNIFICANT CHANGE UP (ref 43–77)
NRBC # BLD: 0 /100 WBCS — SIGNIFICANT CHANGE UP (ref 0–0)
PLATELET # BLD AUTO: 327 K/UL — SIGNIFICANT CHANGE UP (ref 150–400)
RBC # BLD: 2.93 M/UL — LOW (ref 4.2–5.8)
RBC # FLD: 16.3 % — HIGH (ref 10.3–14.5)
RH IG SCN BLD-IMP: POSITIVE — SIGNIFICANT CHANGE UP
WBC # BLD: 4.74 K/UL — SIGNIFICANT CHANGE UP (ref 3.8–10.5)
WBC # FLD AUTO: 4.74 K/UL — SIGNIFICANT CHANGE UP (ref 3.8–10.5)

## 2020-04-28 PROCEDURE — 86900 BLOOD TYPING SEROLOGIC ABO: CPT

## 2020-04-28 PROCEDURE — 86850 RBC ANTIBODY SCREEN: CPT

## 2020-04-28 PROCEDURE — 86923 COMPATIBILITY TEST ELECTRIC: CPT

## 2020-04-28 PROCEDURE — 86901 BLOOD TYPING SEROLOGIC RH(D): CPT

## 2020-04-29 ENCOUNTER — APPOINTMENT (OUTPATIENT)
Dept: INFUSION THERAPY | Facility: HOSPITAL | Age: 80
End: 2020-04-29

## 2020-04-30 DIAGNOSIS — Z51.89 ENCOUNTER FOR OTHER SPECIFIED AFTERCARE: ICD-10-CM

## 2020-05-04 DIAGNOSIS — R11.2 NAUSEA WITH VOMITING, UNSPECIFIED: ICD-10-CM

## 2020-05-06 ENCOUNTER — APPOINTMENT (OUTPATIENT)
Dept: ELECTROPHYSIOLOGY | Facility: CLINIC | Age: 80
End: 2020-05-06
Payer: MEDICARE

## 2020-05-06 PROCEDURE — 93298 REM INTERROG DEV EVAL SCRMS: CPT

## 2020-05-06 PROCEDURE — G2066: CPT

## 2020-05-12 ENCOUNTER — LABORATORY RESULT (OUTPATIENT)
Age: 80
End: 2020-05-12

## 2020-05-14 ENCOUNTER — OUTPATIENT (OUTPATIENT)
Dept: OUTPATIENT SERVICES | Facility: HOSPITAL | Age: 80
LOS: 1 days | Discharge: HOME | End: 2020-05-14

## 2020-05-14 DIAGNOSIS — D46.9 MYELODYSPLASTIC SYNDROME, UNSPECIFIED: ICD-10-CM

## 2020-05-14 DIAGNOSIS — Z90.89 ACQUIRED ABSENCE OF OTHER ORGANS: Chronic | ICD-10-CM

## 2020-05-14 DIAGNOSIS — Z98.89 OTHER SPECIFIED POSTPROCEDURAL STATES: Chronic | ICD-10-CM

## 2020-05-14 DIAGNOSIS — Z95.5 PRESENCE OF CORONARY ANGIOPLASTY IMPLANT AND GRAFT: Chronic | ICD-10-CM

## 2020-05-15 ENCOUNTER — OUTPATIENT (OUTPATIENT)
Dept: OUTPATIENT SERVICES | Facility: HOSPITAL | Age: 80
LOS: 1 days | End: 2020-05-15
Payer: MEDICARE

## 2020-05-15 DIAGNOSIS — D46.9 MYELODYSPLASTIC SYNDROME, UNSPECIFIED: ICD-10-CM

## 2020-05-15 DIAGNOSIS — Z90.89 ACQUIRED ABSENCE OF OTHER ORGANS: Chronic | ICD-10-CM

## 2020-05-15 DIAGNOSIS — Z95.5 PRESENCE OF CORONARY ANGIOPLASTY IMPLANT AND GRAFT: Chronic | ICD-10-CM

## 2020-05-15 DIAGNOSIS — Z98.89 OTHER SPECIFIED POSTPROCEDURAL STATES: Chronic | ICD-10-CM

## 2020-05-18 ENCOUNTER — APPOINTMENT (OUTPATIENT)
Dept: HEMATOLOGY ONCOLOGY | Facility: CLINIC | Age: 80
End: 2020-05-18

## 2020-05-18 ENCOUNTER — RESULT REVIEW (OUTPATIENT)
Age: 80
End: 2020-05-18

## 2020-05-18 LAB
BASOPHILS # BLD AUTO: 0.06 K/UL — SIGNIFICANT CHANGE UP (ref 0–0.2)
BASOPHILS NFR BLD AUTO: 1.2 % — SIGNIFICANT CHANGE UP (ref 0–2)
BLD GP AB SCN SERPL QL: NEGATIVE — SIGNIFICANT CHANGE UP
EOSINOPHIL # BLD AUTO: 0.19 K/UL — SIGNIFICANT CHANGE UP (ref 0–0.5)
EOSINOPHIL NFR BLD AUTO: 3.7 % — SIGNIFICANT CHANGE UP (ref 0–6)
HCT VFR BLD CALC: 25.5 % — LOW (ref 39–50)
HGB BLD-MCNC: 8.6 G/DL — LOW (ref 13–17)
IMM GRANULOCYTES NFR BLD AUTO: 0.8 % — SIGNIFICANT CHANGE UP (ref 0–1.5)
LYMPHOCYTES # BLD AUTO: 1.13 K/UL — SIGNIFICANT CHANGE UP (ref 1–3.3)
LYMPHOCYTES # BLD AUTO: 22 % — SIGNIFICANT CHANGE UP (ref 13–44)
MCHC RBC-ENTMCNC: 30.4 PG — SIGNIFICANT CHANGE UP (ref 27–34)
MCHC RBC-ENTMCNC: 33.7 GM/DL — SIGNIFICANT CHANGE UP (ref 32–36)
MCV RBC AUTO: 90.1 FL — SIGNIFICANT CHANGE UP (ref 80–100)
MONOCYTES # BLD AUTO: 0.57 K/UL — SIGNIFICANT CHANGE UP (ref 0–0.9)
MONOCYTES NFR BLD AUTO: 11.1 % — SIGNIFICANT CHANGE UP (ref 2–14)
NEUTROPHILS # BLD AUTO: 3.15 K/UL — SIGNIFICANT CHANGE UP (ref 1.8–7.4)
NEUTROPHILS NFR BLD AUTO: 61.2 % — SIGNIFICANT CHANGE UP (ref 43–77)
NRBC # BLD: 0 /100 WBCS — SIGNIFICANT CHANGE UP (ref 0–0)
PLATELET # BLD AUTO: 343 K/UL — SIGNIFICANT CHANGE UP (ref 150–400)
RBC # BLD: 2.83 M/UL — LOW (ref 4.2–5.8)
RBC # FLD: 15.5 % — HIGH (ref 10.3–14.5)
RH IG SCN BLD-IMP: POSITIVE — SIGNIFICANT CHANGE UP
WBC # BLD: 5.14 K/UL — SIGNIFICANT CHANGE UP (ref 3.8–10.5)
WBC # FLD AUTO: 5.14 K/UL — SIGNIFICANT CHANGE UP (ref 3.8–10.5)

## 2020-05-19 ENCOUNTER — APPOINTMENT (OUTPATIENT)
Dept: INFUSION THERAPY | Facility: HOSPITAL | Age: 80
End: 2020-05-19

## 2020-05-19 ENCOUNTER — APPOINTMENT (OUTPATIENT)
Dept: HEMATOLOGY ONCOLOGY | Facility: CLINIC | Age: 80
End: 2020-05-19

## 2020-05-20 DIAGNOSIS — Z51.89 ENCOUNTER FOR OTHER SPECIFIED AFTERCARE: ICD-10-CM

## 2020-05-26 ENCOUNTER — LABORATORY RESULT (OUTPATIENT)
Age: 80
End: 2020-05-26

## 2020-06-02 ENCOUNTER — LABORATORY RESULT (OUTPATIENT)
Age: 80
End: 2020-06-02

## 2020-06-04 ENCOUNTER — RESULT REVIEW (OUTPATIENT)
Age: 80
End: 2020-06-04

## 2020-06-04 ENCOUNTER — APPOINTMENT (OUTPATIENT)
Dept: HEMATOLOGY ONCOLOGY | Facility: CLINIC | Age: 80
End: 2020-06-04

## 2020-06-04 LAB
BASOPHILS # BLD AUTO: 0.05 K/UL — SIGNIFICANT CHANGE UP (ref 0–0.2)
BASOPHILS NFR BLD AUTO: 0.9 % — SIGNIFICANT CHANGE UP (ref 0–2)
BLD GP AB SCN SERPL QL: NEGATIVE — SIGNIFICANT CHANGE UP
EOSINOPHIL # BLD AUTO: 0.21 K/UL — SIGNIFICANT CHANGE UP (ref 0–0.5)
EOSINOPHIL NFR BLD AUTO: 3.8 % — SIGNIFICANT CHANGE UP (ref 0–6)
HCT VFR BLD CALC: 27.3 % — LOW (ref 39–50)
HGB BLD-MCNC: 9.1 G/DL — LOW (ref 13–17)
IMM GRANULOCYTES NFR BLD AUTO: 1.1 % — SIGNIFICANT CHANGE UP (ref 0–1.5)
LYMPHOCYTES # BLD AUTO: 1.22 K/UL — SIGNIFICANT CHANGE UP (ref 1–3.3)
LYMPHOCYTES # BLD AUTO: 22 % — SIGNIFICANT CHANGE UP (ref 13–44)
MCHC RBC-ENTMCNC: 30.4 PG — SIGNIFICANT CHANGE UP (ref 27–34)
MCHC RBC-ENTMCNC: 33.3 GM/DL — SIGNIFICANT CHANGE UP (ref 32–36)
MCV RBC AUTO: 91.3 FL — SIGNIFICANT CHANGE UP (ref 80–100)
MONOCYTES # BLD AUTO: 0.64 K/UL — SIGNIFICANT CHANGE UP (ref 0–0.9)
MONOCYTES NFR BLD AUTO: 11.5 % — SIGNIFICANT CHANGE UP (ref 2–14)
NEUTROPHILS # BLD AUTO: 3.37 K/UL — SIGNIFICANT CHANGE UP (ref 1.8–7.4)
NEUTROPHILS NFR BLD AUTO: 60.7 % — SIGNIFICANT CHANGE UP (ref 43–77)
NRBC # BLD: 0 /100 WBCS — SIGNIFICANT CHANGE UP (ref 0–0)
PLATELET # BLD AUTO: 341 K/UL — SIGNIFICANT CHANGE UP (ref 150–400)
RBC # BLD: 2.99 M/UL — LOW (ref 4.2–5.8)
RBC # FLD: 15.4 % — HIGH (ref 10.3–14.5)
RH IG SCN BLD-IMP: POSITIVE — SIGNIFICANT CHANGE UP
WBC # BLD: 5.55 K/UL — SIGNIFICANT CHANGE UP (ref 3.8–10.5)
WBC # FLD AUTO: 5.55 K/UL — SIGNIFICANT CHANGE UP (ref 3.8–10.5)

## 2020-06-05 PROCEDURE — 86901 BLOOD TYPING SEROLOGIC RH(D): CPT

## 2020-06-05 PROCEDURE — 86900 BLOOD TYPING SEROLOGIC ABO: CPT

## 2020-06-05 PROCEDURE — 86923 COMPATIBILITY TEST ELECTRIC: CPT

## 2020-06-05 PROCEDURE — 86850 RBC ANTIBODY SCREEN: CPT

## 2020-06-07 ENCOUNTER — APPOINTMENT (OUTPATIENT)
Dept: INFUSION THERAPY | Facility: HOSPITAL | Age: 80
End: 2020-06-07

## 2020-06-08 ENCOUNTER — APPOINTMENT (OUTPATIENT)
Dept: ELECTROPHYSIOLOGY | Facility: CLINIC | Age: 80
End: 2020-06-08
Payer: MEDICARE

## 2020-06-08 PROCEDURE — G2066: CPT

## 2020-06-08 PROCEDURE — 93298 REM INTERROG DEV EVAL SCRMS: CPT

## 2020-06-15 ENCOUNTER — LABORATORY RESULT (OUTPATIENT)
Age: 80
End: 2020-06-15

## 2020-06-19 ENCOUNTER — OUTPATIENT (OUTPATIENT)
Dept: OUTPATIENT SERVICES | Facility: HOSPITAL | Age: 80
LOS: 1 days | Discharge: HOME | End: 2020-06-19

## 2020-06-19 DIAGNOSIS — Z90.89 ACQUIRED ABSENCE OF OTHER ORGANS: Chronic | ICD-10-CM

## 2020-06-19 DIAGNOSIS — D46.9 MYELODYSPLASTIC SYNDROME, UNSPECIFIED: ICD-10-CM

## 2020-06-19 DIAGNOSIS — Z98.89 OTHER SPECIFIED POSTPROCEDURAL STATES: Chronic | ICD-10-CM

## 2020-06-19 DIAGNOSIS — Z95.5 PRESENCE OF CORONARY ANGIOPLASTY IMPLANT AND GRAFT: Chronic | ICD-10-CM

## 2020-06-24 NOTE — PHYSICAL EXAM
[Restricted in physically strenuous activity but ambulatory and able to carry out work of a light or sedentary nature] : Status 1- Restricted in physically strenuous activity but ambulatory and able to carry out work of a light or sedentary nature, e.g., light house work, office work [Normal] : affect appropriate [de-identified] : Wheelchair bound [de-identified] : no suprapubic tenderness, no CVA tenderness

## 2020-06-24 NOTE — ASSESSMENT
[FreeTextEntry1] : This is a 79 year old male with myelodysplastic syndrome with 5q deletion, low risk disease, IPSS score of 2.\par He received treatment with Revlimid in March-April 2018. S/p vidaza\par His counts had improved for approx 1 year, but worsened towards Aug 2019.  Repeat marrow revealed MDS with excess blasts, continued del5q.  \par \par He is currently s/p 2 cycles of vidaza.  Gaps in treatment due to UTI/ARF/weakness/COVID pandemic \par Plan to continue twice weekly hydration, may begin to taper as he is tolerating more po. \par Blood transfusion if Hgb <10.  Will check his counts every Monday/Friday.  \par Scheduled for Friday 1pm for 2u PRBC for Hgb of 9.1\par Discussed benefits of a bone marrow biopsy and how this will provide information for Dr. Elizondo to come up with a treatment plan. Pt would like to wait until his wife is able to assist him to the BMBx procedure, and until then would like to continue with blood transfusion support prn.\par \par Follow up in 1 month\par Case and management discussed with Dr. Elizondo.

## 2020-06-24 NOTE — HISTORY OF PRESENT ILLNESS
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 MRA brain: No flow-limiting stenosis or MRA evidence of aneurysm of the intracranial arteries. \par \par Transfusion independent until 8/2019.  Repeat bone marrow biopsy 9/13/19- Myelodysplastic syndrome (history of isolated del(5q))\par with progression to MDS with excess blasts- approximately 7% of cellularity. p53 show>1% strongly positive cells\par Abnormal male karyotype- 46,XY,del(5)(q13q33)    {20    }\par  [de-identified] : Patient presents for urgent COVID swab prior to treatment room visit. He is doing well today, has decided to not do bone marrow biopsy again. He continues to ambulate via wheelchair, complains of unsteadiness when attempting to ambulate, is doing strengthening exercises at home.  Able to perform his own ADL's. Interval improvement in lower extremity neuropathy unchanged and PUGH has resolved. Patient denies bone pain, fever, chills, night sweats, back pain, headache, abdominal pain, nausea, vomiting, diarrhea, chest pain, shortness of breath, or peripheral edema. Good appetite, his sugars have been stable off the hypoglycemic agents (tradjenta, metformi), stable weight.\par

## 2020-06-24 NOTE — REVIEW OF SYSTEMS
[Muscle Weakness] : muscle weakness [Difficulty Walking] : difficulty walking [Negative] : Allergic/Immunologic [Fever] : no fever [Chills] : no chills [Night Sweats] : no night sweats [Fatigue] : no fatigue [Recent Change In Weight] : ~T no recent weight change [Incontinence] : no incontinence [FreeTextEntry9] : generalized weakness [de-identified] : is unsteady on feet, feels a sense of imbalance; denies dizziness +neuropathy

## 2020-08-07 NOTE — ED PROVIDER NOTE - CROS ED ENDOCRINE ALL NEG
Dr Ezio Robison already aware. Spoke with pt via telephone, requested copy of dr Janet Bravo office note. negative...

## 2020-08-18 PROBLEM — N18.3 CKD (CHRONIC KIDNEY DISEASE), STAGE III: Status: ACTIVE | Noted: 2019-08-12

## 2020-08-18 NOTE — HISTORY OF PRESENT ILLNESS
[FreeTextEntry1] : Malachi has severe polyneuropathy. He ambulates more with walker. His legs are stronger. No chemo right now. Glucose has been good off medication until recently. Needs to see endo.  Denies any chest pain, palpitations, lightheadedness or dizziness.

## 2020-08-18 NOTE — DISCUSSION/SUMMARY
[___ Month(s)] : [unfilled] month(s) [FreeTextEntry1] : The patient is a 79-year-old gentleman ex-smoker, diabetes mellitus, hyperlipidemia, coronary artery disease, chronic systolic congestive heart failure, hypothyroidism, gout, MDS, whose decline has stabilized.\par #1 Neuro- Cervical neuropathy is major concern. He uses an upwalker at home on his own but mostly wheelchair that he uses it for exercise. Legs getting stronger.. He is never alone. \par #1 CAD- on aspirin, no angina \par #3 HFrEF- euvolemic\par #4 Lipids- on atorvastatin, labs today\par #5 DM- off meds, f/u endo for rising glucose\par #6 Hypothyroid- on levothyroxine\par #7 ILR- negative to date, f/u 1 year\par #8 MDS- f/u heme, currently off chemo

## 2020-08-18 NOTE — PHYSICAL EXAM
[General Appearance - Well Developed] : well developed [Normal Appearance] : normal appearance [Well Groomed] : well groomed [General Appearance - Well Nourished] : well nourished [No Deformities] : no deformities [General Appearance - In No Acute Distress] : no acute distress [Normal Conjunctiva] : the conjunctiva exhibited no abnormalities [Eyelids - No Xanthelasma] : the eyelids demonstrated no xanthelasmas [Normal Oral Mucosa] : normal oral mucosa [No Oral Pallor] : no oral pallor [No Oral Cyanosis] : no oral cyanosis [Normal Jugular Venous A Waves Present] : normal jugular venous A waves present [Normal Jugular Venous V Waves Present] : normal jugular venous V waves present [No Jugular Venous Talley A Waves] : no jugular venous talley A waves [Respiration, Rhythm And Depth] : normal respiratory rhythm and effort [Exaggerated Use Of Accessory Muscles For Inspiration] : no accessory muscle use [Auscultation Breath Sounds / Voice Sounds] : lungs were clear to auscultation bilaterally [Heart Sounds] : normal S1 and S2 [Heart Rate And Rhythm] : heart rate and rhythm were normal [Murmurs] : no murmurs present [Abdomen Soft] : soft [Abdomen Tenderness] : non-tender [Abdomen Mass (___ Cm)] : no abdominal mass palpated [Abnormal Walk] : normal gait [Gait - Sufficient For Exercise Testing] : the gait was sufficient for exercise testing [Nail Clubbing] : no clubbing of the fingernails [Cyanosis, Localized] : no localized cyanosis [Petechial Hemorrhages (___cm)] : no petechial hemorrhages [Skin Color & Pigmentation] : normal skin color and pigmentation [] : no rash [No Venous Stasis] : no venous stasis [Skin Lesions] : no skin lesions [No Skin Ulcers] : no skin ulcer [No Xanthoma] : no  xanthoma was observed [Oriented To Time, Place, And Person] : oriented to person, place, and time [Affect] : the affect was normal [Mood] : the mood was normal [No Anxiety] : not feeling anxious

## 2020-08-19 NOTE — CONSULT NOTE ADULT - SUBJECTIVE AND OBJECTIVE BOX
HPI: 79 M pmhx HTN, HLD, DM, afib on aspirin 81mg daily, MDS was at [facility] earlier today for blood transfusion and fell while reaching for urinal hitting his head. No loc. Patient denies any headaches, nausea, vomiting. CTH with anterior interhemispheric sdh.     PAST MEDICAL & SURGICAL HISTORY:  CAD (coronary artery disease)  ADD (attention deficit disorder)  Hypothyroid  Atrial fibrillation  Congestive heart failure (CHF)  Gout  Hypercholesterolemia  DM (Diabetes Mellitus)  Hypertension  History of colonoscopy  History of tonsillectomy  Stented coronary artery    FAMILY HISTORY:  Family history of cerebrovascular accident (CVA) in father  FH: CHF (congestive heart failure): mother    Home Medications:  allopurinol 100 mg oral tablet: 1 tab(s) orally once a day (07 Jun 2020 08:26)  amLODIPine 5 mg oral tablet: 1 tab(s) orally once a day (07 Jun 2020 08:26)  aspirin 81 mg oral delayed release tablet: 1 tab(s) orally once a day (07 Jun 2020 08:26)  levothyroxine: 37.5 microgram(s) orally once a day (07 Jun 2020 08:26)  methylphenidate 20 mg oral tablet: 1 tab(s) orally 3 times a day (07 Jun 2020 08:26)  metoprolol tartrate 50 mg oral tablet: 1 tab(s) orally 2 times a day (07 Jun 2020 08:26)  omeprazole 20 mg oral delayed release capsule: 1 cap(s) orally once a day (07 Jun 2020 08:26)  rosuvastatin 5 mg oral tablet: 1 tab(s) orally once a day (07 Jun 2020 08:26)      MEDICATIONS  (STANDING):  levETIRAcetam  IVPB 500 milliGRAM(s) IV Intermittent every 12 hours    MEDICATIONS  (PRN):    Vital Signs Last 24 Hrs  T(C): 37 (19 Aug 2020 17:52), Max: 37 (19 Aug 2020 17:52)  T(F): 98.6 (19 Aug 2020 17:52), Max: 98.6 (19 Aug 2020 17:52)  HR: 71 (19 Aug 2020 18:29) (71 - 74)  BP: 147/57 (19 Aug 2020 18:29) (147/57 - 152/63)  BP(mean): --  RR: 18 (19 Aug 2020 18:29) (16 - 18)  SpO2: 100% (19 Aug 2020 18:29) (100% - 100%)    PHYSICAL EXAM:  Awake Alert Oriented x 3 No distress, Speech is clear  PERRL, EOMI, Tongue midline, No facial drop  Motor:             RUE 5/5        LUE 5/5             RLE 5/5         LLE 5/5  Sensory intact to light touch  No drift    LABS:    PT/INR - ( 19 Aug 2020 18:20 )   PT: 13.1 SEC;   INR: 1.15          PTT - ( 19 Aug 2020 18:20 )  PTT:32.8 SEC                        10.3   6.96  )-----------( 344      ( 19 Aug 2020 18:20 )             30.4     08-19    141  |  107  |  60<H>  ----------------------------<  138<H>  4.5   |  20<L>  |  1.36<H>    Ca    9.4      19 Aug 2020 18:20        RADIOLOGY:    INTERPRETATION:  Indication: Fall.    Multiple axial sections were performed from base skull to vertex without contrast enhancement. Coronal and sagittal reconstructions were performed well.    This exam is compared with prior noncontrast head CT performed on December 6, 2019    Dilatation of the ventricles out of proportion to the sulci is again seen.    Extra-axial area of high attenuation is seen in the anterior interhemispheric region. This finding measures approximately 0.7 cm widest diameter and is compatible an acute subdural hematoma. No significant shift or herniation is seen.    Evaluation of the osseous structures with the appropriate window appears unremarkable    The visualized paranasal sinuses mastoid and middle ear regions appear clear.    IMPRESSION: Acute subdural hematoma involving the anterior and thoracic region.

## 2020-08-19 NOTE — ED ADULT NURSE REASSESSMENT NOTE - NS ED NURSE REASSESS COMMENT FT1
Pt taken to CT for repeat scan. Pt offers no complaints. Appears in NAD, wife at bedside will continue to monitor.

## 2020-08-19 NOTE — ED ADULT NURSE REASSESSMENT NOTE - NS ED NURSE REASSESS COMMENT FT1
pt denies HA no bleeding present neuro exam normal (see neuro section). pts wife at bed side requesting to have transferred to [facility] because she states they will let her stay overnight. Upon speaking with the Charge RN whom the patients wife was speaking with, it was confirmed that [facility] was under the impression that this patient was not currently in a hospital and coming to them from outpatient. Upon informing [facility] that the patient was Currently a patient in our ED they confirmed that they can not accommodate the patient if the patient is already having a higher level of care at [facility]. Spoke with ADA Tuttle who confirmed with AND Emiliana that the patients wife may stay overnight. Wife informed and agreeable.

## 2020-08-19 NOTE — CONSULT NOTE ADULT - PROBLEM SELECTOR RECOMMENDATION 9
- no acute neurosurgical intervention warranted at this time.  - rpt cth 4-6h  - keppra for seizure ppx  - reverse aspirin give plt/ ddavp.  - if rpt cth stable patient can goto floor under medicine.    d/w attending

## 2020-08-19 NOTE — ED ADULT NURSE NOTE - OBJECTIVE STATEMENT
Break coverage- Received pt into TRB. AA0X3, poor historian. S/p mechanical fall at [facility] while receiving blood transfusion. Pt states he goes approx every 3 weeks for transfusions. Pt states he fell backwards and hit his head on the wall. Pt was told to come to [facility] for "brain bleed" per patient. No abrasions or lacerations noted. Rpt given to primary RN Roni who will obtain blood work. Will monitor.

## 2020-08-19 NOTE — ED PROVIDER NOTE - CLINICAL SUMMARY MEDICAL DECISION MAKING FREE TEXT BOX
78 yo M with MDS brought in for acute subdural hematoma.  VSS.  No focal neuro deficit, alert.  Patient on asa, no anticoagulation.  Will call nsgy, labs.  Dispo pending.

## 2020-08-19 NOTE — ED PROVIDER NOTE - PROGRESS NOTE DETAILS
Dr Bright: spoke to neurosx who want keppra for seizure ppx and then rpt imaging in 4 hrs to determine if should be transferred or admitted here. Will give further recs about platelets after talking with their attg. Pt aaox3, non focal exam

## 2020-08-19 NOTE — ED PROVIDER NOTE - OBJECTIVE STATEMENT
78 yo M with MDS brought in for acute subdural hematoma. Patient is seen regularly for blood transfusions. Patient reports he does not ambulate at baseline due to neuropathy but is able to stand if he holds on to something. After transfusion today he was sitting on the edge of the bed and leaned to reach the urinal. Lost balance and fell to the ground and hit the back of his head. No LOC.   Denies other injury. No pain or SOB including of head/neck.  No other cause for the fall including no preceding pain of the head/chest/back/abdomen, palpitations, lightheadedness, SOB, dizziness.  Take daily aspirin but denies blood thinners.  CT showed acute SDH and sent to ED.

## 2020-08-19 NOTE — ED PROVIDER NOTE - PHYSICAL EXAMINATION
General: Seated in Stretcher, Awake, Alert, Conversant  Head, Ears, Eyes, Nose, Throat: Head atraumatic, Pupils equal, round, reactive to light, normal sclera, moist oral mucosa  Neck: No C spine tenderness, ranging in all directions  Pulmonary: Breath sounds bilaterally, no increased work of breathing, speaking full sentences  Cardiovascular: Normal and regular heart rate, normal S1/S2, no murmurs, rubs, or gallops  Abdominal: Soft and nontender, no rebound or guarding  Extremities: No tenderness of C/T/L spine, extremities, pelvis, chest wall  Dermatologic: No rash  Neurologic: Alert and oriented x3, clear and fluent speech, moving all extremities with good strength

## 2020-08-19 NOTE — ED ADULT TRIAGE NOTE - CHIEF COMPLAINT QUOTE
states 'I just received blood transfusion across the street and I fell as I got up to go to the rest room. denies lOC. states he had a cat scan done shows bleeding '. patient states he get blood transfusion every 2 -3 weeks. s/p transfusion x 2 units . states 'I just received blood transfusion across the street and I fell as I got up to go to the rest room. denies lOC. states he had a cat scan done shows bleeding '. patient states he get blood transfusion every 2 -3 weeks. s/p transfusion x 2 units .patient also states he is unsteady on his feet and fall risk.

## 2020-08-19 NOTE — ED PROVIDER NOTE - NS ED ROS FT
Constitutional: No fever or Chills.    Head, Eyes, Ears, Nose, Throat: No visual changes.  No tongue or throat swelling or pain.  Neck: No neck stiffness.  Pulmonary: No shortness of breath or cough.  Cardiovascular: No chest pain, palpitations, or diaphoresis.  Abdominal: No abdominal pain. No nausea, vomiting, diarrhea.   Genitourinary: No dysuria or hematuria.   Dermatologic: No rashes.  Neurologic: No headache, weakness, visual changes, speech changes, or sensory abnormalities.  No fainting episodes.

## 2020-08-19 NOTE — ED ADULT NURSE REASSESSMENT NOTE - NS ED NURSE REASSESS COMMENT FT1
Pt repeat CT as resulted. Pt offers no complaints, MD Bright reports no longer requires q1h neuro checks. To begin q4h neuro checks. Pt appears in NAD, offers no complaints, VS as charted, will continue to monitor.

## 2020-08-19 NOTE — ED ADULT NURSE NOTE - CHIEF COMPLAINT QUOTE
states 'I just received blood transfusion across the street and I fell as I got up to go to the rest room. denies lOC. states he had a cat scan done shows bleeding '. patient states he get blood transfusion every 2 -3 weeks. s/p transfusion x 2 units .patient also states he is unsteady on his feet and fall risk.

## 2020-08-19 NOTE — ED ADULT NURSE NOTE - NSIMPLEMENTINTERV_GEN_ALL_ED
Implemented All Fall with Harm Risk Interventions:  [place] to call system. Call bell, personal items and telephone within reach. Instruct patient to call for assistance. Room bathroom lighting operational. Non-slip footwear when patient is off stretcher. Physically safe environment: no spills, clutter or unnecessary equipment. Stretcher in lowest position, wheels locked, appropriate side rails in place. Provide visual cue, wrist band, yellow gown, etc. Monitor gait and stability. Monitor for mental status changes and reorient to person, place, and time. Review medications for side effects contributing to fall risk. Reinforce activity limits and safety measures with patient and family. Provide visual clues: red socks.

## 2020-08-19 NOTE — ED PROVIDER NOTE - ATTENDING CONTRIBUTION TO CARE
I have personally performed a face to face bedside history and physical examination of this patient. I have discussed the history, examination, review of systems, assessment and plan of management with the resident. I have reviewed the electronic medical record and amended it to reflect my history, review of systems, physical exam, assessment and plan.    Brief HPI:  80 yo M with MDS brought in for acute subdural hematoma [facility].      Vitals:   Reviewed    Exam:    GEN:  Non-toxic appearing, non-distressed, speaking full sentences, non-diaphoretic, AAOx3  HEENT:  NCAT, neck supple, EOMI, PERRLA, sclera anicteric, no conjunctival pallor or injection, no stridor, normal voice, no tonsillar exudate, uvula midline, tympanic membranes and external auditory canals normal appearing bilaterally   CV:  regular rhythm and rate, s1/s2 audible, no murmurs, rubs or gallops, peripheral pulses 2+ and symmetric  PULM:  non-labored respirations, lungs clear to auscultation bilaterally, no wheezes, crackles or rales  ABD:  non distended, non-tender, no rebound, no guarding, negative Cedillo's sign, bowel sounds normal, no cvat  MSK:  no gross deformity, non-tender extremities and joints, range of motion grossly normal appearing, no extremity edema, extremities warm and well perfused   NEURO:  AAOx3, CN II-XII intact, motor 5/5 in upper and lower extremities bilaterally, sensation grossly intact in extremities and trunk, finger to nose testing wnl, no nystagmus, negative Romberg, no pronator drift, no gait deficit  SKIN:  warm, dry, no rash or vesicles     A/P:  80 yo M with MDS brought in for acute subdural hematoma.  VSS.  No focal neuro deficit, alert.  Patient on asa, no anticoagulation.  Will call nsgy, labs.  Dispo pending.

## 2020-08-19 NOTE — ED ADULT NURSE REASSESSMENT NOTE - NS ED NURSE REASSESS COMMENT FT1
Received report from day RN. Pt Aox3, with hx dementia. Pt offers no complaints, Plan to repeat head CT approx 2130 and reassess. Pt neuro check as documented. Wife at bedside to reorient patient. IV noted 20G Lft arm. VS as charted, appears in NAD, will continue to monitor.

## 2020-08-20 NOTE — DISCHARGE NOTE PROVIDER - CARE PROVIDER_API CALL
Arthur Álvarez  NEUROSURGERY - GENERAL  [address] [place] [address]  Phone: [phone]  Fax: [phone]  Follow Up Time:     Sebastien Bray  NEUROLOGY  [address] [facility] [address]  [place] [address]  Phone: [phone]  Fax: [phone]  Follow Up Time:     Arline Elizondo  [address] [address]  Phone: [phone]  Fax: [phone]  Follow Up Time:

## 2020-08-20 NOTE — PROGRESS NOTE ADULT - SUBJECTIVE AND OBJECTIVE BOX
[facility] Division of Hospital Medicine  Beth Decker MD  Pager [phone]    Patient is a 79y old  Male who presents with a chief complaint of Fall X 1 day (20 Aug 2020 13:00)      SUBJECTIVE / OVERNIGHT EVENTS: pt seen and examined at 1215p- was having some soreness on right side lower back since the fall  no dizziness or headache or blurred vision    ADDITIONAL REVIEW OF SYSTEMS:    MEDICATIONS  (STANDING):  allopurinol 100 milliGRAM(s) Oral daily  amLODIPine   Tablet 5 milliGRAM(s) Oral daily  atorvastatin 20 milliGRAM(s) Oral at bedtime  levETIRAcetam  IVPB 500 milliGRAM(s) IV Intermittent every 12 hours  levothyroxine 37.5 MICROGram(s) Oral daily  lidocaine   Patch 1 Patch Transdermal daily  methylphenidate 20 milliGRAM(s) Oral two times a day  metoprolol tartrate 50 milliGRAM(s) Oral two times a day  pantoprazole    Tablet 40 milliGRAM(s) Oral before breakfast  sodium chloride 0.9% lock flush 3 milliLiter(s) IV Push every 8 hours    MEDICATIONS  (PRN):  metoclopramide 10 milliGRAM(s) Oral daily PRN dizziness      CAPILLARY BLOOD GLUCOSE        I&O's Summary      PHYSICAL EXAM:  Vital Signs Last 24 Hrs  T(C): 36.5 (20 Aug 2020 04:59), Max: 37 (19 Aug 2020 17:52)  T(F): 97.7 (20 Aug 2020 04:59), Max: 98.6 (19 Aug 2020 17:52)  HR: 98 (20 Aug 2020 07:36) (68 - 98)  BP: 117/66 (20 Aug 2020 07:36) (117/66 - 152/63)  BP(mean): 74 (19 Aug 2020 20:41) (74 - 74)  RR: 17 (20 Aug 2020 04:59) (16 - 18)  SpO2: 100% (20 Aug 2020 04:59) (96% - 100%)  CONSTITUTIONAL: NAD,  RESPIRATORY: Normal respiratory effort; lungs are clear to auscultation bilaterally  CARDIOVASCULAR: Regular rate and rhythm, normal S1 and S2, no murmur/rub/gallop; No lower extremity edema;   ABDOMEN: Nontender to palpation, normoactive bowel sounds, not distended;   MUSCULOSKELETAL:  ; no clubbing or cyanosis of digits; no joint swelling or tenderness to palpation  PSYCH:; affect appropriate      LABS:                        8.6    7.45  )-----------( 366      ( 20 Aug 2020 05:30 )             25.1     08-20    142  |  108<H>  |  56<H>  ----------------------------<  133<H>  3.9   |  22  |  1.13    Ca    9.3      20 Aug 2020 05:30  Phos  3.5     08-20  Mg     1.7     08-20    TPro  6.7  /  Alb  3.6  /  TBili  0.8  /  DBili  0.2  /  AST  42<H>  /  ALT  56<H>  /  AlkPhos  152<H>  08-20    PT/INR - ( 19 Aug 2020 18:20 )   PT: 13.1 SEC;   INR: 1.15          PTT - ( 19 Aug 2020 18:20 )  PTT:32.8 SEC            RADIOLOGY & ADDITIONAL TESTS:  Results Reviewed:   Imaging Personally Reviewed:  Electrocardiogram Personally Reviewed:    COORDINATION OF CARE:  Care Discussed with Consultants/Other Providers [Y/N]:  Prior or Outpatient Records Reviewed [Y/N]:

## 2020-08-20 NOTE — DISCHARGE NOTE PROVIDER - CARE PROVIDERS DIRECT ADDRESSES
[email or web address]

## 2020-08-20 NOTE — CONSULT NOTE ADULT - ASSESSMENT
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 CT imaging shows stable bleed measure 6mm without associated edema, midline shift, or mass effect. CT cervical spine was negative for acute displaced fracture, however significant for cervical degenerative disc disease.    Impression: Post-traumatic right anterior parafalcine subdural hematoma secondary to fall with stable CT head imaging s/p reversal of anticoagulation    Recommendations:  -Agree with Neurosurgery recommendations  -Continue Keppra 500mg BID for 2 weeks until follow up with Neurosurgery as outpatient  -Re-start Aspirin 81mg tomorrow   -Fall and seizure precautions  -Neurochecks q4h    To be discussed with Neurology Attending, Dr. Kohler.    Thank you.    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 CT imaging shows stable bleed measuring 6mm without associated edema, midline shift, or mass effect. CT cervical spine was negative for acute displaced fracture, +cervical degenerative disc disease. Neurological exam is significant for impaired proprioception of big toes, also diminished at ankles bilaterally. Patient denies headache, vision changes, nausea, vomiting, dizziness, lightheadedness, weakness, numbness, and tingling.    Impression: Post-traumatic right anterior parafalcine subdural hematoma secondary to fall with stable CT head imaging s/p reversal of antithrombotic    Recommendations:  -Agree with Neurosurgery recommendations  -Continue Keppra 500mg BID for 1 week or until follow up with Neurosurgery as outpatient  -Please consult Cardiology regarding indication for continuing Aspirin and possibility of anticoagulation given history of afib. Patient was a moderate fall risk previously, however has now been wheelchair bound  -If Aspirin is indicated from a Cardiology standpoint, please order CT head to be done in 4 days to f/u bleed PRIOR to initiating antiplatelet therapy. If bleed is stable, patient can resume Aspirin and please order a repeat CT head to be done 24-48 hours afterwards  -Transfuse pRBC for hemoglobin <8  -Fall and seizure precautions  -Neurochecks q4h  -Follow up in Neurosurgery Clinic with Dr. Álvarez in 1-2 weeks  -Follow up with Dr. Sebastien Bray, neuromuscular specialist, at [address] [place] [address]. [phone].    Discussed with Dr. Alford, Stroke Fellow. To be discussed with Neurology Attending, Dr. Tracy.    Thank you.    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 CT imaging shows stable bleed measuring 6mm without associated edema, midline shift, or mass effect. CT cervical spine was negative for acute displaced fracture, +cervical degenerative disc disease. Neurological exam is significant for impaired proprioception of big toes, also diminished at ankles bilaterally. Patient denies headache, vision changes, nausea, vomiting, dizziness, lightheadedness, weakness, numbness, and tingling.    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 CT imaging shows stable bleed measuring 6mm without associated edema, midline shift, or mass effect. CT cervical spine was negative for acute displaced fracture, +cervical degenerative disc disease. Neurological exam is significant for impaired proprioception of big toes, also diminished at ankles bilaterally. Patient denies headache, vision changes, nausea, vomiting, dizziness, lightheadedness, weakness, numbness, and tingling.    Impression: Post-traumatic right anterior parafalcine subdural hematoma secondary to fall with stable CT head imaging s/p reversal of antithrombotic    Recommendations:  -Agree with Neurosurgery recommendations  -Continue Keppra 500mg BID for 1 week or until follow up with Neurosurgery as outpatient  -Please consult Cardiology regarding indication for continuing Aspirin and possibility of anticoagulation given history of atrial fibrillation. Patient was a moderate fall risk previously, however has now been wheelchair bound  -If Aspirin is indicated from a Cardiology standpoint, please hold antiplatelet therapy for 2 weeks before restarting due to higher risk of bleeding after SDH  -Transfuse pRBC for hemoglobin <8  -Fall and seizure precautions  -Neurochecks q4h  -Follow up in Neurosurgery Clinic with Dr. Álvarez in 1-2 weeks  -Follow up with Dr. Sebastien Bray, neuromuscular specialist, at [address] [place] [address]. [phone].    Discussed with Dr. Alford, Stroke Fellow. To be discussed with Neurology Attending, Dr. Tracy.    Thank you.    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 CT imaging shows stable bleed measuring 6mm without associated edema, midline shift, or mass effect. CT cervical spine was negative for acute displaced fracture, +cervical degenerative disc disease. Neurological exam is significant for impaired proprioception of big toes, also diminished at ankles bilaterally. Patient denies headache, vision changes, nausea, vomiting, dizziness, lightheadedness, weakness, numbness, and tingling.    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 CT imaging shows stable bleed measuring 6mm without associated edema, midline shift, or mass effect. CT cervical spine was negative for acute displaced fracture, +cervical degenerative disc disease. Neurological exam is significant for impaired proprioception of big toes, also diminished at ankles bilaterally. Patient denies headache, vision changes, nausea, vomiting, dizziness, lightheadedness, weakness, numbness, and tingling.    Impression: Post-traumatic right anterior parafalcine subdural hematoma secondary to fall with stable CT head imaging s/p reversal of antithrombotic    Recommendations:  -Agree with Neurosurgery recommendations  -Continue Keppra 500mg BID for 1 week or until follow up with Neurosurgery as outpatient  -Would appreciate Cardiology input regarding indication for continuing Aspirin and possibility of anticoagulation given history of atrial fibrillation. Patient was a moderate fall risk previously, however has now been wheelchair bound  -If Aspirin is indicated from a Cardiology standpoint, please hold antiplatelet therapy for 2 weeks before restarting due to higher risk of hematoma expansion   -Transfuse pRBC for hemoglobin <8  -Fall and seizure precautions  -Neurochecks q4h  -Follow up in Neurosurgery Clinic with Dr. Álvarez in 1-2 weeks  -Follow up with Dr. Sebastien Bray, neuromuscular specialist, at [address] [place] [address]. [phone].    Discussed with Dr. Alford, Stroke Fellow. To be discussed with Neurology Attending, Dr. Tracy.    Thank you.    Anyi Cortez,   PGY-2  Neurology Resident

## 2020-08-20 NOTE — PROGRESS NOTE ADULT - PROBLEM SELECTOR PLAN 2
Currently SR rate controlled @ 63b/ min.  Continue BB for rate control.   CHADSVASC=7.   Hold ASA and hold anticoagulation due to SDH.

## 2020-08-20 NOTE — DISCHARGE NOTE PROVIDER - PROVIDER TOKENS
PROVIDER:[TOKEN:[[id number]]],PROVIDER:[TOKEN:[[id number]]],PROVIDER:[TOKEN:[[id number]]]

## 2020-08-20 NOTE — H&P ADULT - ASSESSMENT
79M hx of AF CHADSVASC =7, on ASA, MDS s/p blood transfusions Stroke, DM2, HTN, Gout, HFrEF@ 40%, Hyperlipidemia, Hypothyroid, admitted s/p fall with anterior interhemispheric subdural hematoma.

## 2020-08-20 NOTE — PROGRESS NOTE ADULT - PROBLEM SELECTOR PLAN 1
Neuro surgery consult appreciated.   - no acute neurosurgical intervention warranted at this time.  - repeat CT head stable in terms of size of hematoma  - Keppra for seizure ppx  - reversed aspirin give plt/ ddavp.

## 2020-08-20 NOTE — PROGRESS NOTE ADULT - PROBLEM SELECTOR PLAN 8
S/P 2 U PRBC 8/19/2020.  Receives transfusion every 3 weeks.  trend hgb and monitor for symptoms d/t anemia

## 2020-08-20 NOTE — H&P ADULT - NEUROLOGICAL DETAILS
no spontaneous movement/responds to verbal commands/normal strength/sensation intact/alert and oriented x 3 responds to verbal commands/no spontaneous movement/alert and oriented x 3/sensation intact

## 2020-08-20 NOTE — DISCHARGE NOTE PROVIDER - NSDCMRMEDTOKEN_GEN_ALL_CORE_FT
allopurinol 100 mg oral tablet: 1 tab(s) orally once a day  amLODIPine 5 mg oral tablet: 1 tab(s) orally once a day  aspirin 81 mg oral delayed release tablet: 1 tab(s) orally once a day  levothyroxine: 37.5 microgram(s) orally once a day  methylphenidate 20 mg oral tablet: 1 tab(s) orally 3 times a day  metoclopramide 10 mg oral tablet: 1 tab(s) orally once a day  metoprolol tartrate 50 mg oral tablet: 1 tab(s) orally 2 times a day  omeprazole 20 mg oral delayed release capsule: 1 cap(s) orally once a day  rosuvastatin 5 mg oral tablet: 1 tab(s) orally once a day allopurinol 100 mg oral tablet: 1 tab(s) orally once a day  amLODIPine 5 mg oral tablet: 1 tab(s) orally once a day  Keppra 500 mg oral tablet: 1 tab(s) orally 2 times a day  levothyroxine: 37.5 microgram(s) orally once a day  methylphenidate 20 mg oral tablet: 1 tab(s) orally 3 times a day  metoclopramide 10 mg oral tablet: 1 tab(s) orally once a day, As Needed - for dizziness  metoprolol tartrate 50 mg oral tablet: 1 tab(s) orally 2 times a day  omeprazole 20 mg oral delayed release capsule: 1 cap(s) orally once a day  rosuvastatin 5 mg oral tablet: 1 tab(s) orally once a day

## 2020-08-20 NOTE — H&P ADULT - NSICDXPASTSURGICALHX_GEN_ALL_CORE_FT
PAST SURGICAL HISTORY:  H/O hemorrhoidectomy     History of colonoscopy     History of tonsillectomy     Stented coronary artery PAST SURGICAL HISTORY:  H/O hemorrhoidectomy     History of colonoscopy     History of tonsillectomy     Status post placement of implantable loop recorder     Stented coronary artery

## 2020-08-20 NOTE — CONSULT NOTE ADULT - ASSESSMENT
79M, with h/o neuropathy, ADD (takes ritalin bid instead of prescribed TID), Atrial fibrillation(on ASA 81mg day), CAD(stent placed 2010, 2015), Stroke with RLE weakness, Loop recorder, CHF(EF= 40%), diet controlled DM2, Gout, Hyperlipidemia, Hypertension, MDS(receiving blood transfusions every 3 weeks) was sent from [facility] after having a fall, found to have acute SDH.        # SDH  - Neurosurgery input appreciated  - Hold aspirin, patient received a unit of platelet 8/19 to compensate platelet dysfunction. Not thromobocytopenic.  - Patient receives intermittent blood transfusion to keep his Hb >10 as he becomes symptomatic when the count is below 10. Pt received 1 PRBC yesterday, now Hb at 8.6. Pt did not complete the second unit yesterday. Would closely monitor his H/H, and transfuse one more unit before discharge. Transfuse if he is symptomatic.  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 Low risk disease, IPSS score 2.  - Patient received treatment with Revlimid in 3/2018-4/2018. s/p Vidaza.  - Pt's counts had improved for approx 1 year and then worsened towards 8/2019. Pt was transfusion independent until 8/2019.  - Repeat bone marrow biopsy was performed on 9/13/19; MDS with excess blast ~7%, p53 >1% strongly positive cells. Continued del 5q. Suggesting progression of disease.  - Started on Vidaza along with transfusion as needed. Last vidaza injection February this year(C2). Patient gets regular blood check every Mon/Friday.   - Patient has a plan for another bone marrow biopsy as outpatient and decide treatment plan per Dr. Elizondo, currently getting blood transfusions only.  - Patient receives intermittent blood transfusion to keep his Hb >10 as he becomes symptomatic when the count is below 10. Pt received 1 PRBC yesterday, now Hb at 8.6. Pt did not complete the second unit yesterday. Would closely monitor his H/H, and transfuse one more unit before discharge.           Merced Vigil MD  PGY 5, Oncology/Hematology fellow  (P) [phone]  After 5pm, please contact on-call team. 79M, with h/o neuropathy, ADD (takes ritalin bid instead of prescribed TID), Atrial fibrillation(on ASA 81mg day), CAD(stent placed 2010, 2015), Stroke with RLE weakness, Loop recorder, CHF(EF= 40%), diet controlled DM2, Gout, Hyperlipidemia, Hypertension, MDS(receiving blood transfusions every 3 weeks) was sent from [facility] after having a fall, found to have acute SDH.      # SDH  - Neurosurgery input appreciated  - Hold aspirin, patient received a unit of platelet 8/19 to compensate platelet dysfunction. Not thromobocytopenic.  - Patient receives intermittent blood transfusion to keep his Hb >10 as he becomes symptomatic when the count is below 10. Pt received 1 PRBC yesterday, now Hb at 8.6. Pt did not complete the second unit yesterday, however as per the patient he 'almost finished it'. Would closely monitor his H/H, and transfuse one more unit before discharge. Transfuse if he is symptomatic, currently patient is asymptomatic.  - f/u CT head this AM: Unchanged right anterior parafalcine subdural hematoma.  - Patient is almost wheelchair bound and cannot move by himself. He needs full assistance when he needs to be outside bed. Fall precaution.   - Defer to neurosurgery regarding when to restart aspirin. Patient has significant cardiac history with coronary artery stents and high CHADSVSAC score.    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 Low risk disease, IPSS score 2.  - Patient received treatment with Revlimid in 3/2018-4/2018. s/p Vidaza.  - Pt's counts had improved for approx 1 year and then worsened towards 8/2019. Pt was transfusion independent until 8/2019.  - Repeat bone marrow biopsy was performed on 9/13/19; MDS with excess blast ~7%, p53 >1% strongly positive cells. Continued del 5q. Suggesting progression of disease.  - Started on Vidaza along with transfusion as needed. Last vidaza injection was February this year(C2). Patient gets regular blood check every week(Monday).   - Patient has a plan for another bone marrow biopsy as outpatient and decide treatment plan per Dr. Elizondo, currently getting blood transfusions only.  - Patient receives intermittent blood transfusion to keep his Hb >10 as he becomes symptomatic when the count is below 10. Pt received 1 PRBC yesterday, almost finished his 2nd unit. Now Hb at 8.6. Would closely monitor his H/H, and transfuse one more unit before discharge if count continues to be low. Currently patient is asymptomatic.            Merced Vigil MD  PGY 5, Oncology/Hematology fellow  (P) [phone]  After 5pm, please contact on-call team.

## 2020-08-20 NOTE — H&P ADULT - RS GEN PE MLT RESP DETAILS PC
no intercostal retractions/no rhonchi/no wheezes/good air movement/no chest wall tenderness/clear to auscultation bilaterally/no subcutaneous emphysema/no rales/airway patent/breath sounds equal/respirations non-labored

## 2020-08-20 NOTE — H&P ADULT - NEGATIVE NEUROLOGICAL SYMPTOMS
no syncope/no confusion/no tremors/no headache/no facial palsy/no paresthesias/no vertigo/no loss of sensation/no hemiparesis/no generalized seizures/no focal seizures/no loss of consciousness/no transient paralysis

## 2020-08-20 NOTE — H&P ADULT - PROBLEM SELECTOR PLAN 1
Keep pt on bedrest.   Neuro surgery consult appreciated.   - no acute neurosurgical intervention warranted at this time.  - rpt cth 4-6h  - Keppra for seizure ppx  - reverse aspirin give plt/ ddavp.  - if repeat CT head stable patient can go to floor under medicine. Keep pt on bedrest.   Neuro surgery consult appreciated.   - no acute neurosurgical intervention warranted at this time.  - rpt cth 4-6h  - Keppra for seizure ppx  - reverse aspirin give plt/ ddavp.  - repeat CTH this AM, monitor bp closely, pt also on ritalin chronically

## 2020-08-20 NOTE — DISCHARGE NOTE PROVIDER - NSDCFUSCHEDAPPT_GEN_ALL_CORE_FT
JENIFFER PIZARRO SR ; 08/26/2020 ; BRANDIN [facility]ER [facility]  JENIFFER PIZARRO SR ; 09/21/2020 ; BRANDIN [facility] [address] JENIFFER PIZARRO SR ; 08/26/2020 ; BRANDIN YEAGER [facility]  JENIFFER PIZARRO SR ; 09/21/2020 ; BRANDIN [facility] [address] JENIFFER PIZARRO SR ; 08/26/2020 ; BRANDIN YEAGER [facility]  JENIFFER PIZARRO SR ; 09/21/2020 ; BRANDIN [facility] [address] JENIFFER PIZARRO SR ; 08/26/2020 ; BRANDIN [facility]  JENIFFER PIZARRO SR ; 08/28/2020 ; NPP Neuro [facility]  JENIFFER PIZARRO SR ; 09/21/2020 ; NPP [facility] [address] JENIFFER PIZARRO SR ; 08/26/2020 ; BRANDIN [facility]  JENIFFER PIZARRO SR ; 08/28/2020 ; [facility]  JENIFFER PIZARRO SR ; 09/21/2020 ; NPP [facility] [address]

## 2020-08-20 NOTE — H&P ADULT - RESPIRATORY AND THORAX
[facility]    FACILITY TRANSFER ORDERS      Patient Name: Jenni Toth  YOB: 1983    PCP: Emily Marquez MD   PCP Address: [address] [place] [address]  PCP Phone Number: [phone]  PCP Fax: [phone]    Encounter Date: 09/28/2018    Admit to: [facility]    Vital Signs:  Routine    Diagnoses:   Active Hospital Problems    Diagnosis  POA    Physical deconditioning [R53.81]  Yes    Rash of groin [R21]  Yes    Dysrhythmia [I49.9]  Yes    C. difficile colitis [A04.72]  Yes    Leg wound, right [S81.801A]  Yes    Unstageable pressure ulcer of right heel [L89.610]  Yes    Skin ulcer of abdomen [L98.499]  Yes    Stage 2 skin ulcer of sacral region [L98.429]  Yes    Wounds, multiple [T07.XXXA]  Yes    Depression [F32.9]  Yes    Transverse myelitis [G37.3]  Yes    Hypokalemia [E87.6]  Yes    Antiphospholipid antibody syndrome [D68.61]  Yes     Chronic     Hx miscarraige  Hx TIA with abnormal MRI 6/10/10      Lupus erythematosus [L93.0]  Yes     Chronic     Hx positive LETICIA, double-stranded DNA, SSA antibodies, leukopenia, thrombocytopenia, discoid skin lesions and alopecia, pleuritis, oral ulcers, hand arthritis, and antiphospholipid antibodies complicated by stroke and miscarriage.  March 2017 developed myelitis with +NMO antibodies treated with solumedrol and plasmapheresis            Resolved Hospital Problems    Diagnosis Date Resolved POA    *Sepsis [A41.9] 09/27/2018 Yes    Complicated UTI (urinary tract infection) [N39.0] 09/27/2018 Yes    Sinus tachycardia [R00.0] 09/27/2018 Yes     Chronic       Allergies:  Review of patient's allergies indicates:   Allergen Reactions    Bactrim [sulfamethoxazole-trimethoprim] Rash    Ciprofloxacin Rash       Diet: regular diet and supplements: Ozzy BID    Activities: Activity as tolerated    Nursing: per facility protocol     Labs: CBC, BMP and Magnesium weekly, to assess and treat hypokalemia  "and anemia as needed     CONSULTS:    Physical Therapy to evaluate and treat. , Occupational Therapy to evaluate and treat. and  to evaluate for community resources/long-range planning.    MISCELLANEOUS CARE:  Bailey Care: Empty Bailey bag every shift. Change Bailey every month. and Routine Skin for Bedridden Patients: Apply moisture barrier cream to all skin folds and wet areas in perineal area daily and after baths and all bowel movements.    WOUND CARE ORDERS  Yes:   Right Leg Wound- Clean s/p surgical wound of Right lateral Ankle with wound cleanser, apply xeroform to wound bed and cover with dry clean dressing, every other day and as needed  Unstagable Right Heel Pressure Ulcer- Clean pressure ulcer of right heel with wound cleanser, apply xeroform to wound bed and cover with dry clean dressing, every other day and as needed   Abdominal Wound, Left breast wound- clean left abdominal wound and left upper breast wound with wound cleanser, apply Aquacel Ag to wound bed, cover with Aquacel Ag adhesive dressing, daily and as needed.   Stage 2 Sacral Ulcer- clean stage 2 ulcers of sacra area, gluteal cleft, right buttocks, and left upper thigh with wound cleanser, apply xeroform to wound beds, cover with Aquacel adhesive dressing, every other day or as needed  Groin Rash- clean rash on skin folds of b/l breast, b/l groin, and abdomen with NS, apply InterDry fabric to affected areas in skin folds, do fold fabric, leave 2" of fabric out of fold to allow material to wick out moisture, every 2 days and as needed.     Medications: Review discharge medications with patient and family and provide education.      Current Discharge Medication List      START taking these medications    Details   dronabinol (MARINOL) 2.5 MG capsule Take 1 capsule (2.5 mg total) by mouth 2 (two) times daily before meals.  Qty: 60 capsule, Refills: 0      ergocalciferol (ERGOCALCIFEROL) 50,000 unit Cap Take 1 capsule (50,000 Units " total) by mouth every 7 days. Every Friday.  Qty: 12 capsule, Refills: 0      oxyCODONE-acetaminophen (PERCOCET) 7.5-325 mg per tablet Take 1 tablet by mouth every 4 (four) hours as needed (Moderate to severe pain).  Qty: 30 tablet, Refills: 0      vancomycin (VANCOCIN) 125 MG capsule Take 1 capsule (125 mg total) by mouth every 6 (six) hours. for 3 days  Qty: 10 capsule, Refills: 0  End date 9/29/2018         CONTINUE these medications which have CHANGED    Details   escitalopram oxalate (LEXAPRO) 10 MG tablet Take 2 tablets (20 mg total) by mouth once daily.  Qty: 60 tablet, Refills: 1         CONTINUE these medications which have NOT CHANGED    Details   acetaZOLAMIDE (DIAMOX) 500 mg CpSR Take 1 capsule (500 mg total) by mouth 2 (two) times daily.  Qty: 30 capsule, Refills: 2      enoxaparin (LOVENOX) 100 mg/mL Syrg Inject 0.9 mLs (90 mg total) into the skin every 12 (twelve) hours.  Qty: 54 mL, Refills: 2      gabapentin (NEURONTIN) 800 MG tablet Take 1 tablet (800 mg total) by mouth 3 (three) times daily.  Qty: 90 tablet, Refills: 11      hydroxychloroquine (PLAQUENIL) 200 mg tablet Take 2 tablets (400 mg total) by mouth once daily.  Qty: 30 tablet, Refills: 2      levETIRAcetam (KEPPRA) 500 MG Tab Take 1 tablet (500 mg total) by mouth 2 (two) times daily.  Qty: 30 tablet, Refills: 2      magnesium hydroxide 400 mg/5 ml (MILK OF MAGNESIA) 400 mg/5 mL Susp Take 30 mLs by mouth 2 (two) times daily as needed (constipation).      predniSONE (DELTASONE) 5 MG tablet Take 3 tablets (15 mg total) by mouth once daily.  Qty: 90 tablet, Refills: 0      tiZANidine (ZANAFLEX) 2 MG tablet Take one half to one tablet nightly  Qty: 90 tablet, Refills: 1    Associated Diagnoses: Neuromyelitis optica; Spasticity      triamcinolone acetonide 0.1% (KENALOG) 0.1 % ointment Apply topically 2 (two) times daily.  Qty: 453.6 g, Refills: 2      folic acid (FOLVITE) 1 MG tablet Take 2 tablets (2 mg total) by mouth once daily.  Refills: 0       pantoprazole (PROTONIX) 40 MG tablet Take 40 mg by mouth once daily.         STOP taking these medications       HYDROcodone-acetaminophen (NORCO) 5-325 mg per tablet Comments:   Reason for Stopping:         loperamide (IMODIUM) 2 mg capsule Comments:   Reason for Stopping:                    _________________________________  Lidia Prieto DO  09/27/2018           details…

## 2020-08-20 NOTE — H&P ADULT - NEGATIVE ENMT SYMPTOMS
no nasal congestion/no nasal obstruction/no post-nasal discharge/no nose bleeds/no ear pain/no vertigo/no sinus symptoms/no nasal discharge/no tinnitus

## 2020-08-20 NOTE — DISCHARGE NOTE PROVIDER - HOSPITAL COURSE
79M admitted s/p fall with anterior interhemispheric sdh        Hospital Course:        8/19- Neuro surgery consult    - no acute neurosurgical intervention warranted at this time.    - rpt cth 4-6h    - keppra for seizure ppx    - reverse aspirin give plt/ ddavp.    - if rpt cth stable patient can goto floor under medicine.        8/19-Brain CT: Acute anterior parafalcine 6.4 mm subdural hematoma        8/19 Cervical spine CT: No evidence for acute displaced fracture or traumatic malalignment. Cervical degenerative spondylosis, as described above        f/u Neurosurg-f/u with Dr. Álvarez in 2 weeks, call upon discharge to schedule appointment.   Hold all blood thinners till cleared by neurosurgeon.  Continue keppra x 2 weeks, if no seizure activity over that time will discontinue.    f/u Neuro--Follow up with Dr. Sebastien Bray, neuromuscular specialist, at [address] [place] [address] [phone].        hx MDS-received Platelets during admit, will f/u outpatient with Heme for further workup-        F/U Repeat CT head.-no change from prior study        Neuro checks Q4*    ASA & A/C on hold.    Keppra level         Dispo-  stable for discharge as per _______________on _______________.  All medications reviewed prior to discharge. 79M with hx of Aib on ASA, MDS s/p blood transfusion, Stroke, DM1, HTN, Gout, HFrEF 40%, HLD, Hypothyroidism, Pt s/p mechanically fall, found to have an acute anterior parafalcine 6.4 mm subdural hematoma and admitted for further management. Neurosurgery consulted, no surgical intervention, repeat CTH remained stable. Pt neurochecks monitored q 4hrs. Pt started on Keppra for seizure ppx, Given pt being on aspirin, pt received Plt transfusion as well as DDAVP.  CT cervical spine, no acute findings. Pt remained stable, to f/u with Nsx Preeti and Neuromuscular specialist Dr. Bray. outpatient. As per Nsx, blood thinner to be on hold until cleared, aspirin to be held for at least 2 weeks. Pt was seen by hematology/Oncology for hx MDS, hgb 8.8 on 8/21, recommended 1 unit PRBC prior to d/c home. Pt is medically stable for d/c home.             Dispo-  stable for discharge as per Dr. Rosas, on 8/21/20.  All medications reviewed prior to discharge. 79M with hx of Aib on ASA, MDS s/p blood transfusion, Stroke, DM1, HTN, Gout, HFrEF 40%, HLD, Hypothyroidism, Pt s/p mechanically fall, found to have an acute anterior parafalcine 6.4 mm subdural hematoma and admitted for further management. Neurosurgery consulted, no surgical intervention, repeat CTH remained stable. Pt neurochecks monitored q 4hrs. Pt started on Keppra for seizure ppx, Given pt being on aspirin, pt received Plt transfusion as well as DDAVP.  CT cervical spine, no acute findings. Pt remained stable, to f/u with Nsx Preeti and Neuromuscular specialist Dr. Bray. outpatient. As per Nsx, blood thinner to be on hold until cleared, aspirin to be held for at least 2 weeks. Pt was seen by hematology/Oncology for hx MDS, hgb 8.8 on 8/21, recommended 1 unit PRBC prior to d/c home. Pt is medically stable for d/c home.     pt to take Keppra for one week for szr proph.  outpt f/u w/ Neuro to obtain repeat CT head to determine timing of restarting aspirin        Dispo-  stable for discharge as per Dr. Rosas, on 8/21/20.  All medications reviewed prior to discharge.

## 2020-08-20 NOTE — H&P ADULT - PROBLEM SELECTOR PLAN 2
Currently SR rate controlled @ 63b/ min.  Continue BB for rate control.   Hold ASA and hold anticoagulation due to SDH.  F/U TSH. Currently SR rate controlled @ 63b/ min.  Continue BB for rate control.   CHADSVASC=7.   Hold ASA and hold anticoagulation due to SDH.  F/U TSH.

## 2020-08-20 NOTE — CONSULT NOTE ADULT - ATTENDING COMMENTS
Patient interviewed/examined.  Agree with history, ROS as above and in Admit H&P, PE, A/P as above.    Would repeat CBC in AM and determine any need for transfusion prior to hopeful discharge.     Sergio Muniz MD
Patient seen and examined with stroke neurology resident and above note reviewed and I agree with assessment and plan as outlined. Patient hx as noted and he was at [facility] yesterday getting a blood transfusion and he had reached over to grab something when he suddenly fell backwards and hit the back of his head. He was brought to ER here at [facility] and found to have a right acute anterior parafalcine subdural hematoma about 6mm. No mass effect or shift noted.   He denies any headaches, visual changes, weakness, numbness or nausea or vomiting.     On exam he is awake and alert and oriented and answers all questions appropriately.  No facial droop or facial numbness.  He has old right sided weakness from prior cerebral insult but nothing acute otherwise and no abnormal reflexes or babinski responses.    CT reviewed with neuroradiology and the follow up scan is stable and no new changes or expansion of hematoma.    Assessment and Plan: Patient is 79 years old with MDS and neuropathy, CHF admitted for fall and fond to have an acute right anterior parafalcine subdural hematoma. FOllow up CT head is stable     1. Continue to hold ASA and all blood thinners  2. Appreciate Neurosurgery follow up and he will see Dr Álvarez in office for follow up CT head in 2 weeks.   3. To follow up with his cardiologist regarding when to restart ASA 81mg however would wait at least 2 weeks prior to restarting and patient is a very high fall risk   4. PT and OT and continue medical mgt and supportive care   5. Follow up in Stroke office at [address] [place] [address] in 2-4 weeks with Dr. Dez Tracy or PATRICIA Nettles [phone]  5. Continue keppra 500mg BID for 1 week then STOP    All questions answered with wife at bedside and they understood plan.

## 2020-08-20 NOTE — CONSULT NOTE ADULT - SUBJECTIVE AND OBJECTIVE BOX
Hematology Consult Note    HPI:  79M, ambulates with a wheelchair due to history of sever, neuropathy, ADD (takes ritalin bid instead of prescribed TID), Atrial fibrillation, CHADSVASC= 7, on ASA 81mg day, CAD, stent placed 2010, 2015 Stroke with RLE weakness, Loop recorder, CHF, EF= 40%, diet controlled DM2, Gout, Hyperlipidemia, Hypertension, MDS, receiving blood transfusions every 3 weeks. The pt was at [facility] 8/19 and completed his transfusion of 2 UPRBC. He was sitting, reached for a urinal, fell, landing on his buttocks and hitting the back of his head against a wall, The staff responded, placed ice to the back of his head and sent the pt to the ED. Denies HA, LOC, dizziness, tinnitus, blurry vision, nausea, vomit, diarrhea, constipation, abdominal pain, chest pain, palpitations, chills, body aches, dysuria.    In the Ed,   #1 Brain CT : Acute subdural hematoma involving the anterior and thoracic region  #2 Brain CT: Acute anterior para falcine 6.4 mm subdural hematoma.   The pt was seen by neuro surgery. Their consult and recommendations are in the chart. The pt received DDAVP 32 mcg IV X 1, ordered for Platelets 1 U x 1 and started on Keppra 500mg IV BID. ASA on hold.  EKG NSR @ 63b/ min, QW V1 V2 V3 2, AVF, QT/ QTC= 400/ 409. No change from EKG 11/14/12019     Vitals: T max= 98.6 oral, HR = 74b/ min, BP =152/ 63, RR= 16b/ min, SPO2@ 100%.   Currently the pt offers no complaints. Spouse at bedside able to reconcile the pt's home meds. (20 Aug 2020 00:15)      Hematology was consulted for h/o MDS.    Allergies    iodine (Anaphylaxis)  tetracycline (Short breath)  Tylenol (Flushing)    Intolerances        MEDICATIONS  (STANDING):  allopurinol 100 milliGRAM(s) Oral daily  amLODIPine   Tablet 5 milliGRAM(s) Oral daily  atorvastatin 20 milliGRAM(s) Oral at bedtime  levETIRAcetam  IVPB 500 milliGRAM(s) IV Intermittent every 12 hours  levothyroxine 37.5 MICROGram(s) Oral daily  methylphenidate 20 milliGRAM(s) Oral two times a day  metoprolol tartrate 50 milliGRAM(s) Oral two times a day  pantoprazole    Tablet 40 milliGRAM(s) Oral before breakfast  sodium chloride 0.9% lock flush 3 milliLiter(s) IV Push every 8 hours    MEDICATIONS  (PRN):  metoclopramide 10 milliGRAM(s) Oral daily PRN dizziness      PAST MEDICAL & SURGICAL HISTORY:  CAD (coronary artery disease)  ADD (attention deficit disorder)  Hypothyroid  Atrial fibrillation  Congestive heart failure (CHF)  Gout  Hypercholesterolemia  DM (Diabetes Mellitus)  Hypertension  Status post placement of implantable loop recorder  H/O hemorrhoidectomy  History of colonoscopy  History of tonsillectomy  Stented coronary artery      FAMILY HISTORY:  Family history of cerebrovascular accident (CVA) in father  FH: CHF (congestive heart failure): mother      SOCIAL HISTORY: No EtOH, no tobacco    REVIEW OF SYSTEMS:    CONSTITUTIONAL: No weakness, fevers or chills  EYES/ENT: No visual changes;  No vertigo or throat pain   NECK: No pain or stiffness  RESPIRATORY: No cough, wheezing, hemoptysis; No shortness of breath  CARDIOVASCULAR: No chest pain or palpitations  GASTROINTESTINAL: No abdominal or epigastric pain. No nausea, vomiting, or hematemesis; No diarrhea or constipation. No melena or hematochezia.  GENITOURINARY: No dysuria, frequency or hematuria  NEUROLOGICAL: No numbness or weakness  SKIN: No itching, burning, rashes, or lesions   All other review of systems is negative unless indicated above.    Height (cm): 177.8 (08-20 @ 01:00)  Weight (kg): 77.1 (08-20 @ 01:00)  BMI (kg/m2): 24.4 (08-20 @ 01:00)  BSA (m2): 1.95 (08-20 @ 01:00)    T(F): 97.7 (08-20-20 @ 04:59), Max: 98.6 (08-19-20 @ 17:52)  HR: 98 (08-20-20 @ 07:36)  BP: 117/66 (08-20-20 @ 07:36)  RR: 17 (08-20-20 @ 04:59)  SpO2: 100% (08-20-20 @ 04:59)  Wt(kg): --    Physical Exam  GENERAL: NAD, well-developed  HEAD:  Atraumatic, Normocephalic  EYES: EOMI, PERRLA, conjunctiva and sclera clear  NECK: Supple, No JVD  CHEST/LUNG: Clear to auscultation bilaterally; No wheeze  HEART: Regular rate and rhythm; No murmurs, rubs, or gallops  ABDOMEN: Soft, Nontender, Nondistended; Bowel sounds present  EXTREMITIES:  2+ Peripheral Pulses, No clubbing, cyanosis, or edema  NEUROLOGY: non-focal  SKIN: No rashes or lesions                          8.6    7.45  )-----------( 366      ( 20 Aug 2020 05:30 )             25.1       08-20    142  |  108<H>  |  56<H>  ----------------------------<  133<H>  3.9   |  22  |  1.13    Ca    9.3      20 Aug 2020 05:30  Phos  3.5     08-20  Mg     1.7     08-20        Phosphorus Level, Serum: 3.5 mg/dL (08-20 @ 05:30)  Magnesium, Serum: 1.7 mg/dL (08-20 @ 05:30) Hematology Consult Note    HPI:  79M, ambulates with a wheelchair due to history of sever, neuropathy, ADD (takes ritalin bid instead of prescribed TID), Atrial fibrillation, CHADSVASC= 7, on ASA 81mg day, CAD, stent placed 2010, 2015 Stroke with RLE weakness, Loop recorder, CHF, EF= 40%, diet controlled DM2, Gout, Hyperlipidemia, Hypertension, MDS, receiving blood transfusions every 3 weeks. The pt was at [facility] 8/19 and completed his transfusion of 2 UPRBC. He was sitting, reached for a urinal, fell, landing on his buttocks and hitting the back of his head against a wall, The staff responded, placed ice to the back of his head and sent the pt to the ED. Denies HA, LOC, dizziness, tinnitus, blurry vision, nausea, vomit, diarrhea, constipation, abdominal pain, chest pain, palpitations, chills, body aches, dysuria.    In the Ed,   #1 Brain CT : Acute subdural hematoma involving the anterior and thoracic region  #2 Brain CT: Acute anterior para falcine 6.4 mm subdural hematoma.   The pt was seen by neuro surgery. Their consult and recommendations are in the chart. The pt received DDAVP 32 mcg IV X 1, ordered for Platelets 1 U x 1 and started on Keppra 500mg IV BID. ASA on hold.  EKG NSR @ 63b/ min, QW V1 V2 V3 2, AVF, QT/ QTC= 400/ 409. No change from EKG 11/14/12019     Vitals: T max= 98.6 oral, HR = 74b/ min, BP =152/ 63, RR= 16b/ min, SPO2@ 100%.   Currently the pt offers no complaints. Spouse at bedside able to reconcile the pt's home meds. (20 Aug 2020 00:15)      Hematology was consulted for h/o MDS.    Allergies    iodine (Anaphylaxis)  tetracycline (Short breath)  Tylenol (Flushing)    Intolerances        MEDICATIONS  (STANDING):  allopurinol 100 milliGRAM(s) Oral daily  amLODIPine   Tablet 5 milliGRAM(s) Oral daily  atorvastatin 20 milliGRAM(s) Oral at bedtime  levETIRAcetam  IVPB 500 milliGRAM(s) IV Intermittent every 12 hours  levothyroxine 37.5 MICROGram(s) Oral daily  methylphenidate 20 milliGRAM(s) Oral two times a day  metoprolol tartrate 50 milliGRAM(s) Oral two times a day  pantoprazole    Tablet 40 milliGRAM(s) Oral before breakfast  sodium chloride 0.9% lock flush 3 milliLiter(s) IV Push every 8 hours    MEDICATIONS  (PRN):  metoclopramide 10 milliGRAM(s) Oral daily PRN dizziness      PAST MEDICAL & SURGICAL HISTORY:  CAD (coronary artery disease)  ADD (attention deficit disorder)  Hypothyroid  Atrial fibrillation  Congestive heart failure (CHF)  Gout  Hypercholesterolemia  DM (Diabetes Mellitus)  Hypertension  Status post placement of implantable loop recorder  H/O hemorrhoidectomy  History of colonoscopy  History of tonsillectomy  Stented coronary artery      FAMILY HISTORY:  Family history of cerebrovascular accident (CVA) in father  FH: CHF (congestive heart failure): mother      SOCIAL HISTORY: No EtOH, no tobacco    REVIEW OF SYSTEMS:    CONSTITUTIONAL: No weakness, fevers or chills  EYES/ENT: No vertigo or throat pain   NECK: No pain or stiffness  RESPIRATORY: No cough, wheezing, hemoptysis; No shortness of breath  CARDIOVASCULAR: No chest pain or palpitations  GASTROINTESTINAL: No abdominal or epigastric pain. No nausea, vomiting, or hematemesis; No diarrhea or constipation. No melena or hematochezia.  GENITOURINARY: No dysuria, frequency or hematuria  NEUROLOGICAL: No numbness or weakness  SKIN: No itching, burning, rashes, or lesions   All other review of systems is negative unless indicated above.    Height (cm): 177.8 (08-20 @ 01:00)  Weight (kg): 77.1 (08-20 @ 01:00)  BMI (kg/m2): 24.4 (08-20 @ 01:00)  BSA (m2): 1.95 (08-20 @ 01:00)    T(F): 97.7 (08-20-20 @ 04:59), Max: 98.6 (08-19-20 @ 17:52)  HR: 98 (08-20-20 @ 07:36)  BP: 117/66 (08-20-20 @ 07:36)  RR: 17 (08-20-20 @ 04:59)  SpO2: 100% (08-20-20 @ 04:59)  Wt(kg): --    Physical Exam  GENERAL: NAD, thin  HEAD: Atraumatic, Normocephalic  EYES: EOMI, conjunctiva and sclera clear  NECK: Supple, No JVD  CHEST/LUNG: Clear to auscultation bilaterally  HEART: Regular rate and rhythm; S1S2 present  ABDOMEN: Soft, Nontender, Nondistended; Bowel sounds present  EXTREMITIES:  2+ Peripheral Pulses, No clubbing, cyanosis, or edema  NEUROLOGY: non-focal  SKIN: No rashes or lesions                          8.6    7.45  )-----------( 366      ( 20 Aug 2020 05:30 )             25.1       08-20    142  |  108<H>  |  56<H>  ----------------------------<  133<H>  3.9   |  22  |  1.13    Ca    9.3      20 Aug 2020 05:30  Phos  3.5     08-20  Mg     1.7     08-20        Phosphorus Level, Serum: 3.5 mg/dL (08-20 @ 05:30)  Magnesium, Serum: 1.7 mg/dL (08-20 @ 05:30)

## 2020-08-20 NOTE — DISCHARGE NOTE PROVIDER - NSDCCPCAREPLAN_GEN_ALL_CORE_FT
PRINCIPAL DISCHARGE DIAGNOSIS  Diagnosis: SDH (subdural hematoma)  Assessment and Plan of Treatment: FINDINGS:  Right anterior parafalcine subdural hematoma measuring 6 mm, unchanged. No midline shift, mass or mass effect.  The size and configuration of the ventricles is unchanged as compared to the previous exam. There is severe age-related related cerebral atrophy, also unchanged.  You will need to follow up with Dr. Álvarez Neurosurgeon  in 2 weeks.  Continue Keppra for 2 weeks and will reevalaute at time of visit if will need to continue.  Hold all blood thinners at this time.    Follow up with Neurology Dr Bray within 2 weeks.          SECONDARY DISCHARGE DIAGNOSES  Diagnosis: MDS (myelodysplastic syndrome)  Assessment and Plan of Treatment: Follow up with your Oncologist/hematologist as outpatient within 1 week for further medical management.    Diagnosis: Hypothyroidism, unspecified type  Assessment and Plan of Treatment: Continue Synthroid as prescribed.    Diagnosis: Type 2 diabetes mellitus without complication, without long-term current use of insulin  Assessment and Plan of Treatment: Continue consistent carbohydrate diet.  Monitor blood glucose levels throughout the day before meals and at bedtime.  Record blood sugars and bring to outpatient providers appointment in order to be reviewed by your doctor for management modifications.  Be aware of diabetes management symptoms including feeling cool and clammy may be related to low glucose levels.  Feeling hot and dry may indicate high glucose levels.  If  you feel these symptoms, check your blood sugar.  Make regular podiatry appointments in order to have feet checked for wounds and toe nails cut by a doctor to prevent infections. PRINCIPAL DISCHARGE DIAGNOSIS  Diagnosis: SDH (subdural hematoma)  Assessment and Plan of Treatment: FINDINGS:  Right anterior parafalcine subdural hematoma measuring 6 mm, unchanged. No midline shift, mass or mass effect.  The size and configuration of the ventricles is unchanged as compared to the previous exam. There is severe age-related related cerebral atrophy, also unchanged.  You will need to follow up with Dr. Álvarez Neurosurgeon  in 2 weeks.  Continue Keppra for 2 weeks and will reevalaute at time of visit if will need to continue.  Hold all blood thinners at this time.    Follow up with Neurology Dr Bray within 2 weeks.          SECONDARY DISCHARGE DIAGNOSES  Diagnosis: Type 2 diabetes mellitus without complication, without long-term current use of insulin  Assessment and Plan of Treatment: Continue consistent carbohydrate diet.  Monitor blood glucose levels throughout the day before meals and at bedtime.  Record blood sugars and bring to outpatient providers appointment in order to be reviewed by your doctor for management modifications.  Be aware of diabetes management symptoms including feeling cool and clammy may be related to low glucose levels.  Feeling hot and dry may indicate high glucose levels.  If  you feel these symptoms, check your blood sugar.  Make regular podiatry appointments in order to have feet checked for wounds and toe nails cut by a doctor to prevent infections.      Diagnosis: Hypothyroidism, unspecified type  Assessment and Plan of Treatment: Continue Synthroid as prescribed.    Diagnosis: MDS (myelodysplastic syndrome)  Assessment and Plan of Treatment: Follow up with your Oncologist/hematologist as outpatient within 1 week for further medical management. PRINCIPAL DISCHARGE DIAGNOSIS  Diagnosis: SDH (subdural hematoma)  Assessment and Plan of Treatment: FINDINGS:  Right anterior parafalcine subdural hematoma measuring 6 mm, unchanged. No midline shift, mass or mass effect.  The size and configuration of the ventricles is unchanged as compared to the previous exam. There is severe age-related related cerebral atrophy, also unchanged.  You will need to follow up with Dr. Álvarez Neurosurgeon  in 2 weeks.  It is recommeded that you have a repeat Head CT scan in 1 week, Call Dr. Álvarez's office to arrange this. Continue Keppra for 2 weeks and will reevalaute at time of visit if will need to continue.  Hold all blood thinners at this time.    Follow up with Neurology Dr Bray within 2 weeks.          SECONDARY DISCHARGE DIAGNOSES  Diagnosis: Type 2 diabetes mellitus without complication, without long-term current use of insulin  Assessment and Plan of Treatment: Continue consistent carbohydrate diet.  Monitor blood glucose levels throughout the day before meals and at bedtime.  Record blood sugars and bring to outpatient providers appointment in order to be reviewed by your doctor for management modifications.  Be aware of diabetes management symptoms including feeling cool and clammy may be related to low glucose levels.  Feeling hot and dry may indicate high glucose levels.  If  you feel these symptoms, check your blood sugar.  Make regular podiatry appointments in order to have feet checked for wounds and toe nails cut by a doctor to prevent infections.      Diagnosis: Hypothyroidism, unspecified type  Assessment and Plan of Treatment: Continue Synthroid as prescribed.    Diagnosis: MDS (myelodysplastic syndrome)  Assessment and Plan of Treatment: Follow up with your Oncologist/hematologist as outpatient within 1 week for further medical management. PRINCIPAL DISCHARGE DIAGNOSIS  Diagnosis: SDH (subdural hematoma)  Assessment and Plan of Treatment: FINDINGS:  Right anterior parafalcine subdural hematoma measuring 6 mm, unchanged. No midline shift, mass or mass effect.  The size and configuration of the ventricles is unchanged as compared to the previous exam. There is severe age-related related cerebral atrophy, also unchanged.  You will need to follow up with Dr. Álvarez Neurosurgeon  in 2 weeks.  It is recommeded that you have a repeat Head CT scan in 1 week, Call Dr. Sood's  office to arrange this. Continue Keppra for 2 weeks and will reevalaute at time of visit if will need to continue.  Hold all blood thinners at this time.    Follow up with Neurology Dr Bray within 2 weeks.          SECONDARY DISCHARGE DIAGNOSES  Diagnosis: Type 2 diabetes mellitus without complication, without long-term current use of insulin  Assessment and Plan of Treatment: Continue consistent carbohydrate diet.  Monitor blood glucose levels throughout the day before meals and at bedtime.  Record blood sugars and bring to outpatient providers appointment in order to be reviewed by your doctor for management modifications.  Be aware of diabetes management symptoms including feeling cool and clammy may be related to low glucose levels.  Feeling hot and dry may indicate high glucose levels.  If  you feel these symptoms, check your blood sugar.  Make regular podiatry appointments in order to have feet checked for wounds and toe nails cut by a doctor to prevent infections.      Diagnosis: Hypothyroidism, unspecified type  Assessment and Plan of Treatment: Continue Synthroid as prescribed.    Diagnosis: MDS (myelodysplastic syndrome)  Assessment and Plan of Treatment: Follow up with your Oncologist/hematologist as outpatient within 1 week for further medical management.

## 2020-08-20 NOTE — H&P ADULT - NEGATIVE OPHTHALMOLOGIC SYMPTOMS
no lacrimation L/no lacrimation R/no blurred vision L/no photophobia/no blurred vision R/no discharge R/no discharge L

## 2020-08-20 NOTE — H&P ADULT - NSHPSOCIALHISTORY_GEN_ALL_CORE
.  Lives with the spouse.  Quit Nicotine 30 years.  Denies ETOH.  Denies Illicit/ recreational drugs.  Colonoscopy 2010: Normal.  Retired .

## 2020-08-20 NOTE — H&P ADULT - PROBLEM SELECTOR PLAN 10
1.  Name of PCP: Dr Kahlil Cedillo   2.  PCP Contacted on Admission: [ ] Y    [X] N    3.  PCP contacted at Discharge: [ ] Y    [ ] N    [ ] N/A  4.  Post-Discharge Appointment Date and Location:  5.  Summary of Handoff given to PCP:

## 2020-08-20 NOTE — H&P ADULT - HISTORY OF PRESENT ILLNESS
ADD, Atrial fibrillation, CHADSVASC= 7, CAD, stent placed 2010, Stroke with RLE weakness, Loop recorded, CHF, EF= 40%, diet controlled DM2, Gout, Hyperlipidemia, Hypertension 79M, ambulates with a wheelchair due to history of sever, neuropathy, ADD, Atrial fibrillation, CHADSVASC= 7, on ASA 81mg day, CAD, stent placed 2010, 2015 Stroke with RLE weakness, Loop recorder, CHF, EF= 40%, diet controlled DM2, Gout, Hyperlipidemia, Hypertension, MDS, receiving blood transfusions every 3 weeks. The pt was at [facility] 8/19 and completed his transfusion of 2 UPRBC. He was sitting, reached for a urinal, fell, landing on his buttocks and hitting the back of his head against a wall, The staff responded, placed ice to the back of his head and sent the pt to the ED. Denies HA, LOC, dizziness, tinnitus, blurry vision, nausea, vomit, diarrhea, constipation, abdominal pain, chest pain, palpitations, chills, body aches, dysuria.    In the Ed,   #1 Brain CT : Acute subdural hematoma involving the anterior and thoracic region  #2 Brain CT: Acute anterior para falcine 6.4 mm subdural hematoma.   The pt was seen by neuro surgery. Their consult and recommendations are in the chart. The pt received DDAVP 32 mcg IV X 1, Platelets 1 U x 1 and started on Keppra 500mg IV BID. ASA on hold.  EKG NSR @ 63b/ min, QW V1 V2 V3 2, AVF, QT/ QTC= 400/ 409. No change from EKG 11/14/12019     Vitals: T max= 98.6 oral, HR = 74b/ min, BP =152/ 63, RR= 16b/ min, SPO2@ 100%.   Currently the pt offers no complaints. Spouse at bedside able to reconcile the pt's home meds. 79M, ambulates with a wheelchair due to history of sever, neuropathy, ADD (takes ritalin bid instead of prescribed TID), Atrial fibrillation, CHADSVASC= 7, on ASA 81mg day, CAD, stent placed 2010, 2015 Stroke with RLE weakness, Loop recorder, CHF, EF= 40%, diet controlled DM2, Gout, Hyperlipidemia, Hypertension, MDS, receiving blood transfusions every 3 weeks. The pt was at [facility] 8/19 and completed his transfusion of 2 UPRBC. He was sitting, reached for a urinal, fell, landing on his buttocks and hitting the back of his head against a wall, The staff responded, placed ice to the back of his head and sent the pt to the ED. Denies HA, LOC, dizziness, tinnitus, blurry vision, nausea, vomit, diarrhea, constipation, abdominal pain, chest pain, palpitations, chills, body aches, dysuria.    In the Ed,   #1 Brain CT : Acute subdural hematoma involving the anterior and thoracic region  #2 Brain CT: Acute anterior para falcine 6.4 mm subdural hematoma.   The pt was seen by neuro surgery. Their consult and recommendations are in the chart. The pt received DDAVP 32 mcg IV X 1, ordered for Platelets 1 U x 1 and started on Keppra 500mg IV BID. ASA on hold.  EKG NSR @ 63b/ min, QW V1 V2 V3 2, AVF, QT/ QTC= 400/ 409. No change from EKG 11/14/12019     Vitals: T max= 98.6 oral, HR = 74b/ min, BP =152/ 63, RR= 16b/ min, SPO2@ 100%.   Currently the pt offers no complaints. Spouse at bedside able to reconcile the pt's home meds.

## 2020-08-20 NOTE — CONSULT NOTE ADULT - SUBJECTIVE AND OBJECTIVE BOX
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 Patient was at the [facility] yesterday and completed his transfusion of 2u pRBC. He was sitting, reached for his urinal, and then fell on his buttocks. Patient hit the back of his head against a wall. The staff at the facility placed an ice pack to the back of his head and sent the patient to the ED.     Patient denies HA, LOC, dizziness, tinnitus, blurry vision, nausea, vomit, diarrhea, constipation, abdominal pain, chest pain, palpitations, chills, body aches, dysuria.    CT head showed acute subdural hematoma involving the anterior and thoracic region. Serial CT head subsequently showed acute anterior para falcine 6.4 mm subdural hematoma. Patient received DDAVP 32 mcg IV, platelets 1 unit, and started on Keppra 500mg IV BID. Aspirin was held.    The patient was seen by neurosurgery and no acute surgical intervention was recommended.      MEDICATIONS  (STANDING):  allopurinol 100 milliGRAM(s) Oral daily  amLODIPine   Tablet 5 milliGRAM(s) Oral daily  atorvastatin 20 milliGRAM(s) Oral at bedtime  levETIRAcetam  IVPB 500 milliGRAM(s) IV Intermittent every 12 hours  levothyroxine 37.5 MICROGram(s) Oral daily  lidocaine   Patch 1 Patch Transdermal daily  methylphenidate 20 milliGRAM(s) Oral two times a day  metoprolol tartrate 50 milliGRAM(s) Oral two times a day  pantoprazole    Tablet 40 milliGRAM(s) Oral before breakfast  sodium chloride 0.9% lock flush 3 milliLiter(s) IV Push every 8 hours    MEDICATIONS  (PRN):  metoclopramide 10 milliGRAM(s) Oral daily PRN dizziness    PAST MEDICAL & SURGICAL HISTORY:  CAD (coronary artery disease)  ADD (attention deficit disorder)  Hypothyroid  Atrial fibrillation  Congestive heart failure (CHF)  Gout  Hypercholesterolemia  DM (Diabetes Mellitus)  Hypertension  Status post placement of implantable loop recorder  H/O hemorrhoidectomy  History of colonoscopy  History of tonsillectomy  Stented coronary artery    FAMILY HISTORY:  Family history of cerebrovascular accident (CVA) in father  FH: CHF (congestive heart failure): mother    Allergies    iodine (Anaphylaxis)  tetracycline (Short breath)  Tylenol (Flushing)    Intolerances        SHx - No smoking, No ETOH, No drug abuse      Review of Systems:  CONSTITUTIONAL:   HEENT:  No visual loss, blurred vision, double vision.  No hearing loss, sneezing, congestion, runny nose or sore throat.  SKIN:  No rash or itching.  CARDIOVASCULAR:  No chest pain, chest pressure or chest discomfort. No palpitations or edema.  RESPIRATORY:  No shortness of breath, cough or sputum.  GASTROINTESTINAL:  No anorexia, nausea, vomiting or diarrhea. No abdominal pain.  GENITOURINARY:  NO dysuria. No increased frequency. No retention. No incontinence.  NEUROLOGICAL:  See HPI  MUSCULOSKELETAL:  No muscle, back pain, joint pain or stiffness.  HEMATOLOGIC:  No anemia, bleeding or bruising.  PSYCHIATRIC:    ENDOCRINOLOGIC:  No reports of sweating, cold or heat intolerance. No polyuria or polydipsia.        Vital Signs Last 24 Hrs  T(C): 36.5 (20 Aug 2020 04:59), Max: 37 (19 Aug 2020 17:52)  T(F): 97.7 (20 Aug 2020 04:59), Max: 98.6 (19 Aug 2020 17:52)  HR: 98 (20 Aug 2020 07:36) (68 - 98)  BP: 117/66 (20 Aug 2020 07:36) (117/66 - 152/63)  BP(mean): 74 (19 Aug 2020 20:41) (74 - 74)  RR: 17 (20 Aug 2020 04:59) (16 - 18)  SpO2: 100% (20 Aug 2020 04:59) (96% - 100%)    PHYSICAL EXAM:  GENERAL: NAD  HEENT: Normocephalic;  conjunctivae and sclerae clear; moist mucous membranes;   NECK : supple  CHEST/LUNG: Clear to auscultation bilaterally with good air entry   HEART: S1 S2  regular; no murmurs, gallops or rubs  ABDOMEN: Soft, Nontender, Nondistended; Bowel sounds present  EXTREMITIES: no cyanosis; no edema; no calf tenderness  SKIN: warm and dry; no rash             Neurological Exam:  - Mental Status:  AAOx3; speech is fluent with intact naming, repetition, and comprehension  - Cranial Nerves II-XII:    II:  PERRLA; visual fields are full to confrontation  III, IV, VI:  EOMI, no nystagmus  V:  facial sensation is intact in the V1-V3 distribution bilaterally.  VII:  face is symmetric with normal eye closure and smile  VIII:  hearing is intact to finger rub  IX, X:  uvula is midline and soft palate rises symmetrically  XI:  head turning and shoulder shrug are intact bilaterally  XII:  tongue protrudes in the midline  - Motor:  strength is 5/5 throughout; normal muscle bulk and tone throughout; no pronator drift  - Reflexes:  2+ and symmetric at the biceps, triceps, brachioradialis, knees, and ankles;  plantar reflexes downgoing bilaterally  - Sensory:  intact to light touch, pin prick, vibration, and joint-position sense throughout  - Coordination:  finger-nose-finger and heel-knee-shin intact without dysmetria; rapid alternating hand movements intact  - Gait:  normal steps, base, arm swing, and turning; heel and toe walking are normal; tandem gait is normal; Romberg testing is negative    Other:    08-20    142  |  108<H>  |  56<H>  ----------------------------<  133<H>  3.9   |  22  |  1.13    Ca    9.3      20 Aug 2020 05:30  Phos  3.5     08-20  Mg     1.7     08-20                              8.6    7.45  )-----------( 366      ( 20 Aug 2020 05:30 )             25.1       Radiology    CT:   MRI  EKG:  tele:  TTE:  EEG: HPI:  78 y/o male with PMHx severe neuropathy, ADD, atrial fibrillation s/p Loop recorder, CAD w/ stent placed 2010 on Aspirin daily, CHFrEF (40%), 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 Patient was at the [facility] yesterday for schedule blood transfusion. He was was completing his second transfusion for pRBC at approximately 2:15PM when he had an unwitnessed fall and treatment was subsequently stopped. Patient reports that he was sitting on the edge of his chair, reached for his urinal, and then fell onto the floor on his buttocks because he lost his balance. He hit the back of his head against the adjacent wall. The staff at the facility helped patient back onto chair, placed an ice pack to the back of his head, and sent the patient to the ED.     Patient has mild discomfort over the back of his head; he denies headaches, LOC, lightheadedness, dizziness, weakness, numbness, tingling, blurry vision, nausea, vomiting, abdominal pain, chest pain, palpitations, fevers, chills, myalgias.    CT head showed acute subdural hematoma involving the anterior and thoracic region. Serial CT head subsequently showed acute anterior parafalcine 6.4 mm subdural hematoma. Patient received DDAVP 32 mcg IV, platelets 1 unit, and started on Keppra 500mg BID. Aspirin was held. The patient was evaluated by Neurosurgery team and no acute surgical intervention was recommended.    MEDICATIONS  (STANDING):  allopurinol 100 milliGRAM(s) Oral daily  amLODIPine   Tablet 5 milliGRAM(s) Oral daily  atorvastatin 20 milliGRAM(s) Oral at bedtime  levETIRAcetam  IVPB 500 milliGRAM(s) IV Intermittent every 12 hours  levothyroxine 37.5 MICROGram(s) Oral daily  lidocaine   Patch 1 Patch Transdermal daily  methylphenidate 20 milliGRAM(s) Oral two times a day  metoprolol tartrate 50 milliGRAM(s) Oral two times a day  pantoprazole    Tablet 40 milliGRAM(s) Oral before breakfast  sodium chloride 0.9% lock flush 3 milliLiter(s) IV Push every 8 hours    MEDICATIONS  (PRN):  metoclopramide 10 milliGRAM(s) Oral daily PRN dizziness    PAST MEDICAL & SURGICAL HISTORY:  CAD (coronary artery disease)  ADD (attention deficit disorder)  Hypothyroid  Atrial fibrillation  Congestive heart failure (CHF)  Gout  Hypercholesterolemia  DM (Diabetes Mellitus)  Hypertension  Status post placement of implantable loop recorder  H/O hemorrhoidectomy  History of colonoscopy  History of tonsillectomy  Stented coronary artery    FAMILY HISTORY:  Family history of cerebrovascular accident (CVA) in father  FH: CHF (congestive heart failure): mother    Allergies  iodine (Anaphylaxis)  tetracycline (Short breath)  Tylenol (Flushing)    SHx - No smoking, No ETOH, No drug abuse    Review of Systems:  CONSTITUTIONAL:   HEENT:  No visual loss, blurred vision, double vision.  No hearing loss, sneezing, congestion, runny nose or sore throat.  SKIN:  No rash or itching.  CARDIOVASCULAR:  No chest pain, chest pressure or chest discomfort. No palpitations or edema.  RESPIRATORY:  No shortness of breath, cough or sputum.  GASTROINTESTINAL:  No anorexia, nausea, vomiting or diarrhea. No abdominal pain.  GENITOURINARY:  NO dysuria. No increased frequency. No retention. No incontinence.  NEUROLOGICAL:  See HPI  MUSCULOSKELETAL:  No muscle, back pain, joint pain or stiffness.  HEMATOLOGIC:  No anemia, bleeding or bruising.    Vital Signs Last 24 Hrs  T(C): 36.5 (20 Aug 2020 04:59), Max: 37 (19 Aug 2020 17:52)  T(F): 97.7 (20 Aug 2020 04:59), Max: 98.6 (19 Aug 2020 17:52)  HR: 98 (20 Aug 2020 07:36) (68 - 98)  BP: 117/66 (20 Aug 2020 07:36) (117/66 - 152/63)  BP(mean): 74 (19 Aug 2020 20:41) (74 - 74)  RR: 17 (20 Aug 2020 04:59) (16 - 18)  SpO2: 100% (20 Aug 2020 04:59) (96% - 100%)    PHYSICAL EXAM:  GENERAL: NAD  HEENT: Normocephalic;  conjunctivae and sclerae clear; moist mucous membranes;   NECK : supple  CHEST/LUNG: Clear to auscultation bilaterally with good air entry   HEART: S1 S2  regular; no murmurs, gallops or rubs  ABDOMEN: Soft, Nontender, Nondistended; Bowel sounds present  EXTREMITIES: no cyanosis; no edema; no calf tenderness  SKIN: warm and dry; no rash             Neurological Exam:  - Mental Status:  AAOx3; speech is fluent with intact naming, repetition, and comprehension  - Cranial Nerves II-XII:    II:  PERRLA; visual fields are full to confrontation  III, IV, VI:  EOMI, no nystagmus  V:  facial sensation is intact in the V1-V3 distribution bilaterally.  VII:  face is symmetric with normal eye closure and smile  VIII:  hearing is intact to finger rub  IX, X:  uvula is midline and soft palate rises symmetrically  XI:  head turning and shoulder shrug are intact bilaterally  XII:  tongue protrudes in the midline  - Motor:  strength is 5/5 throughout; normal muscle bulk and tone throughout; no pronator drift  - Reflexes:  2+ and symmetric at the biceps, triceps, brachioradialis, knees, and ankles;  plantar reflexes downgoing bilaterally  - Sensory:  intact to light touch, pin prick, vibration, and joint-position sense throughout  - Coordination:  finger-nose-finger and heel-knee-shin intact without dysmetria; rapid alternating hand movements intact  - Gait:  normal steps, base, arm swing, and turning; heel and toe walking are normal; tandem gait is normal; Romberg testing is negative    08-20    142  |  108<H>  |  56<H>  ----------------------------<  133<H>  3.9   |  22  |  1.13    Ca    9.3      20 Aug 2020 05:30  Phos  3.5     08-20  Mg     1.7     08-20                          8.6    7.45  )-----------( 366      ( 20 Aug 2020 05:30 )             25.1     Prothrombin Time and INR, Plasma in AM (08.19.20 @ 18:20)    Prothrombin Time, Plasma: 13.1 SEC     Activated Partial Thromboplastin Time in AM (08.19.20 @ 18:20)    Activated Partial Thromboplastin Time: 32.8: Recommended therapeutic heparin range (full dose) for APTT  is 58-99 seconds.    Radiology  CT Head No Cont (08.20.20 @ 10:28)   IMPRESSION:  Unchanged right anterior parafalcine subdural hematoma.    CT Head No Cont (08.19.20 @ 21:43)  Brain CT: Acute anterior parafalcine 6.4 mm subdural hematoma    Cervical spine CT: No evidence for acute displaced fracture or traumatic malalignment. 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 Patient was at the [facility] yesterday for schedule blood transfusion. He was was completing his second transfusion for pRBC at approximately 2:15PM when he had an unwitnessed fall and treatment was subsequently stopped. Patient reports that he was sitting on the edge of his chair, reached for his urinal, and then fell onto the floor on his buttocks because he lost his balance. He hit the back of his head against the adjacent wall. The staff at the facility helped patient back onto chair, placed an ice pack to the back of his head, and sent the patient to the ED. Patient has mild discomfort over the back of his head and back; he denies headaches, LOC, lightheadedness, dizziness, weakness, numbness, tingling, blurry vision, nausea, vomiting, abdominal pain, chest pain, palpitations, fevers, chills, myalgias. Collateral information was obtained by wife at bedside. She states his last session of chemotherapy was in February 2020 and frequency of blood transfusions have been once every 3 weeks (blood draws are once a week).     Patient has been wheelchair bound for 2 years. He had multiple falls prior to that and could not ambulate without 1 or 2 person assistance. Wife states that he has becoming much stronger since his stroke after physical therapy. Patient is able to complete basic ADLs on his own. Patient states he has Patient has good family support and lives with his wife, 6 children are close by. They currently do not have HHA services.    CT head showed acute subdural hematoma involving the anterior and thoracic region. Serial CT head subsequently showed acute anterior parafalcine 6.4 mm subdural hematoma. Patient received DDAVP 32 mcg IV, platelets 1 unit, and started on Keppra 500mg BID. Aspirin was held. The patient was evaluated by Neurosurgery team and no acute surgical intervention was recommended.    MEDICATIONS  (STANDING):  allopurinol 100 milliGRAM(s) Oral daily  amLODIPine   Tablet 5 milliGRAM(s) Oral daily  atorvastatin 20 milliGRAM(s) Oral at bedtime  levETIRAcetam  IVPB 500 milliGRAM(s) IV Intermittent every 12 hours  levothyroxine 37.5 MICROGram(s) Oral daily  lidocaine   Patch 1 Patch Transdermal daily  methylphenidate 20 milliGRAM(s) Oral two times a day  metoprolol tartrate 50 milliGRAM(s) Oral two times a day  pantoprazole    Tablet 40 milliGRAM(s) Oral before breakfast  sodium chloride 0.9% lock flush 3 milliLiter(s) IV Push every 8 hours    MEDICATIONS  (PRN):  metoclopramide 10 milliGRAM(s) Oral daily PRN dizziness    PAST MEDICAL & SURGICAL HISTORY:  CAD (coronary artery disease)  ADD (attention deficit disorder)  Hypothyroid  Atrial fibrillation  Congestive heart failure (CHF)  Gout  Hypercholesterolemia  DM (Diabetes Mellitus)  Hypertension  Status post placement of implantable loop recorder  H/O hemorrhoidectomy  History of colonoscopy  History of tonsillectomy  Stented coronary artery    FAMILY HISTORY:  Family history of cerebrovascular accident (CVA) in father  FH: CHF (congestive heart failure): mother    Allergies  iodine (Anaphylaxis)  tetracycline (Short breath)  Tylenol (Flushing)    SHx - No smoking, No ETOH, No drug abuse    Review of Systems:  HEENT: No visual loss, blurred vision, double vision. No hearing loss, sneezing, congestion, runny nose or sore throat.  SKIN:  No rash or itching.  CARDIOVASCULAR:  No chest pain, chest pressure or chest discomfort. No palpitations or edema.  RESPIRATORY: No shortness of breath, cough or sputum.  GASTROINTESTINAL: No anorexia, nausea, vomiting or diarrhea. No abdominal pain.  GENITOURINARY:  No dysuria. No increased frequency. No retention. No incontinence.  NEUROLOGICAL:  See HPI  MUSCULOSKELETAL:  No muscle, back pain, joint pain or stiffness.  HEMATOLOGIC: No anemia, bleeding or bruising.    Vital Signs Last 24 Hrs  T(C): 36.5 (20 Aug 2020 04:59), Max: 37 (19 Aug 2020 17:52)  T(F): 97.7 (20 Aug 2020 04:59), Max: 98.6 (19 Aug 2020 17:52)  HR: 98 (20 Aug 2020 07:36) (68 - 98)  BP: 117/66 (20 Aug 2020 07:36) (117/66 - 152/63)  BP(mean): 74 (19 Aug 2020 20:41) (74 - 74)  RR: 17 (20 Aug 2020 04:59) (16 - 18)  SpO2: 100% (20 Aug 2020 04:59) (96% - 100%)    PHYSICAL EXAM:  GENERAL: No acute distress, lying in bed comfortably with eyes closed  HEENT: Normocephalic; conjunctivae and sclerae clear; moist mucous membranes;   NECK: Supple  ABDOMEN: Soft, non-tender, non-distended  EXTREMITIES: No cyanosis; no edema; no calf tenderness  SKIN: Warm and dry; no rash             Neurological Exam:  - Mental Status: Alert and oriented to person, place, year, month, date, president; speech is fluent with intact naming, repetition, and comprehension. Follows crossed commands. Can spell WORLD backwards. Cannot complete serial 7s.  - Cranial Nerves II-XII:    II:  PERRLA; visual fields are full to confrontation  III, IV, VI:  EOMI, 4-5 beats of nystagmus with extreme right lateral gaze  V:  facial sensation is intact in the V1-V3 distribution bilaterally.  VII:  face is symmetric with normal eye closure and smile  VIII:  hearing is intact to finger rub  IX, X:  uvula is midline and soft palate rises symmetrically  XI:  head turning and shoulder shrug are intact bilaterally  XII:  tongue protrudes in the midline  - Motor: strength is 5/5 throughout for UE and LE b/l; normal muscle bulk and tone throughout; no pronator drift; no tremors or fasciculations.  - Reflexes: 1+ and symmetric at the biceps, triceps, brachioradialis, knees, and ankles; plantar reflexes downgoing bilaterally  - Sensory: intact to light touch and pin prick, symmetrical in all 4 extremities; impaired proprioception of big toes and ankle bilaterally  - Coordination:  finger-nose-finger without dysmetria; rapid alternating hand movements intact  - Gait: deferred    08-20    142  |  108<H>  |  56<H>  ----------------------------<  133<H>  3.9   |  22  |  1.13    Ca    9.3      20 Aug 2020 05:30  Phos  3.5     08-20  Mg     1.7     08-20                          8.6    7.45  )-----------( 366      ( 20 Aug 2020 05:30 )             25.1     Prothrombin Time and INR, Plasma in AM (08.19.20 @ 18:20)    Prothrombin Time, Plasma: 13.1 SEC     Activated Partial Thromboplastin Time in AM (08.19.20 @ 18:20)    Activated Partial Thromboplastin Time: 32.8: Recommended therapeutic heparin range (full dose) for APTT  is 58-99 seconds.    Radiology  CT Head No Cont (08.20.20 @ 10:28)   IMPRESSION:  Unchanged right anterior parafalcine subdural hematoma.    CT Head No Cont (08.19.20 @ 21:43)  Brain CT: Acute anterior parafalcine 6.4 mm subdural hematoma    Cervical spine CT: No evidence for acute displaced fracture or traumatic malalignment. 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 Patient was at the [facility] yesterday for schedule blood transfusion. He was was completing his second transfusion for pRBC at approximately 2:15PM when he had an unwitnessed fall and treatment was subsequently stopped. Patient reports that he was sitting on the edge of his chair, reached for his urinal, and then fell onto the floor on his buttocks because he lost his balance. He hit the back of his head against the adjacent wall. The staff at the facility helped patient back onto chair, placed an ice pack to the back of his head, and sent the patient to the ED. Patient has mild discomfort over the back of his head and back; he denies headaches, LOC, lightheadedness, dizziness, weakness, numbness, tingling, blurry vision, nausea, vomiting, abdominal pain, chest pain, palpitations, fevers, chills, myalgias. Collateral information was obtained by wife at bedside. She states his last session of chemotherapy was in February 2020 and frequency of blood transfusions have been once every 3 weeks (blood draws are once a week).     Patient has been wheelchair bound for 2 years. He had multiple falls prior to that and could not ambulate without 1 or 2 person assistance. Wife states that he has becoming much stronger since his stroke after physical therapy. Patient is able to complete basic ADLs on his own. Patient states he has Patient has good family support and lives with his wife, 6 children are close by. They currently do not have HHA services.    CT head showed acute subdural hematoma involving the anterior and thoracic region. Serial CT head subsequently showed acute anterior parafalcine 6.4 mm subdural hematoma. Patient received DDAVP 32 mcg IV, 1U platelet, and started on Keppra 500mg BID. Aspirin was held. The patient was evaluated by Neurosurgery team and no acute surgical intervention was recommended.    MEDICATIONS  (STANDING):  allopurinol 100 milliGRAM(s) Oral daily  amLODIPine   Tablet 5 milliGRAM(s) Oral daily  atorvastatin 20 milliGRAM(s) Oral at bedtime  levETIRAcetam  IVPB 500 milliGRAM(s) IV Intermittent every 12 hours  levothyroxine 37.5 MICROGram(s) Oral daily  lidocaine   Patch 1 Patch Transdermal daily  methylphenidate 20 milliGRAM(s) Oral two times a day  metoprolol tartrate 50 milliGRAM(s) Oral two times a day  pantoprazole    Tablet 40 milliGRAM(s) Oral before breakfast  sodium chloride 0.9% lock flush 3 milliLiter(s) IV Push every 8 hours    MEDICATIONS  (PRN):  metoclopramide 10 milliGRAM(s) Oral daily PRN dizziness    PAST MEDICAL & SURGICAL HISTORY:  CAD (coronary artery disease)  ADD (attention deficit disorder)  Hypothyroid  Atrial fibrillation  Congestive heart failure (CHF)  Gout  Hypercholesterolemia  DM (Diabetes Mellitus)  Hypertension  Status post placement of implantable loop recorder  H/O hemorrhoidectomy  History of colonoscopy  History of tonsillectomy  Stented coronary artery    FAMILY HISTORY:  Family history of cerebrovascular accident (CVA) in father  FH: CHF (congestive heart failure): mother    Allergies  iodine (Anaphylaxis)  tetracycline (Short breath)  Tylenol (Flushing)    SHx - No smoking, No ETOH, No drug abuse    Review of Systems:  HEENT: No visual loss, blurred vision, double vision. No hearing loss, sneezing, congestion, runny nose or sore throat.  SKIN:  No rash or itching.  CARDIOVASCULAR:  No chest pain, chest pressure or chest discomfort. No palpitations or edema.  RESPIRATORY: No shortness of breath, cough or sputum.  GASTROINTESTINAL: No anorexia, nausea, vomiting or diarrhea. No abdominal pain.  GENITOURINARY:  No dysuria. No increased frequency. No retention. No incontinence.  NEUROLOGICAL:  See HPI  MUSCULOSKELETAL:  No muscle, back pain, joint pain or stiffness.  HEMATOLOGIC: No anemia, bleeding or bruising.    Vital Signs Last 24 Hrs  T(C): 36.5 (20 Aug 2020 04:59), Max: 37 (19 Aug 2020 17:52)  T(F): 97.7 (20 Aug 2020 04:59), Max: 98.6 (19 Aug 2020 17:52)  HR: 98 (20 Aug 2020 07:36) (68 - 98)  BP: 117/66 (20 Aug 2020 07:36) (117/66 - 152/63)  BP(mean): 74 (19 Aug 2020 20:41) (74 - 74)  RR: 17 (20 Aug 2020 04:59) (16 - 18)  SpO2: 100% (20 Aug 2020 04:59) (96% - 100%)    PHYSICAL EXAM:  GENERAL: No acute distress, lying in bed comfortably with eyes closed  HEENT: Normocephalic; conjunctivae and sclerae clear; moist mucous membranes;   NECK: Supple  ABDOMEN: Soft, non-tender, non-distended  EXTREMITIES: No cyanosis; no edema; no calf tenderness  SKIN: Warm and dry; no rash             Neurological Exam:  - Mental Status: Alert and oriented to person, place, year, month, date, president; speech is fluent with intact naming, repetition, and comprehension. Follows crossed commands. Can spell WORLD backwards. Cannot complete serial 7s.  - Cranial Nerves II-XII:    II:  PERRLA; visual fields are full to confrontation  III, IV, VI:  EOMI, 4-5 beats of nystagmus with extreme right lateral gaze  V:  facial sensation is intact in the V1-V3 distribution bilaterally.  VII:  face is symmetric with normal eye closure and smile  VIII:  hearing is intact to finger rub  IX, X:  uvula is midline and soft palate rises symmetrically  XI:  head turning and shoulder shrug are intact bilaterally  XII:  tongue protrudes in the midline  - Motor: strength is 5/5 throughout for UE and LE b/l; normal muscle bulk and tone throughout; no pronator drift; no tremors or fasciculations.  - Reflexes: 1+ and symmetric at the biceps, triceps, brachioradialis, knees, and ankles; plantar reflexes downgoing bilaterally  - Sensory: intact to light touch and pin prick, symmetrical in all 4 extremities; impaired proprioception of big toes and ankle bilaterally  - Coordination:  finger-nose-finger without dysmetria; rapid alternating hand movements intact  - Gait: deferred    08-20    142  |  108<H>  |  56<H>  ----------------------------<  133<H>  3.9   |  22  |  1.13    Ca    9.3      20 Aug 2020 05:30  Phos  3.5     08-20  Mg     1.7     08-20                          8.6    7.45  )-----------( 366      ( 20 Aug 2020 05:30 )             25.1     Prothrombin Time and INR, Plasma in AM (08.19.20 @ 18:20)    Prothrombin Time, Plasma: 13.1 SEC     Activated Partial Thromboplastin Time in AM (08.19.20 @ 18:20)    Activated Partial Thromboplastin Time: 32.8: Recommended therapeutic heparin range (full dose) for APTT  is 58-99 seconds.    Radiology  CT Head No Cont (08.20.20 @ 10:28)   IMPRESSION:  Unchanged right anterior parafalcine subdural hematoma.    CT Head No Cont (08.19.20 @ 21:43)  Brain CT: Acute anterior parafalcine 6.4 mm subdural hematoma    Cervical spine CT: No evidence for acute displaced fracture or traumatic malalignment. Cervical degenerative spondylosis, as described above

## 2020-08-20 NOTE — CHART NOTE - NSCHARTNOTEFT_GEN_A_CORE
Neurosurgery f/u    Repeat CT head shows Right anterior parafalcine subdural hematoma measuring 6 mm, unchanged. No midline shift, mass or mass effect.  The size and configuration of the ventricles is unchanged as compared to the previous exam. There is severe age-related related cerebral atrophy, also unchanged.  No calvarial fracture.  No acute neurosurgical intervention needed, patient may f/u with Dr. Álvarez in 2 weeks, call upon discharge to schedule appointment.   Hold all blood thinners till cleared by neurosurgeon.  Continue keppra x 2 weeks, if no seizure activity over that time will discontinue.

## 2020-08-20 NOTE — H&P ADULT - GASTROINTESTINAL DETAILS
no rebound tenderness/no rigidity/no masses palpable/bowel sounds normal/nontender/no distention/no bruit/soft/no guarding

## 2020-08-21 NOTE — CHART NOTE - NSCHARTNOTEFT_GEN_A_CORE
Pt's hgb today is 8.8 We recommend another unit of prbc prior to discharge today. No objection from heme standpoint for discharge with followup with Dr. Elizondo outpatient today.     Shiela Escamilla MD  Hematology Oncology Fellow, PGY-5  [facility] Pager: [phone]/ [facility] Pager: [phone]

## 2020-08-21 NOTE — CHART NOTE - NSCHARTNOTEFT_GEN_A_CORE
pt seen and examined w/ wife at bedside at 1p  feels well  for dc home today  outpt f/u w/ Dr. Bray for CT head repeat as outpt  pt to go home after 1 more unit of prbc given  time 40min

## 2020-08-21 NOTE — PHYSICAL THERAPY INITIAL EVALUATION ADULT - PATIENT PROFILE REVIEW, REHAB EVAL
Activity - bedrest. Noted pt. receiving PRBC transfusion. Spoke with RN Livier MEANS, pt. ok to be seen for PT Evaluation./yes

## 2020-08-21 NOTE — PHYSICAL THERAPY INITIAL EVALUATION ADULT - GENERAL OBSERVATIONS, REHAB EVAL
Consult received, chart reviewed. Patient received semisupine in bed, no apparent distress, wife present.

## 2020-08-21 NOTE — PHYSICAL THERAPY INITIAL EVALUATION ADULT - ADDITIONAL COMMENTS
Pt. owns a wheelchair and hospital bed. Pt. reports he has been non-ambulatory for years secondary to neuropathy and requires assistance with bed<>wheelchair transfers.     Pt. was left semisupine in bed post PT Evaluation, no apparent distress, all lines intact, call bell within reach, wife present.

## 2020-08-21 NOTE — DISCHARGE NOTE NURSING/CASE MANAGEMENT/SOCIAL WORK - PATIENT PORTAL LINK FT
You can access the FollowMyHealth Patient Portal offered by [facility] by registering at the following website: [email or web address]. By joining [facility]’s FollowMyHealth portal, you will also be able to view your health information using other applications (apps) compatible with our system.

## 2020-08-21 NOTE — PHYSICAL THERAPY INITIAL EVALUATION ADULT - PERTINENT HX OF CURRENT PROBLEM, REHAB EVAL
Pt. admitted s/p fall with anterior interhemispheric subdural hematoma. Per neurosurgery documentation, no acute neurosurgical intervention needed.

## 2020-08-27 NOTE — HISTORY OF PRESENT ILLNESS
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 MRA brain: No flow-limiting stenosis or MRA evidence of aneurysm of the intracranial arteries. \par \par Transfusion independent until 8/2019.  Repeat bone marrow biopsy 9/13/19- Myelodysplastic syndrome (history of isolated del(5q))\par with progression to MDS with excess blasts- approximately 7% of cellularity. p53 show>1% strongly positive cells\par Abnormal male karyotype- 46,XY,del(5)(q13q33)    {20    }\par  [de-identified] : Patient presents for a follow up appointment, he is accompanied by his wife.  He had an incident when he was in the treatment room on 8/19, had an unwitnessed fall after reaching for the urinal.  He was sent for a noncontrast CT scan which revealed an acute right parafalcine subdural hematoma measuring 6.4 mm.  He was admitted from 8/19-8/21.  \par Overall he has been improving over the last few months.  Requiring blood monthly.  He has no headache, no visual changes, no change in his generalized weakness/neuropathy.  He has not had an further UTI's, has been eating and drinking as usual.  His unsteadiness with ambulation is better, able to ambulate better at home, perform some ADLs.  \par No chest pain, no SOB at rest, +dyspnea with exertion, no abdominal pain, no n/v/d.

## 2020-08-27 NOTE — REVIEW OF SYSTEMS
[Fever] : no fever [Chills] : no chills [Night Sweats] : no night sweats [Recent Change In Weight] : ~T no recent weight change [Fatigue] : fatigue [Incontinence] : no incontinence [Muscle Weakness] : muscle weakness [Difficulty Walking] : difficulty walking [Negative] : Allergic/Immunologic [FreeTextEntry9] : generalized weakness [de-identified] : is unsteady on feet, +neuropathy

## 2020-08-27 NOTE — ASSESSMENT
[FreeTextEntry1] : This is a 79 year old male with myelodysplastic syndrome previously with 5q deletion, low risk disease, IPSS score of 2.\par He received treatment with Revlimid in March-April 2018.\par His counts had improved for approx 1 year, but worsened towards Aug 2019.  Repeat marrow revealed MDS with excess blasts, continued del5q.  \par He received 3 cycles of vidaza, with gaps in treatment due to UTI/ARF/weakness.  He has improved clinically after treatment with fosfomycin for his UTI.  Vidaza was stopped due to COVID pandemic and he has been tolerating transfusional support.  For now he is receiving PRBC monthly.  \par If his transfusional requirements worsen or if his WBC/plt worsen, will consider a repeat bone marrow biopsy at that time.  \par Will keep his PRBC closer to 10 gm/dL.  Plan for weekly or twice weekly labs depending on his Hg trend.  \par He has a repeat head CT planned for today- with decrease in size of his bleed.  He has a follow up with Dr. Bray later in the week.   \par His wife will accompany him to his visits. \par Check his CMP, UA/urine culture next week.\par He will follow up in 1 month.

## 2020-08-27 NOTE — PHYSICAL EXAM
[Restricted in physically strenuous activity but ambulatory and able to carry out work of a light or sedentary nature] : Status 1- Restricted in physically strenuous activity but ambulatory and able to carry out work of a light or sedentary nature, e.g., light house work, office work [Normal] : affect appropriate [de-identified] : CN II-XII intact, no focal deficits [de-identified] : no suprapubic tenderness, no CVA tenderness

## 2020-08-28 PROBLEM — E11.42 DIABETIC POLYNEUROPATHY ASSOCIATED WITH TYPE 2 DIABETES MELLITUS: Status: ACTIVE | Noted: 2018-09-27

## 2020-08-28 PROBLEM — G95.9 CERVICAL MYELOPATHY: Status: ACTIVE | Noted: 2018-09-27

## 2020-08-28 PROBLEM — Z86.79 HISTORY OF SUBDURAL HEMATOMA: Status: RESOLVED | Noted: 2020-01-01 | Resolved: 2020-01-01

## 2020-08-28 NOTE — PHYSICAL EXAM
[Person] : oriented to person [Place] : oriented to place [Time] : oriented to time [Concentration Intact] : normal concentrating ability [Visual Intact] : visual attention was ~T not ~L decreased [Naming Objects] : no difficulty naming common objects [Writing A Sentence] : no difficulty writing a sentence [Repeating Phrases] : no difficulty repeating a phrase [Fluency] : fluency intact [Comprehension] : comprehension intact [Reading] : reading intact [Past History] : adequate knowledge of personal past history [Cranial Nerves Optic (II)] : visual acuity intact bilaterally,  visual fields full to confrontation, pupils equal round and reactive to light [Cranial Nerves Facial (VII)] : face symmetrical [Cranial Nerves Trigeminal (V)] : facial sensation intact symmetrically [Cranial Nerves Oculomotor (III)] : extraocular motion intact [Cranial Nerves Glossopharyngeal (IX)] : tongue and palate midline [Cranial Nerves Accessory (XI - Cranial And Spinal)] : head turning and shoulder shrug symmetric [Cranial Nerves Vestibulocochlear (VIII)] : hearing was intact bilaterally [Cranial Nerves Hypoglossal (XII)] : there was no tongue deviation with protrusion [No Muscle Atrophy] : normal bulk in all four extremities [Motor Tone] : muscle tone was normal in all four extremities [Hand Weakness Right] : normal hand  [Hand Weakness Left] : normal hand  [+4] : knee flexion +4/5 [5] : ankle eversion 5/5 [Romberg's Sign] : a positive Romberg's sign was present [Vibration Decrease Leg / Foot Both Ankles] : decreased at both ankles [Sensation Tactile Decrease] : light touch was intact [Position Sensation Decrease Leg/Foot At Level Of Ankle] : impaired at the ankle in both legs [Vibration Decrease Leg / Foot Toes Both Feet] : decreased at the toes of both feet  [Position Sensation Decrease Leg/Foot At Level Of Toes] : impaired at the toes in both legs [Tremor] : no tremor present [Past-pointing] : there was no past-pointing [3+] : Triceps left 3+ [2+] : Patella left 2+ [Plantar Reflex Right Only] : normal on the right [Plantar Reflex Left Only] : normal on the left [0] : Ankle jerk left 0 [FreeTextEntry6] : exaggerated cervical kyphosis, ?pectoral jerks bilaterally [FreeTextEntry8] : wide-based and ataxic gait

## 2020-08-28 NOTE — ASSESSMENT
[FreeTextEntry1] : Patient had fall with hitting head and very small falcian SDH, resolved, no indication for AED's and can resume ASA 81mg\par \par f/u with me in [place]

## 2020-08-28 NOTE — HISTORY OF PRESENT ILLNESS
[FreeTextEntry1] : Patient was recently at [facility] after hitting his head from a fall with LOC, no seizure activity.  He was in [facility] 2 days, now is home.  Initially head CT showed very small right frontal falcian SDH and repeat head CT two days ago shows almost complete resolution.  There was never any ICH component, ASA was help out of an abundance of caution. \par \par He was started

## 2020-10-01 NOTE — PATIENT PROFILE ADULT - NSPROMEDSBROUGHTTOHOSP_GEN_A_NUR
The patient called she was being instructed by her fertility doctor to make an appointment for her abnormal ultrasound. When speaking to the pt it was explained to her that she needed to establish care for her pregnancy which ment make her first OB appointment then we would be able to schedule her with Dr Werner. The patient informed the office that she will be seeing a Dr Mandujano in [place] so she did not wish to make her appointementa to establish care for pregnancy. The patient then requested to establish here that way she could be seen with Dr Werner. When scheduling her appointment the patient was very rude and raising her voice stating that she can only be seen on weds that is her day off. She was going on about how this office cannot help her that we are unaccommodating.  The patient said she don't need this bull #$% and that she dont need these appointments. \"she aint coming here\"    no

## 2020-10-07 NOTE — HISTORY OF PRESENT ILLNESS
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 MRA brain: No flow-limiting stenosis or MRA evidence of aneurysm of the intracranial arteries. \par \par Transfusion independent until 8/2019.  Repeat bone marrow biopsy 9/13/19- Myelodysplastic syndrome (history of isolated del(5q))\par with progression to MDS with excess blasts- approximately 7% of cellularity. p53 show>1% strongly positive cells\par Abnormal male karyotype- 46,XY,del(5)(q13q33)     {20     }\par  [de-identified] : Patient presents for a follow up appointment, he is accompanied by his wife.  \par He overall feels well, has no complaints.  He has been clinically improving, appetite is good.  He has no headache, no dizziness, no chest pain, no SOB, no abdominal c/o.  He denies any dysuria, but his wife states he has frequency.  No fever/chills, his urinalysis has been normal.

## 2020-10-07 NOTE — REVIEW OF SYSTEMS
[Fatigue] : fatigue [Muscle Weakness] : muscle weakness [Difficulty Walking] : difficulty walking [Negative] : Allergic/Immunologic [Fever] : no fever [Chills] : no chills [Night Sweats] : no night sweats [Recent Change In Weight] : ~T no recent weight change [Incontinence] : no incontinence [FreeTextEntry9] : generalized weakness [de-identified] : is unsteady on feet, +neuropathy

## 2020-10-07 NOTE — ASSESSMENT
[FreeTextEntry1] : This is a 80 year old male with myelodysplastic syndrome previously with 5q deletion, low risk disease, IPSS score of 2.\par He received treatment with Revlimid in March-April 2018.\par His counts had improved for approx 1 year, but worsened towards Aug 2019.  Repeat marrow revealed MDS with excess blasts, continued del5q.  \par He received 3 cycles of vidaza, with gaps in treatment due to UTI/ARF/weakness.  He has improved clinically after treatment with fosfomycin for his UTI.  Vidaza was stopped due to COVID pandemic and he has been tolerating transfusional support.  For now he is receiving PRBC monthly.  \par If his transfusional requirements worsen or if his WBC/plt worsen, will consider a repeat bone marrow biopsy at that time.  \par Will keep his PRBC closer to 10 gm/dL.  Plan for weekly labs depending on his Hg trend.  \par If any worsening in his urinary symptoms, they will call.  \par Will likely repeat a bone marrow biopsy after his next visit and consider restarting treatment with vidaza depending on results.  Will plan on repeat empiric treatment for UTI prior to chemotherapy. \par His wife will accompany him to his visits. \par He will follow up in 1 month.

## 2020-10-07 NOTE — PHYSICAL EXAM
[Restricted in physically strenuous activity but ambulatory and able to carry out work of a light or sedentary nature] : Status 1- Restricted in physically strenuous activity but ambulatory and able to carry out work of a light or sedentary nature, e.g., light house work, office work [Normal] : affect appropriate [de-identified] : no suprapubic tenderness, no CVA tenderness [de-identified] : CN II-XII intact, no focal deficits

## 2020-12-03 NOTE — HISTORY OF PRESENT ILLNESS
[FreeTextEntry1] : Mr. Kesha Walker is a pleasant 80 year old male with CAD, myelodysplastic syndrome, CAD, DM2, HTN, and Hyperlipidemia sent by his Nephrologist Dr. Garcia for UTI. \par \par He was admitted 11/14- 11/16/2019 for UTI. UA with pyuria >50WBC, Urine culture with genital sandie. Was treated with ceftriaxone and discharged with oral cefpodoxime. \par \par Again admitted Dec 6- Dec 10, 2019 for weakness, found to have a negative UA, but urine culture with PA 10-49,000. He states that after leaving the hospital, was given a course of fosfomycin for UTI.\par \par He states in terms of infections, he had a liver abscess in 2010 that grew Strep suspected from a dental source. \par \par He remains with neuropathy of his bilateral LE, and weakness of his LE, wheelchair bound. \par \par Recently he was having increased frequency and burning with urination. He was started on Monurol twice, now on Cipro for three days with improvement. He also noticed that during having a UTI he had his balance off and he could not move in bed. Now after starting abx he is out of bed today.\par \par No fevers. Had fever two weeks two days after a blood transfusion.\par \par Scheduled for blood transfusion this Sat.

## 2020-12-03 NOTE — ASSESSMENT
[FreeTextEntry1] : Mr. PIZARRO is a pleasant 79 year old male presenting for followup for UTI.\par \par Was hospitalized Nov 2019 with UTI and treated with ceftriaxone. Urine cx negative at that time, but collected after abx started.\par \par Again hospitalized in Dec 2019 and urine culture with PA with unremarkable UA. Was not treated in the hospital, but later was seen and treated with fosfomycin.\par \par Now presenting for followup. States had increased frequency and burning with urination and started on medrol twice, currently on Cipro with improvement.\par \par Recommend:\par UTI\par -Complete 3 days of Cipro\par -If symptomatic can recheck UA/ urine culture\par -Can restart Hiprex after cipro course\par \par MDS\par -Follows with the [facility]\par \par RTC in 2-3 months or sooner if symptomatic\par

## 2020-12-03 NOTE — REVIEW OF SYSTEMS
[Feeling Sick] : feeling sick [Feeling Tired] : feeling tired [Negative] : Heme/Lymph [FreeTextEntry2] : Weakness

## 2020-12-03 NOTE — PHYSICAL EXAM
[General Appearance - Alert] : alert [General Appearance - In No Acute Distress] : in no acute distress [General Appearance - Well-Appearing] : healthy appearing [Sclera] : the sclera and conjunctiva were normal [PERRL With Normal Accommodation] : pupils were equal in size, round, reactive to light [Extraocular Movements] : extraocular movements were intact [Outer Ear] : the ears and nose were normal in appearance [Hearing Threshold Finger Rub Not [place]] : hearing was normal [Examination Of The Oral Cavity] : the lips and gums were normal [Oropharynx] : the oropharynx was normal with no thrush [Neck Appearance] : the appearance of the neck was normal [Neck Cervical Mass (___cm)] : no neck mass was observed [Respiration, Rhythm And Depth] : normal respiratory rhythm and effort [Exaggerated Use Of Accessory Muscles For Inspiration] : no accessory muscle use [Auscultation Breath Sounds / Voice Sounds] : lungs were clear to auscultation bilaterally [Heart Rate And Rhythm] : heart rate was normal and rhythm regular [Heart Sounds] : normal S1 and S2 [Murmurs] : no murmurs [Edema] : there was no peripheral edema [Bowel Sounds] : normal bowel sounds [Abdomen Soft] : soft [Abdomen Tenderness] : non-tender [No Palpable Adenopathy] : no palpable adenopathy [Musculoskeletal - Swelling] : no joint swelling [Nail Clubbing] : no clubbing  or cyanosis of the fingernails [Skin Color & Pigmentation] : normal skin color and pigmentation [] : no rash [Skin Lesions] : no skin lesions [Sensation] : the sensory exam was normal to light touch and pinprick [Motor Exam] : the motor exam was normal [No Focal Deficits] : no focal deficits [Oriented To Time, Place, And Person] : oriented to person, place, and time [Affect] : the affect was normal [FreeTextEntry1] : LE weakness bilaterally

## 2021-01-01 ENCOUNTER — OUTPATIENT (OUTPATIENT)
Dept: OUTPATIENT SERVICES | Facility: HOSPITAL | Age: 81
LOS: 1 days | Discharge: HOME | End: 2021-01-01

## 2021-01-01 ENCOUNTER — LABORATORY RESULT (OUTPATIENT)
Age: 81
End: 2021-01-01

## 2021-01-01 ENCOUNTER — NON-APPOINTMENT (OUTPATIENT)
Age: 81
End: 2021-01-01

## 2021-01-01 ENCOUNTER — RESULT REVIEW (OUTPATIENT)
Age: 81
End: 2021-01-01

## 2021-01-01 ENCOUNTER — APPOINTMENT (OUTPATIENT)
Dept: HEMATOLOGY ONCOLOGY | Facility: CLINIC | Age: 81
End: 2021-01-01

## 2021-01-01 ENCOUNTER — OUTPATIENT (OUTPATIENT)
Dept: OUTPATIENT SERVICES | Facility: HOSPITAL | Age: 81
LOS: 1 days | End: 2021-01-01
Payer: MEDICARE

## 2021-01-01 ENCOUNTER — APPOINTMENT (OUTPATIENT)
Dept: INFUSION THERAPY | Facility: HOSPITAL | Age: 81
End: 2021-01-01

## 2021-01-01 ENCOUNTER — APPOINTMENT (OUTPATIENT)
Dept: INFECTIOUS DISEASE | Facility: CLINIC | Age: 81
End: 2021-01-01
Payer: MEDICARE

## 2021-01-01 ENCOUNTER — TRANSCRIPTION ENCOUNTER (OUTPATIENT)
Age: 81
End: 2021-01-01

## 2021-01-01 ENCOUNTER — APPOINTMENT (OUTPATIENT)
Dept: HEMATOLOGY ONCOLOGY | Facility: CLINIC | Age: 81
End: 2021-01-01
Payer: MEDICARE

## 2021-01-01 ENCOUNTER — EMERGENCY (EMERGENCY)
Facility: HOSPITAL | Age: 81
LOS: 0 days | Discharge: HOME | End: 2021-05-27
Attending: EMERGENCY MEDICINE
Payer: MEDICARE

## 2021-01-01 ENCOUNTER — APPOINTMENT (OUTPATIENT)
Dept: RADIOLOGY | Facility: IMAGING CENTER | Age: 81
End: 2021-01-01
Payer: MEDICARE

## 2021-01-01 ENCOUNTER — APPOINTMENT (OUTPATIENT)
Dept: ELECTROPHYSIOLOGY | Facility: CLINIC | Age: 81
End: 2021-01-01
Payer: MEDICARE

## 2021-01-01 ENCOUNTER — RX RENEWAL (OUTPATIENT)
Age: 81
End: 2021-01-01

## 2021-01-01 ENCOUNTER — INPATIENT (INPATIENT)
Facility: HOSPITAL | Age: 81
LOS: 1 days | Discharge: HOME HEALTH | DRG: 811 | End: 2021-04-25
Attending: INTERNAL MEDICINE | Admitting: INTERNAL MEDICINE
Payer: MEDICARE

## 2021-01-01 ENCOUNTER — APPOINTMENT (OUTPATIENT)
Dept: NEUROLOGY | Facility: CLINIC | Age: 81
End: 2021-01-01
Payer: MEDICARE

## 2021-01-01 ENCOUNTER — APPOINTMENT (OUTPATIENT)
Dept: CARDIOLOGY | Facility: CLINIC | Age: 81
End: 2021-01-01
Payer: MEDICARE

## 2021-01-01 ENCOUNTER — APPOINTMENT (OUTPATIENT)
Dept: MRI IMAGING | Facility: CLINIC | Age: 81
End: 2021-01-01
Payer: MEDICARE

## 2021-01-01 ENCOUNTER — INPATIENT (INPATIENT)
Facility: HOSPITAL | Age: 81
LOS: 5 days | Discharge: HOSPICE | DRG: 56 | End: 2021-06-13
Attending: HOSPITALIST | Admitting: HOSPITALIST
Payer: MEDICARE

## 2021-01-01 VITALS
SYSTOLIC BLOOD PRESSURE: 120 MMHG | RESPIRATION RATE: 18 BRPM | HEART RATE: 75 BPM | HEIGHT: 70 IN | WEIGHT: 130.07 LBS | TEMPERATURE: 98 F | DIASTOLIC BLOOD PRESSURE: 68 MMHG | OXYGEN SATURATION: 100 %

## 2021-01-01 VITALS
HEART RATE: 72 BPM | RESPIRATION RATE: 18 BRPM | DIASTOLIC BLOOD PRESSURE: 61 MMHG | OXYGEN SATURATION: 99 % | TEMPERATURE: 98 F | SYSTOLIC BLOOD PRESSURE: 118 MMHG

## 2021-01-01 VITALS
OXYGEN SATURATION: 95 % | DIASTOLIC BLOOD PRESSURE: 54 MMHG | SYSTOLIC BLOOD PRESSURE: 120 MMHG | RESPIRATION RATE: 16 BRPM | TEMPERATURE: 98 F | HEART RATE: 90 BPM

## 2021-01-01 VITALS — HEART RATE: 93 BPM | DIASTOLIC BLOOD PRESSURE: 61 MMHG | SYSTOLIC BLOOD PRESSURE: 90 MMHG

## 2021-01-01 VITALS
SYSTOLIC BLOOD PRESSURE: 121 MMHG | OXYGEN SATURATION: 99 % | DIASTOLIC BLOOD PRESSURE: 75 MMHG | HEIGHT: 71 IN | HEART RATE: 63 BPM

## 2021-01-01 VITALS
TEMPERATURE: 98.4 F | OXYGEN SATURATION: 98 % | DIASTOLIC BLOOD PRESSURE: 72 MMHG | HEART RATE: 63 BPM | SYSTOLIC BLOOD PRESSURE: 129 MMHG | HEIGHT: 71 IN | RESPIRATION RATE: 16 BRPM

## 2021-01-01 VITALS
SYSTOLIC BLOOD PRESSURE: 118 MMHG | RESPIRATION RATE: 18 BRPM | OXYGEN SATURATION: 96 % | HEART RATE: 78 BPM | DIASTOLIC BLOOD PRESSURE: 67 MMHG

## 2021-01-01 VITALS
RESPIRATION RATE: 18 BRPM | DIASTOLIC BLOOD PRESSURE: 53 MMHG | TEMPERATURE: 97 F | OXYGEN SATURATION: 94 % | SYSTOLIC BLOOD PRESSURE: 111 MMHG | HEART RATE: 84 BPM

## 2021-01-01 VITALS — HEIGHT: 70 IN | WEIGHT: 149.03 LBS

## 2021-01-01 VITALS — TEMPERATURE: 97.4 F

## 2021-01-01 DIAGNOSIS — D46.9 MYELODYSPLASTIC SYNDROME, UNSPECIFIED: ICD-10-CM

## 2021-01-01 DIAGNOSIS — I48.91 UNSPECIFIED ATRIAL FIBRILLATION: ICD-10-CM

## 2021-01-01 DIAGNOSIS — Z90.89 ACQUIRED ABSENCE OF OTHER ORGANS: Chronic | ICD-10-CM

## 2021-01-01 DIAGNOSIS — D64.9 ANEMIA, UNSPECIFIED: ICD-10-CM

## 2021-01-01 DIAGNOSIS — Z98.890 OTHER SPECIFIED POSTPROCEDURAL STATES: Chronic | ICD-10-CM

## 2021-01-01 DIAGNOSIS — E43 UNSPECIFIED SEVERE PROTEIN-CALORIE MALNUTRITION: ICD-10-CM

## 2021-01-01 DIAGNOSIS — I11.0 HYPERTENSIVE HEART DISEASE WITH HEART FAILURE: ICD-10-CM

## 2021-01-01 DIAGNOSIS — Z95.5 PRESENCE OF CORONARY ANGIOPLASTY IMPLANT AND GRAFT: ICD-10-CM

## 2021-01-01 DIAGNOSIS — N39.0 URINARY TRACT INFECTION, SITE NOT SPECIFIED: ICD-10-CM

## 2021-01-01 DIAGNOSIS — Z95.5 PRESENCE OF CORONARY ANGIOPLASTY IMPLANT AND GRAFT: Chronic | ICD-10-CM

## 2021-01-01 DIAGNOSIS — Z86.79 PERSONAL HISTORY OF OTHER DISEASES OF THE CIRCULATORY SYSTEM: ICD-10-CM

## 2021-01-01 DIAGNOSIS — F98.8 OTHER SPECIFIED BEHAVIORAL AND EMOTIONAL DISORDERS WITH ONSET USUALLY OCCURRING IN CHILDHOOD AND ADOLESCENCE: ICD-10-CM

## 2021-01-01 DIAGNOSIS — Z87.891 PERSONAL HISTORY OF NICOTINE DEPENDENCE: ICD-10-CM

## 2021-01-01 DIAGNOSIS — Z95.818 PRESENCE OF OTHER CARDIAC IMPLANTS AND GRAFTS: Chronic | ICD-10-CM

## 2021-01-01 DIAGNOSIS — E03.9 HYPOTHYROIDISM, UNSPECIFIED: ICD-10-CM

## 2021-01-01 DIAGNOSIS — Z98.89 OTHER SPECIFIED POSTPROCEDURAL STATES: Chronic | ICD-10-CM

## 2021-01-01 DIAGNOSIS — Z79.82 LONG TERM (CURRENT) USE OF ASPIRIN: ICD-10-CM

## 2021-01-01 DIAGNOSIS — E83.52 HYPERCALCEMIA: ICD-10-CM

## 2021-01-01 DIAGNOSIS — G93.41 METABOLIC ENCEPHALOPATHY: ICD-10-CM

## 2021-01-01 DIAGNOSIS — E11.9 TYPE 2 DIABETES MELLITUS WITHOUT COMPLICATIONS: ICD-10-CM

## 2021-01-01 DIAGNOSIS — Z51.89 ENCOUNTER FOR OTHER SPECIFIED AFTERCARE: ICD-10-CM

## 2021-01-01 DIAGNOSIS — I50.22 CHRONIC SYSTOLIC (CONGESTIVE) HEART FAILURE: ICD-10-CM

## 2021-01-01 DIAGNOSIS — I10 ESSENTIAL (PRIMARY) HYPERTENSION: ICD-10-CM

## 2021-01-01 DIAGNOSIS — M25.512 PAIN IN LEFT SHOULDER: ICD-10-CM

## 2021-01-01 DIAGNOSIS — G12.21 AMYOTROPHIC LATERAL SCLEROSIS: ICD-10-CM

## 2021-01-01 DIAGNOSIS — I69.341 MONOPLEGIA OF LOWER LIMB FOLLOWING CEREBRAL INFARCTION AFFECTING RIGHT DOMINANT SIDE: ICD-10-CM

## 2021-01-01 DIAGNOSIS — R74.01 ELEVATION OF LEVELS OF LIVER TRANSAMINASE LEVELS: ICD-10-CM

## 2021-01-01 DIAGNOSIS — S06.5X9A TRAUMATIC SUBDURAL HEMORRHAGE WITH LOSS OF CONSCIOUSNESS OF UNSPECIFIED DURATION, INITIAL ENCOUNTER: ICD-10-CM

## 2021-01-01 DIAGNOSIS — Z51.5 ENCOUNTER FOR PALLIATIVE CARE: ICD-10-CM

## 2021-01-01 DIAGNOSIS — I50.9 HEART FAILURE, UNSPECIFIED: ICD-10-CM

## 2021-01-01 DIAGNOSIS — I25.10 ATHEROSCLEROTIC HEART DISEASE OF NATIVE CORONARY ARTERY WITHOUT ANGINA PECTORIS: ICD-10-CM

## 2021-01-01 DIAGNOSIS — I25.10 ATHEROSCLEROTIC HEART DISEASE OF NATIVE CORONARY ARTERY W/OUT ANGINA PECTORIS: ICD-10-CM

## 2021-01-01 DIAGNOSIS — M62.81 MUSCLE WEAKNESS (GENERALIZED): ICD-10-CM

## 2021-01-01 DIAGNOSIS — R53.1 WEAKNESS: ICD-10-CM

## 2021-01-01 DIAGNOSIS — Z96.89 PRESENCE OF OTHER SPECIFIED FUNCTIONAL IMPLANTS: ICD-10-CM

## 2021-01-01 DIAGNOSIS — B37.0 CANDIDAL STOMATITIS: ICD-10-CM

## 2021-01-01 DIAGNOSIS — M10.9 GOUT, UNSPECIFIED: ICD-10-CM

## 2021-01-01 DIAGNOSIS — Z88.8 ALLERGY STATUS TO OTHER DRUGS, MEDICAMENTS AND BIOLOGICAL SUBSTANCES: ICD-10-CM

## 2021-01-01 DIAGNOSIS — F03.90 UNSPECIFIED DEMENTIA, UNSPECIFIED SEVERITY, WITHOUT BEHAVIORAL DISTURBANCE, PSYCHOTIC DISTURBANCE, MOOD DISTURBANCE, AND ANXIETY: ICD-10-CM

## 2021-01-01 DIAGNOSIS — E78.5 HYPERLIPIDEMIA, UNSPECIFIED: ICD-10-CM

## 2021-01-01 DIAGNOSIS — I63.9 CEREBRAL INFARCTION, UNSPECIFIED: ICD-10-CM

## 2021-01-01 DIAGNOSIS — E86.0 DEHYDRATION: ICD-10-CM

## 2021-01-01 DIAGNOSIS — E78.00 PURE HYPERCHOLESTEROLEMIA, UNSPECIFIED: ICD-10-CM

## 2021-01-01 DIAGNOSIS — R53.83 OTHER FATIGUE: ICD-10-CM

## 2021-01-01 DIAGNOSIS — Z91.09 OTHER ALLERGY STATUS, OTHER THAN TO DRUGS AND BIOLOGICAL SUBSTANCES: ICD-10-CM

## 2021-01-01 DIAGNOSIS — E11.40 TYPE 2 DIABETES MELLITUS WITH DIABETIC NEUROPATHY, UNSPECIFIED: ICD-10-CM

## 2021-01-01 LAB
24R-OH-CALCIDIOL SERPL-MCNC: 25.8 NG/ML — LOW (ref 30–80)
A1C WITH ESTIMATED AVERAGE GLUCOSE RESULT: 6.1 % — HIGH (ref 4–5.6)
A1C WITH ESTIMATED AVERAGE GLUCOSE RESULT: 6.6 % — HIGH (ref 4–5.6)
ACHR BLOCK AB SER-ACNC: 14 % — SIGNIFICANT CHANGE UP (ref 0–25)
ACHR MOD AB SER-ACNC: <12 % — SIGNIFICANT CHANGE UP (ref 0–20)
ACRM MODULATING ANTIBODY: 0.07 NMOL/L — SIGNIFICANT CHANGE UP (ref 0–0.24)
ALBUMIN SERPL ELPH-MCNC: 2.5 G/DL — LOW (ref 3.3–5)
ALBUMIN SERPL ELPH-MCNC: 2.6 G/DL — LOW (ref 3.3–5)
ALBUMIN SERPL ELPH-MCNC: 2.7 G/DL — LOW (ref 3.3–5)
ALBUMIN SERPL ELPH-MCNC: 2.7 G/DL — LOW (ref 3.3–5)
ALBUMIN SERPL ELPH-MCNC: 2.8 G/DL — LOW (ref 3.3–5)
ALBUMIN SERPL ELPH-MCNC: 2.8 G/DL — LOW (ref 3.3–5)
ALP SERPL-CCNC: 215 U/L — HIGH (ref 40–120)
ALP SERPL-CCNC: 248 U/L — HIGH (ref 40–120)
ALP SERPL-CCNC: 249 U/L — HIGH (ref 40–120)
ALP SERPL-CCNC: 282 U/L — HIGH (ref 40–120)
ALP SERPL-CCNC: 313 U/L — HIGH (ref 40–120)
ALP SERPL-CCNC: 346 U/L — HIGH (ref 40–120)
ALT FLD-CCNC: 100 U/L — HIGH (ref 10–45)
ALT FLD-CCNC: 109 U/L — HIGH (ref 10–45)
ALT FLD-CCNC: 128 U/L — HIGH (ref 12–78)
ALT FLD-CCNC: 130 U/L — HIGH (ref 12–78)
ALT FLD-CCNC: 88 U/L — HIGH (ref 10–45)
ALT FLD-CCNC: 91 U/L — HIGH (ref 10–45)
AMMONIA BLD-MCNC: 20 UMOL/L — SIGNIFICANT CHANGE UP (ref 11–55)
ANION GAP SERPL CALC-SCNC: 10 MMOL/L — SIGNIFICANT CHANGE UP (ref 5–17)
ANION GAP SERPL CALC-SCNC: 3 MMOL/L — LOW (ref 5–17)
ANION GAP SERPL CALC-SCNC: 4 MMOL/L — LOW (ref 5–17)
ANION GAP SERPL CALC-SCNC: 6 MMOL/L — SIGNIFICANT CHANGE UP (ref 5–17)
ANION GAP SERPL CALC-SCNC: 6 MMOL/L — SIGNIFICANT CHANGE UP (ref 5–17)
ANION GAP SERPL CALC-SCNC: 8 MMOL/L — SIGNIFICANT CHANGE UP (ref 5–17)
ANION GAP SERPL CALC-SCNC: 9 MMOL/L — SIGNIFICANT CHANGE UP (ref 5–17)
ANION GAP SERPL CALC-SCNC: 9 MMOL/L — SIGNIFICANT CHANGE UP (ref 5–17)
ANISOCYTOSIS BLD QL: SLIGHT — SIGNIFICANT CHANGE UP
APPEARANCE UR: CLEAR — SIGNIFICANT CHANGE UP
APPEARANCE UR: CLEAR — SIGNIFICANT CHANGE UP
APTT BLD: 28.9 SEC — SIGNIFICANT CHANGE UP (ref 27.5–35.5)
APTT BLD: 31.2 SEC — SIGNIFICANT CHANGE UP (ref 27.5–35.5)
APTT BLD: 31.3 SEC — SIGNIFICANT CHANGE UP (ref 27.5–35.5)
APTT BLD: 31.4 SEC — SIGNIFICANT CHANGE UP (ref 27.5–35.5)
AST SERPL-CCNC: 72 U/L — HIGH (ref 10–40)
AST SERPL-CCNC: 79 U/L — HIGH (ref 10–40)
AST SERPL-CCNC: 80 U/L — HIGH (ref 15–37)
AST SERPL-CCNC: 86 U/L — HIGH (ref 15–37)
AST SERPL-CCNC: 88 U/L — HIGH (ref 10–40)
AST SERPL-CCNC: 96 U/L — HIGH (ref 10–40)
BACTERIA # UR AUTO: NEGATIVE — SIGNIFICANT CHANGE UP
BASOPHILS # BLD AUTO: 0 K/UL — SIGNIFICANT CHANGE UP (ref 0–0.2)
BASOPHILS # BLD AUTO: 0.02 K/UL — SIGNIFICANT CHANGE UP (ref 0–0.2)
BASOPHILS # BLD AUTO: 0.05 K/UL — SIGNIFICANT CHANGE UP (ref 0–0.2)
BASOPHILS # BLD AUTO: 0.05 K/UL — SIGNIFICANT CHANGE UP (ref 0–0.2)
BASOPHILS # BLD AUTO: 0.07 K/UL — SIGNIFICANT CHANGE UP (ref 0–0.2)
BASOPHILS # BLD AUTO: 0.08 K/UL — SIGNIFICANT CHANGE UP (ref 0–0.2)
BASOPHILS # BLD AUTO: 0.1 K/UL — SIGNIFICANT CHANGE UP (ref 0–0.2)
BASOPHILS # BLD AUTO: 0.11 K/UL — SIGNIFICANT CHANGE UP (ref 0–0.2)
BASOPHILS # BLD AUTO: 0.17 K/UL — SIGNIFICANT CHANGE UP (ref 0–0.2)
BASOPHILS NFR BLD AUTO: 0 % — SIGNIFICANT CHANGE UP (ref 0–2)
BASOPHILS NFR BLD AUTO: 0.5 % — SIGNIFICANT CHANGE UP (ref 0–2)
BASOPHILS NFR BLD AUTO: 0.7 % — SIGNIFICANT CHANGE UP (ref 0–2)
BASOPHILS NFR BLD AUTO: 0.8 % — SIGNIFICANT CHANGE UP (ref 0–2)
BASOPHILS NFR BLD AUTO: 0.9 % — SIGNIFICANT CHANGE UP (ref 0–2)
BASOPHILS NFR BLD AUTO: 1.2 % — SIGNIFICANT CHANGE UP (ref 0–2)
BASOPHILS NFR BLD AUTO: 1.3 % — SIGNIFICANT CHANGE UP (ref 0–2)
BASOPHILS NFR BLD AUTO: 1.7 % — SIGNIFICANT CHANGE UP (ref 0–2)
BASOPHILS NFR BLD AUTO: 1.8 % — SIGNIFICANT CHANGE UP (ref 0–2)
BASOPHILS NFR BLD AUTO: 2 % — SIGNIFICANT CHANGE UP (ref 0–2)
BILIRUB SERPL-MCNC: 0.7 MG/DL — SIGNIFICANT CHANGE UP (ref 0.2–1.2)
BILIRUB SERPL-MCNC: 0.7 MG/DL — SIGNIFICANT CHANGE UP (ref 0.2–1.2)
BILIRUB SERPL-MCNC: 0.8 MG/DL — SIGNIFICANT CHANGE UP (ref 0.2–1.2)
BILIRUB SERPL-MCNC: 0.9 MG/DL — SIGNIFICANT CHANGE UP (ref 0.2–1.2)
BILIRUB SERPL-MCNC: 0.9 MG/DL — SIGNIFICANT CHANGE UP (ref 0.2–1.2)
BILIRUB SERPL-MCNC: 1.1 MG/DL — SIGNIFICANT CHANGE UP (ref 0.2–1.2)
BILIRUB UR-MCNC: NEGATIVE — SIGNIFICANT CHANGE UP
BILIRUB UR-MCNC: NEGATIVE — SIGNIFICANT CHANGE UP
BLD GP AB SCN SERPL QL: NEGATIVE — SIGNIFICANT CHANGE UP
BLD GP AB SCN SERPL QL: NEGATIVE — SIGNIFICANT CHANGE UP
BUN SERPL-MCNC: 29 MG/DL — HIGH (ref 7–23)
BUN SERPL-MCNC: 33 MG/DL — HIGH (ref 7–23)
BUN SERPL-MCNC: 42 MG/DL — HIGH (ref 7–23)
BUN SERPL-MCNC: 44 MG/DL — HIGH (ref 7–23)
BUN SERPL-MCNC: 54 MG/DL — HIGH (ref 7–23)
BUN SERPL-MCNC: 59 MG/DL — HIGH (ref 7–23)
BUN SERPL-MCNC: 62 MG/DL — HIGH (ref 7–23)
BUN SERPL-MCNC: 64 MG/DL — HIGH (ref 7–23)
BUN SERPL-MCNC: 68 MG/DL — HIGH (ref 7–23)
BUN SERPL-MCNC: 79 MG/DL — HIGH (ref 7–23)
CALCIUM SERPL-MCNC: 10 MG/DL — SIGNIFICANT CHANGE UP (ref 8.4–10.5)
CALCIUM SERPL-MCNC: 10.1 MG/DL — SIGNIFICANT CHANGE UP (ref 8.4–10.5)
CALCIUM SERPL-MCNC: 10.7 MG/DL — HIGH (ref 8.4–10.5)
CALCIUM SERPL-MCNC: 8.5 MG/DL — SIGNIFICANT CHANGE UP (ref 8.5–10.1)
CALCIUM SERPL-MCNC: 8.6 MG/DL — SIGNIFICANT CHANGE UP (ref 8.5–10.1)
CALCIUM SERPL-MCNC: 9.1 MG/DL — SIGNIFICANT CHANGE UP (ref 8.5–10.1)
CALCIUM SERPL-MCNC: 9.3 MG/DL — SIGNIFICANT CHANGE UP (ref 8.5–10.1)
CALCIUM SERPL-MCNC: 9.5 MG/DL — SIGNIFICANT CHANGE UP (ref 8.4–10.5)
CALCIUM SERPL-MCNC: 9.9 MG/DL — SIGNIFICANT CHANGE UP (ref 8.4–10.5)
CALCIUM SERPL-MCNC: 9.9 MG/DL — SIGNIFICANT CHANGE UP (ref 8.4–10.5)
CERULOPLASMIN SERPL-MCNC: 14 MG/DL — LOW (ref 15–30)
CHLORIDE SERPL-SCNC: 105 MMOL/L — SIGNIFICANT CHANGE UP (ref 96–108)
CHLORIDE SERPL-SCNC: 105 MMOL/L — SIGNIFICANT CHANGE UP (ref 96–108)
CHLORIDE SERPL-SCNC: 107 MMOL/L — SIGNIFICANT CHANGE UP (ref 96–108)
CHLORIDE SERPL-SCNC: 109 MMOL/L — HIGH (ref 96–108)
CHLORIDE SERPL-SCNC: 109 MMOL/L — HIGH (ref 96–108)
CHLORIDE SERPL-SCNC: 110 MMOL/L — HIGH (ref 96–108)
CHLORIDE SERPL-SCNC: 111 MMOL/L — HIGH (ref 96–108)
CHLORIDE SERPL-SCNC: 112 MMOL/L — HIGH (ref 96–108)
CHLORIDE SERPL-SCNC: 112 MMOL/L — HIGH (ref 96–108)
CHLORIDE SERPL-SCNC: 115 MMOL/L — HIGH (ref 96–108)
CK SERPL-CCNC: 7 U/L — LOW (ref 26–308)
CK SERPL-CCNC: <10 U/L — LOW (ref 30–200)
CMV IGG FLD QL: >10 U/ML — HIGH
CMV IGG SERPL-IMP: POSITIVE
CMV IGM FLD-ACNC: 8.4 AU/ML — SIGNIFICANT CHANGE UP
CMV IGM SERPL QL: NEGATIVE — SIGNIFICANT CHANGE UP
CO2 SERPL-SCNC: 20 MMOL/L — LOW (ref 22–31)
CO2 SERPL-SCNC: 22 MMOL/L — SIGNIFICANT CHANGE UP (ref 22–31)
CO2 SERPL-SCNC: 23 MMOL/L — SIGNIFICANT CHANGE UP (ref 22–31)
CO2 SERPL-SCNC: 23 MMOL/L — SIGNIFICANT CHANGE UP (ref 22–31)
CO2 SERPL-SCNC: 24 MMOL/L — SIGNIFICANT CHANGE UP (ref 22–31)
CO2 SERPL-SCNC: 24 MMOL/L — SIGNIFICANT CHANGE UP (ref 22–31)
CO2 SERPL-SCNC: 25 MMOL/L — SIGNIFICANT CHANGE UP (ref 22–31)
COLOR SPEC: YELLOW — SIGNIFICANT CHANGE UP
COLOR SPEC: YELLOW — SIGNIFICANT CHANGE UP
CORTIS AM PEAK SERPL-MCNC: 7.1 UG/DL — SIGNIFICANT CHANGE UP (ref 6–18.4)
COVID-19 SPIKE DOMAIN AB INTERP: POSITIVE
COVID-19 SPIKE DOMAIN AB INTERP: POSITIVE
COVID-19 SPIKE DOMAIN ANTIBODY RESULT: 13.2 U/ML — HIGH
COVID-19 SPIKE DOMAIN ANTIBODY RESULT: 55.1 U/ML — HIGH
CREAT SERPL-MCNC: 0.61 MG/DL — SIGNIFICANT CHANGE UP (ref 0.5–1.3)
CREAT SERPL-MCNC: 0.62 MG/DL — SIGNIFICANT CHANGE UP (ref 0.5–1.3)
CREAT SERPL-MCNC: 0.65 MG/DL — SIGNIFICANT CHANGE UP (ref 0.5–1.3)
CREAT SERPL-MCNC: 0.72 MG/DL — SIGNIFICANT CHANGE UP (ref 0.5–1.3)
CREAT SERPL-MCNC: 0.73 MG/DL — SIGNIFICANT CHANGE UP (ref 0.5–1.3)
CREAT SERPL-MCNC: 0.79 MG/DL — SIGNIFICANT CHANGE UP (ref 0.5–1.3)
CREAT SERPL-MCNC: 0.9 MG/DL — SIGNIFICANT CHANGE UP (ref 0.5–1.3)
CREAT SERPL-MCNC: 0.94 MG/DL — SIGNIFICANT CHANGE UP (ref 0.5–1.3)
CREAT SERPL-MCNC: 0.99 MG/DL — SIGNIFICANT CHANGE UP (ref 0.5–1.3)
CREAT SERPL-MCNC: 1.04 MG/DL — SIGNIFICANT CHANGE UP (ref 0.5–1.3)
CRP SERPL-MCNC: 16 MG/L — HIGH (ref 0–4)
CULTURE RESULTS: NO GROWTH — SIGNIFICANT CHANGE UP
CULTURE RESULTS: SIGNIFICANT CHANGE UP
DIFF PNL FLD: NEGATIVE — SIGNIFICANT CHANGE UP
DIFF PNL FLD: NEGATIVE — SIGNIFICANT CHANGE UP
EOSINOPHIL # BLD AUTO: 0.1 K/UL — SIGNIFICANT CHANGE UP (ref 0–0.5)
EOSINOPHIL # BLD AUTO: 0.1 K/UL — SIGNIFICANT CHANGE UP (ref 0–0.5)
EOSINOPHIL # BLD AUTO: 0.15 K/UL — SIGNIFICANT CHANGE UP (ref 0–0.5)
EOSINOPHIL # BLD AUTO: 0.22 K/UL — SIGNIFICANT CHANGE UP (ref 0–0.5)
EOSINOPHIL # BLD AUTO: 0.24 K/UL — SIGNIFICANT CHANGE UP (ref 0–0.5)
EOSINOPHIL # BLD AUTO: 0.25 K/UL — SIGNIFICANT CHANGE UP (ref 0–0.5)
EOSINOPHIL # BLD AUTO: 0.25 K/UL — SIGNIFICANT CHANGE UP (ref 0–0.5)
EOSINOPHIL # BLD AUTO: 0.28 K/UL — SIGNIFICANT CHANGE UP (ref 0–0.5)
EOSINOPHIL # BLD AUTO: 0.3 K/UL — SIGNIFICANT CHANGE UP (ref 0–0.5)
EOSINOPHIL # BLD AUTO: 0.51 K/UL — HIGH (ref 0–0.5)
EOSINOPHIL # BLD AUTO: 0.58 K/UL — HIGH (ref 0–0.5)
EOSINOPHIL NFR BLD AUTO: 1.4 % — SIGNIFICANT CHANGE UP (ref 0–6)
EOSINOPHIL NFR BLD AUTO: 2.5 % — SIGNIFICANT CHANGE UP (ref 0–6)
EOSINOPHIL NFR BLD AUTO: 2.6 % — SIGNIFICANT CHANGE UP (ref 0–6)
EOSINOPHIL NFR BLD AUTO: 2.6 % — SIGNIFICANT CHANGE UP (ref 0–6)
EOSINOPHIL NFR BLD AUTO: 4 % — SIGNIFICANT CHANGE UP (ref 0–6)
EOSINOPHIL NFR BLD AUTO: 4.4 % — SIGNIFICANT CHANGE UP (ref 0–6)
EOSINOPHIL NFR BLD AUTO: 4.5 % — SIGNIFICANT CHANGE UP (ref 0–6)
EOSINOPHIL NFR BLD AUTO: 4.7 % — SIGNIFICANT CHANGE UP (ref 0–6)
EOSINOPHIL NFR BLD AUTO: 4.7 % — SIGNIFICANT CHANGE UP (ref 0–6)
EOSINOPHIL NFR BLD AUTO: 4.8 % — SIGNIFICANT CHANGE UP (ref 0–6)
EOSINOPHIL NFR BLD AUTO: 6 % — SIGNIFICANT CHANGE UP (ref 0–6)
EPI CELLS # UR: 0 /HPF — SIGNIFICANT CHANGE UP
ERYTHROCYTE [SEDIMENTATION RATE] IN BLOOD: 33 MM/HR — HIGH (ref 0–20)
ESTIMATED AVERAGE GLUCOSE: 128 MG/DL — HIGH (ref 68–114)
ESTIMATED AVERAGE GLUCOSE: 143 MG/DL — HIGH (ref 68–114)
FERRITIN SERPL-MCNC: 4921 NG/ML — HIGH (ref 30–400)
FIBRINOGEN PPP-MCNC: 391 MG/DL — SIGNIFICANT CHANGE UP (ref 290–520)
FOLATE SERPL-MCNC: 12.6 NG/ML — SIGNIFICANT CHANGE UP
GGT SERPL-CCNC: 152 U/L — HIGH (ref 9–50)
GLUCOSE SERPL-MCNC: 107 MG/DL — HIGH (ref 70–99)
GLUCOSE SERPL-MCNC: 123 MG/DL — HIGH (ref 70–99)
GLUCOSE SERPL-MCNC: 133 MG/DL — HIGH (ref 70–99)
GLUCOSE SERPL-MCNC: 133 MG/DL — HIGH (ref 70–99)
GLUCOSE SERPL-MCNC: 135 MG/DL — HIGH (ref 70–99)
GLUCOSE SERPL-MCNC: 148 MG/DL — HIGH (ref 70–99)
GLUCOSE SERPL-MCNC: 157 MG/DL — HIGH (ref 70–99)
GLUCOSE SERPL-MCNC: 162 MG/DL — HIGH (ref 70–99)
GLUCOSE SERPL-MCNC: 228 MG/DL — HIGH (ref 70–99)
GLUCOSE SERPL-MCNC: 237 MG/DL — HIGH (ref 70–99)
GLUCOSE UR QL: NEGATIVE MG/DL — SIGNIFICANT CHANGE UP
GLUCOSE UR QL: NEGATIVE — SIGNIFICANT CHANGE UP
HBV CORE AB SER-ACNC: SIGNIFICANT CHANGE UP
HBV SURFACE AB SER-ACNC: ABNORMAL
HBV SURFACE AG SER-ACNC: SIGNIFICANT CHANGE UP
HCT VFR BLD CALC: 19.4 % — CRITICAL LOW (ref 39–50)
HCT VFR BLD CALC: 20.9 % — CRITICAL LOW (ref 39–50)
HCT VFR BLD CALC: 22.4 % — LOW (ref 39–50)
HCT VFR BLD CALC: 22.8 % — LOW (ref 39–50)
HCT VFR BLD CALC: 23.1 % — LOW (ref 39–50)
HCT VFR BLD CALC: 23.2 % — LOW (ref 39–50)
HCT VFR BLD CALC: 24.4 % — LOW (ref 39–50)
HCT VFR BLD CALC: 24.5 % — LOW (ref 39–50)
HCT VFR BLD CALC: 24.8 % — LOW (ref 39–50)
HCT VFR BLD CALC: 25.5 % — LOW (ref 39–50)
HCT VFR BLD CALC: 25.8 % — LOW (ref 39–50)
HCT VFR BLD CALC: 27.1 % — LOW (ref 39–50)
HCT VFR BLD CALC: 27.8 % — LOW (ref 39–50)
HCT VFR BLD CALC: 28.2 % — LOW (ref 39–50)
HCT VFR BLD CALC: 28.2 % — LOW (ref 39–50)
HCT VFR BLD CALC: 28.4 % — LOW (ref 39–50)
HCT VFR BLD CALC: 30.3 % — LOW (ref 39–50)
HCT VFR BLD CALC: 30.7 % — LOW (ref 39–50)
HCT VFR BLD CALC: 32.6 % — LOW (ref 39–50)
HCT VFR BLD CALC: 33.5 % — LOW (ref 39–50)
HCV AB S/CO SERPL IA: 0.26 S/CO — SIGNIFICANT CHANGE UP (ref 0–0.99)
HCV AB SERPL-IMP: SIGNIFICANT CHANGE UP
HGB BLD-MCNC: 10.1 G/DL — LOW (ref 13–17)
HGB BLD-MCNC: 10.4 G/DL — LOW (ref 13–17)
HGB BLD-MCNC: 11.2 G/DL — LOW (ref 13–17)
HGB BLD-MCNC: 11.3 G/DL — LOW (ref 13–17)
HGB BLD-MCNC: 6.5 G/DL — CRITICAL LOW (ref 13–17)
HGB BLD-MCNC: 6.9 G/DL — CRITICAL LOW (ref 13–17)
HGB BLD-MCNC: 7.4 G/DL — LOW (ref 13–17)
HGB BLD-MCNC: 7.7 G/DL — LOW (ref 13–17)
HGB BLD-MCNC: 7.7 G/DL — LOW (ref 13–17)
HGB BLD-MCNC: 7.9 G/DL — LOW (ref 13–17)
HGB BLD-MCNC: 8.1 G/DL — LOW (ref 13–17)
HGB BLD-MCNC: 8.2 G/DL — LOW (ref 13–17)
HGB BLD-MCNC: 8.3 G/DL — LOW (ref 13–17)
HGB BLD-MCNC: 8.3 G/DL — LOW (ref 13–17)
HGB BLD-MCNC: 8.6 G/DL — LOW (ref 13–17)
HGB BLD-MCNC: 9.1 G/DL — LOW (ref 13–17)
HGB BLD-MCNC: 9.1 G/DL — LOW (ref 13–17)
HGB BLD-MCNC: 9.3 G/DL — LOW (ref 13–17)
HGB BLD-MCNC: 9.4 G/DL — LOW (ref 13–17)
HGB BLD-MCNC: 9.5 G/DL — LOW (ref 13–17)
HIV 1+2 AB+HIV1 P24 AG SERPL QL IA: SIGNIFICANT CHANGE UP
HYALINE CASTS # UR AUTO: 4 /LPF — HIGH (ref 0–2)
IMM GRANULOCYTES NFR BLD AUTO: 0.5 % — SIGNIFICANT CHANGE UP (ref 0–1.5)
IMM GRANULOCYTES NFR BLD AUTO: 0.9 % — SIGNIFICANT CHANGE UP (ref 0–1.5)
IMM GRANULOCYTES NFR BLD AUTO: 1 % — SIGNIFICANT CHANGE UP (ref 0–1.5)
IMM GRANULOCYTES NFR BLD AUTO: 1.2 % — SIGNIFICANT CHANGE UP (ref 0–1.5)
IMM GRANULOCYTES NFR BLD AUTO: 1.2 % — SIGNIFICANT CHANGE UP (ref 0–1.5)
IMM GRANULOCYTES NFR BLD AUTO: 1.3 % — SIGNIFICANT CHANGE UP (ref 0–1.5)
IMM GRANULOCYTES NFR BLD AUTO: 1.4 % — SIGNIFICANT CHANGE UP (ref 0–1.5)
IMM GRANULOCYTES NFR BLD AUTO: 1.8 % — HIGH (ref 0–1.5)
INR BLD: 1.34 RATIO — HIGH (ref 0.88–1.16)
INR BLD: 1.43 RATIO — HIGH (ref 0.88–1.16)
INR BLD: 1.45 RATIO — HIGH (ref 0.88–1.16)
INR BLD: 1.53 RATIO — HIGH (ref 0.88–1.16)
INR BLD: 1.56 RATIO — HIGH (ref 0.88–1.16)
IRON SATN MFR SERPL: 91 UG/DL — SIGNIFICANT CHANGE UP (ref 45–165)
IRON SATN MFR SERPL: SIGNIFICANT CHANGE UP % (ref 16–55)
KETONES UR-MCNC: NEGATIVE — SIGNIFICANT CHANGE UP
KETONES UR-MCNC: NEGATIVE — SIGNIFICANT CHANGE UP
LACTATE SERPL-SCNC: 0.7 MMOL/L — SIGNIFICANT CHANGE UP (ref 0.7–2)
LDH SERPL L TO P-CCNC: 72 U/L — SIGNIFICANT CHANGE UP (ref 50–242)
LEUKOCYTE ESTERASE UR-ACNC: NEGATIVE — SIGNIFICANT CHANGE UP
LEUKOCYTE ESTERASE UR-ACNC: NEGATIVE — SIGNIFICANT CHANGE UP
LYMPHOCYTES # BLD AUTO: 1.03 K/UL — SIGNIFICANT CHANGE UP (ref 1–3.3)
LYMPHOCYTES # BLD AUTO: 1.04 K/UL — SIGNIFICANT CHANGE UP (ref 1–3.3)
LYMPHOCYTES # BLD AUTO: 1.04 K/UL — SIGNIFICANT CHANGE UP (ref 1–3.3)
LYMPHOCYTES # BLD AUTO: 1.11 K/UL — SIGNIFICANT CHANGE UP (ref 1–3.3)
LYMPHOCYTES # BLD AUTO: 1.21 K/UL — SIGNIFICANT CHANGE UP (ref 1–3.3)
LYMPHOCYTES # BLD AUTO: 1.28 K/UL — SIGNIFICANT CHANGE UP (ref 1–3.3)
LYMPHOCYTES # BLD AUTO: 1.3 K/UL — SIGNIFICANT CHANGE UP (ref 1–3.3)
LYMPHOCYTES # BLD AUTO: 1.31 K/UL — SIGNIFICANT CHANGE UP (ref 1–3.3)
LYMPHOCYTES # BLD AUTO: 1.36 K/UL — SIGNIFICANT CHANGE UP (ref 1–3.3)
LYMPHOCYTES # BLD AUTO: 1.45 K/UL — SIGNIFICANT CHANGE UP (ref 1–3.3)
LYMPHOCYTES # BLD AUTO: 1.46 K/UL — SIGNIFICANT CHANGE UP (ref 1–3.3)
LYMPHOCYTES # BLD AUTO: 1.46 K/UL — SIGNIFICANT CHANGE UP (ref 1–3.3)
LYMPHOCYTES # BLD AUTO: 1.54 K/UL — SIGNIFICANT CHANGE UP (ref 1–3.3)
LYMPHOCYTES # BLD AUTO: 10 % — LOW (ref 13–44)
LYMPHOCYTES # BLD AUTO: 13 % — SIGNIFICANT CHANGE UP (ref 13–44)
LYMPHOCYTES # BLD AUTO: 14.3 % — SIGNIFICANT CHANGE UP (ref 13–44)
LYMPHOCYTES # BLD AUTO: 14.3 % — SIGNIFICANT CHANGE UP (ref 13–44)
LYMPHOCYTES # BLD AUTO: 16.6 % — SIGNIFICANT CHANGE UP (ref 13–44)
LYMPHOCYTES # BLD AUTO: 21.8 % — SIGNIFICANT CHANGE UP (ref 13–44)
LYMPHOCYTES # BLD AUTO: 22 % — SIGNIFICANT CHANGE UP (ref 13–44)
LYMPHOCYTES # BLD AUTO: 22 % — SIGNIFICANT CHANGE UP (ref 13–44)
LYMPHOCYTES # BLD AUTO: 22.5 % — SIGNIFICANT CHANGE UP (ref 13–44)
LYMPHOCYTES # BLD AUTO: 23 % — SIGNIFICANT CHANGE UP (ref 13–44)
LYMPHOCYTES # BLD AUTO: 24.3 % — SIGNIFICANT CHANGE UP (ref 13–44)
LYMPHOCYTES # BLD AUTO: 25.9 % — SIGNIFICANT CHANGE UP (ref 13–44)
LYMPHOCYTES # BLD AUTO: 26.7 % — SIGNIFICANT CHANGE UP (ref 13–44)
MAGNESIUM SERPL-MCNC: 1.6 MG/DL — SIGNIFICANT CHANGE UP (ref 1.6–2.6)
MAGNESIUM SERPL-MCNC: 1.7 MG/DL — SIGNIFICANT CHANGE UP (ref 1.6–2.6)
MAGNESIUM SERPL-MCNC: 1.8 MG/DL — SIGNIFICANT CHANGE UP (ref 1.6–2.6)
MANUAL SMEAR VERIFICATION: SIGNIFICANT CHANGE UP
MCHC RBC-ENTMCNC: 26.9 PG — LOW (ref 27–34)
MCHC RBC-ENTMCNC: 27.3 PG — SIGNIFICANT CHANGE UP (ref 27–34)
MCHC RBC-ENTMCNC: 27.7 PG — SIGNIFICANT CHANGE UP (ref 27–34)
MCHC RBC-ENTMCNC: 28.1 PG — SIGNIFICANT CHANGE UP (ref 27–34)
MCHC RBC-ENTMCNC: 28.4 PG — SIGNIFICANT CHANGE UP (ref 27–34)
MCHC RBC-ENTMCNC: 28.6 PG — SIGNIFICANT CHANGE UP (ref 27–34)
MCHC RBC-ENTMCNC: 29 PG — SIGNIFICANT CHANGE UP (ref 27–34)
MCHC RBC-ENTMCNC: 29.2 PG — SIGNIFICANT CHANGE UP (ref 27–34)
MCHC RBC-ENTMCNC: 29.2 PG — SIGNIFICANT CHANGE UP (ref 27–34)
MCHC RBC-ENTMCNC: 29.3 PG — SIGNIFICANT CHANGE UP (ref 27–34)
MCHC RBC-ENTMCNC: 29.3 PG — SIGNIFICANT CHANGE UP (ref 27–34)
MCHC RBC-ENTMCNC: 29.4 PG — SIGNIFICANT CHANGE UP (ref 27–34)
MCHC RBC-ENTMCNC: 29.5 PG — SIGNIFICANT CHANGE UP (ref 27–34)
MCHC RBC-ENTMCNC: 29.6 PG — SIGNIFICANT CHANGE UP (ref 27–34)
MCHC RBC-ENTMCNC: 29.6 PG — SIGNIFICANT CHANGE UP (ref 27–34)
MCHC RBC-ENTMCNC: 29.8 PG — SIGNIFICANT CHANGE UP (ref 27–34)
MCHC RBC-ENTMCNC: 30.1 PG — SIGNIFICANT CHANGE UP (ref 27–34)
MCHC RBC-ENTMCNC: 30.2 PG — SIGNIFICANT CHANGE UP (ref 27–34)
MCHC RBC-ENTMCNC: 30.5 PG — SIGNIFICANT CHANGE UP (ref 27–34)
MCHC RBC-ENTMCNC: 30.6 PG — SIGNIFICANT CHANGE UP (ref 27–34)
MCHC RBC-ENTMCNC: 32.2 G/DL — SIGNIFICANT CHANGE UP (ref 32–36)
MCHC RBC-ENTMCNC: 32.7 G/DL — SIGNIFICANT CHANGE UP (ref 32–36)
MCHC RBC-ENTMCNC: 33 G/DL — SIGNIFICANT CHANGE UP (ref 32–36)
MCHC RBC-ENTMCNC: 33 GM/DL — SIGNIFICANT CHANGE UP (ref 32–36)
MCHC RBC-ENTMCNC: 33 GM/DL — SIGNIFICANT CHANGE UP (ref 32–36)
MCHC RBC-ENTMCNC: 33.1 G/DL — SIGNIFICANT CHANGE UP (ref 32–36)
MCHC RBC-ENTMCNC: 33.3 G/DL — SIGNIFICANT CHANGE UP (ref 32–36)
MCHC RBC-ENTMCNC: 33.3 GM/DL — SIGNIFICANT CHANGE UP (ref 32–36)
MCHC RBC-ENTMCNC: 33.5 GM/DL — SIGNIFICANT CHANGE UP (ref 32–36)
MCHC RBC-ENTMCNC: 33.6 GM/DL — SIGNIFICANT CHANGE UP (ref 32–36)
MCHC RBC-ENTMCNC: 33.7 G/DL — SIGNIFICANT CHANGE UP (ref 32–36)
MCHC RBC-ENTMCNC: 33.7 GM/DL — SIGNIFICANT CHANGE UP (ref 32–36)
MCHC RBC-ENTMCNC: 33.7 GM/DL — SIGNIFICANT CHANGE UP (ref 32–36)
MCHC RBC-ENTMCNC: 33.8 GM/DL — SIGNIFICANT CHANGE UP (ref 32–36)
MCHC RBC-ENTMCNC: 33.9 G/DL — SIGNIFICANT CHANGE UP (ref 32–36)
MCHC RBC-ENTMCNC: 34 GM/DL — SIGNIFICANT CHANGE UP (ref 32–36)
MCHC RBC-ENTMCNC: 34.1 GM/DL — SIGNIFICANT CHANGE UP (ref 32–36)
MCHC RBC-ENTMCNC: 34.4 GM/DL — SIGNIFICANT CHANGE UP (ref 32–36)
MCV RBC AUTO: 81.4 FL — SIGNIFICANT CHANGE UP (ref 80–100)
MCV RBC AUTO: 84.8 FL — SIGNIFICANT CHANGE UP (ref 80–100)
MCV RBC AUTO: 84.8 FL — SIGNIFICANT CHANGE UP (ref 80–100)
MCV RBC AUTO: 84.9 FL — SIGNIFICANT CHANGE UP (ref 80–100)
MCV RBC AUTO: 84.9 FL — SIGNIFICANT CHANGE UP (ref 80–100)
MCV RBC AUTO: 85.8 FL — SIGNIFICANT CHANGE UP (ref 80–100)
MCV RBC AUTO: 86 FL — SIGNIFICANT CHANGE UP (ref 80–100)
MCV RBC AUTO: 86.3 FL — SIGNIFICANT CHANGE UP (ref 80–100)
MCV RBC AUTO: 86.5 FL — SIGNIFICANT CHANGE UP (ref 80–100)
MCV RBC AUTO: 86.8 FL — SIGNIFICANT CHANGE UP (ref 80–100)
MCV RBC AUTO: 87.6 FL — SIGNIFICANT CHANGE UP (ref 80–100)
MCV RBC AUTO: 87.6 FL — SIGNIFICANT CHANGE UP (ref 80–100)
MCV RBC AUTO: 88.6 FL — SIGNIFICANT CHANGE UP (ref 80–100)
MCV RBC AUTO: 88.9 FL — SIGNIFICANT CHANGE UP (ref 80–100)
MCV RBC AUTO: 89.4 FL — SIGNIFICANT CHANGE UP (ref 80–100)
MCV RBC AUTO: 89.5 FL — SIGNIFICANT CHANGE UP (ref 80–100)
MCV RBC AUTO: 89.5 FL — SIGNIFICANT CHANGE UP (ref 80–100)
MCV RBC AUTO: 89.7 FL — SIGNIFICANT CHANGE UP (ref 80–100)
MCV RBC AUTO: 89.8 FL — SIGNIFICANT CHANGE UP (ref 80–100)
MCV RBC AUTO: 89.9 FL — SIGNIFICANT CHANGE UP (ref 80–100)
MONOCYTES # BLD AUTO: 0.15 K/UL — SIGNIFICANT CHANGE UP (ref 0–0.9)
MONOCYTES # BLD AUTO: 0.52 K/UL — SIGNIFICANT CHANGE UP (ref 0–0.9)
MONOCYTES # BLD AUTO: 0.69 K/UL — SIGNIFICANT CHANGE UP (ref 0–0.9)
MONOCYTES # BLD AUTO: 0.7 K/UL — SIGNIFICANT CHANGE UP (ref 0–0.9)
MONOCYTES # BLD AUTO: 0.71 K/UL — SIGNIFICANT CHANGE UP (ref 0–0.9)
MONOCYTES # BLD AUTO: 0.73 K/UL — SIGNIFICANT CHANGE UP (ref 0–0.9)
MONOCYTES # BLD AUTO: 0.77 K/UL — SIGNIFICANT CHANGE UP (ref 0–0.9)
MONOCYTES # BLD AUTO: 0.78 K/UL — SIGNIFICANT CHANGE UP (ref 0–0.9)
MONOCYTES # BLD AUTO: 0.82 K/UL — SIGNIFICANT CHANGE UP (ref 0–0.9)
MONOCYTES # BLD AUTO: 0.83 K/UL — SIGNIFICANT CHANGE UP (ref 0–0.9)
MONOCYTES # BLD AUTO: 0.87 K/UL — SIGNIFICANT CHANGE UP (ref 0–0.9)
MONOCYTES # BLD AUTO: 1.1 K/UL — HIGH (ref 0–0.9)
MONOCYTES # BLD AUTO: 1.46 K/UL — HIGH (ref 0–0.9)
MONOCYTES NFR BLD AUTO: 10 % — SIGNIFICANT CHANGE UP (ref 2–14)
MONOCYTES NFR BLD AUTO: 11.4 % — SIGNIFICANT CHANGE UP (ref 2–14)
MONOCYTES NFR BLD AUTO: 11.6 % — SIGNIFICANT CHANGE UP (ref 2–14)
MONOCYTES NFR BLD AUTO: 11.9 % — SIGNIFICANT CHANGE UP (ref 2–14)
MONOCYTES NFR BLD AUTO: 12.2 % — SIGNIFICANT CHANGE UP (ref 2–14)
MONOCYTES NFR BLD AUTO: 12.5 % — SIGNIFICANT CHANGE UP (ref 2–14)
MONOCYTES NFR BLD AUTO: 12.9 % — SIGNIFICANT CHANGE UP (ref 2–14)
MONOCYTES NFR BLD AUTO: 12.9 % — SIGNIFICANT CHANGE UP (ref 2–14)
MONOCYTES NFR BLD AUTO: 13 % — SIGNIFICANT CHANGE UP (ref 2–14)
MONOCYTES NFR BLD AUTO: 13.8 % — SIGNIFICANT CHANGE UP (ref 2–14)
MONOCYTES NFR BLD AUTO: 14 % — SIGNIFICANT CHANGE UP (ref 2–14)
MONOCYTES NFR BLD AUTO: 2.6 % — SIGNIFICANT CHANGE UP (ref 2–14)
MONOCYTES NFR BLD AUTO: 8 % — SIGNIFICANT CHANGE UP (ref 2–14)
NEUTROPHILS # BLD AUTO: 11.06 K/UL — HIGH (ref 1.8–7.4)
NEUTROPHILS # BLD AUTO: 2.3 K/UL — SIGNIFICANT CHANGE UP (ref 1.8–7.4)
NEUTROPHILS # BLD AUTO: 3.26 K/UL — SIGNIFICANT CHANGE UP (ref 1.8–7.4)
NEUTROPHILS # BLD AUTO: 3.36 K/UL — SIGNIFICANT CHANGE UP (ref 1.8–7.4)
NEUTROPHILS # BLD AUTO: 3.37 K/UL — SIGNIFICANT CHANGE UP (ref 1.8–7.4)
NEUTROPHILS # BLD AUTO: 3.45 K/UL — SIGNIFICANT CHANGE UP (ref 1.8–7.4)
NEUTROPHILS # BLD AUTO: 3.48 K/UL — SIGNIFICANT CHANGE UP (ref 1.8–7.4)
NEUTROPHILS # BLD AUTO: 3.73 K/UL — SIGNIFICANT CHANGE UP (ref 1.8–7.4)
NEUTROPHILS # BLD AUTO: 3.92 K/UL — SIGNIFICANT CHANGE UP (ref 1.8–7.4)
NEUTROPHILS # BLD AUTO: 3.97 K/UL — SIGNIFICANT CHANGE UP (ref 1.8–7.4)
NEUTROPHILS # BLD AUTO: 5.22 K/UL — SIGNIFICANT CHANGE UP (ref 1.8–7.4)
NEUTROPHILS # BLD AUTO: 5.71 K/UL — SIGNIFICANT CHANGE UP (ref 1.8–7.4)
NEUTROPHILS # BLD AUTO: 6 K/UL — SIGNIFICANT CHANGE UP (ref 1.8–7.4)
NEUTROPHILS NFR BLD AUTO: 56.4 % — SIGNIFICANT CHANGE UP (ref 43–77)
NEUTROPHILS NFR BLD AUTO: 56.5 % — SIGNIFICANT CHANGE UP (ref 43–77)
NEUTROPHILS NFR BLD AUTO: 57 % — SIGNIFICANT CHANGE UP (ref 43–77)
NEUTROPHILS NFR BLD AUTO: 57.2 % — SIGNIFICANT CHANGE UP (ref 43–77)
NEUTROPHILS NFR BLD AUTO: 57.7 % — SIGNIFICANT CHANGE UP (ref 43–77)
NEUTROPHILS NFR BLD AUTO: 59.3 % — SIGNIFICANT CHANGE UP (ref 43–77)
NEUTROPHILS NFR BLD AUTO: 59.8 % — SIGNIFICANT CHANGE UP (ref 43–77)
NEUTROPHILS NFR BLD AUTO: 63.1 % — SIGNIFICANT CHANGE UP (ref 43–77)
NEUTROPHILS NFR BLD AUTO: 63.8 % — SIGNIFICANT CHANGE UP (ref 43–77)
NEUTROPHILS NFR BLD AUTO: 67.8 % — SIGNIFICANT CHANGE UP (ref 43–77)
NEUTROPHILS NFR BLD AUTO: 69 % — SIGNIFICANT CHANGE UP (ref 43–77)
NEUTROPHILS NFR BLD AUTO: 71.7 % — SIGNIFICANT CHANGE UP (ref 43–77)
NEUTROPHILS NFR BLD AUTO: 76 % — SIGNIFICANT CHANGE UP (ref 43–77)
NEUTS BAND # BLD: 0.9 % — SIGNIFICANT CHANGE UP (ref 0–8)
NITRITE UR-MCNC: NEGATIVE — SIGNIFICANT CHANGE UP
NITRITE UR-MCNC: NEGATIVE — SIGNIFICANT CHANGE UP
NRBC # BLD: 0 /100 WBCS — SIGNIFICANT CHANGE UP (ref 0–0)
NRBC # BLD: 0 /100 — SIGNIFICANT CHANGE UP (ref 0–0)
NRBC # BLD: SIGNIFICANT CHANGE UP /100 WBCS (ref 0–0)
NRBC # BLD: SIGNIFICANT CHANGE UP /100 WBCS (ref 0–0)
OVALOCYTES BLD QL SMEAR: SLIGHT — SIGNIFICANT CHANGE UP
PH UR: 5 — SIGNIFICANT CHANGE UP (ref 5–8)
PH UR: 6 — SIGNIFICANT CHANGE UP (ref 5–8)
PLAT MORPH BLD: NORMAL — SIGNIFICANT CHANGE UP
PLAT MORPH BLD: NORMAL — SIGNIFICANT CHANGE UP
PLATELET # BLD AUTO: 109 K/UL — LOW (ref 150–400)
PLATELET # BLD AUTO: 126 K/UL — LOW (ref 150–400)
PLATELET # BLD AUTO: 129 K/UL — LOW (ref 150–400)
PLATELET # BLD AUTO: 162 K/UL — SIGNIFICANT CHANGE UP (ref 150–400)
PLATELET # BLD AUTO: 173 K/UL — SIGNIFICANT CHANGE UP (ref 150–400)
PLATELET # BLD AUTO: 187 K/UL — SIGNIFICANT CHANGE UP (ref 150–400)
PLATELET # BLD AUTO: 188 K/UL — SIGNIFICANT CHANGE UP (ref 150–400)
PLATELET # BLD AUTO: 200 K/UL — SIGNIFICANT CHANGE UP (ref 150–400)
PLATELET # BLD AUTO: 210 K/UL — SIGNIFICANT CHANGE UP (ref 150–400)
PLATELET # BLD AUTO: 218 K/UL — SIGNIFICANT CHANGE UP (ref 150–400)
PLATELET # BLD AUTO: 230 K/UL — SIGNIFICANT CHANGE UP (ref 150–400)
PLATELET # BLD AUTO: 239 K/UL — SIGNIFICANT CHANGE UP (ref 150–400)
PLATELET # BLD AUTO: 243 K/UL — SIGNIFICANT CHANGE UP (ref 150–400)
PLATELET # BLD AUTO: 286 K/UL — SIGNIFICANT CHANGE UP (ref 150–400)
PLATELET # BLD AUTO: 55 K/UL — LOW (ref 150–400)
PLATELET # BLD AUTO: 62 K/UL — LOW (ref 150–400)
PLATELET # BLD AUTO: 66 K/UL — LOW (ref 150–400)
PLATELET # BLD AUTO: 76 K/UL — LOW (ref 150–400)
PLATELET # BLD AUTO: 81 K/UL — LOW (ref 150–400)
PLATELET # BLD AUTO: 84 K/UL — LOW (ref 150–400)
POTASSIUM SERPL-MCNC: 3.8 MMOL/L — SIGNIFICANT CHANGE UP (ref 3.5–5.3)
POTASSIUM SERPL-MCNC: 4 MMOL/L — SIGNIFICANT CHANGE UP (ref 3.5–5.3)
POTASSIUM SERPL-MCNC: 4.1 MMOL/L — SIGNIFICANT CHANGE UP (ref 3.5–5.3)
POTASSIUM SERPL-MCNC: 4.1 MMOL/L — SIGNIFICANT CHANGE UP (ref 3.5–5.3)
POTASSIUM SERPL-MCNC: 4.2 MMOL/L — SIGNIFICANT CHANGE UP (ref 3.5–5.3)
POTASSIUM SERPL-MCNC: 4.3 MMOL/L — SIGNIFICANT CHANGE UP (ref 3.5–5.3)
POTASSIUM SERPL-MCNC: 4.4 MMOL/L — SIGNIFICANT CHANGE UP (ref 3.5–5.3)
POTASSIUM SERPL-MCNC: 4.5 MMOL/L — SIGNIFICANT CHANGE UP (ref 3.5–5.3)
POTASSIUM SERPL-MCNC: 4.7 MMOL/L — SIGNIFICANT CHANGE UP (ref 3.5–5.3)
POTASSIUM SERPL-MCNC: 4.7 MMOL/L — SIGNIFICANT CHANGE UP (ref 3.5–5.3)
POTASSIUM SERPL-SCNC: 3.8 MMOL/L — SIGNIFICANT CHANGE UP (ref 3.5–5.3)
POTASSIUM SERPL-SCNC: 4 MMOL/L — SIGNIFICANT CHANGE UP (ref 3.5–5.3)
POTASSIUM SERPL-SCNC: 4.1 MMOL/L — SIGNIFICANT CHANGE UP (ref 3.5–5.3)
POTASSIUM SERPL-SCNC: 4.1 MMOL/L — SIGNIFICANT CHANGE UP (ref 3.5–5.3)
POTASSIUM SERPL-SCNC: 4.2 MMOL/L — SIGNIFICANT CHANGE UP (ref 3.5–5.3)
POTASSIUM SERPL-SCNC: 4.3 MMOL/L — SIGNIFICANT CHANGE UP (ref 3.5–5.3)
POTASSIUM SERPL-SCNC: 4.4 MMOL/L — SIGNIFICANT CHANGE UP (ref 3.5–5.3)
POTASSIUM SERPL-SCNC: 4.5 MMOL/L — SIGNIFICANT CHANGE UP (ref 3.5–5.3)
POTASSIUM SERPL-SCNC: 4.7 MMOL/L — SIGNIFICANT CHANGE UP (ref 3.5–5.3)
POTASSIUM SERPL-SCNC: 4.7 MMOL/L — SIGNIFICANT CHANGE UP (ref 3.5–5.3)
PREALB SERPL-MCNC: 9 MG/DL — LOW (ref 20–40)
PROT SERPL-MCNC: 5.8 G/DL — LOW (ref 6–8.3)
PROT SERPL-MCNC: 5.8 G/DL — LOW (ref 6–8.3)
PROT SERPL-MCNC: 6 G/DL — SIGNIFICANT CHANGE UP (ref 6–8.3)
PROT SERPL-MCNC: 6.4 GM/DL — SIGNIFICANT CHANGE UP (ref 6–8.3)
PROT SERPL-MCNC: 6.7 G/DL — SIGNIFICANT CHANGE UP (ref 6–8.3)
PROT SERPL-MCNC: 7 GM/DL — SIGNIFICANT CHANGE UP (ref 6–8.3)
PROT UR-MCNC: 30 MG/DL
PROT UR-MCNC: ABNORMAL
PROTHROM AB SERPL-ACNC: 15.5 SEC — HIGH (ref 10.6–13.6)
PROTHROM AB SERPL-ACNC: 16.3 SEC — HIGH (ref 10.6–13.6)
PROTHROM AB SERPL-ACNC: 17.1 SEC — HIGH (ref 10.6–13.6)
PROTHROM AB SERPL-ACNC: 18 SEC — HIGH (ref 10.6–13.6)
PROTHROM AB SERPL-ACNC: 18.3 SEC — HIGH (ref 10.6–13.6)
RBC # BLD: 2.16 M/UL — LOW (ref 4.2–5.8)
RBC # BLD: 2.36 M/UL — LOW (ref 4.2–5.8)
RBC # BLD: 2.52 M/UL — LOW (ref 4.2–5.8)
RBC # BLD: 2.55 M/UL — LOW (ref 4.2–5.8)
RBC # BLD: 2.58 M/UL — LOW (ref 4.2–5.8)
RBC # BLD: 2.58 M/UL — LOW (ref 4.2–5.8)
RBC # BLD: 2.72 M/UL — LOW (ref 4.2–5.8)
RBC # BLD: 2.89 M/UL — LOW (ref 4.2–5.8)
RBC # BLD: 2.91 M/UL — LOW (ref 4.2–5.8)
RBC # BLD: 3.01 M/UL — LOW (ref 4.2–5.8)
RBC # BLD: 3.04 M/UL — LOW (ref 4.2–5.8)
RBC # BLD: 3.14 M/UL — LOW (ref 4.2–5.8)
RBC # BLD: 3.15 M/UL — LOW (ref 4.2–5.8)
RBC # BLD: 3.28 M/UL — LOW (ref 4.2–5.8)
RBC # BLD: 3.32 M/UL — LOW (ref 4.2–5.8)
RBC # BLD: 3.35 M/UL — LOW (ref 4.2–5.8)
RBC # BLD: 3.46 M/UL — LOW (ref 4.2–5.8)
RBC # BLD: 3.55 M/UL — LOW (ref 4.2–5.8)
RBC # BLD: 3.79 M/UL — LOW (ref 4.2–5.8)
RBC # BLD: 3.86 M/UL — LOW (ref 4.2–5.8)
RBC # FLD: 13.8 % — SIGNIFICANT CHANGE UP (ref 10.3–14.5)
RBC # FLD: 13.9 % — SIGNIFICANT CHANGE UP (ref 10.3–14.5)
RBC # FLD: 13.9 % — SIGNIFICANT CHANGE UP (ref 10.3–14.5)
RBC # FLD: 14.1 % — SIGNIFICANT CHANGE UP (ref 10.3–14.5)
RBC # FLD: 14.2 % — SIGNIFICANT CHANGE UP (ref 10.3–14.5)
RBC # FLD: 14.6 % — HIGH (ref 10.3–14.5)
RBC # FLD: 14.6 % — HIGH (ref 10.3–14.5)
RBC # FLD: 14.7 % — HIGH (ref 10.3–14.5)
RBC # FLD: 14.9 % — HIGH (ref 10.3–14.5)
RBC # FLD: 15.3 % — HIGH (ref 10.3–14.5)
RBC # FLD: 15.4 % — HIGH (ref 10.3–14.5)
RBC # FLD: 15.9 % — HIGH (ref 10.3–14.5)
RBC # FLD: 16.2 % — HIGH (ref 10.3–14.5)
RBC # FLD: 16.3 % — HIGH (ref 10.3–14.5)
RBC # FLD: 16.7 % — HIGH (ref 10.3–14.5)
RBC # FLD: 17.5 % — HIGH (ref 10.3–14.5)
RBC BLD AUTO: ABNORMAL
RBC BLD AUTO: SIGNIFICANT CHANGE UP
RBC CASTS # UR COMP ASSIST: 6 /HPF — HIGH (ref 0–4)
RH IG SCN BLD-IMP: POSITIVE — SIGNIFICANT CHANGE UP
RH IG SCN BLD-IMP: POSITIVE — SIGNIFICANT CHANGE UP
SARS-COV-2 IGG+IGM SERPL QL IA: 13.2 U/ML — HIGH
SARS-COV-2 IGG+IGM SERPL QL IA: 55.1 U/ML — HIGH
SARS-COV-2 IGG+IGM SERPL QL IA: POSITIVE
SARS-COV-2 IGG+IGM SERPL QL IA: POSITIVE
SARS-COV-2 RNA SPEC QL NAA+PROBE: SIGNIFICANT CHANGE UP
SARS-COV-2 RNA SPEC QL NAA+PROBE: SIGNIFICANT CHANGE UP
SODIUM SERPL-SCNC: 136 MMOL/L — SIGNIFICANT CHANGE UP (ref 135–145)
SODIUM SERPL-SCNC: 137 MMOL/L — SIGNIFICANT CHANGE UP (ref 135–145)
SODIUM SERPL-SCNC: 138 MMOL/L — SIGNIFICANT CHANGE UP (ref 135–145)
SODIUM SERPL-SCNC: 139 MMOL/L — SIGNIFICANT CHANGE UP (ref 135–145)
SODIUM SERPL-SCNC: 140 MMOL/L — SIGNIFICANT CHANGE UP (ref 135–145)
SODIUM SERPL-SCNC: 140 MMOL/L — SIGNIFICANT CHANGE UP (ref 135–145)
SODIUM SERPL-SCNC: 142 MMOL/L — SIGNIFICANT CHANGE UP (ref 135–145)
SODIUM SERPL-SCNC: 143 MMOL/L — SIGNIFICANT CHANGE UP (ref 135–145)
SP GR SPEC: 1.01 — SIGNIFICANT CHANGE UP (ref 1.01–1.02)
SP GR SPEC: 1.02 — SIGNIFICANT CHANGE UP (ref 1.01–1.02)
SPECIMEN SOURCE: SIGNIFICANT CHANGE UP
T PALLIDUM AB TITR SER: NEGATIVE — SIGNIFICANT CHANGE UP
T4 FREE SERPL-MCNC: 1 NG/DL — SIGNIFICANT CHANGE UP (ref 0.9–1.8)
TARGETS BLD QL SMEAR: SIGNIFICANT CHANGE UP
TIBC SERPL-MCNC: SIGNIFICANT CHANGE UP UG/DL (ref 220–430)
TROPONIN I SERPL-MCNC: <0.015 NG/ML — SIGNIFICANT CHANGE UP (ref 0.01–0.04)
TSH SERPL-MCNC: 5.63 UIU/ML — HIGH (ref 0.27–4.2)
UIBC SERPL-MCNC: <20 UG/DL — LOW (ref 110–370)
URATE SERPL-MCNC: 4.4 MG/DL — SIGNIFICANT CHANGE UP (ref 3.4–8.8)
UROBILINOGEN FLD QL: NEGATIVE MG/DL — SIGNIFICANT CHANGE UP
UROBILINOGEN FLD QL: NEGATIVE — SIGNIFICANT CHANGE UP
VARIANT LYMPHS # BLD: 2.6 % — SIGNIFICANT CHANGE UP (ref 0–6)
VIT B12 SERPL-MCNC: 1437 PG/ML — HIGH (ref 232–1245)
WBC # BLD: 14.55 K/UL — HIGH (ref 3.8–10.5)
WBC # BLD: 3.52 K/UL — LOW (ref 3.8–10.5)
WBC # BLD: 3.67 K/UL — LOW (ref 3.8–10.5)
WBC # BLD: 4.02 K/UL — SIGNIFICANT CHANGE UP (ref 3.8–10.5)
WBC # BLD: 4.07 K/UL — SIGNIFICANT CHANGE UP (ref 3.8–10.5)
WBC # BLD: 4.7 K/UL — SIGNIFICANT CHANGE UP (ref 3.8–10.5)
WBC # BLD: 5.33 K/UL — SIGNIFICANT CHANGE UP (ref 3.8–10.5)
WBC # BLD: 5.66 K/UL — SIGNIFICANT CHANGE UP (ref 3.8–10.5)
WBC # BLD: 5.77 K/UL — SIGNIFICANT CHANGE UP (ref 3.8–10.5)
WBC # BLD: 5.87 K/UL — SIGNIFICANT CHANGE UP (ref 3.8–10.5)
WBC # BLD: 5.96 K/UL — SIGNIFICANT CHANGE UP (ref 3.8–10.5)
WBC # BLD: 5.96 K/UL — SIGNIFICANT CHANGE UP (ref 3.8–10.5)
WBC # BLD: 6.05 K/UL — SIGNIFICANT CHANGE UP (ref 3.8–10.5)
WBC # BLD: 6.21 K/UL — SIGNIFICANT CHANGE UP (ref 3.8–10.5)
WBC # BLD: 6.64 K/UL — SIGNIFICANT CHANGE UP (ref 3.8–10.5)
WBC # BLD: 6.93 K/UL — SIGNIFICANT CHANGE UP (ref 3.8–10.5)
WBC # BLD: 7.28 K/UL — SIGNIFICANT CHANGE UP (ref 3.8–10.5)
WBC # BLD: 8.11 K/UL — SIGNIFICANT CHANGE UP (ref 3.8–10.5)
WBC # BLD: 8.44 K/UL — SIGNIFICANT CHANGE UP (ref 3.8–10.5)
WBC # BLD: 8.57 K/UL — SIGNIFICANT CHANGE UP (ref 3.8–10.5)
WBC # FLD AUTO: 14.55 K/UL — HIGH (ref 3.8–10.5)
WBC # FLD AUTO: 3.52 K/UL — LOW (ref 3.8–10.5)
WBC # FLD AUTO: 3.67 K/UL — LOW (ref 3.8–10.5)
WBC # FLD AUTO: 4.02 K/UL — SIGNIFICANT CHANGE UP (ref 3.8–10.5)
WBC # FLD AUTO: 4.07 K/UL — SIGNIFICANT CHANGE UP (ref 3.8–10.5)
WBC # FLD AUTO: 4.7 K/UL — SIGNIFICANT CHANGE UP (ref 3.8–10.5)
WBC # FLD AUTO: 5.33 K/UL — SIGNIFICANT CHANGE UP (ref 3.8–10.5)
WBC # FLD AUTO: 5.66 K/UL — SIGNIFICANT CHANGE UP (ref 3.8–10.5)
WBC # FLD AUTO: 5.77 K/UL — SIGNIFICANT CHANGE UP (ref 3.8–10.5)
WBC # FLD AUTO: 5.87 K/UL — SIGNIFICANT CHANGE UP (ref 3.8–10.5)
WBC # FLD AUTO: 5.96 K/UL — SIGNIFICANT CHANGE UP (ref 3.8–10.5)
WBC # FLD AUTO: 5.96 K/UL — SIGNIFICANT CHANGE UP (ref 3.8–10.5)
WBC # FLD AUTO: 6.05 K/UL — SIGNIFICANT CHANGE UP (ref 3.8–10.5)
WBC # FLD AUTO: 6.21 K/UL — SIGNIFICANT CHANGE UP (ref 3.8–10.5)
WBC # FLD AUTO: 6.64 K/UL — SIGNIFICANT CHANGE UP (ref 3.8–10.5)
WBC # FLD AUTO: 6.93 K/UL — SIGNIFICANT CHANGE UP (ref 3.8–10.5)
WBC # FLD AUTO: 7.28 K/UL — SIGNIFICANT CHANGE UP (ref 3.8–10.5)
WBC # FLD AUTO: 8.11 K/UL — SIGNIFICANT CHANGE UP (ref 3.8–10.5)
WBC # FLD AUTO: 8.44 K/UL — SIGNIFICANT CHANGE UP (ref 3.8–10.5)
WBC # FLD AUTO: 8.57 K/UL — SIGNIFICANT CHANGE UP (ref 3.8–10.5)
WBC UR QL: 0 /HPF — SIGNIFICANT CHANGE UP (ref 0–5)

## 2021-01-01 PROCEDURE — 92610 EVALUATE SWALLOWING FUNCTION: CPT | Mod: GN

## 2021-01-01 PROCEDURE — 82306 VITAMIN D 25 HYDROXY: CPT

## 2021-01-01 PROCEDURE — 80053 COMPREHEN METABOLIC PANEL: CPT

## 2021-01-01 PROCEDURE — 86900 BLOOD TYPING SEROLOGIC ABO: CPT

## 2021-01-01 PROCEDURE — 85652 RBC SED RATE AUTOMATED: CPT

## 2021-01-01 PROCEDURE — 92523 SPEECH SOUND LANG COMPREHEN: CPT | Mod: GN

## 2021-01-01 PROCEDURE — 85027 COMPLETE CBC AUTOMATED: CPT

## 2021-01-01 PROCEDURE — 83735 ASSAY OF MAGNESIUM: CPT

## 2021-01-01 PROCEDURE — 86803 HEPATITIS C AB TEST: CPT

## 2021-01-01 PROCEDURE — 86850 RBC ANTIBODY SCREEN: CPT

## 2021-01-01 PROCEDURE — 82746 ASSAY OF FOLIC ACID SERUM: CPT

## 2021-01-01 PROCEDURE — 85730 THROMBOPLASTIN TIME PARTIAL: CPT

## 2021-01-01 PROCEDURE — 76705 ECHO EXAM OF ABDOMEN: CPT

## 2021-01-01 PROCEDURE — 99233 SBSQ HOSP IP/OBS HIGH 50: CPT

## 2021-01-01 PROCEDURE — 93005 ELECTROCARDIOGRAM TRACING: CPT

## 2021-01-01 PROCEDURE — 93010 ELECTROCARDIOGRAM REPORT: CPT

## 2021-01-01 PROCEDURE — 99239 HOSP IP/OBS DSCHRG MGMT >30: CPT

## 2021-01-01 PROCEDURE — 71045 X-RAY EXAM CHEST 1 VIEW: CPT | Mod: 26

## 2021-01-01 PROCEDURE — 83615 LACTATE (LD) (LDH) ENZYME: CPT

## 2021-01-01 PROCEDURE — 86901 BLOOD TYPING SEROLOGIC RH(D): CPT

## 2021-01-01 PROCEDURE — G1004: CPT

## 2021-01-01 PROCEDURE — 84443 ASSAY THYROID STIM HORMONE: CPT

## 2021-01-01 PROCEDURE — 82550 ASSAY OF CK (CPK): CPT

## 2021-01-01 PROCEDURE — 12345: CPT | Mod: NC

## 2021-01-01 PROCEDURE — 36430 TRANSFUSION BLD/BLD COMPNT: CPT

## 2021-01-01 PROCEDURE — 97530 THERAPEUTIC ACTIVITIES: CPT | Mod: GP

## 2021-01-01 PROCEDURE — 83550 IRON BINDING TEST: CPT

## 2021-01-01 PROCEDURE — 99232 SBSQ HOSP IP/OBS MODERATE 35: CPT

## 2021-01-01 PROCEDURE — 97167 OT EVAL HIGH COMPLEX 60 MIN: CPT

## 2021-01-01 PROCEDURE — 97110 THERAPEUTIC EXERCISES: CPT

## 2021-01-01 PROCEDURE — 86923 COMPATIBILITY TEST ELECTRIC: CPT

## 2021-01-01 PROCEDURE — 82140 ASSAY OF AMMONIA: CPT

## 2021-01-01 PROCEDURE — 81001 URINALYSIS AUTO W/SCOPE: CPT

## 2021-01-01 PROCEDURE — U0005: CPT

## 2021-01-01 PROCEDURE — 85610 PROTHROMBIN TIME: CPT

## 2021-01-01 PROCEDURE — 73030 X-RAY EXAM OF SHOULDER: CPT

## 2021-01-01 PROCEDURE — 82728 ASSAY OF FERRITIN: CPT

## 2021-01-01 PROCEDURE — 83036 HEMOGLOBIN GLYCOSYLATED A1C: CPT

## 2021-01-01 PROCEDURE — 36415 COLL VENOUS BLD VENIPUNCTURE: CPT

## 2021-01-01 PROCEDURE — 97163 PT EVAL HIGH COMPLEX 45 MIN: CPT | Mod: GP

## 2021-01-01 PROCEDURE — 99222 1ST HOSP IP/OBS MODERATE 55: CPT

## 2021-01-01 PROCEDURE — 86645 CMV ANTIBODY IGM: CPT

## 2021-01-01 PROCEDURE — 99285 EMERGENCY DEPT VISIT HI MDM: CPT | Mod: 25

## 2021-01-01 PROCEDURE — 82607 VITAMIN B-12: CPT

## 2021-01-01 PROCEDURE — 93291 INTERROG DEV EVAL SCRMS IP: CPT

## 2021-01-01 PROCEDURE — 87086 URINE CULTURE/COLONY COUNT: CPT

## 2021-01-01 PROCEDURE — 86704 HEP B CORE ANTIBODY TOTAL: CPT

## 2021-01-01 PROCEDURE — P9040: CPT

## 2021-01-01 PROCEDURE — 76705 ECHO EXAM OF ABDOMEN: CPT | Mod: 26,RT

## 2021-01-01 PROCEDURE — 82977 ASSAY OF GGT: CPT

## 2021-01-01 PROCEDURE — 84550 ASSAY OF BLOOD/URIC ACID: CPT

## 2021-01-01 PROCEDURE — 86140 C-REACTIVE PROTEIN: CPT

## 2021-01-01 PROCEDURE — 99213 OFFICE O/P EST LOW 20 MIN: CPT

## 2021-01-01 PROCEDURE — 87340 HEPATITIS B SURFACE AG IA: CPT

## 2021-01-01 PROCEDURE — 86769 SARS-COV-2 COVID-19 ANTIBODY: CPT

## 2021-01-01 PROCEDURE — 85025 COMPLETE CBC W/AUTO DIFF WBC: CPT

## 2021-01-01 PROCEDURE — 71045 X-RAY EXAM CHEST 1 VIEW: CPT

## 2021-01-01 PROCEDURE — 99223 1ST HOSP IP/OBS HIGH 75: CPT

## 2021-01-01 PROCEDURE — 82390 ASSAY OF CERULOPLASMIN: CPT

## 2021-01-01 PROCEDURE — 85384 FIBRINOGEN ACTIVITY: CPT

## 2021-01-01 PROCEDURE — 70450 CT HEAD/BRAIN W/O DYE: CPT | Mod: 26

## 2021-01-01 PROCEDURE — 99215 OFFICE O/P EST HI 40 MIN: CPT

## 2021-01-01 PROCEDURE — 97530 THERAPEUTIC ACTIVITIES: CPT

## 2021-01-01 PROCEDURE — 84134 ASSAY OF PREALBUMIN: CPT

## 2021-01-01 PROCEDURE — P9016: CPT

## 2021-01-01 PROCEDURE — 80048 BASIC METABOLIC PNL TOTAL CA: CPT

## 2021-01-01 PROCEDURE — 99291 CRITICAL CARE FIRST HOUR: CPT

## 2021-01-01 PROCEDURE — U0003: CPT

## 2021-01-01 PROCEDURE — 70553 MRI BRAIN STEM W/O & W/DYE: CPT | Mod: 26,ME

## 2021-01-01 PROCEDURE — 84238 ASSAY NONENDOCRINE RECEPTOR: CPT

## 2021-01-01 PROCEDURE — 74176 CT ABD & PELVIS W/O CONTRAST: CPT | Mod: 26

## 2021-01-01 PROCEDURE — 86644 CMV ANTIBODY: CPT

## 2021-01-01 PROCEDURE — 84439 ASSAY OF FREE THYROXINE: CPT

## 2021-01-01 PROCEDURE — 87389 HIV-1 AG W/HIV-1&-2 AB AG IA: CPT

## 2021-01-01 PROCEDURE — 99214 OFFICE O/P EST MOD 30 MIN: CPT | Mod: 95

## 2021-01-01 PROCEDURE — 99223 1ST HOSP IP/OBS HIGH 75: CPT | Mod: GC

## 2021-01-01 PROCEDURE — 70553 MRI BRAIN STEM W/O & W/DYE: CPT

## 2021-01-01 PROCEDURE — 92526 ORAL FUNCTION THERAPY: CPT

## 2021-01-01 PROCEDURE — 87040 BLOOD CULTURE FOR BACTERIA: CPT

## 2021-01-01 PROCEDURE — 73030 X-RAY EXAM OF SHOULDER: CPT | Mod: 26,LT

## 2021-01-01 PROCEDURE — 86780 TREPONEMA PALLIDUM: CPT

## 2021-01-01 PROCEDURE — 99221 1ST HOSP IP/OBS SF/LOW 40: CPT | Mod: GC

## 2021-01-01 PROCEDURE — A9585: CPT

## 2021-01-01 PROCEDURE — 82962 GLUCOSE BLOOD TEST: CPT

## 2021-01-01 PROCEDURE — G2066: CPT

## 2021-01-01 PROCEDURE — 82533 TOTAL CORTISOL: CPT

## 2021-01-01 PROCEDURE — 86706 HEP B SURFACE ANTIBODY: CPT

## 2021-01-01 PROCEDURE — 92610 EVALUATE SWALLOWING FUNCTION: CPT

## 2021-01-01 PROCEDURE — 99285 EMERGENCY DEPT VISIT HI MDM: CPT | Mod: GC

## 2021-01-01 PROCEDURE — 93298 REM INTERROG DEV EVAL SCRMS: CPT

## 2021-01-01 PROCEDURE — 99214 OFFICE O/P EST MOD 30 MIN: CPT

## 2021-01-01 PROCEDURE — 99291 CRITICAL CARE FIRST HOUR: CPT | Mod: CS

## 2021-01-01 PROCEDURE — 83540 ASSAY OF IRON: CPT

## 2021-01-01 RX ORDER — DEXTROSE 50 % IN WATER 50 %
12.5 SYRINGE (ML) INTRAVENOUS ONCE
Refills: 0 | Status: DISCONTINUED | OUTPATIENT
Start: 2021-01-01 | End: 2021-01-01

## 2021-01-01 RX ORDER — ATORVASTATIN CALCIUM 80 MG/1
20 TABLET, FILM COATED ORAL AT BEDTIME
Refills: 0 | Status: DISCONTINUED | OUTPATIENT
Start: 2021-01-01 | End: 2021-01-01

## 2021-01-01 RX ORDER — NYSTATIN 500MM UNIT
5 POWDER (EA) MISCELLANEOUS
Qty: 0 | Refills: 0 | DISCHARGE
Start: 2021-01-01

## 2021-01-01 RX ORDER — LEVOTHYROXINE SODIUM 125 MCG
37.5 TABLET ORAL DAILY
Refills: 0 | Status: DISCONTINUED | OUTPATIENT
Start: 2021-01-01 | End: 2021-01-01

## 2021-01-01 RX ORDER — DEXTROSE 50 % IN WATER 50 %
15 SYRINGE (ML) INTRAVENOUS ONCE
Refills: 0 | Status: DISCONTINUED | OUTPATIENT
Start: 2021-01-01 | End: 2021-01-01

## 2021-01-01 RX ORDER — PANTOPRAZOLE SODIUM 20 MG/1
40 TABLET, DELAYED RELEASE ORAL
Refills: 0 | Status: DISCONTINUED | OUTPATIENT
Start: 2021-01-01 | End: 2021-01-01

## 2021-01-01 RX ORDER — CIPROFLOXACIN HYDROCHLORIDE 500 MG/1
500 TABLET, FILM COATED ORAL TWICE DAILY
Qty: 20 | Refills: 0 | Status: DISCONTINUED | COMMUNITY
Start: 2020-01-01 | End: 2021-01-01

## 2021-01-01 RX ORDER — ACETAMINOPHEN 500 MG
650 TABLET ORAL EVERY 6 HOURS
Refills: 0 | Status: DISCONTINUED | OUTPATIENT
Start: 2021-01-01 | End: 2021-01-01

## 2021-01-01 RX ORDER — FUROSEMIDE 40 MG
20 TABLET ORAL ONCE
Refills: 0 | Status: COMPLETED | OUTPATIENT
Start: 2021-01-01 | End: 2021-01-01

## 2021-01-01 RX ORDER — INSULIN LISPRO 100/ML
VIAL (ML) SUBCUTANEOUS AT BEDTIME
Refills: 0 | Status: DISCONTINUED | OUTPATIENT
Start: 2021-01-01 | End: 2021-01-01

## 2021-01-01 RX ORDER — INSULIN LISPRO 100/ML
VIAL (ML) SUBCUTANEOUS
Refills: 0 | Status: DISCONTINUED | OUTPATIENT
Start: 2021-01-01 | End: 2021-01-01

## 2021-01-01 RX ORDER — DEXTROSE 50 % IN WATER 50 %
25 SYRINGE (ML) INTRAVENOUS ONCE
Refills: 0 | Status: DISCONTINUED | OUTPATIENT
Start: 2021-01-01 | End: 2021-01-01

## 2021-01-01 RX ORDER — ROSUVASTATIN CALCIUM 5 MG/1
1 TABLET ORAL
Qty: 0 | Refills: 0 | DISCHARGE

## 2021-01-01 RX ORDER — METOPROLOL TARTRATE 50 MG
50 TABLET ORAL
Refills: 0 | Status: DISCONTINUED | OUTPATIENT
Start: 2021-01-01 | End: 2021-01-01

## 2021-01-01 RX ORDER — GLUCAGON INJECTION, SOLUTION 0.5 MG/.1ML
1 INJECTION, SOLUTION SUBCUTANEOUS ONCE
Refills: 0 | Status: DISCONTINUED | OUTPATIENT
Start: 2021-01-01 | End: 2021-01-01

## 2021-01-01 RX ORDER — AMLODIPINE BESYLATE 2.5 MG/1
5 TABLET ORAL DAILY
Refills: 0 | Status: DISCONTINUED | OUTPATIENT
Start: 2021-01-01 | End: 2021-01-01

## 2021-01-01 RX ORDER — SODIUM CHLORIDE 9 MG/ML
1000 INJECTION INTRAMUSCULAR; INTRAVENOUS; SUBCUTANEOUS ONCE
Refills: 0 | Status: COMPLETED | OUTPATIENT
Start: 2021-01-01 | End: 2021-01-01

## 2021-01-01 RX ORDER — ASPIRIN/CALCIUM CARB/MAGNESIUM 324 MG
81 TABLET ORAL DAILY
Refills: 0 | Status: DISCONTINUED | OUTPATIENT
Start: 2021-01-01 | End: 2021-01-01

## 2021-01-01 RX ORDER — MULTIVIT-MIN/FERROUS GLUCONATE 9 MG/15 ML
15 LIQUID (ML) ORAL DAILY
Refills: 0 | Status: DISCONTINUED | OUTPATIENT
Start: 2021-01-01 | End: 2021-01-01

## 2021-01-01 RX ORDER — SODIUM CHLORIDE 9 MG/ML
1000 INJECTION, SOLUTION INTRAVENOUS
Refills: 0 | Status: DISCONTINUED | OUTPATIENT
Start: 2021-01-01 | End: 2021-01-01

## 2021-01-01 RX ORDER — METOPROLOL TARTRATE 50 MG
1 TABLET ORAL
Qty: 0 | Refills: 0 | DISCHARGE
Start: 2021-01-01

## 2021-01-01 RX ORDER — CLOTRIMAZOLE 10 MG/1
10 LOZENGE ORAL
Qty: 35 | Refills: 0 | Status: ACTIVE | COMMUNITY
Start: 2021-01-01 | End: 1900-01-01

## 2021-01-01 RX ORDER — NYSTATIN 500MM UNIT
500000 POWDER (EA) MISCELLANEOUS
Refills: 0 | Status: DISCONTINUED | OUTPATIENT
Start: 2021-01-01 | End: 2021-01-01

## 2021-01-01 RX ORDER — ASCORBIC ACID 60 MG
500 TABLET,CHEWABLE ORAL DAILY
Refills: 0 | Status: DISCONTINUED | OUTPATIENT
Start: 2021-01-01 | End: 2021-01-01

## 2021-01-01 RX ORDER — ALLOPURINOL 100 MG/1
100 TABLET ORAL
Qty: 90 | Refills: 3 | Status: ACTIVE | COMMUNITY
Start: 2018-07-06 | End: 1900-01-01

## 2021-01-01 RX ORDER — LINAGLIPTIN 5 MG/1
5 TABLET, FILM COATED ORAL DAILY
Qty: 90 | Refills: 1 | Status: DISCONTINUED | COMMUNITY
Start: 2017-04-27 | End: 2021-01-01

## 2021-01-01 RX ORDER — AMLODIPINE BESYLATE 2.5 MG/1
2.5 TABLET ORAL DAILY
Refills: 0 | Status: DISCONTINUED | OUTPATIENT
Start: 2021-01-01 | End: 2021-01-01

## 2021-01-01 RX ORDER — SODIUM CHLORIDE 9 MG/ML
250 INJECTION INTRAMUSCULAR; INTRAVENOUS; SUBCUTANEOUS ONCE
Refills: 0 | Status: COMPLETED | OUTPATIENT
Start: 2021-01-01 | End: 2021-01-01

## 2021-01-01 RX ORDER — METOPROLOL TARTRATE 50 MG
100 TABLET ORAL DAILY
Refills: 0 | Status: DISCONTINUED | OUTPATIENT
Start: 2021-01-01 | End: 2021-01-01

## 2021-01-01 RX ORDER — POLYETHYLENE GLYCOL 3350 17 G/17G
17 POWDER, FOR SOLUTION ORAL
Refills: 0 | Status: DISCONTINUED | OUTPATIENT
Start: 2021-01-01 | End: 2021-01-01

## 2021-01-01 RX ORDER — OMEPRAZOLE 10 MG/1
1 CAPSULE, DELAYED RELEASE ORAL
Qty: 0 | Refills: 0 | DISCHARGE

## 2021-01-01 RX ORDER — ALLOPURINOL 300 MG
100 TABLET ORAL DAILY
Refills: 0 | Status: DISCONTINUED | OUTPATIENT
Start: 2021-01-01 | End: 2021-01-01

## 2021-01-01 RX ORDER — NYSTATIN 500MM UNIT
5 POWDER (EA) MISCELLANEOUS
Qty: 140 | Refills: 0
Start: 2021-01-01 | End: 2021-01-01

## 2021-01-01 RX ORDER — POLYETHYLENE GLYCOL 3350 17 G/17G
17 POWDER, FOR SOLUTION ORAL DAILY
Refills: 0 | Status: DISCONTINUED | OUTPATIENT
Start: 2021-01-01 | End: 2021-01-01

## 2021-01-01 RX ORDER — SENNA PLUS 8.6 MG/1
2 TABLET ORAL AT BEDTIME
Refills: 0 | Status: DISCONTINUED | OUTPATIENT
Start: 2021-01-01 | End: 2021-01-01

## 2021-01-01 RX ORDER — FOSFOMYCIN TROMETHAMINE 3 G/1
3 POWDER ORAL DAILY
Qty: 2 | Refills: 0 | Status: ACTIVE | COMMUNITY
Start: 2020-01-01 | End: 1900-01-01

## 2021-01-01 RX ORDER — METHYLPHENIDATE HCL 5 MG
20 TABLET ORAL THREE TIMES A DAY
Refills: 0 | Status: DISCONTINUED | OUTPATIENT
Start: 2021-01-01 | End: 2021-01-01

## 2021-01-01 RX ORDER — METOCLOPRAMIDE HCL 10 MG
1 TABLET ORAL
Qty: 0 | Refills: 0 | DISCHARGE

## 2021-01-01 RX ORDER — PHYTONADIONE (VIT K1) 5 MG
5 TABLET ORAL DAILY
Refills: 0 | Status: COMPLETED | OUTPATIENT
Start: 2021-01-01 | End: 2021-01-01

## 2021-01-01 RX ORDER — CHOLECALCIFEROL (VITAMIN D3) 125 MCG
1000 CAPSULE ORAL DAILY
Refills: 0 | Status: DISCONTINUED | OUTPATIENT
Start: 2021-01-01 | End: 2021-01-01

## 2021-01-01 RX ORDER — MULTIVIT-MIN/FERROUS GLUCONATE 9 MG/15 ML
30 LIQUID (ML) ORAL DAILY
Refills: 0 | Status: DISCONTINUED | OUTPATIENT
Start: 2021-01-01 | End: 2021-01-01

## 2021-01-01 RX ADMIN — Medication 500000 UNIT(S): at 00:08

## 2021-01-01 RX ADMIN — Medication 20 MILLIGRAM(S): at 08:59

## 2021-01-01 RX ADMIN — Medication 81 MILLIGRAM(S): at 12:04

## 2021-01-01 RX ADMIN — Medication 1000 UNIT(S): at 12:48

## 2021-01-01 RX ADMIN — Medication 37.5 MICROGRAM(S): at 05:35

## 2021-01-01 RX ADMIN — AMLODIPINE BESYLATE 2.5 MILLIGRAM(S): 2.5 TABLET ORAL at 05:30

## 2021-01-01 RX ADMIN — SODIUM CHLORIDE 1000 MILLILITER(S): 9 INJECTION INTRAMUSCULAR; INTRAVENOUS; SUBCUTANEOUS at 22:30

## 2021-01-01 RX ADMIN — Medication 50 MILLIGRAM(S): at 09:07

## 2021-01-01 RX ADMIN — Medication 20 MILLIGRAM(S): at 20:35

## 2021-01-01 RX ADMIN — Medication 500000 UNIT(S): at 21:27

## 2021-01-01 RX ADMIN — SODIUM CHLORIDE 75 MILLILITER(S): 9 INJECTION, SOLUTION INTRAVENOUS at 12:46

## 2021-01-01 RX ADMIN — SODIUM CHLORIDE 75 MILLILITER(S): 9 INJECTION, SOLUTION INTRAVENOUS at 16:11

## 2021-01-01 RX ADMIN — POLYETHYLENE GLYCOL 3350 17 GRAM(S): 17 POWDER, FOR SOLUTION ORAL at 17:14

## 2021-01-01 RX ADMIN — PANTOPRAZOLE SODIUM 40 MILLIGRAM(S): 20 TABLET, DELAYED RELEASE ORAL at 05:22

## 2021-01-01 RX ADMIN — Medication 81 MILLIGRAM(S): at 12:47

## 2021-01-01 RX ADMIN — Medication 37.5 MICROGRAM(S): at 05:33

## 2021-01-01 RX ADMIN — AMLODIPINE BESYLATE 5 MILLIGRAM(S): 2.5 TABLET ORAL at 05:35

## 2021-01-01 RX ADMIN — Medication 100 MILLIGRAM(S): at 09:07

## 2021-01-01 RX ADMIN — Medication 500000 UNIT(S): at 17:45

## 2021-01-01 RX ADMIN — SENNA PLUS 2 TABLET(S): 8.6 TABLET ORAL at 21:10

## 2021-01-01 RX ADMIN — POLYETHYLENE GLYCOL 3350 17 GRAM(S): 17 POWDER, FOR SOLUTION ORAL at 17:07

## 2021-01-01 RX ADMIN — Medication 2: at 13:44

## 2021-01-01 RX ADMIN — AMLODIPINE BESYLATE 5 MILLIGRAM(S): 2.5 TABLET ORAL at 09:07

## 2021-01-01 RX ADMIN — Medication 100 MILLIGRAM(S): at 13:08

## 2021-01-01 RX ADMIN — Medication 100 MILLIGRAM(S): at 09:02

## 2021-01-01 RX ADMIN — Medication 500000 UNIT(S): at 05:21

## 2021-01-01 RX ADMIN — Medication 20 MILLIGRAM(S): at 05:21

## 2021-01-01 RX ADMIN — Medication 1: at 13:06

## 2021-01-01 RX ADMIN — Medication 100 MILLIGRAM(S): at 05:30

## 2021-01-01 RX ADMIN — Medication 30 MILLILITER(S): at 12:46

## 2021-01-01 RX ADMIN — AMLODIPINE BESYLATE 5 MILLIGRAM(S): 2.5 TABLET ORAL at 05:22

## 2021-01-01 RX ADMIN — Medication 100 MILLIGRAM(S): at 12:47

## 2021-01-01 RX ADMIN — PANTOPRAZOLE SODIUM 40 MILLIGRAM(S): 20 TABLET, DELAYED RELEASE ORAL at 05:31

## 2021-01-01 RX ADMIN — Medication 20 MILLIGRAM(S): at 05:33

## 2021-01-01 RX ADMIN — Medication 30 MILLILITER(S): at 12:06

## 2021-01-01 RX ADMIN — Medication 20 MILLIGRAM(S): at 13:17

## 2021-01-01 RX ADMIN — POLYETHYLENE GLYCOL 3350 17 GRAM(S): 17 POWDER, FOR SOLUTION ORAL at 13:08

## 2021-01-01 RX ADMIN — Medication 5 MILLIGRAM(S): at 12:05

## 2021-01-01 RX ADMIN — Medication 50 MILLIGRAM(S): at 05:35

## 2021-01-01 RX ADMIN — ATORVASTATIN CALCIUM 20 MILLIGRAM(S): 80 TABLET, FILM COATED ORAL at 20:35

## 2021-01-01 RX ADMIN — SENNA PLUS 2 TABLET(S): 8.6 TABLET ORAL at 21:27

## 2021-01-01 RX ADMIN — Medication 81 MILLIGRAM(S): at 12:37

## 2021-01-01 RX ADMIN — Medication 37.5 MICROGRAM(S): at 05:10

## 2021-01-01 RX ADMIN — SODIUM CHLORIDE 250 MILLILITER(S): 9 INJECTION INTRAMUSCULAR; INTRAVENOUS; SUBCUTANEOUS at 19:07

## 2021-01-01 RX ADMIN — SENNA PLUS 2 TABLET(S): 8.6 TABLET ORAL at 21:34

## 2021-01-01 RX ADMIN — Medication 500000 UNIT(S): at 12:46

## 2021-01-01 RX ADMIN — Medication 20 MILLIGRAM(S): at 05:34

## 2021-01-01 RX ADMIN — Medication 20 MILLIGRAM(S): at 05:09

## 2021-01-01 RX ADMIN — PANTOPRAZOLE SODIUM 40 MILLIGRAM(S): 20 TABLET, DELAYED RELEASE ORAL at 05:24

## 2021-01-01 RX ADMIN — PANTOPRAZOLE SODIUM 40 MILLIGRAM(S): 20 TABLET, DELAYED RELEASE ORAL at 05:34

## 2021-01-01 RX ADMIN — POLYETHYLENE GLYCOL 3350 17 GRAM(S): 17 POWDER, FOR SOLUTION ORAL at 12:29

## 2021-01-01 RX ADMIN — Medication 1: at 08:31

## 2021-01-01 RX ADMIN — Medication 1: at 13:55

## 2021-01-01 RX ADMIN — POLYETHYLENE GLYCOL 3350 17 GRAM(S): 17 POWDER, FOR SOLUTION ORAL at 12:04

## 2021-01-01 RX ADMIN — SODIUM CHLORIDE 75 MILLILITER(S): 9 INJECTION, SOLUTION INTRAVENOUS at 10:02

## 2021-01-01 RX ADMIN — Medication 50 MILLIGRAM(S): at 07:52

## 2021-01-01 RX ADMIN — ATORVASTATIN CALCIUM 20 MILLIGRAM(S): 80 TABLET, FILM COATED ORAL at 21:26

## 2021-01-01 RX ADMIN — Medication 500000 UNIT(S): at 23:28

## 2021-01-01 RX ADMIN — Medication 20 MILLIGRAM(S): at 21:26

## 2021-01-01 RX ADMIN — Medication 50 MILLIGRAM(S): at 17:45

## 2021-01-01 RX ADMIN — Medication 50 MILLIGRAM(S): at 09:02

## 2021-01-01 RX ADMIN — Medication 500000 UNIT(S): at 23:52

## 2021-01-01 RX ADMIN — ATORVASTATIN CALCIUM 20 MILLIGRAM(S): 80 TABLET, FILM COATED ORAL at 21:35

## 2021-01-01 RX ADMIN — Medication 5 MILLIGRAM(S): at 12:29

## 2021-01-01 RX ADMIN — Medication 100 MILLIGRAM(S): at 12:29

## 2021-01-01 RX ADMIN — Medication 500 MILLIGRAM(S): at 12:06

## 2021-01-01 RX ADMIN — POLYETHYLENE GLYCOL 3350 17 GRAM(S): 17 POWDER, FOR SOLUTION ORAL at 09:02

## 2021-01-01 RX ADMIN — Medication 20 MILLIGRAM(S): at 15:07

## 2021-01-01 RX ADMIN — Medication 20 MILLIGRAM(S): at 05:25

## 2021-01-01 RX ADMIN — SODIUM CHLORIDE 75 MILLILITER(S): 9 INJECTION, SOLUTION INTRAVENOUS at 05:27

## 2021-01-01 RX ADMIN — Medication 20 MILLIGRAM(S): at 04:45

## 2021-01-01 RX ADMIN — PANTOPRAZOLE SODIUM 40 MILLIGRAM(S): 20 TABLET, DELAYED RELEASE ORAL at 07:52

## 2021-01-01 RX ADMIN — Medication 50 MILLIGRAM(S): at 20:35

## 2021-01-01 RX ADMIN — Medication 37.5 MICROGRAM(S): at 05:22

## 2021-01-01 RX ADMIN — Medication 500000 UNIT(S): at 18:14

## 2021-01-01 RX ADMIN — Medication 37.5 MICROGRAM(S): at 08:03

## 2021-01-01 RX ADMIN — Medication 100 MILLIGRAM(S): at 12:05

## 2021-01-01 RX ADMIN — Medication 81 MILLIGRAM(S): at 13:08

## 2021-01-01 RX ADMIN — POLYETHYLENE GLYCOL 3350 17 GRAM(S): 17 POWDER, FOR SOLUTION ORAL at 12:47

## 2021-01-01 RX ADMIN — Medication 50 MILLIGRAM(S): at 18:02

## 2021-01-01 RX ADMIN — PANTOPRAZOLE SODIUM 40 MILLIGRAM(S): 20 TABLET, DELAYED RELEASE ORAL at 05:09

## 2021-01-01 RX ADMIN — Medication 1: at 12:33

## 2021-01-01 RX ADMIN — AMLODIPINE BESYLATE 5 MILLIGRAM(S): 2.5 TABLET ORAL at 08:59

## 2021-01-01 RX ADMIN — Medication 500000 UNIT(S): at 05:35

## 2021-01-01 RX ADMIN — Medication 1000 UNIT(S): at 12:29

## 2021-01-01 RX ADMIN — Medication 100 MILLIGRAM(S): at 12:37

## 2021-01-01 RX ADMIN — Medication 81 MILLIGRAM(S): at 12:28

## 2021-01-01 RX ADMIN — Medication 50 MILLIGRAM(S): at 17:14

## 2021-01-01 RX ADMIN — Medication 500000 UNIT(S): at 00:13

## 2021-01-01 RX ADMIN — Medication 81 MILLIGRAM(S): at 09:06

## 2021-01-01 RX ADMIN — Medication 500000 UNIT(S): at 05:25

## 2021-01-01 RX ADMIN — SODIUM CHLORIDE 250 MILLILITER(S): 9 INJECTION INTRAMUSCULAR; INTRAVENOUS; SUBCUTANEOUS at 18:07

## 2021-01-01 RX ADMIN — Medication 50 MILLIGRAM(S): at 05:21

## 2021-01-01 RX ADMIN — Medication 500000 UNIT(S): at 12:29

## 2021-01-01 RX ADMIN — SENNA PLUS 2 TABLET(S): 8.6 TABLET ORAL at 21:30

## 2021-01-01 RX ADMIN — Medication 1: at 17:07

## 2021-01-01 RX ADMIN — Medication 20 MILLIGRAM(S): at 13:45

## 2021-01-01 RX ADMIN — PANTOPRAZOLE SODIUM 40 MILLIGRAM(S): 20 TABLET, DELAYED RELEASE ORAL at 05:35

## 2021-01-01 RX ADMIN — Medication 1000 UNIT(S): at 12:05

## 2021-01-01 RX ADMIN — Medication 2: at 13:06

## 2021-01-01 RX ADMIN — Medication 37.5 MICROGRAM(S): at 05:21

## 2021-01-01 RX ADMIN — SODIUM CHLORIDE 75 MILLILITER(S): 9 INJECTION, SOLUTION INTRAVENOUS at 05:21

## 2021-01-01 RX ADMIN — Medication 20 MILLIGRAM(S): at 15:36

## 2021-01-01 RX ADMIN — POLYETHYLENE GLYCOL 3350 17 GRAM(S): 17 POWDER, FOR SOLUTION ORAL at 20:35

## 2021-01-01 RX ADMIN — Medication 5 MILLIGRAM(S): at 13:08

## 2021-01-01 RX ADMIN — AMLODIPINE BESYLATE 5 MILLIGRAM(S): 2.5 TABLET ORAL at 05:21

## 2021-01-01 RX ADMIN — AMLODIPINE BESYLATE 5 MILLIGRAM(S): 2.5 TABLET ORAL at 09:02

## 2021-01-01 RX ADMIN — Medication 1: at 18:06

## 2021-01-01 RX ADMIN — Medication 50 MILLIGRAM(S): at 05:23

## 2021-01-01 RX ADMIN — Medication 37.5 MICROGRAM(S): at 05:26

## 2021-01-01 RX ADMIN — Medication 500000 UNIT(S): at 13:08

## 2021-01-01 RX ADMIN — SENNA PLUS 2 TABLET(S): 8.6 TABLET ORAL at 21:39

## 2021-01-01 RX ADMIN — Medication 500000 UNIT(S): at 12:04

## 2021-01-01 RX ADMIN — Medication 81 MILLIGRAM(S): at 09:02

## 2021-01-01 RX ADMIN — Medication 20 MILLIGRAM(S): at 13:15

## 2021-01-01 RX ADMIN — Medication 500000 UNIT(S): at 18:02

## 2021-01-01 RX ADMIN — ATORVASTATIN CALCIUM 20 MILLIGRAM(S): 80 TABLET, FILM COATED ORAL at 21:10

## 2021-01-01 RX ADMIN — ATORVASTATIN CALCIUM 20 MILLIGRAM(S): 80 TABLET, FILM COATED ORAL at 21:30

## 2021-01-01 RX ADMIN — Medication 1: at 09:34

## 2021-01-01 RX ADMIN — ATORVASTATIN CALCIUM 20 MILLIGRAM(S): 80 TABLET, FILM COATED ORAL at 21:39

## 2021-01-01 RX ADMIN — Medication 500 MILLIGRAM(S): at 12:48

## 2021-01-14 NOTE — PHYSICAL EXAM
[General Appearance - Alert] : alert [General Appearance - Well-Appearing] : healthy appearing [General Appearance - In No Acute Distress] : in no acute distress [Sclera] : the sclera and conjunctiva were normal [PERRL With Normal Accommodation] : pupils were equal in size, round, reactive to light [Extraocular Movements] : extraocular movements were intact [Outer Ear] : the ears and nose were normal in appearance [Hearing Threshold Finger Rub Not [place]] : hearing was normal [Examination Of The Oral Cavity] : the lips and gums were normal [Oropharynx] : the oropharynx was normal with no thrush [Neck Appearance] : the appearance of the neck was normal [Neck Cervical Mass (___cm)] : no neck mass was observed [Respiration, Rhythm And Depth] : normal respiratory rhythm and effort [Exaggerated Use Of Accessory Muscles For Inspiration] : no accessory muscle use [Auscultation Breath Sounds / Voice Sounds] : lungs were clear to auscultation bilaterally [Heart Rate And Rhythm] : heart rate was normal and rhythm regular [Heart Sounds] : normal S1 and S2 [Murmurs] : no murmurs [Edema] : there was no peripheral edema [Bowel Sounds] : normal bowel sounds [Abdomen Soft] : soft [Abdomen Tenderness] : non-tender [No Palpable Adenopathy] : no palpable adenopathy [Musculoskeletal - Swelling] : no joint swelling [Nail Clubbing] : no clubbing  or cyanosis of the fingernails [FreeTextEntry1] : LE weakness bilaterally [Skin Color & Pigmentation] : normal skin color and pigmentation [] : no rash [Skin Lesions] : no skin lesions [Sensation] : the sensory exam was normal to light touch and pinprick [No Focal Deficits] : no focal deficits [Motor Exam] : the motor exam was normal [Oriented To Time, Place, And Person] : oriented to person, place, and time [Affect] : the affect was normal

## 2021-01-14 NOTE — HISTORY OF PRESENT ILLNESS
[FreeTextEntry1] : Mr. Kesha Walker is a pleasant 80 year old male with CAD, myelodysplastic syndrome, CAD, DM2, HTN, and Hyperlipidemia sent by his Nephrologist Dr. Garcia for UTI. \par \par He was admitted 11/14- 11/16/2019 for UTI. UA with pyuria >50WBC, Urine culture with genital sandie. Was treated with ceftriaxone and discharged with oral cefpodoxime. \par \par Again admitted Dec 6- Dec 10, 2019 for weakness, found to have a negative UA, but urine culture with PA 10-49,000. He states that after leaving the hospital, was given a course of fosfomycin for UTI.\par \par He states in terms of infections, he had a liver abscess in 2010 that grew Strep suspected from a dental source. \par \par He remains with neuropathy of his bilateral LE, and weakness of his LE, wheelchair bound. \par \par At last visit, he was having increased frequency and burning with urination. He was started on Monurol twice, now on Cipro for three days with improvement. He also noticed that during having a UTI he had his balance off and he could not move in bed. After starting abx he has been doing well. He currently has no urinary complaints. A few times, the wife noticed his urine was dark, but otherwise, he is doing well. His last UA was reviewed with him and his wife from 1/7/21 and UA WBC was WBC was 1. He has no urinary symtpoms at this time.\par

## 2021-01-14 NOTE — ASSESSMENT
[FreeTextEntry1] : Mr. PIZARRO is a pleasant 79 year old male presenting for followup for UTI.\par \par Was hospitalized Nov 2019 with UTI and treated with ceftriaxone. Urine cx negative at that time, but collected after abx started.\par \par Again hospitalized in Dec 2019 and urine culture with PA with unremarkable UA. Was not treated in the hospital, but later was seen and treated with fosfomycin.\par \par Now presenting for followup. States had increased frequency and burning with urination, at last visit completed a course of Cipro with improvement. Since then, has not had any urinary complaints. He otherwise feels well. \par \par Recommend:\par UTI\par -Completed 3 days of Cipro\par -If symptomatic can recheck UA/ urine culture\par -Currently feels well\par \par MDS\par -Follows with the [facility]\par \par RTC PRN.\par

## 2021-02-19 PROBLEM — N39.0 UTI (URINARY TRACT INFECTION): Status: ACTIVE | Noted: 2019-12-16

## 2021-03-18 PROBLEM — S06.5X9A SUBDURAL HEMATOMA: Status: ACTIVE | Noted: 2020-01-01

## 2021-03-19 NOTE — DISCUSSION/SUMMARY
[___ Month(s)] : [unfilled] month(s) [FreeTextEntry1] : The patient is a 80-year-old gentleman ex-smoker, diabetes mellitus, hyperlipidemia, coronary artery disease, chronic systolic congestive heart failure, hypothyroidism, gout, MDS, whose decline has stabilized.\par #1 Neuro- Cervical neuropathy is major concern. He uses an up-walker at home on his own but mostly wheelchair that he uses it for exercise. Legs getting stronger. He is never alone. Needs to eat better to stop losing weight.\par #1 CAD- continue aspirin, no angina \par #3 HFrEF- euvolemic\par #4 Lipids- continue atorvastatin, labs today\par #5 DM- off meds, f/u endo for rising glucose\par #6 Hypothyroid- on levothyroxine\par #7 ILR- negative to date, f/u 1 year\par #8 MDS- f/u heme, currently off chemo

## 2021-03-19 NOTE — HISTORY OF PRESENT ILLNESS
[FreeTextEntry1] : Malachi has severe polyneuropathy. He ambulates more with walker but needs assistance. His legs are stronger. No chemo right now. Glucose has been good off medication until recently. Needs to see endo.  Denies any chest pain, palpitations, lightheadedness or dizziness. Not eating well.

## 2021-04-09 PROBLEM — M25.512 PAIN OF LEFT SHOULDER REGION: Status: ACTIVE | Noted: 2021-01-01

## 2021-04-23 NOTE — ED ADULT NURSE NOTE - FAMILY HISTORY
Father  Still living? Unknown  Family history of cerebrovascular accident (CVA) in father, Age at diagnosis: Age Unknown     Mother  Still living? Unknown  FH: CHF (congestive heart failure), Age at diagnosis: Age Unknown

## 2021-04-23 NOTE — ED ADULT TRIAGE NOTE - CHIEF COMPLAINT QUOTE
PT SENT IN BY HEMATOLOGIST C/O GENERALIZED LETHARGY, LOW GRADE, PROGRESSIVELY GETTING WORSE.  +INTERMITTENT CONFUSION X 1 MONTH.

## 2021-04-23 NOTE — ED PROVIDER NOTE - OBJECTIVE STATEMENT
10 year old male with pmhx MDS, ADD, hypothyroidism, AFib, CHF, gout, hypercholesterolemia, DM, HTN and pshx tonsillectomy, stented coronary artery presents to the ED c/o lethargy associated with confusion. Per wife, pt with poor PO intake, appears dehydrated despite drinking 6 glasses of water a day. Pt was sent in from his hematologist. Pt receives blood transfusions every 3 weeks. Last blood transfusion was April 9th, received 1 pint. Pt was suppose to receive 2 pints today. Pt notes symptoms presently improving. Denies fever, SOB, vomiting, HA, diarrhea, cough. HEMO: Mikhail, PCP: Haylie, CARDIO: Kandice

## 2021-04-23 NOTE — ED PROVIDER NOTE - SKIN, MLM
Skin normal color for race, warm, dry and intact. No evidence of rash. No obvious evidence of cellulitis, no skin breakdown noted.

## 2021-04-23 NOTE — ED PROVIDER NOTE - NEUROLOGICAL, MLM
Mildly lethargic, somewhat confused. Oriented to person and place only. no focal deficits, no motor or sensory deficits.

## 2021-04-23 NOTE — ED ADULT NURSE REASSESSMENT NOTE - NS ED NURSE REASSESS COMMENT FT1
Assumed care of pt from Chrissy SCHULTZ. Pt is awake at this time with wife at bedside. Offers no complaints at this time. All safety measures in place.

## 2021-04-23 NOTE — ED PROVIDER NOTE - MUSCULOSKELETAL, MLM
Spine appears normal, range of motion is not limited, no muscle or joint tenderness. MAEx4, no focal swelling.

## 2021-04-23 NOTE — ED PROVIDER NOTE - CONSTITUTIONAL, MLM
elderly male, non-toxic appearing, normocephalic, atraumatic, no acute distress, non-toxic appearing. normal...

## 2021-04-23 NOTE — ED STATDOCS - PSH
H/O hemorrhoidectomy    History of colonoscopy    History of tonsillectomy    Status post placement of implantable loop recorder    Stented coronary artery

## 2021-04-23 NOTE — ED ADULT NURSE REASSESSMENT NOTE - NS ED NURSE REASSESS COMMENT FT1
20 g PIV inserted to L AC- bloods/cultures drawn and sent to lab. unable to obtain 2nd set of cultures- called phlebotomy, will draw. covid-19 swab completed and sent to lab. urine obtained via straight cath, UA/UC sent to lab. pt cleaned, repositioned in bed. pending results. wife at bedside.

## 2021-04-23 NOTE — ED STATDOCS - PROGRESS NOTE DETAILS
Scribe Jaclyn Casale for attending Dr Shay: 80 year old male with pmhx MDS, ADD, hypothyroidism, AFib, CHF, gout, hypercholesterolemia, DM, HTN and pshx tonsillectomy, stented coronary artery presents to the ED c/o lethargy associated with confusion. Pt was sent in from his hematologist. Pt receives blood transfusions every 3 weeks. Last blood transfusion was April 9th, received 1 pint. Pt was suppose to receive 2 pints today. Denies fever, vomiting, HA, diarrhea. Will send to the main ED for further eval.

## 2021-04-23 NOTE — ED PROVIDER NOTE - CLINICAL SUMMARY MEDICAL DECISION MAKING FREE TEXT BOX
79 y/o male with pmhx of MDS, ADD, hypothyroidism, Afib, CHF, gout, hypercholesterolemia, DM, HTN presenting with poor PO intake, lethargy, gradually worsening x 1 month, past few days acutely worsening. Pt mildly lethargic, otherwise neuro intact. No focal tenderness, no tactile warmth/rash. PLAN: EKG, CT head, CT abd, CXR, labs including blood cultures, urine with culture, lactate, troponin. Cautious IV fluids. Monitor, observe, reassess. Wife reports pt requires transfusion when Hgb 9 or less.

## 2021-04-23 NOTE — ED ADULT NURSE NOTE - OBJECTIVE STATEMENT
pt presents to ED c/o lethargy, weakness, worsening confusion over past month. pt w/ pmhx MDS, hypothyroid, afib, CHF, gout HLD, DM, HTN. pt sent in by hematologist. vs wnl.     upon straight cath for urine sample pts urine odorous and slightly cloudy. pts wife endorses pt has complained of burning while urinating however pt w/ neuropathy so unclear

## 2021-04-24 NOTE — PHYSICAL THERAPY INITIAL EVALUATION ADULT - GENERAL OBSERVATIONS, REHAB EVAL
t seen for 45min PT Eval. Pt s/p AMS. Pt rec'd semi supine in bed in NAD, wife bedside. As per wife pt unable to amb. Pt willing to work with PT.

## 2021-04-24 NOTE — PHYSICAL THERAPY INITIAL EVALUATION ADULT - ACTIVE RANGE OF MOTION EXAMINATION, REHAB EVAL
limited 2/2 decreased strength/bilateral upper extremity Active ROM was WFL (within functional limits)

## 2021-04-24 NOTE — PHYSICAL THERAPY INITIAL EVALUATION ADULT - PRECAUTIONS/LIMITATIONS, REHAB EVAL
IMAGING- CT A&P: No definite CT evidence of an acute infectious process within limitations of noncontrast technique. Hyperattenuation of the hepatic parenchyma, nonspecific. Differential diagnosis includes iron deposition (such as from frequent transfusions, hemochromatosis), amiodarone toxicity, and other etiologies. Clinical correlation is recommended. Small bilateral pleural effusions and bibasilar atelectasis.; CT HEAD: No evidence of acute intracranial abnormality.  No evidence of hemorrhage./fall precautions IMAGING- CT A&P: No definite CT evidence of an acute infectious process within limitations of noncontrast technique. Hyperattenuation of the hepatic parenchyma, nonspecific. Differential diagnosis includes iron deposition (such as from frequent transfusions, hemochromatosis), amiodarone toxicity, and other etiologies. Clinical correlation is recommended. Small bilateral pleural effusions and bibasilar atelectasis.; CT HEAD: No evidence of acute intracranial abnormality.  No evidence of hemorrhage./cardiac precautions/fall precautions

## 2021-04-24 NOTE — ED ADULT NURSE REASSESSMENT NOTE - NS ED NURSE REASSESS COMMENT FT1
1st unit prbc's obtained. PIV patent, blood consent obtained with 3 signatures, blood T+S present, and order checked. Pt consents to blood transfusion and is aware to let RN if any s/s of transfusion reaction. VSS at this time. RN will stay with pt for first 15 minutes of transfusion

## 2021-04-24 NOTE — PHYSICAL THERAPY INITIAL EVALUATION ADULT - PERTINENT HX OF CURRENT PROBLEM, REHAB EVAL
80 year old Male with pmhx MDS, ADD, hypothyroidism, AFib, CHF, gout, hypercholesterolemia, DM, HTN and pshx tonsillectomy, stented coronary artery presents to the ED with complain of lethargy associated with confusion.

## 2021-04-24 NOTE — PROGRESS NOTE ADULT - NUTRITIONAL ASSESSMENT
This patient has been assessed with a concern for Malnutrition and has been determined to have a diagnosis/diagnoses of Severe protein-calorie malnutrition.    This patient is being managed with:   Diet Consistent Carbohydrate w/Evening Snack-  Entered: Apr 24 2021  5:14PM    The following pending diet order is being considered for treatment of Severe protein-calorie malnutrition:  Diet Soft-  Entered: Apr 24 2021 11:44AM

## 2021-04-24 NOTE — H&P ADULT - NSHPPHYSICALEXAM_GEN_ALL_CORE
Vital Signs Last 24 Hrs  T(C): 36.5 (23 Apr 2021 20:32), Max: 36.6 (23 Apr 2021 15:54)  T(F): 97.7 (23 Apr 2021 20:32), Max: 97.8 (23 Apr 2021 15:54)  HR: 61 (23 Apr 2021 20:32) (61 - 72)  BP: 115/53 (23 Apr 2021 20:32) (115/53 - 118/61)  BP(mean): 73 (23 Apr 2021 20:32) (73 - 79)  RR: 16 (23 Apr 2021 20:32) (16 - 18)  SpO2: 95% (23 Apr 2021 20:32) (95% - 99%)

## 2021-04-24 NOTE — DIETITIAN INITIAL EVALUATION ADULT. - PERTINENT MEDS FT
MEDICATIONS  (STANDING):  allopurinol 100 milliGRAM(s) Oral daily  amLODIPine   Tablet 5 milliGRAM(s) Oral daily  aspirin enteric coated 81 milliGRAM(s) Oral daily  atorvastatin 20 milliGRAM(s) Oral at bedtime  dextrose 40% Gel 15 Gram(s) Oral once  dextrose 5%. 1000 milliLiter(s) (50 mL/Hr) IV Continuous <Continuous>  dextrose 5%. 1000 milliLiter(s) (100 mL/Hr) IV Continuous <Continuous>  dextrose 50% Injectable 25 Gram(s) IV Push once  dextrose 50% Injectable 12.5 Gram(s) IV Push once  dextrose 50% Injectable 25 Gram(s) IV Push once  glucagon  Injectable 1 milliGRAM(s) IntraMuscular once  insulin lispro (ADMELOG) corrective regimen sliding scale   SubCutaneous three times a day before meals  insulin lispro (ADMELOG) corrective regimen sliding scale   SubCutaneous at bedtime  levothyroxine 37.5 MICROGram(s) Oral daily  methylphenidate 20 milliGRAM(s) Oral three times a day  metoprolol tartrate 50 milliGRAM(s) Oral two times a day  pantoprazole    Tablet 40 milliGRAM(s) Oral before breakfast    MEDICATIONS  (PRN):  acetaminophen   Tablet .. 650 milliGRAM(s) Oral every 6 hours PRN Temp greater or equal to 38C (100.4F), Mild Pain (1 - 3)

## 2021-04-24 NOTE — DIETITIAN INITIAL EVALUATION ADULT. - PERTINENT LABORATORY DATA
04-24    139  |  112<H>  |  62<H>  ----------------------------<  228<H>  3.8   |  23  |  0.94    Ca    8.5      24 Apr 2021 10:17    TPro  6.4  /  Alb  2.8<L>  /  TBili  0.9  /  DBili  x   /  AST  80<H>  /  ALT  128<H>  /  AlkPhos  215<H>  04-23    BMI: BMI (kg/m2): 21.5 (04-23-21 @ 16:30)  HbA1c: A1C with Estimated Average Glucose: 6.5 % (08-20-20 @ 05:13)    Glucose: POCT Blood Glucose.: 164 mg/dL (04-24-21 @ 07:58)    BP: 109/55 (04-24-21 @ 08:21) (108/60 - 136/59)  Lipid Panel:

## 2021-04-24 NOTE — DIETITIAN NUTRITION RISK NOTIFICATION - ADDITIONAL COMMENTS/DIETITIAN RECOMMENDATIONS
Change diet to Soft diet (per wife at home)  Add Glucerna TID  Pt with long term complications for meeting nutritional needs, consider discussion with family about long term nutrition goals of care.

## 2021-04-24 NOTE — PROGRESS NOTE ADULT - SUBJECTIVE AND OBJECTIVE BOX
80 year old Male with pmhx MDS, ADD, hypothyroidism, AFib, CHF, gout, hypercholesterolemia, DM, HTN and pshx tonsillectomy, stented coronary artery presents to the ED with complain of lethargy associated with confusion. Per wife, patient with poor PO intake, appears dehydrated despite drinking 6 glasses of water a day. Patient was sent in from his hematologist. Patient receives blood transfusions every 3 weeks. Last blood transfusion was April 9th, received 1 pint. Patient was suppose to receive 2 pints today. Patient notes symptoms presently improving in the ED. Denies fever. No nausea or vomiting. No diarrhea.     4/24  - ate breakfast; no cp palps sob, less tired, pt not a good historian    PHYSICAL EXAM:  GENERAL: NAD, able to lie flat in bed  HEAD:  Atraumatic, Normocephalic  EYES: EOMI, PERRLA, normal sclera  ENT: Moist mucous membranes  NECK: Supple, No JVD, no nuchal rigidity  CHEST/LUNG: Clear to auscultation bilaterally; No rales, rhonchi, wheezing, or rubs. Unlabored respirations  HEART: Regular rate and rhythm; No murmurs, rubs, or gallops  ABDOMEN: Bowel sounds present; Soft, Nontender, Nondistended. No hepatomegaly  EXTREMITIES:  no pitting bilaterally  NERVOUS SYSTEM:  Alert & Oriented X1, speech clear. No focal motor or sensory deficits, generalized weakness   MSK: FROM all 4 extremities  SKIN: No rashes or lesions    LABS: All Labs Reviewed:                        11.3   8.11  )-----------( 173      ( 24 Apr 2021 10:17 )             33.5     139  |  112<H>  |  62<H>  ----------------------------<  228<H>  3.8   |  23  |  0.94    Ca    8.5      24 Apr 2021 10:17    TPro  6.4  /  Alb  2.8<L>  /  TBili  0.9  /  DBili  x   /  AST  80<H>  /  ALT  128<H>  /  AlkPhos  215<H>  04-23    PT/INR - ( 23 Apr 2021 17:18 )   PT: 15.5 sec;   INR: 1.34 ratio         RADIOLOGY/EKG:  < from: CT Abdomen and Pelvis No Cont (04.23.21 @ 19:39) >  1. No definite CT evidence of an acute infectious process within limitations of noncontrast technique.  2. Hyperattenuation of the hepatic parenchyma, nonspecific. Differential diagnosis includes iron deposition (such as from frequent transfusions, hemochromatosis), amiodarone toxicity, and other etiologies. Clinical correlation is recommended.  3. Small bilateral pleural effusions and bibasilar atelectasis.    < from: CT Head No Cont (04.23.21 @ 19:30) >  No evidence of acute intracranial abnormality.  No evidence of hemorrhage.  Chronic changes as above.    < from: Xray Chest 1 View- PORTABLE-Urgent (Xray Chest 1 View- PORTABLE-Urgent .) (04.23.21 @ 18:13) >  Heart is enlarged. Loop recorder over the left chestagain noted.  Large hiatal hernia is better demonstrated on CAT scan which followed. The CAT scan also demonstrated mild basilar pleural pulmonary reactions not particularly evident on chest x-ray.  Chest x-ray appearance is similar to December 7, 2019.          MEDS:   acetaminophen   Tablet .. 650 milliGRAM(s) Oral every 6 hours PRN  allopurinol 100 milliGRAM(s) Oral daily  amLODIPine   Tablet 5 milliGRAM(s) Oral daily  aspirin enteric coated 81 milliGRAM(s) Oral daily  atorvastatin 20 milliGRAM(s) Oral at bedtime  glucagon  Injectable 1 milliGRAM(s) IntraMuscular once  insulin lispro (ADMELOG) corrective regimen sliding scale   SubCutaneous three times a day before meals  insulin lispro (ADMELOG) corrective regimen sliding scale   SubCutaneous at bedtime  levothyroxine 37.5 MICROGram(s) Oral daily  methylphenidate 20 milliGRAM(s) Oral three times a day  metoprolol tartrate 50 milliGRAM(s) Oral two times a day  pantoprazole    Tablet 40 milliGRAM(s) Oral before breakfast  polyethylene glycol 3350 17 Gram(s) Oral two times a day

## 2021-04-24 NOTE — PATIENT PROFILE ADULT - NSASFALLATTEMPTOOB_GEN_A_NUR
Left AC saline lock discontinued with canula intact; tolerated well.  Discharge instructions, new home medication list, and post discharge follow up appointments discussed with verbalized understanding.  Tele monitor removed and brought to tele monitor tech.  Discharged to family, via wheelchair, in no apparent distress. All personal belongings taken with pt and family. Encouraged continued compliance with MD directives.   
SBP dropped to 100 after multiple manual readings. Notified Loida Jennings NP.  
no

## 2021-04-24 NOTE — H&P ADULT - NSICDXFAMILYHX_GEN_ALL_CORE_FT
FAMILY HISTORY:  Father  Still living? Unknown  Family history of cerebrovascular accident (CVA) in father, Age at diagnosis: Age Unknown    Mother  Still living? Unknown  FH: CHF (congestive heart failure), Age at diagnosis: Age Unknown

## 2021-04-24 NOTE — H&P ADULT - ASSESSMENT
A/P:    1.  Weakness  Lethargy  Metabolic Encephalopathy  Dehydration  Anemia due to MDS  -will give 2 units of PRBC with in between lasix dose  -encourage PO intake, follow Nutrition services consult  -keep on fall precaution  -follow PT eval  -encourage PO fluid intake, avoid IVF due to HF and PRBC transfusions      2.  DM  -on ISS  -follow Dxt    3.  HTN  Hypothyroidism  -on home meds    4.  SCD for DVT ppx    5.  COde status: Patient is in Full code status.  A/P:    1.  Weakness  Lethargy  Metabolic Encephalopathy  Dehydration  Anemia due to MDS  -will give 2 units of PRBC with in between lasix dose  -encourage PO intake, follow Nutrition services consult  -keep on fall precaution  -follow PT eval  -encourage PO fluid intake, avoid IVF due to HF and PRBC transfusions      2.  DM  -on ISS  -follow Dxt    3.  HTN  Hypothyroidism  -on home meds    4.  Transaminitis  -most likely depositions in Liver due to multiple transfusions every month.     5.  SCD for DVT ppx    6.  COde status: Patient is in Full code status.  A/P:    1.  Weakness  Lethargy  Metabolic Encephalopathy  Dehydration  Anemia due to MDS  -will give 2 units of PRBC with in between lasix dose  -encourage PO intake, follow Nutrition services consult  -keep on fall precaution  -follow PT eval  -encourage PO fluid intake, avoid IVF due to HF and PRBC transfusions      2.  DM  -on ISS  -follow Dxt    3.  HTN  Hypothyroidism  -on home meds    4.  Transaminitis  -most likely depositions in Liver due to multiple transfusions every month.   -continue to follow in clinic as before     5.  SCD for DVT ppx    6.  COde status: Patient is in Full code status.

## 2021-04-24 NOTE — PHYSICAL THERAPY INITIAL EVALUATION ADULT - ADDITIONAL COMMENTS
Pt lives with wife and family with no steps to enter however has sliding glass door "lip" that is difficulty to get over in transport wheelchair. Pt unable to amb with increased difficulty transferring. Pt requires assist for ADLs.

## 2021-04-24 NOTE — DIETITIAN INITIAL EVALUATION ADULT. - OTHER INFO
80 year old Male with pmhx MDS, ADD, hypothyroidism, AFib, CHF, gout, hypercholesterolemia, DM, HTN and pshx tonsillectomy, stented coronary artery presents to the ED with complain of lethargy associated with confusion. Per wife, patient with poor PO intake, appears dehydrated despite drinking 6 glasses of water a day. Patient was sent in from his hematologist. Patient receives blood transfusions every 3 weeks. Last blood transfusion was April 9th, received 1 pint. Patient was suppose to receive 2 pints today. Patient notes symptoms presently improving in the ED. Denies fever. No nausea or vomiting. No diarrhea.     Pt seen for assessment with poor PO intake and dehydration at home. Pt with AMS and unable to provide detailed diet information. Discussed with Wife who is proactive about feeding patient, but pt appears to be mostly sleeping at home and not getting meals. Pt required soft foods that are cut up (not pureed meals). Pt didn't eat much for breakfast this morning. Per wife pt hasn't been eating well and felt he lost about 12lbs, appears pt has lost about 18-20lbs over the past few months. Pt does not seem like he is able to meet calorie and protein needs. Per wife she was not providing certain foods to help control blood sugar without medications, pt's blood sugars appear elevated, recommend checking A1C. Pt presents with clear signs of severe muscle and fat wasting. Pt meets criteria for severe protein-calorie mal. Recommend Advancing diet to soft foods add Glucerna TID. Will continue to monitor. Discussion on long term artificial nutrition may be beneficial given pt's poor nutritional status and inability to meet needs long term.

## 2021-04-24 NOTE — DIETITIAN NUTRITION RISK NOTIFICATION - TREATMENT: THE FOLLOWING DIET HAS BEEN RECOMMENDED
Diet, Soft (04-24-21 @ 11:44) [Pending Verification By Attending]  Diet, Pureed (04-24-21 @ 00:48) [Active]

## 2021-04-24 NOTE — H&P ADULT - NSICDXPASTSURGICALHX_GEN_ALL_CORE_FT
PAST SURGICAL HISTORY:  H/O hemorrhoidectomy     History of colonoscopy     History of tonsillectomy     Status post placement of implantable loop recorder     Stented coronary artery

## 2021-04-24 NOTE — PHYSICAL THERAPY INITIAL EVALUATION ADULT - DISCHARGE DISPOSITION, PT EVAL
Home with assistance for all functional mobility and home PT for progressive strengthening, transfer training, balance, and pt/family education./home/home w/ assist/home w/ home PT

## 2021-04-24 NOTE — PROGRESS NOTE ADULT - ASSESSMENT
Weakness, Lethargy  Metabolic Encephalopathy  Dehydration  Anemia due to MDS  -s/p  2 units of PRBC, H&H better   -encourage PO intake, follow Nutrition services consult  -keep on fall precaution  -follow PT eval  -encourage PO fluid intake, avoid IVF due to HF and PRBC transfusions    DM  -on ISS    HTN  Hypothyroidism  -on home meds    Transaminitis  -most likely depositions in Liver due to multiple transfusions every month.   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   -continue to follow in clinic as before     SCD for DVT ppx    Patient is  Full code     discussed with RN, pt's wife Carmita    rec f/u Dr Bray, Neuro   check bladder scan (pt's wife worried about retention) and will get swallow eval as discussed

## 2021-04-24 NOTE — H&P ADULT - HISTORY OF PRESENT ILLNESS
80 year old Male with pmhx MDS, ADD, hypothyroidism, AFib, CHF, gout, hypercholesterolemia, DM, HTN and pshx tonsillectomy, stented coronary artery presents to the ED with complain of lethargy associated with confusion. Per wife, patient with poor PO intake, appears dehydrated despite drinking 6 glasses of water a day. Patient was sent in from his hematologist. Patient receives blood transfusions every 3 weeks. Last blood transfusion was April 9th, received 1 pint. Patient was suppose to receive 2 pints today. Patient notes symptoms presently improving in the ED. Denies fever. No nausea or vomiting. No diarrhea.     Family Hx:  Unable to get detail family history due to patient's change in mental status.

## 2021-04-24 NOTE — PHYSICAL THERAPY INITIAL EVALUATION ADULT - IMPAIRMENTS CONTRIBUTING IMPAIRED BED MOBILITY, REHAB EVAL
decreased endurance/impaired balance/impaired motor control/impaired postural control/decreased ROM/decreased strength

## 2021-04-25 NOTE — SWALLOW BEDSIDE ASSESSMENT ADULT - ORAL PREPARATORY PHASE
Pt's alertness for/orientation to feeding were variably decreased. Aperture of mouth opening was excessively large when accepting PO & labial grading on utensils was functionally decreased.

## 2021-04-25 NOTE — SWALLOW BEDSIDE ASSESSMENT ADULT - PHARYNGEAL PHASE
Swallow trigger was timely to mildly latent. Laryngeal lift on palpation during swallowing trials was mildly to moderately decreased but felt to be grossly functional with some of the above modified food textures. Post prandial coughing exhibited with thin liquids only, despite cues to employ compensatory swallowing maneuvers. NO behavioral aspiration signs exhibited with nectar thick liquids and pureed foods.

## 2021-04-25 NOTE — DISCHARGE NOTE PROVIDER - CARE PROVIDERS DIRECT ADDRESSES
[email or web address]

## 2021-04-25 NOTE — DISCHARGE NOTE NURSING/CASE MANAGEMENT/SOCIAL WORK - PATIENT PORTAL LINK FT
You can access the FollowMyHealth Patient Portal offered by [facility] by registering at the following website: [email or web address]. By joining [employer]’s FollowMyHealth portal, you will also be able to view your health information using other applications (apps) compatible with our system.

## 2021-04-25 NOTE — DISCHARGE NOTE PROVIDER - HOSPITAL COURSE
80 year old Male with pmhx MDS, ADD, hypothyroidism, AFib, CHF, gout, hypercholesterolemia, DM, HTN and pshx tonsillectomy, stented coronary artery presents to the ED with complain of lethargy associated with confusion. Per wife, patient with poor PO intake, appears dehydrated despite drinking 6 glasses of water a day. Patient was sent in from his hematologist. Patient receives blood transfusions every 3 weeks. Last blood transfusion was April 9th, received 1 pint. Patient was suppose to receive 2 pints today. Patient notes symptoms presently improving in the ED. Denies fever. No nausea or vomiting. No diarrhea.     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  CAD w/ stent placed 2010 on Aspirin daily,   h/o CHFrEF (40%)  -s/p  2 units of PRBC, H&H better   -encourage PO intake, follow Nutrition services consult  -keep on fall precaution  -follow PT eval  -encourage PO fluid intake, avoid IVF due to HF and PRBC transfusions  4/25 - check bladder scan (pt's wife worried about retention) - wnl    Pt s/p swallow eval in AM - but pt very sleepy at the time (roughly 7AM)   but later with encourage from nursing staff is tolerating a po diet   suspect patient has worsening dementia, rec f/u with Dr Bray     DM, severe neuropathy   -on ISS    HTN  Hypothyroidism  -on home meds    Transaminitis  -most likely depositions in Liver due to multiple transfusions every month.   -continue to follow in clinic as before     SCD for DVT ppx    Patient is  Full code     discussed with RN, pt's wife Carmita    rec f/u Dr Bray, Neuro     OTHER DETAILS:  4/25 - no cp palps sob abdo pain. he had a BM today  PHYSICAL EXAM:  GENERAL: NAD, able to lie flat in bed  HEAD:  Atraumatic, Normocephalic  EYES: EOMI, PERRLA, normal sclera  ENT: Moist mucous membranes  NECK: Supple, No JVD, no nuchal rigidity  CHEST/LUNG: Clear to auscultation bilaterally; No rales, rhonchi, wheezing, or rubs. Unlabored respirations  HEART: Regular rate and rhythm; No murmurs, rubs, or gallops  ABDOMEN: Bowel sounds present; Soft, Nontender, Nondistended. No hepatomegaly  EXTREMITIES:  no pitting bilaterally  NERVOUS SYSTEM:  Alert & Oriented to name and place, speech clear. No focal motor or sensory deficits  MSK: FROM all 4 extremities, generalized weakness   SKIN: No rashes or lesions    LABS: All Labs Reviewed:                     11.2   6.93  )-----------( 162      ( 25 Apr 2021 06:57 )             32.6   143  |  115<H>  |  44<H>  ----------------------------<  133<H>  4.1   |  25  |  0.90  Ca    8.6      25 Apr 2021 06:57  Mg     1.7     04-25  TPro  6.4  /  Alb  2.8<L>  /  TBili  0.9  /  DBili  x   /  AST  80<H>  /  ALT  128<H>  /  AlkPhos  215<H>  04-23  PT/INR - ( 23 Apr 2021 17:18 )   PT: 15.5 sec;   INR: 1.34 ratio    PTT - ( 23 Apr 2021 17:18 )  PTT:28.9 sec  CARDIAC MARKERS ( 23 Apr 2021 17:18 )  <0.015 ng/mL / x     / x     / x     / x          RADIOLOGY/EKG:  RADIOLOGY/EKG:  < from: CT Abdomen and Pelvis No Cont (04.23.21 @ 19:39) >  1. No definite CT evidence of an acute infectious process within limitations of noncontrast technique.  2. Hyperattenuation of the hepatic parenchyma, nonspecific. Differential diagnosis includes iron deposition (such as from frequent transfusions, hemochromatosis), amiodarone toxicity, and other etiologies. Clinical correlation is recommended.  3. Small bilateral pleural effusions and bibasilar atelectasis.    < from: CT Head No Cont (04.23.21 @ 19:30) >  No evidence of acute intracranial abnormality.  No evidence of hemorrhage.  Chronic changes as above.    < from: Xray Chest 1 View- PORTABLE-Urgent (Xray Chest 1 View- PORTABLE-Urgent .) (04.23.21 @ 18:13) >  Heart is enlarged. Loop recorder over the left chestagain noted.  Large hiatal hernia is better demonstrated on CAT scan which followed. The CAT scan also demonstrated mild basilar pleural pulmonary reactions not particularly evident on chest x-ray.  Chest x-ray appearance is similar to December 7, 2019.      discussed with RN  time spent on discharge - 55 mins

## 2021-04-25 NOTE — DISCHARGE NOTE PROVIDER - CARE PROVIDER_API CALL
Sebastien Bray)  Neurology  [address], [address]  [place] [address]  Phone: [phone]  Fax: [phone]  Follow Up Time: 1 week    Follow-up with Hematology for transfusions,   Phone: (   )    -  Fax: (   )    -  Follow Up Time: 1 week

## 2021-04-25 NOTE — SWALLOW BEDSIDE ASSESSMENT ADULT - COMMENTS
The patient was admitted to  with generalized weakness. Hospital course is notable for anemia s/p transfusion, transaminitis, low albumin and generalized deconditioning. This profile is superimposed upon a history of attention deficit disorder, CAD s/p stent placement, CHF, A-Fib s/p loop recorder, hypothyroidism, gout, HLD, DM, neuropathy, HTN, HLD, myeloplastic syndrome, previous tonsillectomy, prior CVA in 2015 with residual RLE weakness, prior hemorrhoidectomy and past tonsillectomy, The patient was admitted to  with generalized weakness. Hospital course is notable for anemia s/p transfusion, transaminitis, low albumin and generalized deconditioning. This profile is superimposed upon a history of attention deficit disorder, CAD s/p stent placement, CHF, A-fib s/p loop recorder, hypothyroidism, gout, HLD, DM, neuropathy, HTN, HLD, myeloplastic syndrome, previous tonsillectomy, prior CVA in 2015 with residual RLE weakness, prior hemorrhoidectomy and past tonsillectomy,

## 2021-04-25 NOTE — SWALLOW BEDSIDE ASSESSMENT ADULT - ASR SWALLOW LABIAL MOBILITY
Unable to assess due to altered mentation/reduced active participation. Though, it is noted that pt maintained an open mouth posture at rest.

## 2021-04-25 NOTE — SWALLOW BEDSIDE ASSESSMENT ADULT - ASR SWALLOW RECOMMEND DIAG
Defer MBS as pt appeared clinically tolerant of suggested PO textures from an oropharyngeal swallowing stance, Dysphagia with a propensity to fluctuate in severity given profile, and considering his altered mentation.

## 2021-04-25 NOTE — DISCHARGE NOTE PROVIDER - NSDCMRMEDTOKEN_GEN_ALL_CORE_FT
allopurinol 100 mg oral tablet: 1 tab(s) orally once a day  amLODIPine 5 mg oral tablet: 1 tab(s) orally once a day  aspirin 81 mg oral delayed release tablet: 1 tab(s) orally once a day  levothyroxine: 37.5 microgram(s) orally once a day  methylphenidate 20 mg oral tablet: 1 tab(s) orally 3 times a day  metoprolol tartrate 50 mg oral tablet: 1 tab(s) orally 2 times a day  omeprazole 20 mg oral delayed release capsule: 1 cap(s) orally once a day  rosuvastatin 5 mg oral tablet: 1 tab(s) orally once a day

## 2021-04-25 NOTE — SWALLOW BEDSIDE ASSESSMENT ADULT - SLP GENERAL OBSERVATIONS
Pt is arousable but fatigued. Affect is flat. Pt maintained an open mouth posture at rest. He was communicatively passive & internally distractible. Pt inconsistently followed 1 step commands & occasionally responded when asked concrete personally relevant ?'s. When responses provided, they consisted of faint head nods as well as brief verbalizations that were produced with decreased articulatory effort/were variably contextually inappropriate c/w Cognitive Dysfunction. Encephalopathy associated w/acute illness is contributory. ? if pt also with a Dementia type process which can also be hindering his cognition.

## 2021-04-25 NOTE — DISCHARGE NOTE PROVIDER - NSDCCPCAREPLAN_GEN_ALL_CORE_FT
PRINCIPAL DISCHARGE DIAGNOSIS  Diagnosis: Symptomatic anemia  Assessment and Plan of Treatment: s/p PRBCS, cont to follow-up with hematolgy      SECONDARY DISCHARGE DIAGNOSES  Diagnosis: Metabolic encephalopathy  Assessment and Plan of Treatment: improved, no signgs of infection at this time, please follow-up with Dr Bray as discussed

## 2021-04-25 NOTE — DISCHARGE NOTE PROVIDER - PROVIDER TOKENS
PROVIDER:[TOKEN:[[id number]],FOLLOWUP:[1 week]],FREE:[LAST:[Follow-up with Hematology for transfusions],PHONE:[(   )    -],FAX:[(   )    -],FOLLOWUP:[1 week]]

## 2021-04-25 NOTE — SWALLOW BEDSIDE ASSESSMENT ADULT - ORAL PHASE
IOnce A delay in the initiation of intra oral processing actions was latent. Once oral actions were initiated, they consisted of moderately prolonged discontinuous lingual pumping actions that were felt to be grossly functional with some of the above modified food textures. Piecemeal deglutition was evident. Mild+ tongue debris noted with puree foods.

## 2021-04-25 NOTE — SWALLOW BEDSIDE ASSESSMENT ADULT - SWALLOW EVAL: DIAGNOSIS
1) Pt exhibits periodically reduced alertness for/orientation to feeding atop Oropharyngeal Dysphagia which subjectively appeared to be a grossly functional condition with a restricted inventory of modified food textures when he's alert/cognizant enough to be fed. Overt aspiration signs/coughing noted w/thin liquids. Effects of acute medical deconditioning is contributory(i.e encephalopathy, anemia s/p transfusion, transaminitis, hypoalbuminemia).  2) Pt is arousable but fatigued. He was communicatively passive & internally distractible. Pt inconsistently followed 1 step commands & occasionally responded when asked concrete personally relevant ?'s. When responses provided, they consisted of faint head nods as well as brief verbalizations that were produced with decreased articulatory effort/were variably contextually inappropriate c/w Cognitive Dysfunction. Encephalopathy associated w/acute illness is contributory to altered mentation. ? if pt also with a contributory Dementia process 1) Pt exhibits periodically reduced alertness for/orientation to feeding atop Oropharyngeal Dysphagia which subjectively appeared to be a grossly functional condition with a restricted inventory of modified food textures when he's alert/cognizant enough to be fed. Overt aspiration signs/coughing noted w/thin liquids. Effects of acute medical deconditioning is contributory(i.e encephalopathy, anemia s/p transfusion, transaminitis, hypoalbuminemia).  2) Pt is arousable but fatigued. He was communicatively passive & internally distractible. Pt inconsistently followed 1 step commands & occasionally responded when asked concrete personally relevant ?'s. When responses provided, they consisted of faint head nods as well as brief verbalizations that were produced with decreased articulatory effort/were variably contextually inappropriate c/w Cognitive Dysfunction. Encephalopathy associated w/acute illness is contributory to altered mentation. ? if pt also with a contributory Dementia process.

## 2021-04-25 NOTE — DISCHARGE NOTE PROVIDER - DETAILS OF MALNUTRITION DIAGNOSIS/DIAGNOSES
This patient has been assessed with a concern for Malnutrition and was treated during this hospitalization for the following Nutrition diagnosis/diagnoses:     -  04/24/2021: Severe protein-calorie malnutrition

## 2021-04-25 NOTE — SWALLOW BEDSIDE ASSESSMENT ADULT - SWALLOW EVAL: RECOMMENDED DIET
SUGGEST A DYSPHAGIA 1 DIET WITH NECTAR THICK LIQUIDS AS TOLERATED, AS THIS IS THE LEAST RESTRICTIVE TOLERABLE DIET CONSISTENCIES FROM AN OROPHARYNGEAL SWALLOWING PERSPECTIVE WHEN IN AN ALERT STATE. PT MUST BE FED ONLY WHEN ALERT/INTERACTIVE.

## 2021-05-04 PROBLEM — B37.0 THRUSH: Status: ACTIVE | Noted: 2021-01-01

## 2021-05-06 PROBLEM — D46.9 MYELODYSPLASTIC SYNDROME, UNSPECIFIED: Chronic | Status: ACTIVE | Noted: 2021-01-01

## 2021-05-12 PROBLEM — I63.9 CVA (CEREBRAL VASCULAR ACCIDENT): Status: ACTIVE | Noted: 2017-05-26

## 2021-05-26 PROBLEM — M62.81 MUSCLE WEAKNESS: Status: ACTIVE | Noted: 2021-01-01

## 2021-05-26 NOTE — ED PROVIDER NOTE - PATIENT PORTAL LINK FT
You can access the FollowMyHealth Patient Portal offered by [facility] by registering at the following website: [email or web address]. By joining [employer]’s FollowMyHealth portal, you will also be able to view your health information using other applications (apps) compatible with our system.

## 2021-05-26 NOTE — ED ADULT NURSE NOTE - OBJECTIVE STATEMENT
Patient brought in by family for low Hgb/Hct.  Patient has MDS. Patient has hx of CHF. Patient was in [facility] approx 1 month ago and at that time during his stay, he received a blood transfusion. Pt was discharged home and has been in bed since discharge. Pt very weak. Pts wife at bedside states that he had blood work done yesterday and today she called to receive the results and was told that it was low and that he should come to the ED for evaluation and treatment.

## 2021-05-26 NOTE — ED ADULT NURSE NOTE - NSIMPLEMENTINTERV_GEN_ALL_ED
Implemented All Fall with Harm Risk Interventions:  [place] to call system. Call bell, personal items and telephone within reach. Instruct patient to call for assistance. Room bathroom lighting operational. Non-slip footwear when patient is off stretcher. Physically safe environment: no spills, clutter or unnecessary equipment. Stretcher in lowest position, wheels locked, appropriate side rails in place. Provide visual cue, wrist band, yellow gown, etc. Monitor gait and stability. Monitor for mental status changes and reorient to person, place, and time. Review medications for side effects contributing to fall risk. Reinforce activity limits and safety measures with patient and family. Provide visual clues: red socks.

## 2021-05-26 NOTE — ED ADULT NURSE REASSESSMENT NOTE - NS ED NURSE REASSESS COMMENT FT1
Assumed care for MARION Prather. Pt resting comfortable in bed with family at bedside. VSS. No acute distress at this time.

## 2021-05-26 NOTE — HISTORY OF PRESENT ILLNESS
[FreeTextEntry1] : Patient seen by me in 2018 for LE weakness.  MRI was not done, lost to f/u. \par \par Family states that he has slowly declined over the past three years and hasn't walked for a couple years.  He coughs and pockets food and hasn't had a recent swallow evaluation.  Wife and daughter state he acutely changed in March with confusion and worsening left side weakness.  \par \par He is not incontinent of bowel and bladder.  \par \par He gets cramps in feet; they don't see obvious signs of SOB.

## 2021-05-26 NOTE — ED PROVIDER NOTE - PROGRESS NOTE DETAILS
Rox BRAXTON for ED attending, Dr. Urrutia: discussed lab results with wife and pt, will transfuse 1 unit PRBC. Wife and pt prefer to go home after transfusion and f/u with their doctors as outpatient.

## 2021-05-26 NOTE — ED PROVIDER NOTE - PMH
ADD (attention deficit disorder)    Atrial fibrillation    CAD (coronary artery disease)    Congestive heart failure (CHF)    DM (Diabetes Mellitus)    Gout    Hypercholesterolemia    Hypertension    Hypothyroid    MDS (myelodysplastic syndrome)

## 2021-05-26 NOTE — ED PROVIDER NOTE - OBJECTIVE STATEMENT
79 y/o M wheelchair/bed bound with PMHx of Afib on ASA, CAD s/p coronary artery stent placement, CHF, DM, HTN, HLD, hypothyroid, MDS, s/p loop recorder placement, and s/p recent admission to  4/23-4/25 for weakness presents to the ED BIB wife for eval of low H&H of 7.4 on outpatient labs drawn earlier this week. Wife reports pt has labs drawn weekly by A. Pt has needed blood transfusions every 2 weeks in the past. Reports intermittent episodes of weakness x years, recently had MRI for r/o stroke and was negative. No fever, melena, or hematochezia. Former smoker. Allergic to iodine and tetracycline. Neurologist: Dr. Sebastien Bray, last seen in office today. Pt is poor historian, hx provided by wife at bedside.

## 2021-05-26 NOTE — ASSESSMENT
[FreeTextEntry1] : Patient has had a significant motor decline and with the increased UE DTR's and dropped LE DTR's and the significant atrophy of back and scap muscles, MND must be considered. \par \par Will check CPK, AchR ab, and have him back for EMG.

## 2021-05-26 NOTE — ED ADULT TRIAGE NOTE - CHIEF COMPLAINT QUOTE
Patient comes in with low H/H. Patient has MDS. Patient has hx of CHF. Patient has been in bed for 1 month since d/c from our hospital. Patients family requesting blood transfusion.

## 2021-06-07 PROBLEM — D46.9 MDS (MYELODYSPLASTIC SYNDROME): Status: ACTIVE | Noted: 2017-06-14

## 2021-06-07 NOTE — ED PROVIDER NOTE - NS ED ROS FT
GENERAL: no fever, no chills  EYES: no change in vision  HEENT: +trouble swallowing  CARDIAC: no chest pain  PULMONARY: no cough, no shortness of breath  GI: no abdominal pain, no nausea, no vomiting, no diarrhea, no constipation  : no dysuria, no frequency, no change in appearance, or odor of urine  SKIN: no rashes  NEURO: no headache, no weakness  MSK: No joint pain

## 2021-06-07 NOTE — ED ADULT NURSE REASSESSMENT NOTE - NS ED NURSE REASSESS COMMENT FT1
PRBCs administered per ED MDs orders. Pt consent in chart. Risks and benefits of administering product explained to pt and wife. Understanding of risks and benefits. VSS. Second RN at beside for confirmation of product and pt identification. Will continue to monitor.

## 2021-06-07 NOTE — ED PROVIDER NOTE - PHYSICAL EXAMINATION
Gen: thin frail elderly M chronically ill non-toxic appearing  Head: normal appearing  HEENT: pale conjunctiva, oral mucosa dry  Lung: no respiratory distress, clear to ascultation bilaterally     CV: regular rate and rhythm   Abd: soft, non distended, non tender   MSK: no visible deformities  Neuro: No focal deficits  Skin: Warm  Psych: normal affect

## 2021-06-07 NOTE — ED ADULT NURSE NOTE - NSIMPLEMENTINTERV_GEN_ALL_ED
Implemented All Fall Risk Interventions:  [place] to call system. Call bell, personal items and telephone within reach. Instruct patient to call for assistance. Room bathroom lighting operational. Non-slip footwear when patient is off stretcher. Physically safe environment: no spills, clutter or unnecessary equipment. Stretcher in lowest position, wheels locked, appropriate side rails in place. Provide visual cue, wrist band, yellow gown, etc. Monitor gait and stability. Monitor for mental status changes and reorient to person, place, and time. Review medications for side effects contributing to fall risk. Reinforce activity limits and safety measures with patient and family.

## 2021-06-07 NOTE — ED ADULT NURSE NOTE - OBJECTIVE STATEMENT
81 yo male AAOx3 with hx of MDS, CAD, Afib, CHF and HTN presents to ED via EMS from home for low hemoglobin. As per EMS, pt PMD recommended patient come to ED for blood transfusion. Wife at bedside reports patient has had increased weakness and fatigue over the last couple weeks, no known fevers or chills. Patient alert, hard of hearing but able to communicate needs. Patient was recently admitted at [place] and has been declining since as per Wife. No CP, SOB, n/v/d, abdominal pain, dysuria. Safety measures maintained with bed in low position and side rails up. 79 yo male AAOx2 with hx of MDS, CAD, Afib, CHF and HTN presents to ED via EMS from home for low hemoglobin. As per EMS, pt PMD recommended patient come to ED for blood transfusion. Wife at bedside reports patient has had increased weakness and fatigue over the last couple weeks, no known fevers or chills. Patient alert, hard of hearing but able to communicate needs. Patient was recently admitted at [place] and has been declining since that visit as per Wife. No CP, SOB, n/v/d, abdominal pain, dysuria. Safety measures maintained with bed in low position and side rails up.

## 2021-06-07 NOTE — ED PROVIDER NOTE - ATTENDING CONTRIBUTION TO CARE
Private Physician Mikhail Metz Heme/onc Zeenat  80y male pmh ADD, Afib, Cad, MDS, HTN,Hypothryodism, DM, Neruopathy, Gout, Cad, Pt Has c/o weakness over past two months, has not ambulated for years. Previously was able to pivot/transfer. Now unable to sit in chair "he slides out" Pt was seen by neuro with neg MRI, Today pt was called for anemia. Private Physician Mikhail Metz Heme/onc Zeenat  80y male pmh ADD, Afib, Cad, MDS, HTN,Hypothryodism, DM, Neruopathy, Gout, Cad, Pt Has c/o weakness over past two months, has not ambulated for years. Previously was able to pivot/transfer. Now unable to sit in chair "he slides out" Pt was seen by neuro with neg MRI, Today pt was called for anemia. Hx from Spouse, PE Eldelry male looking chronically ill/weak, Heent normocephalic atraumatic neck supple oral mucosa dry chest clear abd soft Neuro eyes open voice soft, moving upper extr power 3/5. pain intact  Jl Noland MD, Facep

## 2021-06-07 NOTE — ED PROVIDER NOTE - OBJECTIVE STATEMENT
81 y/o M w/ h/o MDS, ADD, hypothyroidism, AFib, CHF, gout, HLD, T2DM, HTN, CAD s/p stents sent in from hematologist for low H/H. Per wife, patient with poor PO intake, appears dehydrated and was told he was getting admitted for more tests but unaware of specific tests. Pt needs blood transfusions every 2 wks. No fevers, chills, sob, dark stools. 79 y/o M w/ h/o MDS, ADD, hypothyroidism, AFib, CHF, gout, HLD, T2DM, HTN, CAD s/p stents sent in from hematologist for low H/H. Per wife, patient with poor PO intake, appears dehydrated, difficulty walking and was told he was getting admitted for more tests but unaware of specific tests. Pt needs blood transfusions every 2 wks. No fevers, chills, sob, dark stools. 81 y/o M w/ h/o MDS, ADD, hypothyroidism, AFib, CHF, gout, HLD, T2DM, HTN, CAD s/p stents sent in from hematologist for low H/H. Per wife, patient with poor PO intake, appears dehydrated, difficulty walking and was told he was getting admitted for more tests but unaware of specific tests. Pt needs blood transfusions every 2 wks. No fevers, chills, sob, dark stools.    Dr. Elizondo hematology

## 2021-06-07 NOTE — ED PROVIDER NOTE - CLINICAL SUMMARY MEDICAL DECISION MAKING FREE TEXT BOX
81 y/o M w/ h/o MDS, ADD, hypothyroidism, AFib, CHF, gout, HLD, T2DM, HTN, CAD s/p stents sent in from hematologist for low H/H. Plan to transfuse and admit for failure to thrive.

## 2021-06-08 NOTE — SWALLOW BEDSIDE ASSESSMENT ADULT - SLP PERTINENT HISTORY OF CURRENT PROBLEM
80M with PMHx of MDS (transfusion dependent), hypothyroidism, ADD, atrial fibrillation (not on AC given fall & bleeding risk), HTN, and CAD (with stents) told to come in by hematologist for increasing weakness and blood transfusion. History obtained from wife as patient is minimally verbal at baseline. Apparently he receives blood transfusion every 3 weeks with goal hemoglobin >8. His last transfusion was 1.5 weeks ago.

## 2021-06-08 NOTE — SWALLOW BEDSIDE ASSESSMENT ADULT - COMMENTS
Per H&P, past several months patient with increasing weakness and truncal instability. He has been seeing Dr. Bray for possible neuromuscular issue. Per patient's wife there are still running tests and patient with recent MRI brain w/ contrast (unknown exact result). Otherwise, it seems it has been progressive with no answers. In addition, it seems MDS is becoming more aggressive and patient may have been pending a bone marrow biopsy for further diagnostic evaluation. Per patient's wife he has poor PO intake at baseline and has not been ambulatory for several years. She denies infectious symptoms such as fever or diarrhea. Patient recently at [facility] for same reason and discharged after IV hydration. In the ED hematology was called and patient given 1 L NS and pRBC.    5/15 MRI Brain: Acute infarct of the high left parasagittal frontoparietal region, as noted on the recent head CT study. No hemorrhagic   transformation.  6/7 CXR: Clear lungs

## 2021-06-08 NOTE — CONSULT NOTE ADULT - SUBJECTIVE AND OBJECTIVE BOX
MRN-[id number]  Patient is a 80y old  Male who presents with a chief complaint of Weakness (2021 15:43)    HPI:  Patient is a 79 yo Rt handed M with pmh of MDS (transfusion dependent), hypothyroidism, ADD, atrial fibrillation (not on AC given fall & bleeding risk), HTN, and CAD (with stents), Progressive Muscle weakness followed by  presents to [facility] as per recommendation of his hematology due to progressive weakness and requirements for blood transfusions.   History was obtained by chart review, Patient is minimally verbal at baseline. Patient receives blood transfusion every 3 weeks however, is requiring increasing blood transfusions with last one completed 1.5 weeks ago. Per chart review, MDS may require bone marrow biopsy due to progression of MDS.   Neurology was consulted as patient follows Dr. Bray outpatient for motor muscle weakness. Patient was last seen on May 26th. Patient was noted to have progression of muscle motor decline. Patient was noted to be pocketing his food as well.   Per patient's wife he has poor PO intake at baseline and has not been ambulatory for several years. Of note, patient recently at [facility].         PAST MEDICAL & SURGICAL HISTORY:  Hypertension    DM (Diabetes Mellitus)    Hypercholesterolemia    Gout    Congestive heart failure (CHF)    Atrial fibrillation    Hypothyroid    ADD (attention deficit disorder)    CAD (coronary artery disease)    MDS (myelodysplastic syndrome)    Stented coronary artery    History of tonsillectomy    History of colonoscopy    H/O hemorrhoidectomy    Status post placement of implantable loop recorder      FAMILY HISTORY:  FH: CHF (congestive heart failure) (Mother)  mother    Family history of cerebrovascular accident (CVA) in father (Father)      Social Hx:  Nonsmoker, no drug or alcohol use    Home Medications:  allopurinol 100 mg oral tablet: 1 tab(s) orally once a day (2021 03:27)  amLODIPine 5 mg oral tablet: 1 tab(s) orally once a day (2021 03:27)  aspirin 81 mg oral delayed release tablet: 1 tab(s) orally once a day (2021 03:27)  levothyroxine: 37.5 microgram(s) orally once a day (2021 03:27)  methylphenidate 20 mg oral tablet: 1 tab(s) orally 3 times a day (2021 03:27)  metoprolol tartrate 50 mg oral tablet: 1 tab(s) orally 2 times a day (2021 03:27)  omeprazole 20 mg oral delayed release capsule: 1 cap(s) orally once a day (2021 03:27)  rosuvastatin 5 mg oral tablet: 1 tab(s) orally once a day (2021 03:27)    MEDICATIONS  (STANDING):  allopurinol 100 milliGRAM(s) Oral daily  amLODIPine   Tablet 5 milliGRAM(s) Oral daily  aspirin enteric coated 81 milliGRAM(s) Oral daily  atorvastatin 20 milliGRAM(s) Oral at bedtime  dextrose 40% Gel 15 Gram(s) Oral once  dextrose 5%. 1000 milliLiter(s) (50 mL/Hr) IV Continuous <Continuous>  dextrose 5%. 1000 milliLiter(s) (100 mL/Hr) IV Continuous <Continuous>  dextrose 50% Injectable 25 Gram(s) IV Push once  dextrose 50% Injectable 12.5 Gram(s) IV Push once  dextrose 50% Injectable 25 Gram(s) IV Push once  glucagon  Injectable 1 milliGRAM(s) IntraMuscular once  insulin lispro (ADMELOG) corrective regimen sliding scale   SubCutaneous three times a day before meals  insulin lispro (ADMELOG) corrective regimen sliding scale   SubCutaneous at bedtime  lactated ringers. 1000 milliLiter(s) (75 mL/Hr) IV Continuous <Continuous>  levothyroxine 37.5 MICROGram(s) Oral daily  methylphenidate 20 milliGRAM(s) Oral three times a day  metoprolol tartrate 50 milliGRAM(s) Oral two times a day  nystatin    Suspension 510603 Unit(s) Oral four times a day  pantoprazole    Tablet 40 milliGRAM(s) Oral before breakfast  polyethylene glycol 3350 17 Gram(s) Oral daily  senna 2 Tablet(s) Oral at bedtime    MEDICATIONS  (PRN):  acetaminophen   Tablet .. 650 milliGRAM(s) Oral every 6 hours PRN Temp greater or equal to 38C (100.4F), Mild Pain (1 - 3), Moderate Pain (4 - 6), Severe Pain (7 - 10)    Allergies  iodine (Anaphylaxis)  tetracycline (Short breath)    Intolerances      REVIEW OF SYSTEMS  General: Denies fever and chills		  Ophthalmologic: Denies blurred vision   Respiratory and Thorax:	Denies sob   Cardiovascular Denies chest pain   Musculoskeletal:	 mentions weakness and fatigue  Neurological: Denies headaches, numbness	  	    ROS: Pertinent positives in HPI, all other ROS were reviewed and are negative.      Vital Signs Last 24 Hrs  T(C): 36.8 (2021 16:50), Max: 36.8 (2021 16:50)  T(F): 98.2 (2021 16:50), Max: 98.2 (2021 16:50)  HR: 83 (2021 16:50) (72 - 84)  BP: 104/66 (2021 16:50) (104/66 - 122/57)  BP(mean): 76 (2021 01:05) (71 - 77)  RR: 16 (2021 16:50) (14 - 18)  SpO2: 97% (2021 16:50) (96% - 100%)    GENERAL EXAM:  Constitutional: awake and alert. NAD  HEENT: PERRL, EOMI      NEUROLOGICAL EXAM:  MS: AAOX2 ( person and time) Speech is fluent however is soft. There is no aphasia nor dysarthria. Patient required verbal stimulation during the exam. Exam was limited due to patient feeling tired and falling asleep.  Patient able to follow simple commands  CN:  EOM were intact, however were slow.  PERRL,   V1-3 intact, no facial asymmetry, t/p midline,  Motor: Strength: UE were 4/5 b/l.  LE b/l were 3/5.   Shoulder shrug intact b/l.    strength equal b/l however is weak.   Tone: normal.   Bulk: normal.   DTR 2+ symm in Biceps,  brachoradialis and triceps b/l. Patellar reflexes were not appreciated.   There was no cross abduction noted.   Lord sign was not appreciated.   Plantar flex b/l.  There was no ankle clonus noted.   There is no resting tremor noted.   Sensation: intact to light touch, temperature and noxious stimuli in all extremities.   Proprioception and vibratory sensation was unable to be assess due to patient falling asleep.   Coordination: FTN intact b/l. Unable to do Heel to shin.   Gait:  deferred.        Labs:   cbc                      8.3    5.33  )-----------( 84       ( 2021 15:56 )             24.4     [id number]    140  |  110<H>  |  54<H>  ----------------------------<  148<H>  4.7   |  22  |  0.79    Ca    10.1      2021 15:56  Mg     1.6         TPro  6.0  /  Alb  2.7<L>  /  TBili  0.8  /  DBili  x   /  AST  72<H>  /  ALT  91<H>  /  AlkPhos  248<H>        LFTsLIVER FUNCTIONS - ( 2021 05:16 )  Alb: 2.7 g/dL / Pro: 6.0 g/dL / ALK PHOS: 248 U/L / ALT: 91 U/L / AST: 72 U/L / GGT: x           UAUrinalysis Basic - ( 2021 01:20 )    Color: Yellow / Appearance: Clear / S.019 / pH: x  Gluc: x / Ketone: Negative  / Bili: Negative / Urobili: Negative   Blood: x / Protein: 30 mg/dL / Nitrite: Negative   Leuk Esterase: Negative / RBC: 6 /hpf / WBC 0 /HPF   Sq Epi: x / Non Sq Epi: 0 /hpf / Bacteria: Negative      Immunology Labs:   Acetylcholine blocking AB 14   Acetylcholine modulating AB <12   ACRM Modulating Antibody 0.07       Radiology   Brain MRI  [id number]  Patient is a 80y old  Male who presents with a chief complaint of Weakness (2021 15:43)    HPI:  Patient is a 79 yo Rt handed M with pmh of MDS (transfusion dependent), hypothyroidism, ADD, atrial fibrillation (not on AC given fall & bleeding risk), HTN, and CAD (with stents), Progressive Muscle weakness followed by  presents to [facility] as per recommendation of his hematology due to progressive weakness and requirements for blood transfusions.   History was obtained by chart review, Patient is minimally verbal at baseline. Patient receives blood transfusion every 3 weeks however, is requiring increasing blood transfusions with last one completed 1.5 weeks ago. Per chart review, MDS may require bone marrow biopsy due to progression of MDS.   Neurology was consulted as patient follows Dr. Bray outpatient for motor muscle weakness. Patient was last seen on May 26th. Patient was noted to have progression of muscle motor decline. Patient was noted to be pocketing his food as well.   Per patient's wife he has poor PO intake at baseline and has not been ambulatory for several years. Of note, patient recently at [facility].         PAST MEDICAL & SURGICAL HISTORY:  Hypertension    DM (Diabetes Mellitus)    Hypercholesterolemia    Gout    Congestive heart failure (CHF)    Atrial fibrillation    Hypothyroid    ADD (attention deficit disorder)    CAD (coronary artery disease)    MDS (myelodysplastic syndrome)    Stented coronary artery    History of tonsillectomy    History of colonoscopy    H/O hemorrhoidectomy    Status post placement of implantable loop recorder      FAMILY HISTORY:  FH: CHF (congestive heart failure) (Mother)  mother    Family history of cerebrovascular accident (CVA) in father (Father)      Social Hx:  Nonsmoker, no drug or alcohol use    Home Medications:  allopurinol 100 mg oral tablet: 1 tab(s) orally once a day (2021 03:27)  amLODIPine 5 mg oral tablet: 1 tab(s) orally once a day (2021 03:27)  aspirin 81 mg oral delayed release tablet: 1 tab(s) orally once a day (2021 03:27)  levothyroxine: 37.5 microgram(s) orally once a day (2021 03:27)  methylphenidate 20 mg oral tablet: 1 tab(s) orally 3 times a day (2021 03:27)  metoprolol tartrate 50 mg oral tablet: 1 tab(s) orally 2 times a day (2021 03:27)  omeprazole 20 mg oral delayed release capsule: 1 cap(s) orally once a day (2021 03:27)  rosuvastatin 5 mg oral tablet: 1 tab(s) orally once a day (2021 03:27)    MEDICATIONS  (STANDING):  allopurinol 100 milliGRAM(s) Oral daily  amLODIPine   Tablet 5 milliGRAM(s) Oral daily  aspirin enteric coated 81 milliGRAM(s) Oral daily  atorvastatin 20 milliGRAM(s) Oral at bedtime  dextrose 40% Gel 15 Gram(s) Oral once  dextrose 5%. 1000 milliLiter(s) (50 mL/Hr) IV Continuous <Continuous>  dextrose 5%. 1000 milliLiter(s) (100 mL/Hr) IV Continuous <Continuous>  dextrose 50% Injectable 25 Gram(s) IV Push once  dextrose 50% Injectable 12.5 Gram(s) IV Push once  dextrose 50% Injectable 25 Gram(s) IV Push once  glucagon  Injectable 1 milliGRAM(s) IntraMuscular once  insulin lispro (ADMELOG) corrective regimen sliding scale   SubCutaneous three times a day before meals  insulin lispro (ADMELOG) corrective regimen sliding scale   SubCutaneous at bedtime  lactated ringers. 1000 milliLiter(s) (75 mL/Hr) IV Continuous <Continuous>  levothyroxine 37.5 MICROGram(s) Oral daily  methylphenidate 20 milliGRAM(s) Oral three times a day  metoprolol tartrate 50 milliGRAM(s) Oral two times a day  nystatin    Suspension 830858 Unit(s) Oral four times a day  pantoprazole    Tablet 40 milliGRAM(s) Oral before breakfast  polyethylene glycol 3350 17 Gram(s) Oral daily  senna 2 Tablet(s) Oral at bedtime    MEDICATIONS  (PRN):  acetaminophen   Tablet .. 650 milliGRAM(s) Oral every 6 hours PRN Temp greater or equal to 38C (100.4F), Mild Pain (1 - 3), Moderate Pain (4 - 6), Severe Pain (7 - 10)    Allergies  iodine (Anaphylaxis)  tetracycline (Short breath)    Intolerances      REVIEW OF SYSTEMS  General: Denies fever and chills		  Ophthalmologic: Denies blurred vision   Respiratory and Thorax:	Denies sob   Cardiovascular Denies chest pain   Musculoskeletal:	 mentions weakness and fatigue  Neurological: Denies headaches, numbness	  	    ROS: Pertinent positives in HPI, all other ROS were reviewed and are negative.      Vital Signs Last 24 Hrs  T(C): 36.8 (2021 16:50), Max: 36.8 (2021 16:50)  T(F): 98.2 (2021 16:50), Max: 98.2 (2021 16:50)  HR: 83 (2021 16:50) (72 - 84)  BP: 104/66 (2021 16:50) (104/66 - 122/57)  BP(mean): 76 (2021 01:05) (71 - 77)  RR: 16 (2021 16:50) (14 - 18)  SpO2: 97% (2021 16:50) (96% - 100%)    GENERAL EXAM:  Constitutional: awake and alert. NAD  HEENT: PERRL, EOMI      NEUROLOGICAL EXAM:  MS: AAOX2 ( person and time) Speech is fluent however is soft. There is no aphasia nor dysarthria. Patient required verbal stimulation during the exam. Exam was limited due to patient feeling tired and falling asleep.  Patient able to follow simple commands  CN:  EOM were intact, however were slow.  PERRL,   V1-3 intact, no facial asymmetry, t/p midline,  Motor: Strength: UE were 4/5 b/l.  LE b/l were 3/5.   Shoulder shrug intact b/l.    strength equal b/l however is weak.   Tone: normal.   Bulk: normal.   DTR 2+ symm in Biceps,  brachoradialis and triceps b/l. Patellar reflexes were not appreciated.   There was no cross abduction noted.   Lord sign was not appreciated.   Plantar flex b/l.  There was no ankle clonus noted.   There is no resting tremor noted.   Sensation: intact to light touch, temperature and noxious stimuli in all extremities.   Proprioception and vibratory sensation was unable to be assess due to patient falling asleep.   Coordination: FTN intact b/l. Unable to do Heel to shin.   Gait:  deferred.        Labs:   cbc                      8.3    5.33  )-----------( 84       ( 2021 15:56 )             24.4     [id number]    140  |  110<H>  |  54<H>  ----------------------------<  148<H>  4.7   |  22  |  0.79    Ca    10.1      2021 15:56  Mg     1.6         TPro  6.0  /  Alb  2.7<L>  /  TBili  0.8  /  DBili  x   /  AST  72<H>  /  ALT  91<H>  /  AlkPhos  248<H>        LFTsLIVER FUNCTIONS - ( 2021 05:16 )  Alb: 2.7 g/dL / Pro: 6.0 g/dL / ALK PHOS: 248 U/L / ALT: 91 U/L / AST: 72 U/L / GGT: x           UAUrinalysis Basic - ( 2021 01:20 )    Color: Yellow / Appearance: Clear / S.019 / pH: x  Gluc: x / Ketone: Negative  / Bili: Negative / Urobili: Negative   Blood: x / Protein: 30 mg/dL / Nitrite: Negative   Leuk Esterase: Negative / RBC: 6 /hpf / WBC 0 /HPF   Sq Epi: x / Non Sq Epi: 0 /hpf / Bacteria: Negative      Immunology Labs:   Acetylcholine blocking AB 14   Acetylcholine modulating AB <12   ACRM Modulating Antibody 0.07       Radiology   Brain MRI w/o was negative.  [id number]  Patient is a 80y old  Male who presents with a chief complaint of Weakness (2021 15:43)    HPI:  Patient is a 79 yo Rt handed M with pmh of MDS (transfusion dependent), hypothyroidism, ADD, atrial fibrillation (not on AC given fall & bleeding risk), HTN, and CAD (with stents), Progressive Muscle weakness followed by  presents to [facility] as per recommendation of his hematology due to progressive weakness and requirements for blood transfusions.   History was obtained by chart review, Patient is minimally verbal at baseline. Patient receives blood transfusion every 3 weeks however, is requiring increasing blood transfusions with last one completed 1.5 weeks ago. Per chart review, MDS may require bone marrow biopsy due to progression of MDS.   Neurology was consulted as patient follows Dr. Bray outpatient for motor muscle weakness. Patient was last seen on May 26th. Patient was noted to have progression of muscle motor decline. Patient was noted to be pocketing his food as well.   Per patient's wife he has poor PO intake at baseline and has not been ambulatory for several years. Of note, patient recently at [facility].         PAST MEDICAL & SURGICAL HISTORY:  Hypertension    DM (Diabetes Mellitus)    Hypercholesterolemia    Gout    Congestive heart failure (CHF)    Atrial fibrillation    Hypothyroid    ADD (attention deficit disorder)    CAD (coronary artery disease)    MDS (myelodysplastic syndrome)    Stented coronary artery    History of tonsillectomy    History of colonoscopy    H/O hemorrhoidectomy    Status post placement of implantable loop recorder      FAMILY HISTORY:  FH: CHF (congestive heart failure) (Mother)  mother    Family history of cerebrovascular accident (CVA) in father (Father)      Social Hx:  Nonsmoker, no drug or alcohol use    Home Medications:  allopurinol 100 mg oral tablet: 1 tab(s) orally once a day (2021 03:27)  amLODIPine 5 mg oral tablet: 1 tab(s) orally once a day (2021 03:27)  aspirin 81 mg oral delayed release tablet: 1 tab(s) orally once a day (2021 03:27)  levothyroxine: 37.5 microgram(s) orally once a day (2021 03:27)  methylphenidate 20 mg oral tablet: 1 tab(s) orally 3 times a day (2021 03:27)  metoprolol tartrate 50 mg oral tablet: 1 tab(s) orally 2 times a day (2021 03:27)  omeprazole 20 mg oral delayed release capsule: 1 cap(s) orally once a day (2021 03:27)  rosuvastatin 5 mg oral tablet: 1 tab(s) orally once a day (2021 03:27)    MEDICATIONS  (STANDING):  allopurinol 100 milliGRAM(s) Oral daily  amLODIPine   Tablet 5 milliGRAM(s) Oral daily  aspirin enteric coated 81 milliGRAM(s) Oral daily  atorvastatin 20 milliGRAM(s) Oral at bedtime  dextrose 40% Gel 15 Gram(s) Oral once  dextrose 5%. 1000 milliLiter(s) (50 mL/Hr) IV Continuous <Continuous>  dextrose 5%. 1000 milliLiter(s) (100 mL/Hr) IV Continuous <Continuous>  dextrose 50% Injectable 25 Gram(s) IV Push once  dextrose 50% Injectable 12.5 Gram(s) IV Push once  dextrose 50% Injectable 25 Gram(s) IV Push once  glucagon  Injectable 1 milliGRAM(s) IntraMuscular once  insulin lispro (ADMELOG) corrective regimen sliding scale   SubCutaneous three times a day before meals  insulin lispro (ADMELOG) corrective regimen sliding scale   SubCutaneous at bedtime  lactated ringers. 1000 milliLiter(s) (75 mL/Hr) IV Continuous <Continuous>  levothyroxine 37.5 MICROGram(s) Oral daily  methylphenidate 20 milliGRAM(s) Oral three times a day  metoprolol tartrate 50 milliGRAM(s) Oral two times a day  nystatin    Suspension 634589 Unit(s) Oral four times a day  pantoprazole    Tablet 40 milliGRAM(s) Oral before breakfast  polyethylene glycol 3350 17 Gram(s) Oral daily  senna 2 Tablet(s) Oral at bedtime    MEDICATIONS  (PRN):  acetaminophen   Tablet .. 650 milliGRAM(s) Oral every 6 hours PRN Temp greater or equal to 38C (100.4F), Mild Pain (1 - 3), Moderate Pain (4 - 6), Severe Pain (7 - 10)    Allergies  iodine (Anaphylaxis)  tetracycline (Short breath)    Intolerances      REVIEW OF SYSTEMS  General: Denies fever and chills		  Ophthalmologic: Denies blurred vision   Respiratory and Thorax:	Denies sob   Cardiovascular Denies chest pain   Musculoskeletal:	 mentions weakness and fatigue  Neurological: Denies headaches, numbness	  	    ROS: Pertinent positives in HPI, all other ROS were reviewed and are negative.      Vital Signs Last 24 Hrs  T(C): 36.8 (2021 16:50), Max: 36.8 (2021 16:50)  T(F): 98.2 (2021 16:50), Max: 98.2 (2021 16:50)  HR: 83 (2021 16:50) (72 - 84)  BP: 104/66 (2021 16:50) (104/66 - 122/57)  BP(mean): 76 (2021 01:05) (71 - 77)  RR: 16 (2021 16:50) (14 - 18)  SpO2: 97% (2021 16:50) (96% - 100%)    GENERAL EXAM:  Constitutional: awake and alert. NAD  HEENT: PERRL, EOMI      NEUROLOGICAL EXAM:  MS: AAOX2 ( person and time) Speech is fluent however is soft. There is no aphasia nor dysarthria. Patient required verbal stimulation during the exam. Exam was limited due to patient feeling tired and falling asleep.  Patient able to follow simple commands  CN:  EOM were intact, however were slow.  PERRL,   V1-3 intact, no facial asymmetry, t/p midline,  Motor: Strength: UE were 4/5 b/l.  LE b/l were 3/5.   Shoulder shrug intact b/l.    strength equal b/l however is weak.   Tone: normal.   Bulk: normal.   DTR 2+ symm in Biceps,  brachoradialis and triceps b/l. Patellar reflexes were not appreciated.   There was no cross abduction noted.   Lord sign was not appreciated.   Plantar flex b/l.  There was no ankle clonus noted.   There is no resting tremor noted.   Sensation: intact to light touch, temperature and noxious stimuli in all extremities.   Proprioception and vibratory sensation was unable to be assess due to patient falling asleep.   Coordination: FTN intact b/l. Unable to do Heel to shin.   Gait:  deferred.        Labs:   cbc                      8.3    5.33  )-----------( 84       ( 2021 15:56 )             24.4     [id number]    140  |  110<H>  |  54<H>  ----------------------------<  148<H>  4.7   |  22  |  0.79    Ca    10.1      2021 15:56  Mg     1.6         TPro  6.0  /  Alb  2.7<L>  /  TBili  0.8  /  DBili  x   /  AST  72<H>  /  ALT  91<H>  /  AlkPhos  248<H>        LFTsLIVER FUNCTIONS - ( 2021 05:16 )  Alb: 2.7 g/dL / Pro: 6.0 g/dL / ALK PHOS: 248 U/L / ALT: 91 U/L / AST: 72 U/L / GGT: x           UAUrinalysis Basic - ( 2021 01:20 )    Color: Yellow / Appearance: Clear / S.019 / pH: x  Gluc: x / Ketone: Negative  / Bili: Negative / Urobili: Negative   Blood: x / Protein: 30 mg/dL / Nitrite: Negative   Leuk Esterase: Negative / RBC: 6 /hpf / WBC 0 /HPF   Sq Epi: x / Non Sq Epi: 0 /hpf / Bacteria: Negative      Immunology Labs:   Acetylcholine blocking AB 14   Acetylcholine modulating AB <12   ACRM Modulating Antibody 0.07       Radiology   Brain MRI w/w/o: Ventricles, sulci, basal cisterns are dilated compatible with severe generalized cerebral volume loss.

## 2021-06-08 NOTE — SWALLOW BEDSIDE ASSESSMENT ADULT - MUCOSAL QUALITY
+xerostomia; slight dried secretions upon lingual surface; brown-coated lingual surface - As per wife, pt consumed chocolate pudding for dinner the prior night with slight coating remaining upon lingual surface

## 2021-06-08 NOTE — H&P ADULT - PROBLEM SELECTOR PLAN 10
Prior notes indicate that it is believed to be due to iron deposition from frequent pRBC transfusions. For now continue to monitor, consider NASRA kilpatrick

## 2021-06-08 NOTE — PROGRESS NOTE ADULT - SUBJECTIVE AND OBJECTIVE BOX
Cedric Thomson MD  Division of Hospital Medicine  Cell: [phone]  Pager: [phone]  Office: [phone]    Patient is a 80y old  Male who presents with a chief complaint of Weakness (2021 19:45)        SUBJECTIVE / OVERNIGHT EVENTS:      MEDICATIONS  (STANDING):  allopurinol 100 milliGRAM(s) Oral daily  amLODIPine   Tablet 5 milliGRAM(s) Oral daily  aspirin enteric coated 81 milliGRAM(s) Oral daily  atorvastatin 20 milliGRAM(s) Oral at bedtime  dextrose 40% Gel 15 Gram(s) Oral once  dextrose 5%. 1000 milliLiter(s) (50 mL/Hr) IV Continuous <Continuous>  dextrose 5%. 1000 milliLiter(s) (100 mL/Hr) IV Continuous <Continuous>  dextrose 50% Injectable 25 Gram(s) IV Push once  dextrose 50% Injectable 12.5 Gram(s) IV Push once  dextrose 50% Injectable 25 Gram(s) IV Push once  glucagon  Injectable 1 milliGRAM(s) IntraMuscular once  insulin lispro (ADMELOG) corrective regimen sliding scale   SubCutaneous three times a day before meals  insulin lispro (ADMELOG) corrective regimen sliding scale   SubCutaneous at bedtime  lactated ringers. 1000 milliLiter(s) (75 mL/Hr) IV Continuous <Continuous>  levothyroxine 37.5 MICROGram(s) Oral daily  methylphenidate 20 milliGRAM(s) Oral three times a day  metoprolol tartrate 50 milliGRAM(s) Oral two times a day  nystatin    Suspension 691794 Unit(s) Oral four times a day  pantoprazole    Tablet 40 milliGRAM(s) Oral before breakfast  polyethylene glycol 3350 17 Gram(s) Oral daily  senna 2 Tablet(s) Oral at bedtime    MEDICATIONS  (PRN):  acetaminophen   Tablet .. 650 milliGRAM(s) Oral every 6 hours PRN Temp greater or equal to 38C (100.4F), Mild Pain (1 - 3), Moderate Pain (4 - 6), Severe Pain (7 - 10)      Vital Signs Last 24 Hrs  T(C): 36.2 (2021 20:13), Max: 36.8 (2021 16:50)  T(F): 97.2 (2021 20:13), Max: 98.2 (2021 16:50)  HR: 74 (2021 20:13) (74 - 84)  BP: 104/60 (2021 20:13) (104/60 - 122/57)  BP(mean): 76 (2021 01:05) (76 - 77)  RR: 17 (2021 20:13) (14 - 17)  SpO2: 97% (2021 20:13) (97% - 99%)  CAPILLARY BLOOD GLUCOSE      POCT Blood Glucose.: 150 mg/dL (2021 22:27)  POCT Blood Glucose.: 162 mg/dL (2021 20:10)  POCT Blood Glucose.: 155 mg/dL (2021 17:57)  POCT Blood Glucose.: 172 mg/dL (2021 13:22)  POCT Blood Glucose.: 154 mg/dL (2021 09:20)    I&O's Summary        PHYSICAL EXAM:   GENERAL: NAD, well-developed  HEAD:  Atraumatic, Normocephalic  EYES: EOMI, PERRLA, conjunctiva and sclera clear  NECK: Supple, No JVD  CHEST/LUNG: Clear to auscultation bilaterally; No wheeze  HEART: S1S2 normal. Regular rate and rhythm; No murmurs, rubs, or gallops  ABDOMEN: Soft, Nontender, Nondistended; Bowel sounds present  EXTREMITIES:  2+ Peripheral Pulses, No clubbing, cyanosis, or edema  PSYCH/Neuro: AAOx3. Non-focal.   SKIN: No rashes or lesions      LABS:                        8.3    5.33  )-----------( 84       ( 2021 15:56 )             24.4     06-08    140  |  110<H>  |  54<H>  ----------------------------<  148<H>  4.7   |  22  |  0.79    Ca    10.1      2021 15:56  Mg     1.6     06-08    TPro  6.0  /  Alb  2.7<L>  /  TBili  0.8  /  DBili  x   /  AST  72<H>  /  ALT  91<H>  /  AlkPhos  248<H>  06-08          Urinalysis Basic - ( 2021 01:20 )    Color: Yellow / Appearance: Clear / S.019 / pH: x  Gluc: x / Ketone: Negative  / Bili: Negative / Urobili: Negative   Blood: x / Protein: 30 mg/dL / Nitrite: Negative   Leuk Esterase: Negative / RBC: 6 /hpf / WBC 0 /HPF   Sq Epi: x / Non Sq Epi: 0 /hpf / Bacteria: Negative          RADIOLOGY & ADDITIONAL TESTS:    Imaging Personally Reviewed:   Consultant(s) Notes Reviewed:  neuro, heme  Care Discussed with Consultants/Other Providers:   Cedric Thomson MD  Division of Hospital Medicine  Cell: [phone]  Pager: [phone]  Office: [phone]    Patient is a 80y old  Male who presents with a chief complaint of Weakness (2021 19:45)        SUBJECTIVE / OVERNIGHT EVENTS: Patient denies any complaints. Unable to give ROS due to cognitive delay.       MEDICATIONS  (STANDING):  allopurinol 100 milliGRAM(s) Oral daily  amLODIPine   Tablet 5 milliGRAM(s) Oral daily  aspirin enteric coated 81 milliGRAM(s) Oral daily  atorvastatin 20 milliGRAM(s) Oral at bedtime  dextrose 40% Gel 15 Gram(s) Oral once  dextrose 5%. 1000 milliLiter(s) (50 mL/Hr) IV Continuous <Continuous>  dextrose 5%. 1000 milliLiter(s) (100 mL/Hr) IV Continuous <Continuous>  dextrose 50% Injectable 25 Gram(s) IV Push once  dextrose 50% Injectable 12.5 Gram(s) IV Push once  dextrose 50% Injectable 25 Gram(s) IV Push once  glucagon  Injectable 1 milliGRAM(s) IntraMuscular once  insulin lispro (ADMELOG) corrective regimen sliding scale   SubCutaneous three times a day before meals  insulin lispro (ADMELOG) corrective regimen sliding scale   SubCutaneous at bedtime  lactated ringers. 1000 milliLiter(s) (75 mL/Hr) IV Continuous <Continuous>  levothyroxine 37.5 MICROGram(s) Oral daily  methylphenidate 20 milliGRAM(s) Oral three times a day  metoprolol tartrate 50 milliGRAM(s) Oral two times a day  nystatin    Suspension 136110 Unit(s) Oral four times a day  pantoprazole    Tablet 40 milliGRAM(s) Oral before breakfast  polyethylene glycol 3350 17 Gram(s) Oral daily  senna 2 Tablet(s) Oral at bedtime    MEDICATIONS  (PRN):  acetaminophen   Tablet .. 650 milliGRAM(s) Oral every 6 hours PRN Temp greater or equal to 38C (100.4F), Mild Pain (1 - 3), Moderate Pain (4 - 6), Severe Pain (7 - 10)      Vital Signs Last 24 Hrs  T(C): 36.2 (2021 20:13), Max: 36.8 (2021 16:50)  T(F): 97.2 (2021 20:13), Max: 98.2 (2021 16:50)  HR: 74 (2021 20:13) (74 - 84)  BP: 104/60 (2021 20:13) (104/60 - 122/57)  BP(mean): 76 (2021 01:05) (76 - 77)  RR: 17 (2021 20:13) (14 - 17)  SpO2: 97% (2021 20:13) (97% - 99%)  CAPILLARY BLOOD GLUCOSE      POCT Blood Glucose.: 150 mg/dL (2021 22:27)  POCT Blood Glucose.: 162 mg/dL (2021 20:10)  POCT Blood Glucose.: 155 mg/dL (2021 17:57)  POCT Blood Glucose.: 172 mg/dL (2021 13:22)  POCT Blood Glucose.: 154 mg/dL (2021 09:20)    I&O's Summary        PHYSICAL EXAM:   GENERAL: NAD, thin  HEAD:  Atraumatic, Normocephalic  EYES:  conjunctiva and sclera clear  NECK: Supple,    CHEST/LUNG: Clear to auscultation bilaterally; No wheeze  HEART: S1S2 normal. Regular rate and rhythm; No murmurs, rubs, or gallops  ABDOMEN: Soft, Nontender, Nondistended; Bowel sounds present  EXTREMITIES:   No clubbing, cyanosis, or edema; muscle wasting apparent.   PSYCH/Neuro: Awake and answers questions but not very interactive. Moves extremities but very weak. Mild twitching.   SKIN: No rashes or lesions      LABS:                        8.3    5.33  )-----------( 84       ( 2021 15:56 )             24.4     06-08    140  |  110<H>  |  54<H>  ----------------------------<  148<H>  4.7   |  22  |  0.79    Ca    10.1      2021 15:56  Mg     1.6     06-08    TPro  6.0  /  Alb  2.7<L>  /  TBili  0.8  /  DBili  x   /  AST  72<H>  /  ALT  91<H>  /  AlkPhos  248<H>  06-08          Urinalysis Basic - ( 2021 01:20 )    Color: Yellow / Appearance: Clear / S.019 / pH: x  Gluc: x / Ketone: Negative  / Bili: Negative / Urobili: Negative   Blood: x / Protein: 30 mg/dL / Nitrite: Negative   Leuk Esterase: Negative / RBC: 6 /hpf / WBC 0 /HPF   Sq Epi: x / Non Sq Epi: 0 /hpf / Bacteria: Negative          RADIOLOGY & ADDITIONAL TESTS:    Imaging Personally Reviewed: MRI brain  Consultant(s) Notes Reviewed:  neuro, heme  Care Discussed with Consultants/Other Providers:

## 2021-06-08 NOTE — H&P ADULT - NSICDXPASTMEDICALHX_GEN_ALL_CORE_FT
PAST MEDICAL HISTORY:  ADD (attention deficit disorder)     Atrial fibrillation     CAD (coronary artery disease)     Congestive heart failure (CHF)     DM (Diabetes Mellitus)     Gout     Hypercholesterolemia     Hypertension     Hypothyroid     MDS (myelodysplastic syndrome)

## 2021-06-08 NOTE — SWALLOW BEDSIDE ASSESSMENT ADULT - SWALLOW EVAL: PROGNOSIS
Dx continued: No overt signs or symptoms of penetration or aspiration observed on limited trials of purees and thin liquids via cup sips. Pt noted to fatigue after limited trials. Pt may require supplemental alterative means of nutrition/hydration due to reported failure to thrive.

## 2021-06-08 NOTE — SWALLOW BEDSIDE ASSESSMENT ADULT - SWALLOW EVAL: DIAGNOSIS
80M with PMHx of MDS (transfusion dependent), hypothyroidism, ADD, atrial fibrillation (not on AC given fall & bleeding risk), HTN, and CAD (with stents) told to come in by hematologist for increasing weakness and blood transfusion. Apparently patient with failure to thrive presenting for several weeks and was seeing a neuromuscular specialist for truncal instability. Per heme outpatient notes it seems they were considering bone marrow biopsy. Patient admitted for expedited work up of these conditions. 80M with PMHx of MDS (transfusion dependent), hypothyroidism, ADD, atrial fibrillation (not on AC given fall & bleeding risk), HTN, and CAD (with stents) told to come in by hematologist for increasing weakness and blood transfusion. Apparently patient with failure to thrive presenting for several weeks and was seeing a neuromuscular specialist for truncal instability. Per heme outpatient notes it seems they were considering bone marrow biopsy. Patient admitted for expedited work up of these conditions. Pt presents with clinical signs of an oropharyngeal dysphagia, likely superimposed upon cognitive deficits. Reduced orientation to food/utensil with inconsistent grading upon spoon/labial closure around cup. Improvement in engagement in trials when cup placed in pt's hand with hand over hand assist to feed self. 80M with PMHx of MDS (transfusion dependent), hypothyroidism, ADD, atrial fibrillation (not on AC given fall & bleeding risk), HTN, and CAD (with stents) told to come in by hematologist for increasing weakness and blood transfusion. Apparently patient with failure to thrive presenting for several weeks and was seeing a neuromuscular specialist for truncal instability. Per heme outpatient notes it seems they were considering bone marrow biopsy. Patient admitted for expedited work up of these conditions. Pt presents with clinical signs of an oropharyngeal dysphagia, likely superimposed upon cognitive deficits. Reduced orientation to food/utensil with inconsistent grading upon spoon/labial closure around cup. Improvement in engagement in trials when cup placed in pt's hand with hand over hand assist to feed self. Occasional delay much/chew pattern with purees and delayed oral transit time vs. delay in swallow initiation. Once swallow initiated, mildly reduced laryngeal elevation.

## 2021-06-08 NOTE — SWALLOW BEDSIDE ASSESSMENT ADULT - SLP GENERAL OBSERVATIONS
Pt encountered in ED stretcher with wife, Carmita, present at bedside. Pt AA&Ox1 (self) and aware of hospital surroundings, however, stated, "We're in [place]." Pt able to follow simple commands only. +Pleasantly confused. Pt's wife endorses h/o dysphagia with pt "forgetting to chew solids" therefore, all PO changed to "purees and shakes" in the most recent few months.  Pt's wife also stated that her  gets tired and sleeps all the time, therefore, she is concerned re: her  achieving enough nutrition/hydration. She endorses a 15-20 pound weight loss in 6 weeks.

## 2021-06-08 NOTE — SWALLOW BEDSIDE ASSESSMENT ADULT - SWALLOW EVAL: RECOMMENDED DIET
1) Puree texture diet 2) MUST BE AWAKE/ALERT 3) If pt fatigues, cease feeds 4) May require alternate means nutrition/hydration/medications due to fatigue/cognitive deficits 1) Puree texture diet 2) MUST BE AWAKE/ALERT 3) If pt fatigues, cease feeds 4) May require alternate means nutrition/hydration/medications due to fatigue/cognitive deficits 5) Suggest Calorie Count

## 2021-06-08 NOTE — SWALLOW BEDSIDE ASSESSMENT ADULT - ADDITIONAL RECOMMENDATIONS
Calorie Count  Per discussion with Heme/Onc team - consider IV fluids due to dehydration  Monitor for s/s aspiration/laryngeal penetration. If noted:  D/C p.o. intake, provide non-oral nutrition/hydration/meds, and contact this service @ [phone]  Aggressive oral care

## 2021-06-08 NOTE — H&P ADULT - HISTORY OF PRESENT ILLNESS
History obtained from wife at bedside since patient is minimally verbal    80M with PMHx of MDS (transfusion dependent), hypothyroidism, ADD, atrial fibrillation (not on AC given fall & bleeding risk), HTN, and CAD (with stents) told to come in by hematologist for increasing weakness and blood transfusion. History obtained from wife as patient is minimally verbal at baseline. Apparently he receives blood transfusion every 3 weeks with goal hemoglobin >8. His last transfusion was 1.5 weeks ago. It seems for past several months patient with increasing weakness and truncal instability. He has been seeing Dr. Bray for possible neuromuscular issue. Per patient's wife there are still running tests and patient with recent MRI brain w/ contrast (unknown exact result). Otherwise, it seems it has been progressive with no answers. In addition, it seems MDS is becoming more aggressive and patient may have been pending a bone marrow biopsy for further diagnostic evaluation. Per patient's wife he has poor PO intake at baseline and has not been ambulatory for several years. She denies infectious symptoms such as fever or diarrhea. Patient recently at [facility] for same reason and discharged after IV hydration. In the ED hematology was called and patient given 1 L NS and pRBC.

## 2021-06-08 NOTE — CONSULT NOTE ADULT - ASSESSMENT
Patient is a 81 yo Rt handed M with pmh of MDS (transfusion dependent), hypothyroidism, ADD, atrial fibrillation (not on AC given fall & bleeding risk), HTN, and CAD (with stents), Progressive Muscle weakness followed by  presents to [facility] as per recommendation of his hematology due to progressive weakness and requirements for blood transfusions. Neurology was consulted as patient follows Dr. Bray outpatient for motor muscle weakness. Patient was last seen on May 26th. Patient was noted to have progression of muscle motor decline.     Impression/Plan  Progression Muscle weakness likely in the setting of a Neuromuscular Etiology with known MDS   Recommendations:   Continue current home medications   Immunological antibodies requested in [employer] Acetylcholine blocking AB , Acetylcholine modulating AB,  ACRM Modulating Antibody, were wnl   CPK level   Continue MIVF hydration   Correction of underlying electrolyte abnormalities.   Will need EMG with Dr. Bray       Case was discussed with Dr. Bray. Please refer Attending Attestation for further recommendations.

## 2021-06-08 NOTE — H&P ADULT - PROBLEM SELECTOR PLAN 2
Patient follows with Dr. Bray for what wife states is truncal instability, increasing weakness, trouble with handling secretions and swallowing? Patient had outpatient work up including MRI brain, but unknown result. Aspiration precautions, consult neurology

## 2021-06-08 NOTE — H&P ADULT - NSHPPHYSICALEXAM_GEN_ALL_CORE
T(C): 36.4 (06-08-21 @ 03:11), Max: 36.6 (06-07-21 @ 22:30)  HR: 76 (06-08-21 @ 03:11) (72 - 84)  BP: 119/56 (06-08-21 @ 03:11) (110/63 - 122/57)  RR: 16 (06-08-21 @ 03:11) (14 - 18)  SpO2: 98% (06-08-21 @ 03:11) (96% - 100%)    Gen: male in NAD, appears comfortable, no diaphoresis  HEENT: NCAT, MMM, neck soft and supple  CV: +S1/S2, no m/r/g  Resp: CTAB, no w/r/r  GI: normoactive BS, soft, NTND, no rebounding/guarding  Ext: No LE edema, extremities appear warm and well perfused   Neuro: moving all four extremities spontaneously  Psych: flat affect, responds to verbal stimuli (but closes eyes)  Skin: no petechiae, ecchymosis or maculopapular rash noted

## 2021-06-08 NOTE — H&P ADULT - PROBLEM SELECTOR PLAN 3
Patient incidentally found to have hypercalcemia and when corrected for hypoalbuminemia is worse than on CMP. For now will repeat labs and see what calcium is. The hypercalcemia may be contributing to weakness. May be difficult to hydrate given HFrEF. If still elevated after 1 L NS will send PTH

## 2021-06-08 NOTE — H&P ADULT - ASSESSMENT
80M with PMHx of MDS (transfusion dependent), hypothyroidism, ADD, atrial fibrillation (not on AC given fall & bleeding risk), HTN, and CAD (with stents) told to come in by hematologist for increasing weakness and blood transfusion. Apparently patient with failure to thrive presenting for several weeks and was seeing a neuromuscular specialist for truncal instability. Per heme outpatient notes it seems they were considering bone marrow biopsy. Patient admitted for expedited work up of these conditions.

## 2021-06-08 NOTE — CONSULT NOTE ADULT - SUBJECTIVE AND OBJECTIVE BOX
REASON FOR CONSULTATION: MDS    HPI: 80M with PMHx of MDS (transfusion dependent), hypothyroidism, ADD, atrial fibrillation (not on AC given fall & bleeding risk), HTN, and CAD (with stents) told to come in by hematologist for increasing weakness and blood transfusion. History obtained from wife as patient is minimally verbal at baseline. Apparently he receives blood transfusion every 3 weeks with goal hemoglobin >8. His last transfusion was 1.5 weeks ago. It seems for past several months patient with increasing weakness and truncal instability. He has been seeing Dr. Bray for possible neuromuscular issue. Per patient's wife there are still running tests and patient with recent MRI brain w/ contrast (unknown exact result). Otherwise, it seems it has been progressive with no answers. In addition, it seems MDS is becoming more aggressive and patient may have been pending a bone marrow biopsy for further diagnostic evaluation. Per patient's wife he has poor PO intake at baseline and has not been ambulatory for several years. She denies infectious symptoms such as fever or diarrhea. Patient recently at [facility] for same reason and discharged after IV hydration. In the ED hematology was called and patient given 1 L NS and pRBC.       REVIEW OF SYSTEMS:  Limited due to poor mental status, see HPI.     Allergies    iodine (Anaphylaxis)  tetracycline (Short breath)    Intolerances        MEDICATIONS  (STANDING):  allopurinol 100 milliGRAM(s) Oral daily  amLODIPine   Tablet 5 milliGRAM(s) Oral daily  aspirin enteric coated 81 milliGRAM(s) Oral daily  atorvastatin 20 milliGRAM(s) Oral at bedtime  dextrose 40% Gel 15 Gram(s) Oral once  dextrose 5%. 1000 milliLiter(s) (50 mL/Hr) IV Continuous <Continuous>  dextrose 5%. 1000 milliLiter(s) (100 mL/Hr) IV Continuous <Continuous>  dextrose 50% Injectable 25 Gram(s) IV Push once  dextrose 50% Injectable 12.5 Gram(s) IV Push once  dextrose 50% Injectable 25 Gram(s) IV Push once  glucagon  Injectable 1 milliGRAM(s) IntraMuscular once  insulin lispro (ADMELOG) corrective regimen sliding scale   SubCutaneous three times a day before meals  insulin lispro (ADMELOG) corrective regimen sliding scale   SubCutaneous at bedtime  levothyroxine 37.5 MICROGram(s) Oral daily  methylphenidate 20 milliGRAM(s) Oral three times a day  metoprolol tartrate 50 milliGRAM(s) Oral two times a day  pantoprazole    Tablet 40 milliGRAM(s) Oral before breakfast    MEDICATIONS  (PRN):  acetaminophen   Tablet .. 650 milliGRAM(s) Oral every 6 hours PRN Temp greater or equal to 38C (100.4F), Mild Pain (1 - 3), Moderate Pain (4 - 6), Severe Pain (7 - 10)      Vital Signs Last 24 Hrs  T(C): 36.3 (2021 08:23), Max: 36.6 (2021 22:30)  T(F): 97.3 (2021 08:23), Max: 97.9 (2021 23:30)  HR: 84 (2021 08:23) (72 - 84)  BP: 113/71 (2021 08:23) (110/63 - 122/57)  BP(mean): 76 (2021 01:05) (71 - 77)  RR: 16 (2021 08:23) (14 - 18)  SpO2: 98% (2021 08:23) (96% - 100%)    PHYSICAL EXAM:        LABS:                        7.9    4.70  )-----------( 81       ( 2021 05:16 )             23.2     06-08    142  |  112<H>  |  59<H>  ----------------------------<  123<H>  4.2   |  20<L>  |  0.65    Ca    9.5      2021 05:16    TPro  6.0  /  Alb  2.7<L>  /  TBili  0.8  /  DBili  x   /  AST  72<H>  /  ALT  91<H>  /  AlkPhos  248<H>        Urinalysis Basic - ( 2021 01:20 )    Color: Yellow / Appearance: Clear / S.019 / pH: x  Gluc: x / Ketone: Negative  / Bili: Negative / Urobili: Negative   Blood: x / Protein: 30 mg/dL / Nitrite: Negative   Leuk Esterase: Negative / RBC: 6 /hpf / WBC 0 /HPF   Sq Epi: x / Non Sq Epi: 0 /hpf / Bacteria: Negative            RADIOLOGY & ADDITIONAL STUDIES:  < from: MRI Head w/wo Cont (05.15.17 @ 00:01) >    EXAM:  MRA HEAD W O CONTRAST                          EXAM:  MRA NECK W & W O CONTRAST                          EXAM:  MRI BRAIN WO W CONTRAST                            PROCEDURE DATE:  05/15/2017            INTERPRETATION:  HISTORY: Right lower extremity weakness, left FADIA   stroke. Dizziness and off balance.    TECHNIQUE: MRI of the brain with and without contrast was performed.   Noncontrast brain and neck MRA was performed. Additionally, neck MRA was   performed with gadolinium enhanced technique.    Axial DWI, ADC, gradient-echo, SWI, SPGR, T2, T2 FLAIR, sagittal T1 and   post contrast sagittal T1, coronal T1, axial T1 and axial SPGR images of   the brain were obtained.    MR angiography of intracranial and extracranial circulation was performed   with time of flight imaging technique. Additionally, approximately 10 cc   of intravenous gadolinium was administered for contrast-enhanced MR   angiography of the extracranial circulation. Maximal intensity projection   images were reviewed in multiple planes.    3-D reformations were made of vasculature. 3-D reformations were reviewed   and included in interpretation of the official report.    10 cc of Gadavist was injected intravenously and 0 cc was discarded.    COMPARISON: CT head dated 2017.    FINDINGS:    Brain MRI:    An acute infarct with associated increased diffusion-weighted signal is   noted in the medial left posterior frontal-parietal location,   corresponding to the infarct noted on the prior head CT study. There is   no hemorrhagic transformation of the infarct.    Mild prominence of the sulci and ventricles are consistent with   age-appropriate volume loss.     There are mild to moderate nonenhancing foci of T2/FLAIR signal   hyperintensity within the hemispheric white matter, which are nonspecific   but likely related to sequelae of microvascular disease. There is no   intraparenchymal hematoma, mass effect or midline shift. No abnormal   extra-axial fluid collections are present.     Fluid level in the left maxillary sinus. The orbits, remaining paranasal   sinuses and mastoid air cells are clear.    Brain MRA:    Internal carotid arteries demonstrate normal antegrade flow related   enhancement to the [place] bilaterally. Visualized portions of   the anterior and middle cerebral arteries are within normal limits. The   anterior communicating artery is normal. There is no evidence of   aneurysm, vascular malformation or occlusion. There is fetal origin of   the right posterior cerebral artery with hypoplastic P1 segment.    The vertebral arteries are normal to the vertebrobasilar confluence. The   left vertebral artery is dominant. The right vertebral artery is small in   caliber and hypoplastic but patent. Branch vasculature of the posterior   circulation is within normal limits.    Neck MRA:    The aortic arch and origins of the great vessels are within normal limits.    Right:  The origin of the right common carotid artery is normal. The   right common carotid artery isnormal in course and caliber to the   carotid bifurcation. There is no significant stenosis by NASCET criteria   along origin of either internal or external carotid artery. The right   internal carotid artery has normal course and caliber to the intracranial   circulation.    The origin of the right vertebral artery is normal. The right vertebral   artery is normal in course and caliber to the intracranial circulation   and vertebrobasilar confluence.    Left: The origin of the left common carotid artery is normal. The left   common carotid artery is normal in course and caliber to the carotid   bifurcation. There is no significant stenosis by NASCET criteria along   origin of either internal or external carotid artery. The left internal   carotid artery has normal course and caliber to the intracranial   circulation.    The origin of the left vertebral artery is normal. The left vertebral   artery is normal in course and caliber to the intracranial circulation   and vertebrobasilar confluence.    IMPRESSION:    MRI brain: Acute infarct of the high left parasagittal frontoparietal   region, as noted on the recent head CT study. No hemorrhagic   transformation.  MRA brain: No flow-limiting stenosis or MRA evidence of aneurysm of the   intracranial arteries.  MRA neck: No significant stenosis of the internal carotids as per NASCET   criteria. The common carotids and vertebral arteries are unremarkable.                YEISON GARCIA M.D., RADIOLOGY FELLOW  This document has been electronically signed.  SAMY PARDO M.D., ATTENDING RADIOLOGIST  This document has been electronically signed. May 15 2017 11:51AM        < end of copied text >      PATHOLOGY:     REASON FOR CONSULTATION: MDS    HPI: 80M with PMHx of MDS (transfusion dependent), hypothyroidism, ADD, atrial fibrillation (not on AC given fall & bleeding risk), HTN, and CAD (with stents) told to come in by hematologist for increasing weakness and blood transfusion. History obtained from wife as patient is minimally verbal at baseline. Apparently he receives blood transfusion every 3 weeks with goal hemoglobin >8. His last transfusion was 1.5 weeks ago. It seems for past several months patient with increasing weakness and truncal instability. He has been seeing Dr. Bray for possible neuromuscular issue. Per patient's wife there are still running tests and patient with recent MRI brain w/ contrast (unknown exact result). Otherwise, it seems it has been progressive with no answers. In addition, it seems MDS is becoming more aggressive and patient may have been pending a bone marrow biopsy for further diagnostic evaluation. Per patient's wife he has poor PO intake at baseline and has not been ambulatory for several years. She denies infectious symptoms such as fever or diarrhea. Patient recently at [facility] for same reason and discharged after IV hydration. In the ED hematology was called and patient given 1 L NS and pRBC.     Patient seen and examined in the ER, met with pt's wife at the bedside. Pt is minimally conversive and wife is providing the history. Pt's clinical status has drastically clinically decompensated. Wife is trying hard to keep patient hydrated and feeding him with pureed, thickened liquids but pt's mental status remains very poor. Overall pt feels very weak and is mostly bedbound.     REVIEW OF SYSTEMS:  Limited due to poor mental status, see HPI.     Allergies    iodine (Anaphylaxis)  tetracycline (Short breath)    Intolerances        MEDICATIONS  (STANDING):  allopurinol 100 milliGRAM(s) Oral daily  amLODIPine   Tablet 5 milliGRAM(s) Oral daily  aspirin enteric coated 81 milliGRAM(s) Oral daily  atorvastatin 20 milliGRAM(s) Oral at bedtime  dextrose 40% Gel 15 Gram(s) Oral once  dextrose 5%. 1000 milliLiter(s) (50 mL/Hr) IV Continuous <Continuous>  dextrose 5%. 1000 milliLiter(s) (100 mL/Hr) IV Continuous <Continuous>  dextrose 50% Injectable 25 Gram(s) IV Push once  dextrose 50% Injectable 12.5 Gram(s) IV Push once  dextrose 50% Injectable 25 Gram(s) IV Push once  glucagon  Injectable 1 milliGRAM(s) IntraMuscular once  insulin lispro (ADMELOG) corrective regimen sliding scale   SubCutaneous three times a day before meals  insulin lispro (ADMELOG) corrective regimen sliding scale   SubCutaneous at bedtime  levothyroxine 37.5 MICROGram(s) Oral daily  methylphenidate 20 milliGRAM(s) Oral three times a day  metoprolol tartrate 50 milliGRAM(s) Oral two times a day  pantoprazole    Tablet 40 milliGRAM(s) Oral before breakfast    MEDICATIONS  (PRN):  acetaminophen   Tablet .. 650 milliGRAM(s) Oral every 6 hours PRN Temp greater or equal to 38C (100.4F), Mild Pain (1 - 3), Moderate Pain (4 - 6), Severe Pain (7 - 10)      Vital Signs Last 24 Hrs  T(C): 36.3 (2021 08:23), Max: 36.6 (2021 22:30)  T(F): 97.3 (2021 08:23), Max: 97.9 (2021 23:30)  HR: 84 (2021 08:23) (72 - 84)  BP: 113/71 (2021 08:23) (110/63 - 122/57)  BP(mean): 76 (2021 01:05) (71 - 77)  RR: 16 (2021 08:23) (14 - 18)  SpO2: 98% (2021 08:23) (96% - 100%)    PHYSICAL EXAM:    Gen: male in NAD, appears comfortable, no diaphoresis, minimally conversive  HEENT: NCAT, MMM, neck soft and supple  CV: +S1/S2, no m/r/g  Resp: CTAB, no w/r/r  GI: normoactive BS, soft, NTND, no rebounding/guarding  Ext: No LE edema, extremities appear warm and well perfused   Neuro: moving all four extremities spontaneously  Psych: flat affect, responds to verbal stimuli (but closes eyes)  Skin: dry appearing       LABS:                        7.9    4.70  )-----------( 81       ( 2021 05:16 )             23.2     06-08    142  |  112<H>  |  59<H>  ----------------------------<  123<H>  4.2   |  20<L>  |  0.65    Ca    9.5      2021 05:16    TPro  6.0  /  Alb  2.7<L>  /  TBili  0.8  /  DBili  x   /  AST  72<H>  /  ALT  91<H>  /  AlkPhos  248<H>  -      Urinalysis Basic - ( 2021 01:20 )    Color: Yellow / Appearance: Clear / S.019 / pH: x  Gluc: x / Ketone: Negative  / Bili: Negative / Urobili: Negative   Blood: x / Protein: 30 mg/dL / Nitrite: Negative   Leuk Esterase: Negative / RBC: 6 /hpf / WBC 0 /HPF   Sq Epi: x / Non Sq Epi: 0 /hpf / Bacteria: Negative

## 2021-06-08 NOTE — CONSULT NOTE ADULT - ASSESSMENT
80M with PMHx of MDS (transfusion dependent), hypothyroidism, ADD, atrial fibrillation (not on AC given fall & bleeding risk), HTN, and CAD (with stents) told to come in by hematologist for increasing weakness, poor mental status and blood transfusion.     #MDS  Bone marrow bx in 2017 initially showed isolated del(5q) and was treated with Revlimid March-April 2018. Patient had worsening counts toward Aug 2019 and repeat marrow showed MDS with excess blasts, continued del5q.   He received 3 cycles of Vidaza with gaps in treatment due to UTI and Covid pandemic.   Pt currently not on any treatment except supportive transfusions due to poor performance status.   Patient has been seeing Dr. Bray from neurology who has been working him out from neurological standpoint, and assess pt's poor mental status. Please consult Dr. Bray's team ([place] neuro) to evaluate.   Speech and swallow team at bedside- please follow up recommendations.   Recommend fluid hydration  S/p 1 unit of prbc on this admission, please check CBC with DIFF daily, and transfuse PRN for hgb goal >7  Follows with Dr. Elizondo at [facility]     Will continue to follow closely.     Shiela Escamilla MD  Hematology Oncology Fellow, PGY-5  [facility] Pager: [phone]/ [facility] Pager: [phone]   80M with PMHx of MDS (transfusion dependent), hypothyroidism, ADD, atrial fibrillation (not on AC given fall & bleeding risk), HTN, and CAD (with stents) told to come in by hematologist for increasing weakness, poor mental status and blood transfusion.     #MDS  Bone marrow bx in 2017 initially showed isolated del(5q) and was treated with Revlimid March-April 2018. Patient had worsening counts toward Aug 2019 and repeat marrow showed MDS with excess blasts, continued del5q.   He received 3 cycles of Vidaza with gaps in treatment due to UTI and Covid pandemic.   Pt currently not on any treatment except supportive transfusions due to poor performance status.   Patient has been seeing Dr. Bray from neurology who has been working him out from neurological standpoint, and assess pt's poor mental status. Please consult Dr. Bray's team ([place] neuro) to evaluate.   Speech and swallow team at bedside- please follow up recommendations.   Recommend fluid hydration  S/p 1 unit of prbc on this admission, please check CBC with DIFF daily, and transfuse PRN for hgb goal >7  Please check B12, folate, and coags- can give Vitamin K supplementation if INR is elevated  Follows with Dr. Elizondo at [facility]     Will continue to follow closely.     Shiela Escamilla MD  Hematology Oncology Fellow, PGY-5  [facility] Pager: [phone]/ [facility] Pager: [phone]

## 2021-06-09 NOTE — OCCUPATIONAL THERAPY INITIAL EVALUATION ADULT - TRANSFER TRAINING, PT EVAL
Pt will perform functional transfer with DME with Max A x1 by 4 weeks. Pt will perform SPT bed<->chair with Max A x1 by 4 weeks. Pt will perform functional transfers with DME with Max A x1 by 4 weeks. Pt will perform SPT bed<->chair with Max A x1 by 4 weeks. Pt will perform functional transfers with DME/AD as needed, with Max A x1 by 4 weeks.

## 2021-06-09 NOTE — OCCUPATIONAL THERAPY INITIAL EVALUATION ADULT - LEVEL OF INDEPENDENCE: SUPINE/SIT, REHAB EVAL
TBD pending functional OT eval. TBD pending functional OT eval. Pt going to US test. maximum assist (25% patients effort)

## 2021-06-09 NOTE — OCCUPATIONAL THERAPY INITIAL EVALUATION ADULT - RLE MMT, REHAB EVAL
pending due to transport to US exam./grossly assessed due to 1/5 ankle, at least 2-/5 for knee and hip./grossly assessed due to 1/5 ankle, at least 2-/5 for hip, 3-/5 knee./grossly assessed due to

## 2021-06-09 NOTE — OCCUPATIONAL THERAPY INITIAL EVALUATION ADULT - RANGE OF MOTION EXAMINATION, UPPER EXTREMITY
pain in L shoulder during flexion./bilateral UE Active Assistive ROM was WFL  (within functional limits)

## 2021-06-09 NOTE — OCCUPATIONAL THERAPY INITIAL EVALUATION ADULT - SHORT TERM MEMORY, REHAB EVAL
Confused about the year./impaired Stating birth date at current year./impaired Stating birth date as current year./impaired

## 2021-06-09 NOTE — OCCUPATIONAL THERAPY INITIAL EVALUATION ADULT - ADL RETRAINING, OT EVAL
Pt will dress lower body with mod A. AE as needed by 4 weeks. Pt will dress upper body with mod A., AE as need by 4 weeks. Pt will dress lower body with mod A., AE as needed by 4 weeks. Pt will dress upper body with mod A., AE as need by 4 weeks.

## 2021-06-09 NOTE — OCCUPATIONAL THERAPY INITIAL EVALUATION ADULT - IMPAIRMENTS CONTRIBUTING IMPAIRED BED MOBILITY, REHAB EVAL
impaired balance/cognition/impaired coordination/decreased ROM/decreased sensation/decreased strength

## 2021-06-09 NOTE — PHYSICAL THERAPY INITIAL EVALUATION ADULT - PRECAUTIONS/LIMITATIONS, REHAB EVAL
Per heme outpatient notes it seems they were considering BM BX. Pt admitted for expedited w/u of these conditions.

## 2021-06-09 NOTE — OCCUPATIONAL THERAPY INITIAL EVALUATION ADULT - LEVEL OF INDEPENDENCE: DRESS LOWER BODY, OT EVAL
TBD pending Ot functional eval. based on functional performance/maximum assist (25% patients effort)

## 2021-06-09 NOTE — OCCUPATIONAL THERAPY INITIAL EVALUATION ADULT - LIVES WITH, PROFILE
Lives in Pvt house with spouse, +ramp outside home, pt first floor set up. DME/AD: (pt owns but not in use)- Wheelchair, grab bars in bath. Pt requires assist with all ADLs, bedbound x3 months. Non-ambulatory 1-2 years. +hearing aids./significant other Pt poor historian per spouse at bedside. Lives in Pvt house with spouse, +ramp outside home, first floor set up. DME/AD: (pt owns but not in use)- Wheelchair, grab bars in bath. Pt requires assist with all ADLs, Spouse assists. Bedbound x3 months. Non-ambulatory 1-2 years. Able to SPT bed to wheelchair prior to March. +hearing aids./spouse

## 2021-06-09 NOTE — OCCUPATIONAL THERAPY INITIAL EVALUATION ADULT - PLANNED THERAPY INTERVENTIONS, OT EVAL
ADL retraining/balance training/bed mobility training/ROM/transfer training ADL retraining/balance training/bed mobility training/fine motor coordination training/ROM/strengthening ADL retraining/balance training/bed mobility training/fine motor coordination training/ROM/strengthening/transfer training

## 2021-06-09 NOTE — OCCUPATIONAL THERAPY INITIAL EVALUATION ADULT - ANTICIPATED DISCHARGE DISPOSITION, OT EVAL
TBD pending OT functional eval. ULIS. family perfers home, if home, Home OT for safety in all ADLs and functional transfers./home w/ OT LUIS. family prefers home, if home, Home OT for safety in all ADLs and functional transfers./home w/ OT LUIS. family prefers home, if home, Home OT for safety and assist in all ADLs and functional transfers./home w/ OT

## 2021-06-09 NOTE — OCCUPATIONAL THERAPY INITIAL EVALUATION ADULT - BALANCE DISTURBANCE, IDENTIFIED IMPAIRMENT CONTRIBUTE, REHAB EVAL
impaired coordination/decreased ROM/decreased strength impaired coordination/decreased ROM/decreased sensation/decreased strength Cognition/impaired coordination/decreased ROM/decreased sensation/decreased strength

## 2021-06-09 NOTE — OCCUPATIONAL THERAPY INITIAL EVALUATION ADULT - BED MOBILITY LIMITATIONS, REHAB EVAL
See Occupational Health Nurse visit only.     decreased ability to use arms for pushing/pulling/decreased ability to use legs for bridging/pushing/impaired ability to control trunk for mobility

## 2021-06-09 NOTE — PROGRESS NOTE ADULT - SUBJECTIVE AND OBJECTIVE BOX
Cedric Thomson MD  Division of Hospital Medicine  Cell: [phone]  Pager: [phone]  Office: [phone]    Patient is a 80y old  Male who presents with a chief complaint of Weakness (2021 14:04)        SUBJECTIVE / OVERNIGHT EVENTS: Patient unable to give ROS due to cognitive decline, however he denies any complaints. Wife at bedside reports no new issues.       MEDICATIONS  (STANDING):  allopurinol 100 milliGRAM(s) Oral daily  amLODIPine   Tablet 5 milliGRAM(s) Oral daily  aspirin enteric coated 81 milliGRAM(s) Oral daily  atorvastatin 20 milliGRAM(s) Oral at bedtime  cholecalciferol 1000 Unit(s) Oral daily  dextrose 40% Gel 15 Gram(s) Oral once  dextrose 5%. 1000 milliLiter(s) (50 mL/Hr) IV Continuous <Continuous>  dextrose 5%. 1000 milliLiter(s) (100 mL/Hr) IV Continuous <Continuous>  dextrose 50% Injectable 25 Gram(s) IV Push once  dextrose 50% Injectable 12.5 Gram(s) IV Push once  dextrose 50% Injectable 25 Gram(s) IV Push once  glucagon  Injectable 1 milliGRAM(s) IntraMuscular once  insulin lispro (ADMELOG) corrective regimen sliding scale   SubCutaneous three times a day before meals  insulin lispro (ADMELOG) corrective regimen sliding scale   SubCutaneous at bedtime  lactated ringers. 1000 milliLiter(s) (75 mL/Hr) IV Continuous <Continuous>  levothyroxine 37.5 MICROGram(s) Oral daily  methylphenidate 20 milliGRAM(s) Oral three times a day  metoprolol tartrate 50 milliGRAM(s) Oral two times a day  nystatin    Suspension 652046 Unit(s) Oral four times a day  pantoprazole    Tablet 40 milliGRAM(s) Oral before breakfast  phytonadione   Solution 5 milliGRAM(s) Oral daily  polyethylene glycol 3350 17 Gram(s) Oral daily  senna 2 Tablet(s) Oral at bedtime    MEDICATIONS  (PRN):  acetaminophen   Tablet .. 650 milliGRAM(s) Oral every 6 hours PRN Temp greater or equal to 38C (100.4F), Mild Pain (1 - 3), Moderate Pain (4 - 6), Severe Pain (7 - 10)      Vital Signs Last 24 Hrs  T(C): 36.4 (2021 16:24), Max: 36.7 (2021 05:12)  T(F): 97.6 (2021 16:24), Max: 98 (2021 05:12)  HR: 83 (2021 17:43) (69 - 83)  BP: 111/65 (2021 17:43) (101/63 - 124/68)  BP(mean): --  RR: 20 (2021 16:24) (16 - 20)  SpO2: 93% (2021 16:24) (93% - 97%)  CAPILLARY BLOOD GLUCOSE      POCT Blood Glucose.: 116 mg/dL (2021 17:03)  POCT Blood Glucose.: 167 mg/dL (2021 12:01)  POCT Blood Glucose.: 144 mg/dL (2021 07:50)  POCT Blood Glucose.: 150 mg/dL (2021 22:27)  POCT Blood Glucose.: 162 mg/dL (2021 20:10)  POCT Blood Glucose.: 155 mg/dL (2021 17:57)    I&O's Summary    2021 07:01  -  2021 07:00  --------------------------------------------------------  IN: 1450 mL / OUT: 400 mL / NET: 1050 mL    2021 07:01  -  2021 17:44  --------------------------------------------------------  IN: 540 mL / OUT: 225 mL / NET: 315 mL          PHYSICAL EXAM:   GENERAL: NAD, thin  HEAD:  oral thrush improving.   EYES: conjunctiva and sclera clear  NECK: Supple  CHEST/LUNG: Clear to auscultation bilaterally; No wheeze  HEART: S1S2 normal. Regular rate and rhythm; No murmurs, rubs, or gallops  ABDOMEN: Soft, Nontender, Nondistended; Bowel sounds present  EXTREMITIES: No LE edema. Thin with apparent muscle wasting.   PSYCH/Neuro: Awake and arousable and follows basic commands, but appears weak and with delayed responses.   SKIN: No rashes or lesions      LABS:                        7.7    4.02  )-----------( 76       ( 2021 07:02 )             23.1     06-    139  |  109<H>  |  42<H>  ----------------------------<  133<H>  4.4   |  24  |  0.72    Ca    10.0      2021 07:02  Mg     1.6     06-    TPro  5.8<L>  /  Alb  2.6<L>  /  TBili  0.9  /  DBili  x   /  AST  88<H>  /  ALT  100<H>  /  AlkPhos  282<H>  06-09    PT/INR - ( 2021 07:02 )   PT: 18.0 sec;   INR: 1.53 ratio         PTT - ( 2021 07:02 )  PTT:31.4 sec  CARDIAC MARKERS ( 2021 07:02 )  x     / x     / <10 U/L / x     / x          Urinalysis Basic - ( 2021 01:20 )    Color: Yellow / Appearance: Clear / S.019 / pH: x  Gluc: x / Ketone: Negative  / Bili: Negative / Urobili: Negative   Blood: x / Protein: 30 mg/dL / Nitrite: Negative   Leuk Esterase: Negative / RBC: 6 /hpf / WBC 0 /HPF   Sq Epi: x / Non Sq Epi: 0 /hpf / Bacteria: Negative      us< from: US Abdomen Upper Quadrant Right (21 @ 10:12) >  IMPRESSION:    Gallbladder sludge without evidence of acute cholecystitis.    A right pleural effusion.    < end of copied text >      RADIOLOGY & ADDITIONAL TESTS:    Imaging Personally Reviewed: RUQ US report.   Consultant(s) Notes Reviewed:  neuro, pt, ot  Care Discussed with Consultants/Other Providers: Dr. Bray

## 2021-06-09 NOTE — OCCUPATIONAL THERAPY INITIAL EVALUATION ADULT - NS ASR OT EQUIP NEEDS DISCH
TBD pending OT functional eval. Hospital bed. pt owns but not in use wheelchair, garb rails in bathroom./wheelchair Hospital bed. pt owns but not in use wheelchair, grab rails in bathroom./wheelchair

## 2021-06-09 NOTE — OCCUPATIONAL THERAPY INITIAL EVALUATION ADULT - PRECAUTIONS/LIMITATIONS, REHAB EVAL
Apparently he receives blood transfusion every 3 weeks with goal hemoglobin >8. His last transfusion was 1.5 weeks ago. It seems for past several months patient with increasing weakness and truncal instability. He has been seeing Dr. Bray for possible neuromuscular issue. Per patient's wife there are still running tests and patient with recent MRI brain w/ contrast (unknown exact result). Otherwise, it seems it has been progressive with no answers. In addition, it seems MDS is becoming more aggressive and patient may have been pending a bone marrow biopsy for further diagnostic evaluation. Per patient's wife he has poor PO intake at baseline and has not been ambulatory for several years. Patient recently at [facility] for same reason and discharged after IV hydration. In the ED hematology was called and patient given 1 L NS and pRBC./fall precautions

## 2021-06-09 NOTE — OCCUPATIONAL THERAPY INITIAL EVALUATION ADULT - LLE MMT, REHAB EVAL
pending due to transport to US exam./grossly assessed due to 1/5 ankle, at least 2-/5 for knee and hip/grossly assessed due to 1/5 ankle, at least 2-/5 for knee and 1/5 hip/grossly assessed due to

## 2021-06-09 NOTE — OCCUPATIONAL THERAPY INITIAL EVALUATION ADULT - BALANCE TRAINING, PT EVAL
Pt will improve 1/2 static sitting balance by 4 weeks. Pt will improve 1/2 dynamic sitting balance by 4 weeks. Pt will improve sitting balance 1/2 grade in order to improve independence with ADLs by 4 weeks.

## 2021-06-09 NOTE — OCCUPATIONAL THERAPY INITIAL EVALUATION ADULT - PERTINENT HX OF CURRENT PROBLEM, REHAB EVAL
COLONOSCOPY    DATE OF PROCEDURE:  12/18/2018    IMPRESSION:  1. Normal colonoscopy    RECOMMENDATIONS:  1. Screening colonoscopy in 10 years time    I appreciate the opportunity to participate in the care of this pleasant patient.    PROCEDURE PERFORMED:  Colonoscopy    PRIMARY CARE: Cory Zambrano MD    INDICATIONS FOR PROCEDURE:  42 year old male here for colonoscopy due to personal history of colonoscopy and polypectomy    CONSENT:  Risks, benefits, alternatives of colonoscopy were discussed with the patient, who seemed to understand the nature of the procedure and agreed to proceed.  Written consent also obtained.    DESCRIPTION OF PROCEDURE:  Perianal inspection and digital exam were performed and were unremarkable.  The Olympus  variable stiffness adult high definition colonoscope was placed in rectum and advanced to cecum.  Cecum was identified with usual anatomic landmarks including ileocecal valve and appendiceal orifice.  Ileocecal valve was intubated and distal terminal ileum examined, which was normal without aphthous ulcerations or friability.  Colonoscope was withdrawn and colonic mucosa carefully reviewed. The prep was adequate (BBPS 6).      Cecum, ascending colon, transverse colon, descending colon, sigmoid colon and rectum were normal, without colitis, polyps or masses.  Rectal retroflexion is normal.  Cecum-to-anus withdrawal time was measured at 10 minutes. The colonoscope was withdrawn, the patient tolerated the procedure well.    MEDICATIONS:  Meperidine 100 mg, Midazolam 10 mg intravenously in divided doses as administered by nursing staff, directly supervised by GI attending physician. Total sedation time 35 min.    PHOTOGRAPH:  Yes.    BIOPSY:  No.    BOSTON BOWEL PREPARATION SCALE:  Right Colon-2, Transverse Colon-2, Left Colon-2, Total Score-6     COMPLICATIONS:  None immediately apparent.    ESTIMATED BLOOD LOSS: Minimal             80M with PMHx of MDS (transfusion dependent), hypothyroidism, ADD, atrial fibrillation (not on AC given fall & bleeding risk), HTN, and CAD (with stents) told to come in by hematologist for increasing weakness and blood transfusion. History obtained from wife as patient is minimally verbal at baseline.

## 2021-06-09 NOTE — OCCUPATIONAL THERAPY INITIAL EVALUATION ADULT - DIAGNOSIS, OT EVAL
Pt p/w deficits in ADLs and functional transfers due to impairments in strength, ROM, coordination, cognition and balance. Pt p/w deficits in ADLs and functional transfers due to impairments in strength, sensation, ROM, coordination, cognition and balance.

## 2021-06-09 NOTE — PHYSICAL THERAPY INITIAL EVALUATION ADULT - PERTINENT HX OF CURRENT PROBLEM, REHAB EVAL
80M with PMHx of MDS (transfusion dependent), hypothyroidism, ADD, atrial fibrillation (not on AC given fall & bleeding risk), HTN, and CAD (with stents) told to come in by hematologist for increasing weakness and blood transfusion. Apparently pt with FTT presenting for several weeks and was seeing a neuromuscular specialist for truncal instability.

## 2021-06-10 NOTE — DIETITIAN INITIAL EVALUATION ADULT. - OTHER INFO
Per wife, pt's UBW 170lb. States after admission at  pt's weight was ~150lb. Dosing wt this admission 130lb indicating further weight loss over past 2 months.    Pt with HbA1c 6.6% indicating good glycemic control PTA. Per wife pt was not on BG control medications, wife checks pt's FS 2 x daily and has noticed in recent weeks BG ranging 150-170. Wife reports giving pt half a tablet of Metformin when pt's BG >150 (medication not noted in outpatient medication records, will notify team).    Pt with no reported nausea, vomiting, diarrhea, or constipation. Last BM per chart 6/9. Pt with poor PO since admission per wife, taking only spoonfuls of food with encouragement. Consuming <50% of meal trays. SLP following for difficulty chewing/swallowing - note pt with xerostomia, forgetting to chew solids; pt recommended for pureed diet at this time. Pt pending MBS when more alert.    Wife amenable to trialing Glucerna supplements to promote PO intake.

## 2021-06-10 NOTE — DIETITIAN INITIAL EVALUATION ADULT. - PERTINENT MEDS FT
MEDICATIONS  (STANDING):  allopurinol 100 milliGRAM(s) Oral daily  amLODIPine   Tablet 5 milliGRAM(s) Oral daily  aspirin enteric coated 81 milliGRAM(s) Oral daily  atorvastatin 20 milliGRAM(s) Oral at bedtime  cholecalciferol 1000 Unit(s) Oral daily  dextrose 40% Gel 15 Gram(s) Oral once  dextrose 5%. 1000 milliLiter(s) (50 mL/Hr) IV Continuous <Continuous>  dextrose 5%. 1000 milliLiter(s) (100 mL/Hr) IV Continuous <Continuous>  dextrose 50% Injectable 25 Gram(s) IV Push once  dextrose 50% Injectable 12.5 Gram(s) IV Push once  dextrose 50% Injectable 25 Gram(s) IV Push once  glucagon  Injectable 1 milliGRAM(s) IntraMuscular once  insulin lispro (ADMELOG) corrective regimen sliding scale   SubCutaneous three times a day before meals  insulin lispro (ADMELOG) corrective regimen sliding scale   SubCutaneous at bedtime  lactated ringers. 1000 milliLiter(s) (75 mL/Hr) IV Continuous <Continuous>  levothyroxine 37.5 MICROGram(s) Oral daily  methylphenidate 20 milliGRAM(s) Oral three times a day  metoprolol tartrate 50 milliGRAM(s) Oral two times a day  nystatin    Suspension 725836 Unit(s) Oral four times a day  pantoprazole    Tablet 40 milliGRAM(s) Oral before breakfast  phytonadione   Solution 5 milliGRAM(s) Oral daily  polyethylene glycol 3350 17 Gram(s) Oral daily  senna 2 Tablet(s) Oral at bedtime    MEDICATIONS  (PRN):  acetaminophen   Tablet .. 650 milliGRAM(s) Oral every 6 hours PRN Temp greater or equal to 38C (100.4F), Mild Pain (1 - 3), Moderate Pain (4 - 6), Severe Pain (7 - 10)

## 2021-06-10 NOTE — PROGRESS NOTE ADULT - SUBJECTIVE AND OBJECTIVE BOX
Cedric Thomson MD  Division of Hospital Medicine  Cell: [phone]  Pager: [phone]  Office: [phone]    Patient is a 80y old  Male who presents with a chief complaint of Weakness (10 Sam 2021 19:25)        SUBJECTIVE / OVERNIGHT EVENTS: Patient unable to give ros due to mental status, but no complaints. No events reported per wife.       MEDICATIONS  (STANDING):  allopurinol 100 milliGRAM(s) Oral daily  amLODIPine   Tablet 5 milliGRAM(s) Oral daily  ascorbic acid 500 milliGRAM(s) Oral daily  aspirin enteric coated 81 milliGRAM(s) Oral daily  atorvastatin 20 milliGRAM(s) Oral at bedtime  cholecalciferol 1000 Unit(s) Oral daily  dextrose 40% Gel 15 Gram(s) Oral once  dextrose 5%. 1000 milliLiter(s) (50 mL/Hr) IV Continuous <Continuous>  dextrose 5%. 1000 milliLiter(s) (100 mL/Hr) IV Continuous <Continuous>  dextrose 50% Injectable 25 Gram(s) IV Push once  dextrose 50% Injectable 12.5 Gram(s) IV Push once  dextrose 50% Injectable 25 Gram(s) IV Push once  glucagon  Injectable 1 milliGRAM(s) IntraMuscular once  insulin lispro (ADMELOG) corrective regimen sliding scale   SubCutaneous three times a day before meals  insulin lispro (ADMELOG) corrective regimen sliding scale   SubCutaneous at bedtime  lactated ringers. 1000 milliLiter(s) (75 mL/Hr) IV Continuous <Continuous>  levothyroxine 37.5 MICROGram(s) Oral daily  methylphenidate 20 milliGRAM(s) Oral three times a day  metoprolol tartrate 50 milliGRAM(s) Oral two times a day  multivitamin/minerals Oral Solution (WELLESSE) 30 milliLiter(s) Oral daily  nystatin    Suspension 411380 Unit(s) Oral four times a day  pantoprazole    Tablet 40 milliGRAM(s) Oral before breakfast  phytonadione   Solution 5 milliGRAM(s) Oral daily  polyethylene glycol 3350 17 Gram(s) Oral daily  senna 2 Tablet(s) Oral at bedtime    MEDICATIONS  (PRN):  acetaminophen   Tablet .. 650 milliGRAM(s) Oral every 6 hours PRN Temp greater or equal to 38C (100.4F), Mild Pain (1 - 3), Moderate Pain (4 - 6), Severe Pain (7 - 10)      Vital Signs Last 24 Hrs  T(C): 36.5 (11 Jun 2021 05:17), Max: 36.5 (11 Jun 2021 05:17)  T(F): 97.7 (11 Jun 2021 05:17), Max: 97.7 (11 Jun 2021 05:17)  HR: 75 (11 Jun 2021 05:17) (72 - 94)  BP: 129/76 (11 Jun 2021 05:17) (98/57 - 129/76)  BP(mean): --  RR: 18 (11 Jun 2021 05:17) (18 - 20)  SpO2: 94% (11 Jun 2021 05:17) (94% - 95%)  CAPILLARY BLOOD GLUCOSE      POCT Blood Glucose.: 145 mg/dL (10 Sam 2021 21:58)  POCT Blood Glucose.: 136 mg/dL (10 Sam 2021 17:03)  POCT Blood Glucose.: 155 mg/dL (10 Sam 2021 12:32)  POCT Blood Glucose.: 120 mg/dL (10 Sam 2021 08:24)    I&O's Summary    10 Sam 2021 07:01  -  11 Jun 2021 07:00  --------------------------------------------------------  IN: 2810 mL / OUT: 0 mL / NET: 2810 mL          PHYSICAL EXAM:   GENERAL: NAD, thin  HEAD:  Atraumatic, Normocephalic  EYES:  conjunctiva and sclera clear  NECK: Supple,   CHEST/LUNG: Clear to auscultation bilaterally; No wheeze  HEART: S1S2 normal. Regular rate and rhythm; No murmurs, rubs, or gallops  ABDOMEN: Soft, Nontender, Nondistended; Bowel sounds present  EXTREMITIES:  no edema, but thin  PSYCH/Neuro: lethargic, but arousable. muscle twitching in LE's. very thin, muscle wasting. Not very interactive.    SKIN: No rashes or lesions      LABS:                        7.7    3.67  )-----------( 62       ( 10 Sam 2021 07:04 )             22.8     06-10    139  |  109<H>  |  33<H>  ----------------------------<  135<H>  4.3   |  22  |  0.61    Ca    9.9      10 Sam 2021 07:04    TPro  5.8<L>  /  Alb  2.5<L>  /  TBili  1.1  /  DBili  x   /  AST  79<H>  /  ALT  88<H>  /  AlkPhos  249<H>  06-10                RADIOLOGY & ADDITIONAL TESTS:    Imaging Personally Reviewed:   Consultant(s) Notes Reviewed:  neuro, heme, gi, cards  Care Discussed with Consultants/Other Providers: neuro, heme, gi, cards

## 2021-06-10 NOTE — CONSULT NOTE ADULT - ASSESSMENT
80M with PMHx of MDS (transfusion dependent), hypothyroidism, ADD, atrial fibrillation (not on AC given fall & bleeding risk), HTN, HFrEF, DM2, cervical neuropathy, and CAD (with stents) told to come in by hematologist for increasing weakness, blood transfusion, FTT with plan for peg placement requiring cardiac risk stratification    Preoperative Risk Stratification for PEG    Patient was recently assessed by outpatient cardiology (3/18/2021)    Patient has a history of:  MDS requiring blood transfusions  AF which is rate controlled with lopressor 50mg BID; no AC due to fall risk and bleed hx  CAD s/p stents, currently managed with ASA 81mg qd and rosuvastatin 5mg qd  HTN; normotensive, managed w/ norvasc 5mg qd  HFrEF managed w/ BB  DM2 on metformin  Hyopothyroidism on synthroid    RCRI Score - _____ __% 30-day risk  Alford score -   Patient ___does/does not____ achieve >= 4 METS. ____    [Address recent history, recent ECHO/stress/misc cardiac workup.]  [Address any of 4 active cardiac risk factors: CHF, CAD, Valvular disease (angelo advanced AS/MS)]    EKG this admission showed ___    Patient ____is/is not___ at elevated risk for perioperative MACE due to comorbidities for high-risk procedure. However, ____he/she is/is not___ medically optimized from a cardiac standpoint and there ___is/is no____ additional workup recommended prior to surgery.    This note does not serve as a formal consult recommendation until signed by an attending cardiologist.   80M with PMHx of MDS (transfusion dependent), hypothyroidism, ADD, atrial fibrillation (not on AC given fall & bleeding risk), HTN, HFrEF, DM2, cervical neuropathy, and CAD (with stents) told to come in by hematologist for increasing weakness, blood transfusion, FTT with plan for peg placement requiring cardiac risk stratification    Preoperative Risk Stratification for PEG    Patient was recently assessed by outpatient cardiology (3/18/2021)    Patient has a history of:  MDS requiring blood transfusions  AF which is rate controlled with lopressor 50mg BID; currently NSR, no AC due to fall risk and bleed hx  CAD s/p stents, currently managed with ASA 81mg qd and rosuvastatin 5mg qd  HTN; normotensive, managed w/ norvasc 5mg qd  HFrEF managed w/ BB  DM2 on metformin  Hyopothyroidism on synthroid    RCRI Score - Class II Risk 6.0 % 30-day risk of death, MI, or cardiac arrest  Alford score - 3.9 % Risk of myocardial infarction or cardiac arrest, intraoperatively or up to 30 days post-op  Patient does not achieve >= 4 METS.       Patient admitted for blood transfusions in the setting of MDs and FTT with decreased PO. Regarding his cardiac history, patient with CAD with stents. Minimally verbal, however ECG w/o ST changes concerning for ACS. Hx of HFrEF (EF 40%; 2017). No i/o acute exacerbation. Hx AF, currently NSR; not on AC due to bleed hx and fall risk. Recent ILR interrogation on 3/18/2021 w/ no arrhythmias. Mild-mod MR on most recent TTE. NST in 2015 with fixed defects suggestive of infarct    EKG this admission showed NSR    Patient is at least intermediate risk for perioperative MACE due to comorbidities for a low-risk procedure. He is medically optimized from a cardiac standpoint and there is no additional workup recommended prior to surgery.    This note does not serve as a formal consult recommendation until signed by an attending cardiologist.    VEDA Clarke MD  Cardiology Fellow  Text or Call: [phone]  For all New Consults and Questions:  [email or web address]   Login: [email or web address]   80M with PMHx of MDS (transfusion dependent), hypothyroidism, ADD, atrial fibrillation (not on AC given fall & bleeding risk), HTN, HFrEF, DM2, cervical neuropathy, and CAD (with stents) told to come in by hematologist for increasing weakness, blood transfusion, FTT with plan for peg placement requiring cardiac risk stratification    Preoperative Risk Stratification for PEG    Patient was recently assessed by outpatient cardiology (3/18/2021)    Patient has a history of:  MDS requiring blood transfusions  AF which is rate controlled with lopressor 50mg BID; currently NSR, no AC due to fall risk and bleed hx  CAD s/p stents, currently managed with ASA 81mg qd and rosuvastatin 5mg qd  HTN; normotensive, managed w/ norvasc 5mg qd  HFrEF managed w/ BB  DM2 on metformin  Hyopothyroidism on synthroid    RCRI Score - Class II Risk 6.0 % 30-day risk of death, MI, or cardiac arrest  Alford score - 3.9 % Risk of myocardial infarction or cardiac arrest, intraoperatively or up to 30 days post-op  Patient does not achieve >= 4 METS.       Patient admitted for blood transfusions in the setting of MDs and FTT with decreased PO. Regarding his cardiac history, patient with CAD with stents. Minimally verbal, however ECG w/o ST changes concerning for ACS. Hx of HFrEF (EF 40%; 2017). No i/o acute exacerbation. Hx AF, currently NSR; not on AC due to bleed hx and fall risk. Recent ILR interrogation on 3/18/2021 w/ no arrhythmias. Mild-mod MR on most recent TTE. NST in 2015 with fixed defects suggestive of infarct    EKG this admission showed NSR with frequent PVCs    Patient is at least intermediate risk for perioperative MACE due to comorbidities for a low-risk procedure. He is medically optimized from a cardiac standpoint and there is no additional workup recommended prior to surgery.    This note does not serve as a formal consult recommendation until signed by an attending cardiologist.    VEDA Clarke MD  Cardiology Fellow  Text or Call: [phone]  For all New Consults and Questions:  [email or web address]   Login: [email or web address]

## 2021-06-10 NOTE — PROGRESS NOTE ADULT - ATTENDING COMMENTS
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  He was initially diagnosed in 2017 due to anemia/transfusion dependent.  He was treated with revlimid and became transfusion independent.  Progressive disease in 2019, MDS with excess blasts s/p treatment with vidaza/delays due to UTI/COVID.    He is now on supportive transfusion due to progressive decline in his performance/functional/cognitive status.    He has been clinically worsening lately, now with total assist, unable to eat so he was recommend to come to the hospital.    MRI brain completed 5/21/21 did not have any acute infarct, generalized volume loss/chronic ischemic changes.    Recommend neurology evaluation.  Would also panculture to ensure no infectious cause.   Physical therapy evaluation.   Hg responded to transfusion.  Prior to discharge, would transfuse to Hg of 10 as he is unable to get to [place].   New thrombocytopenia.  His WBC/neutrophils are normal.  Uncertain if progressive MDS but hold off on marrow.  Maybe consumptive due to acute illness.  Elevated PT, likely due to vitamin K deficiency, replete.  Check fibrinogen, CMV/HIV/hepatitis, B12/folate levels.    Elevated LFT's likely due to iron overload, not able to offer chelation at this time.

## 2021-06-10 NOTE — CONSULT NOTE ADULT - ASSESSMENT
Impression:  80M with PMHx of MDS (transfusion dependent), hypothyroidism, ADD, atrial fibrillation (not on AC given fall & bleeding risk), HTN, and CAD (with stents) told to come in by hematologist for increasing weakness and blood transfusion.  #PEG tube placement - due to inadequate consumption with possible ALS and cognitive affects    Recommendation:   - can consider PEG placement no absolute contraindications   - would consider calorie count    - wife is open to PEG tube placement but wants to discuss with neurologist and cardiologist first   - patient would need cardiac clearance for procedure    Alma Arteaga  Gastroenterology Fellow  Pager x [phone] or [phone]  Please page on-call Fellow on weekends/holidays or after 5 pm and before 8 am on weekdays   On-call GI fellow can be contacted via the [employer] service ([phone])

## 2021-06-10 NOTE — CONSULT NOTE ADULT - SUBJECTIVE AND OBJECTIVE BOX
Chief Complaint:  Patient is a 80y old  Male who presents with a chief complaint of Weakness (10 Sam 2021 15:08)      HPI:  The paitent is 80M with PMHx of MDS (transfusion dependent), hypothyroidism, ADD, atrial fibrillation (not on AC given fall & bleeding risk), HTN, and CAD (with stents) told to come in by hematologist for increasing weakness and blood transfusion. Apparently patient with failure to thrive presenting for several weeks and was seeing a neuromuscular specialist for truncal instability. Per heme outpatient notes it seems they were considering bone marrow biopsy.  Patient admitted for expedited work up of these conditions. Now with possible ALS so GI being called for PEG tube placement.     Pt seen for initial bedside swallow evaluation on 6/8 with recommendation for 1) Puree texture diet 2) MUST BE AWAKE/ALERT 3) If pt fatigues, cease feeds 4) May require alternate means nutrition/hydration/medications due to fatigue/cognitive deficits Per speech evaluation has clinical signs of an oropharyngeal dysphagia, likely superimposed upon cognitive deficits.  Pt tolerated trials of purees and thin liquids without overt signs or symptoms of penetration or aspiration. Pt's wife also reported that her  occasionally c/o food sticking substernally while at home.    GI consulted for PEG tube placement due to inadequate oral intake.      Allergies:  iodine (Anaphylaxis)  tetracycline (Short breath)      Home Medications:    Hospital Medications:  acetaminophen   Tablet .. 650 milliGRAM(s) Oral every 6 hours PRN  allopurinol 100 milliGRAM(s) Oral daily  amLODIPine   Tablet 5 milliGRAM(s) Oral daily  aspirin enteric coated 81 milliGRAM(s) Oral daily  atorvastatin 20 milliGRAM(s) Oral at bedtime  cholecalciferol 1000 Unit(s) Oral daily  dextrose 40% Gel 15 Gram(s) Oral once  dextrose 5%. 1000 milliLiter(s) IV Continuous <Continuous>  dextrose 5%. 1000 milliLiter(s) IV Continuous <Continuous>  dextrose 50% Injectable 25 Gram(s) IV Push once  dextrose 50% Injectable 12.5 Gram(s) IV Push once  dextrose 50% Injectable 25 Gram(s) IV Push once  glucagon  Injectable 1 milliGRAM(s) IntraMuscular once  insulin lispro (ADMELOG) corrective regimen sliding scale   SubCutaneous three times a day before meals  insulin lispro (ADMELOG) corrective regimen sliding scale   SubCutaneous at bedtime  lactated ringers. 1000 milliLiter(s) IV Continuous <Continuous>  levothyroxine 37.5 MICROGram(s) Oral daily  methylphenidate 20 milliGRAM(s) Oral three times a day  metoprolol tartrate 50 milliGRAM(s) Oral two times a day  nystatin    Suspension 126678 Unit(s) Oral four times a day  pantoprazole    Tablet 40 milliGRAM(s) Oral before breakfast  phytonadione   Solution 5 milliGRAM(s) Oral daily  polyethylene glycol 3350 17 Gram(s) Oral daily  senna 2 Tablet(s) Oral at bedtime      PMHX/PSHX:  Hypertension    DM (Diabetes Mellitus)    Hypercholesterolemia    Gout    Congestive heart failure (CHF)    Atrial fibrillation    Hypothyroid    ADD (attention deficit disorder)    CAD (coronary artery disease)    MDS (myelodysplastic syndrome)    Stented coronary artery    History of tonsillectomy    History of colonoscopy    H/O hemorrhoidectomy    Status post placement of implantable loop recorder        Family history:  No significant family history    FH: CHF (congestive heart failure) (Mother)    Family history of cerebrovascular accident (CVA) in father (Father)        Social History:     ROS:     General:  No wt loss, fevers, chills, night sweats, fatigue,   Eyes:  Good vision, no reported pain  ENT:  No sore throat, pain, runny nose, dysphagia  CV:  No pain, palpitations, hypo/hypertension  Resp:  No dyspnea, cough, tachypnea, wheezing  GI:  See HPI  :  No pain, bleeding, incontinence, nocturia  Muscle:  No pain, weakness  Neuro:  No weakness, tingling, memory problems  Psych:  No fatigue, insomnia, mood problems, depression  Endocrine:  No polyuria, polydipsia, cold/heat intolerance  Heme:  No petechiae, ecchymosis, easy bruisability  Skin:  No rash, edema      PHYSICAL EXAM:     GENERAL:  NAD  CHEST:  Full & symmetric excursion  HEART:  Regular rhythm, no abdominal bruit, no edema  ABDOMEN:  Soft, non-tender, non-distended, normoactive bowel sounds,  no masses , no hepatosplenomegaly  EXTREMITIES:  no cyanosis,clubbing or edema  SKIN:  No rash/erythema/ecchymoses/petechiae/wounds/abscess/warm/dry  NEURO:  Alert, oriented    Vital Signs:  Vital Signs Last 24 Hrs  T(C): 36.2 (10 Sam 2021 13:02), Max: 36.7 (10 Sam 2021 05:19)  T(F): 97.2 (10 Sam 2021 13:02), Max: 98 (10 Sam 2021 05:19)  HR: 81 (10 Sam 2021 13:02) (69 - 83)  BP: 111/71 (10 Sam 2021 13:02) (101/63 - 129/67)  BP(mean): --  RR: 18 (10 Sam 2021 13:02) (18 - 20)  SpO2: 95% (10 Sam 2021 13:02) (93% - 97%)  Daily     Daily     LABS:                        7.7    3.67  )-----------( 62       ( 10 Sam 2021 07:04 )             22.8     06-10    139  |  109<H>  |  33<H>  ----------------------------<  135<H>  4.3   |  22  |  0.61    Ca    9.9      10 Sam 2021 07:04    TPro  5.8<L>  /  Alb  2.5<L>  /  TBili  1.1  /  DBili  x   /  AST  79<H>  /  ALT  88<H>  /  AlkPhos  249<H>  06-10    LIVER FUNCTIONS - ( 10 Sam 2021 07:04 )  Alb: 2.5 g/dL / Pro: 5.8 g/dL / ALK PHOS: 249 U/L / ALT: 88 U/L / AST: 79 U/L / GGT: x           PT/INR - ( 09 Jun 2021 07:02 )   PT: 18.0 sec;   INR: 1.53 ratio         PTT - ( 09 Jun 2021 07:02 )  PTT:31.4 sec        Imaging:           Chief Complaint:  Patient is a 80y old  Male who presents with a chief complaint of Weakness (10 Sam 2021 15:08)      HPI:  The paitent is 80M with PMHx of MDS (transfusion dependent), hypothyroidism, ADD, atrial fibrillation (not on AC given fall & bleeding risk), HTN, and CAD (with stents) told to come in by hematologist for increasing weakness and blood transfusion. Apparently patient with failure to thrive presenting for several weeks and was seeing a neuromuscular specialist for truncal instability. Per heme outpatient notes it seems they were considering bone marrow biopsy.  Patient admitted for expedited work up of these conditions. Now with possible ALS so GI being called for PEG tube placement.     Pt seen for initial bedside swallow evaluation on 6/8 with recommendation for 1) Puree texture diet 2) MUST BE AWAKE/ALERT 3) If pt fatigues, cease feeds 4) May require alternate means nutrition/hydration/medications due to fatigue/cognitive deficits Per speech evaluation has clinical signs of an oropharyngeal dysphagia, likely superimposed upon cognitive deficits.  Pt tolerated trials of purees and thin liquids without overt signs or symptoms of penetration or aspiration. Pt's wife also reported that her  occasionally c/o food sticking substernally while at home.    GI consulted for PEG tube placement due to inadequate oral intake.      Allergies:  iodine (Anaphylaxis)  tetracycline (Short breath)      Home Medications:    Hospital Medications:  acetaminophen   Tablet .. 650 milliGRAM(s) Oral every 6 hours PRN  allopurinol 100 milliGRAM(s) Oral daily  amLODIPine   Tablet 5 milliGRAM(s) Oral daily  aspirin enteric coated 81 milliGRAM(s) Oral daily  atorvastatin 20 milliGRAM(s) Oral at bedtime  cholecalciferol 1000 Unit(s) Oral daily  dextrose 40% Gel 15 Gram(s) Oral once  dextrose 5%. 1000 milliLiter(s) IV Continuous <Continuous>  dextrose 5%. 1000 milliLiter(s) IV Continuous <Continuous>  dextrose 50% Injectable 25 Gram(s) IV Push once  dextrose 50% Injectable 12.5 Gram(s) IV Push once  dextrose 50% Injectable 25 Gram(s) IV Push once  glucagon  Injectable 1 milliGRAM(s) IntraMuscular once  insulin lispro (ADMELOG) corrective regimen sliding scale   SubCutaneous three times a day before meals  insulin lispro (ADMELOG) corrective regimen sliding scale   SubCutaneous at bedtime  lactated ringers. 1000 milliLiter(s) IV Continuous <Continuous>  levothyroxine 37.5 MICROGram(s) Oral daily  methylphenidate 20 milliGRAM(s) Oral three times a day  metoprolol tartrate 50 milliGRAM(s) Oral two times a day  nystatin    Suspension 345977 Unit(s) Oral four times a day  pantoprazole    Tablet 40 milliGRAM(s) Oral before breakfast  phytonadione   Solution 5 milliGRAM(s) Oral daily  polyethylene glycol 3350 17 Gram(s) Oral daily  senna 2 Tablet(s) Oral at bedtime      PMHX/PSHX:  Hypertension    DM (Diabetes Mellitus)    Hypercholesterolemia    Gout    Congestive heart failure (CHF)    Atrial fibrillation    Hypothyroid    ADD (attention deficit disorder)    CAD (coronary artery disease)    MDS (myelodysplastic syndrome)    Stented coronary artery    History of tonsillectomy    History of colonoscopy    H/O hemorrhoidectomy    Status post placement of implantable loop recorder        Family history:  No significant family history    FH: CHF (congestive heart failure) (Mother)    Family history of cerebrovascular accident (CVA) in father (Father)        Social History:     ROS:     General:  No wt loss, fevers, chills, night sweats, fatigue,   Eyes:  Good vision, no reported pain  ENT:  No sore throat, pain, runny nose, dysphagia  CV:  No pain, palpitations, hypo/hypertension  Resp:  No dyspnea, cough, tachypnea, wheezing  GI:  See HPI  :  No pain, bleeding, incontinence, nocturia  Muscle:  No pain, weakness  Neuro:  No weakness, tingling, memory problems  Psych:  No fatigue, insomnia, mood problems, depression  Endocrine:  No polyuria, polydipsia, cold/heat intolerance  Heme:  No petechiae, ecchymosis, easy bruisability  Skin:  No rash, edema      PHYSICAL EXAM:     GENERAL:  hunched over able to shake head  CHEST:  Full & symmetric excursion  HEART:  Regular rhythm, no abdominal bruit, no edema  ABDOMEN:  Soft, non-tender, non-distended, normoactive bowel sounds,  no masses , no hepatosplenomegaly  EXTREMITIES:  no cyanosis,clubbing or edema  SKIN:  No rash/erythema/ecchymoses/petechiae/wounds/abscess/warm/dry  NEURO:  lethargic but easily arousable    Vital Signs:  Vital Signs Last 24 Hrs  T(C): 36.2 (10 Sam 2021 13:02), Max: 36.7 (10 Sam 2021 05:19)  T(F): 97.2 (10 Sam 2021 13:02), Max: 98 (10 Sam 2021 05:19)  HR: 81 (10 Sam 2021 13:02) (69 - 83)  BP: 111/71 (10 Sam 2021 13:02) (101/63 - 129/67)  BP(mean): --  RR: 18 (10 Sam 2021 13:02) (18 - 20)  SpO2: 95% (10 Sam 2021 13:02) (93% - 97%)  Daily     Daily     LABS:                        7.7    3.67  )-----------( 62       ( 10 Sam 2021 07:04 )             22.8     06-10    139  |  109<H>  |  33<H>  ----------------------------<  135<H>  4.3   |  22  |  0.61    Ca    9.9      10 Sam 2021 07:04    TPro  5.8<L>  /  Alb  2.5<L>  /  TBili  1.1  /  DBili  x   /  AST  79<H>  /  ALT  88<H>  /  AlkPhos  249<H>  06-10    LIVER FUNCTIONS - ( 10 Sam 2021 07:04 )  Alb: 2.5 g/dL / Pro: 5.8 g/dL / ALK PHOS: 249 U/L / ALT: 88 U/L / AST: 79 U/L / GGT: x           PT/INR - ( 09 Jun 2021 07:02 )   PT: 18.0 sec;   INR: 1.53 ratio         PTT - ( 09 Jun 2021 07:02 )  PTT:31.4 sec        Imaging:

## 2021-06-10 NOTE — PROGRESS NOTE ADULT - SUBJECTIVE AND OBJECTIVE BOX
Patient seen and examined.  No change in motor function    MEDICATIONS  (STANDING):  allopurinol 100 milliGRAM(s) Oral daily  amLODIPine   Tablet 5 milliGRAM(s) Oral daily  aspirin enteric coated 81 milliGRAM(s) Oral daily  atorvastatin 20 milliGRAM(s) Oral at bedtime  cholecalciferol 1000 Unit(s) Oral daily  dextrose 40% Gel 15 Gram(s) Oral once  dextrose 5%. 1000 milliLiter(s) (50 mL/Hr) IV Continuous <Continuous>  dextrose 5%. 1000 milliLiter(s) (100 mL/Hr) IV Continuous <Continuous>  dextrose 50% Injectable 25 Gram(s) IV Push once  dextrose 50% Injectable 12.5 Gram(s) IV Push once  dextrose 50% Injectable 25 Gram(s) IV Push once  glucagon  Injectable 1 milliGRAM(s) IntraMuscular once  insulin lispro (ADMELOG) corrective regimen sliding scale   SubCutaneous three times a day before meals  insulin lispro (ADMELOG) corrective regimen sliding scale   SubCutaneous at bedtime  lactated ringers. 1000 milliLiter(s) (75 mL/Hr) IV Continuous <Continuous>  levothyroxine 37.5 MICROGram(s) Oral daily  methylphenidate 20 milliGRAM(s) Oral three times a day  metoprolol tartrate 50 milliGRAM(s) Oral two times a day  nystatin    Suspension 142113 Unit(s) Oral four times a day  pantoprazole    Tablet 40 milliGRAM(s) Oral before breakfast  phytonadione   Solution 5 milliGRAM(s) Oral daily  polyethylene glycol 3350 17 Gram(s) Oral daily  senna 2 Tablet(s) Oral at bedtime        Vital Signs Last 24 Hrs  T(C): 36.2 (10 Sam 2021 13:02), Max: 36.7 (10 Sam 2021 05:19)  T(F): 97.2 (10 Sam 2021 13:02), Max: 98 (10 Sam 2021 05:19)  HR: 81 (10 Sam 2021 13:02) (69 - 83)  BP: 111/71 (10 Sam 2021 13:02) (101/63 - 129/67)  BP(mean): --  RR: 18 (10 Sam 2021 13:02) (18 - 20)  SpO2: 95% (10 Sam 2021 13:02) (93% - 97%)        Constitutional: awake and alert.  HEENT: PERRLA, EOMI,   cachectic    Neurological exam:  Lethargic  atrophic UE with 4/5 weakness, brisk DTR's UE's      Labs:                        7.7    3.67  )-----------( 62       ( 10 Sam 2021 07:04 )             22.8     06-10    139  |  109<H>  |  33<H>  ----------------------------<  135<H>  4.3   |  22  |  0.61    Ca    9.9      10 Sam 2021 07:04  Mg     1.6     06-08    TPro  5.8<L>  /  Alb  2.5<L>  /  TBili  1.1  /  DBili  x   /  AST  79<H>  /  ALT  88<H>  /  AlkPhos  249<H>  06-10        PT/INR - ( 09 Jun 2021 07:02 )   PT: 18.0 sec;   INR: 1.53 ratio         PTT - ( 09 Jun 2021 07:02 )  PTT:31.4 sec    NCV/EMG today:  very low amplitude left median motor evoked responses, no response ulnar motor, normal radial sensory.  Concentric needle EMG of left FDI, biceps, diaphragm and thoracic paraspinals shows fascics, fibrillation potentials and PSWs.     A/P:  Patient's motor decline, brisk DTR's in atrophic and weak UE's along with today's EMG/NCV findings are diagnostic for amyotrophic lateral sclerosis.      I have discussed the diagnosis with his wife and the patient and they agree to PEG, then I would start riluzole 50mg BID.

## 2021-06-10 NOTE — DIETITIAN INITIAL EVALUATION ADULT. - REASON INDICATOR FOR ASSESSMENT
Consult received for unintentional weight loss PTA, assessment, education and BMI<19.  Information obtained from pt's wife, EMR. Pt A&Ox1, sleeping at visit. Consult received for unintentional weight loss PTA, pressure injury stage II or greater, assessment, education and BMI<19.  Information obtained from pt's wife, EMR. Pt A&Ox1, sleeping at visit.

## 2021-06-10 NOTE — CONSULT NOTE ADULT - ATTENDING COMMENTS
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  He was initially diagnosed in 2017 due to anemia/transfusion dependent.  He was treated with revlimid and became transfusion independent.  Progressive disease in 2019, MDS with excess blasts s/p treatment with vidaza/delays due to UTI/COVID.    He is now on supportive transfusion due to progressive decline in his performance/functional/cognitive status.    He has been clinically worsening lately, now with total assist, unable to eat so he was recommend to come to the hospital.    MRI brain completed 5/21/21 did not have any acute infarct, generalized volume loss/chronic ischemic changes.    Recommend neurology evaluation.  Would also panculture to ensure no infectious cause.   Physical therapy evaluation.   Hg responded to transfusion.  Prior to discharge, would transfuse to Hg of 10 as he is unable to get to [place].   New thrombocytopenia.  His WBC/neutrophils are normal.  Uncertain if progressive MDS but hold off on marrow.  Maybe consumptive due to acute illness.  Elevated PT, likely due to vitamin K deficiency, replete.  Check fibrinogen, CMV/HIV/hepatitis, B12/folate levels.    Elevated LFT's likely due to iron overload, not able to offer chelation at this time.
80M with PMHx of MDS (transfusion dependent), hypothyroidism, ADD, atrial fibrillation (not on AC given fall & bleeding risk), HTN, and CAD (with stents) told to come in by hematologist for increasing weakness and blood transfusion.  #PEG tube placement - due to inadequate consumption with possible ALS and cognitive affects    - wife reconsidered and pt. will be discharged with home hospice, without PEG tube
Agree with plan as outlined above.  Multiple comorbidities and stable cardiac chronic conditions include CHF, AF, CAD  Despite inability to do > 4 METS, he is not a candidate for further revascularization given frequent transfusions and has no symptoms of active ischemia.  May proceed with PEG. Continue ASA and current meds.     B Maritza                                                                                                                                                                    Sixty Minutes spent in direct patient care and communication

## 2021-06-10 NOTE — PROGRESS NOTE ADULT - SUBJECTIVE AND OBJECTIVE BOX
INTERVAL HPI/OVERNIGHT EVENTS:  Wife at bedside.  Patient still remains weak, confused.  He is unable to eat.        VITAL SIGNS:  T(F): 97.2 (06-10-21 @ 13:02)  HR: 81 (06-10-21 @ 13:02)  BP: 111/71 (06-10-21 @ 13:02)  RR: 18 (06-10-21 @ 13:02)  SpO2: 95% (06-10-21 @ 13:02)  Wt(kg): --    PHYSICAL EXAM:    Constitutional: NAD, thin, chronically ill appearing  Eyes: EOMI  Neck: supple  Respiratory: grossly clear ant  Cardiovascular: RRR  Gastrointestinal: soft, NTND  Extremities: no c/c/e  Neurological: AAOx3      MEDICATIONS  (STANDING):  allopurinol 100 milliGRAM(s) Oral daily  amLODIPine   Tablet 5 milliGRAM(s) Oral daily  aspirin enteric coated 81 milliGRAM(s) Oral daily  atorvastatin 20 milliGRAM(s) Oral at bedtime  cholecalciferol 1000 Unit(s) Oral daily  dextrose 40% Gel 15 Gram(s) Oral once  dextrose 5%. 1000 milliLiter(s) (50 mL/Hr) IV Continuous <Continuous>  dextrose 5%. 1000 milliLiter(s) (100 mL/Hr) IV Continuous <Continuous>  dextrose 50% Injectable 25 Gram(s) IV Push once  dextrose 50% Injectable 12.5 Gram(s) IV Push once  dextrose 50% Injectable 25 Gram(s) IV Push once  glucagon  Injectable 1 milliGRAM(s) IntraMuscular once  insulin lispro (ADMELOG) corrective regimen sliding scale   SubCutaneous three times a day before meals  insulin lispro (ADMELOG) corrective regimen sliding scale   SubCutaneous at bedtime  lactated ringers. 1000 milliLiter(s) (75 mL/Hr) IV Continuous <Continuous>  levothyroxine 37.5 MICROGram(s) Oral daily  methylphenidate 20 milliGRAM(s) Oral three times a day  metoprolol tartrate 50 milliGRAM(s) Oral two times a day  nystatin    Suspension 213680 Unit(s) Oral four times a day  pantoprazole    Tablet 40 milliGRAM(s) Oral before breakfast  phytonadione   Solution 5 milliGRAM(s) Oral daily  polyethylene glycol 3350 17 Gram(s) Oral daily  senna 2 Tablet(s) Oral at bedtime    MEDICATIONS  (PRN):  acetaminophen   Tablet .. 650 milliGRAM(s) Oral every 6 hours PRN Temp greater or equal to 38C (100.4F), Mild Pain (1 - 3), Moderate Pain (4 - 6), Severe Pain (7 - 10)      Allergies    iodine (Anaphylaxis)  tetracycline (Short breath)    Intolerances        LABS:                        7.7    3.67  )-----------( 62       ( 10 Sam 2021 07:04 )             22.8     06-10    139  |  109<H>  |  33<H>  ----------------------------<  135<H>  4.3   |  22  |  0.61    Ca    9.9      10 Sam 2021 07:04  Mg     1.6     06-08    TPro  5.8<L>  /  Alb  2.5<L>  /  TBili  1.1  /  DBili  x   /  AST  79<H>  /  ALT  88<H>  /  AlkPhos  249<H>  06-10    PT/INR - ( 09 Jun 2021 07:02 )   PT: 18.0 sec;   INR: 1.53 ratio         PTT - ( 09 Jun 2021 07:02 )  PTT:31.4 sec      RADIOLOGY & ADDITIONAL TESTS:  Studies reviewed.

## 2021-06-10 NOTE — DIETITIAN INITIAL EVALUATION ADULT. - ORAL INTAKE PTA/DIET HISTORY
Per wife, pt with progressively decreased PO intake over last several weeks. Wife states after discharge from [facility] (04/2021) pt was able to tolerate pureed diet with [employer] Protein shakes up to 4 x daily. In recent weeks pt only able to tolerate teaspoons of pureed foods. Per wife, pt on multivitamin PTA. NKFA.

## 2021-06-10 NOTE — DIETITIAN INITIAL EVALUATION ADULT. - PERTINENT LABORATORY DATA
06-10 @ 07:04: Na 139, BUN 33<H>, Cr 0.61, <H>, K+ 4.3, Phos --, Mg --, Alk Phos 249<H>, ALT/SGPT 88<H>, AST/SGOT 79<H>, HbA1c 6.6%  CAPILLARY BLOOD GLUCOSE  POCT Blood Glucose.: 155 mg/dL (10 Sam 2021 12:32)  POCT Blood Glucose.: 120 mg/dL (10 Sam 2021 08:24)  POCT Blood Glucose.: 115 mg/dL (09 Jun 2021 22:08)  POCT Blood Glucose.: 116 mg/dL (09 Jun 2021 17:03)

## 2021-06-10 NOTE — CHART NOTE - NSCHARTNOTEFT_GEN_A_CORE
80M with PMHx of MDS (transfusion dependent), hypothyroidism, ADD, atrial fibrillation (not on AC given fall & bleeding risk), HTN, and CAD (with stents) told to come in by hematologist for increasing weakness and blood transfusion. Apparently patient with failure to thrive presenting for several weeks and was seeing a neuromuscular specialist for truncal instability. Per heme outpatient notes it seems they were considering bone marrow biopsy. Patient admitted for expedited work up of these conditions. 6/7 CXR: clear lungs    Pt seen for initial bedside swallow evaluation on 6/8 with recommendation for 1) Puree texture diet 2) MUST BE AWAKE/ALERT 3) If pt fatigues, cease feeds 4) May require alternate means nutrition/hydration/medications due to fatigue/cognitive deficits 5) Suggest Calorie Count.    "Pt presents with clinical signs of an oropharyngeal dysphagia, likely superimposed upon cognitive deficits. Reduced orientation to food/utensil with inconsistent grading upon spoon/labial closure around cup. Improvement in engagement in trials when cup placed in pt's hand with hand over hand assist to feed self. Occasional delay much/chew pattern with purees and delayed oral transit time vs. delay in swallow initiation. Once swallow initiated, mildly reduced laryngeal elevation. No overt signs or symptoms of penetration or aspiration observed on limited trials of purees and thin liquids via cup sips. Pt noted to fatigue after limited trials. Pt may require supplemental alterative means of nutrition/hydration due to reported failure to thrive.     Per chart, pt began on nystatin for recurrent oral thrush.    Pt seen today, 6/10, for bedside swallow re-evaluation. Pt encountered asleep in bed with wife, Carmita, present at bedside. Pt easily aroused given minimal verbal cues. Pt AA&Ox1. Pt tolerated trials of purees and thin liquids without overt signs or symptoms of penetration or aspiration. Pt consumed limited overall trials and stated "no more". Pt's wife reported poor intake for breakfast, consisting of 3 tsp applesauce, 2 trsp oatmeal and 6 tablespoons of cranberry juice. 80M with PMHx of MDS (transfusion dependent), hypothyroidism, ADD, atrial fibrillation (not on AC given fall & bleeding risk), HTN, and CAD (with stents) told to come in by hematologist for increasing weakness and blood transfusion. Apparently patient with failure to thrive presenting for several weeks and was seeing a neuromuscular specialist for truncal instability. Per heme outpatient notes it seems they were considering bone marrow biopsy. Patient admitted for expedited work up of these conditions. 6/7 CXR: clear lungs    Pt seen for initial bedside swallow evaluation on 6/8 with recommendation for 1) Puree texture diet 2) MUST BE AWAKE/ALERT 3) If pt fatigues, cease feeds 4) May require alternate means nutrition/hydration/medications due to fatigue/cognitive deficits 5) Suggest Calorie Count.    "Pt presents with clinical signs of an oropharyngeal dysphagia, likely superimposed upon cognitive deficits. Reduced orientation to food/utensil with inconsistent grading upon spoon/labial closure around cup. Improvement in engagement in trials when cup placed in pt's hand with hand over hand assist to feed self. Occasional delay much/chew pattern with purees and delayed oral transit time vs. delay in swallow initiation. Once swallow initiated, mildly reduced laryngeal elevation. No overt signs or symptoms of penetration or aspiration observed on limited trials of purees and thin liquids via cup sips. Pt noted to fatigue after limited trials. Pt may require supplemental alterative means of nutrition/hydration due to reported failure to thrive.     Per chart, pt began on nystatin for recurrent oral thrush.    Pt seen today, 6/10, for bedside swallow re-evaluation. Pt encountered asleep in bed with wife, Carmita, present at bedside. Pt easily aroused given minimal verbal cues. Pt AA&Ox1. Pt tolerated trials of purees and thin liquids without overt signs or symptoms of penetration or aspiration. Pt consumed limited overall trials and stated "no more" after 2 tsp puree and 3-4 cup sips of thin liquids.  Pt's wife reported poor intake for breakfast, consisting of 3 tsp applesauce, 2 tsp oatmeal and 6 "soup spoons" of cranberry juice. Pt's wife also reported that her  occasionally c/o food sticking substernally while at home. If team concerned, would suggest GI evaluation to further assess esophageal stage of swallow. Discussed with pt and wife re: likely need for supplemental alternative means nutrition/hydration.    Impression: Pt continues to limited overall PO intake. Pt tolerates limited trials of purees and thin liquids without overt signs or symptoms of penetration/aspiration. Most recent CXR clear. No current concern for aspiration PNA. Aware of MBS order placed by neuro, however, would suggest deferring MBS at this time as would not likely change clinical management.    Recommendations:  1) Puree texture diet   2) MUST BE AWAKE/ALERT  3) If pt fatigues, cease feeds  4) May require alternate means nutrition/hydration/medications due to fatigue/cognitive deficits   5) Suggest Calorie Count.     This service to remain available for f/u of diet tolerance    Lorie Badillo M.A. CCC-SLP  Speech-Language Pathologist  Pgr # [phone] 80M with PMHx of MDS (transfusion dependent), hypothyroidism, ADD, atrial fibrillation (not on AC given fall & bleeding risk), HTN, and CAD (with stents) told to come in by hematologist for increasing weakness and blood transfusion. Apparently patient with failure to thrive presenting for several weeks and was seeing a neuromuscular specialist for truncal instability. Per heme outpatient notes it seems they were considering bone marrow biopsy. Patient admitted for expedited work up of these conditions. 6/7 CXR: clear lungs    Pt seen for initial bedside swallow evaluation on 6/8 with recommendation for 1) Puree texture diet 2) MUST BE AWAKE/ALERT 3) If pt fatigues, cease feeds 4) May require alternate means nutrition/hydration/medications due to fatigue/cognitive deficits 5) Suggest Calorie Count.    "Pt presents with clinical signs of an oropharyngeal dysphagia, likely superimposed upon cognitive deficits. Reduced orientation to food/utensil with inconsistent grading upon spoon/labial closure around cup. Improvement in engagement in trials when cup placed in pt's hand with hand over hand assist to feed self. Occasional delay much/chew pattern with purees and delayed oral transit time vs. delay in swallow initiation. Once swallow initiated, mildly reduced laryngeal elevation. No overt signs or symptoms of penetration or aspiration observed on limited trials of purees and thin liquids via cup sips. Pt noted to fatigue after limited trials. Pt may require supplemental alterative means of nutrition/hydration due to reported failure to thrive.     Per chart, pt began on nystatin for recurrent oral thrush.    Pt seen today, 6/10, for bedside swallow re-evaluation. Pt encountered asleep in bed with wife, Carmita, present at bedside. Pt easily aroused given minimal verbal cues. Pt AA&Ox1. Pt tolerated trials of purees and thin liquids without overt signs or symptoms of penetration or aspiration. Pt consumed limited overall trials and stated "no more" after 2 tsp puree and 3-4 cup sips of thin liquids.  Pt's wife reported poor intake for breakfast, consisting of 3 tsp applesauce, 2 tsp oatmeal and 6 "soup spoons" of cranberry juice. Pt's wife also reported that her  occasionally c/o food sticking substernally while at home. If team concerned, would suggest GI evaluation to further assess esophageal stage of swallow. Discussed with pt and wife re: likely need for supplemental alternative means nutrition/hydration.    Impression: Pt continues to limited overall PO intake. Pt appears to tolerate the  limited trials of purees and thin liquids without overt signs or symptoms of penetration/aspiration, however, cannot r/o silent aspiration at bedside. Aware of MBS order placed by neuro, however, would suggest deferring MBS at this time as would not likely change clinical management.    Recommendations:  1) Puree texture diet, as tolerated  2) MUST BE AWAKE/ALERT  3) If pt fatigues, cease feeds  4) May require alternate means nutrition/hydration/medications due to fatigue/cognitive deficits   5) Suggest Calorie Count.     This service to remain available for f/u of diet tolerance.    Lorie Badillo M.A. CCC-SLP  Speech-Language Pathologist  Pgr # [phone]    **Addendum- Discussed pt with Dr. Bray at 1440. As per discussion, medical team plans for PEG placement with suggestion for pt to remain NPO at this time. This service to f/u post PEG placement. 80M with PMHx of MDS (transfusion dependent), hypothyroidism, ADD, atrial fibrillation (not on AC given fall & bleeding risk), HTN, and CAD (with stents) told to come in by hematologist for increasing weakness and blood transfusion. Apparently patient with failure to thrive presenting for several weeks and was seeing a neuromuscular specialist for truncal instability. Per heme outpatient notes it seems they were considering bone marrow biopsy. Patient admitted for expedited work up of these conditions. 6/7 CXR: clear lungs    Pt seen for initial bedside swallow evaluation on 6/8 with recommendation for 1) Puree texture diet 2) MUST BE AWAKE/ALERT 3) If pt fatigues, cease feeds 4) May require alternate means nutrition/hydration/medications due to fatigue/cognitive deficits 5) Suggest Calorie Count.    "Pt presents with clinical signs of an oropharyngeal dysphagia, likely superimposed upon cognitive deficits. Reduced orientation to food/utensil with inconsistent grading upon spoon/labial closure around cup. Improvement in engagement in trials when cup placed in pt's hand with hand over hand assist to feed self. Occasional delay much/chew pattern with purees and delayed oral transit time vs. delay in swallow initiation. Once swallow initiated, mildly reduced laryngeal elevation. No overt signs or symptoms of penetration or aspiration observed on limited trials of purees and thin liquids via cup sips. Pt noted to fatigue after limited trials. Pt may require supplemental alterative means of nutrition/hydration due to reported failure to thrive.     Per chart, pt began on nystatin for recurrent oral thrush.    Pt seen today, 6/10, for bedside swallow re-evaluation. Pt encountered asleep in bed with wife, Carmita, present at bedside. Pt easily aroused given minimal verbal cues. Pt AA&Ox1. Pt tolerated trials of purees and thin liquids without overt signs or symptoms of penetration or aspiration. Pt consumed limited overall trials and stated "no more" after 2 tsp puree and 3-4 cup sips of thin liquids.  Pt's wife reported poor intake for breakfast, consisting of 3 tsp applesauce, 2 tsp oatmeal and 6 "soup spoons" of cranberry juice. Pt's wife also reported that her  occasionally c/o food sticking substernally while at home. If team concerned, would suggest GI evaluation to further assess esophageal stage of swallow. Discussed with pt and wife re: likely need for supplemental alternative means nutrition/hydration.    Impression: Pt continues to limited overall PO intake. Pt appears to tolerate the  limited trials of purees and thin liquids without overt signs or symptoms of penetration/aspiration, however, cannot r/o silent aspiration at bedside. Aware of MBS order placed by neuro, however, would suggest deferring MBS at this time as would not likely change clinical management.    Recommendations:  1. As per discussion with medical team, pt currently at high risk of silent aspiration. Plan for NPO/PEG  2) Universal aspiration precautions  3) This service to remain available for f/u post PEG placement    Lorie Badillo M.A. CCC-SLP  Speech-Language Pathologist  Pgr # [phone]    **Addendum- Discussed pt with Dr. Bray at 1440. As per discussion, medical team plans for PEG placement with suggestion for pt to remain NPO at this time. This service to f/u post PEG placement.

## 2021-06-10 NOTE — DIETITIAN INITIAL EVALUATION ADULT. - ADD RECOMMEND
Consider multivitamin and vitamin C supplementation to promote wound healing. If pt unable to meet nutrient needs via PO diet consider alternate routes of nutrition if medically feasible and within pt's GOC. Continue to monitor PO intake, labs, weights, BM, skin, clinical course.

## 2021-06-10 NOTE — DIETITIAN INITIAL EVALUATION ADULT. - CHIEF COMPLAINT
The patient is a 80M with PMHx of MDS (transfusion dependent), hypothyroidism, ADD, atrial fibrillation (not on AC given fall & bleeding risk), HTN, and CAD (with stents) told to come in by hematologist for increasing weakness and blood transfusion. Apparently patient with failure to thrive presenting for several weeks and was seeing a neuromuscular specialist for truncal instability. Per heme outpatient notes it seems they were considering bone marrow biopsy. Patient admitted for expedited work up of these conditions.

## 2021-06-10 NOTE — CONSULT NOTE ADULT - SUBJECTIVE AND OBJECTIVE BOX
Patient seen and evaluated at bedside    Chief Complaint: preop    HPI:  80M with PMHx of MDS (transfusion dependent), hypothyroidism, ADD, atrial fibrillation (not on AC given fall & bleeding risk), HTN, HFrEF, DM2, cervical neuropathy, and CAD (with stents) told to come in by hematologist for increasing weakness and blood transfusion. Pt receives blood transfusions every 3 weeks with goal hemoglobin >8. Per patient's wife he has poor PO intake at baseline and has not been ambulatory for several years. She denies infectious symptoms such as fever or diarrhea. Patient recently at [facility] for same reason and discharged after IV hydration. GI consulted for peg placement; cardiology consulted for preop. Procedure possibly occurring tomorrow.    PMHx:   Hypertension    DM (Diabetes Mellitus)    Hypercholesterolemia    Gout    Congestive heart failure (CHF)    Atrial fibrillation    Hypothyroid    ADD (attention deficit disorder)    CAD (coronary artery disease)    MDS (myelodysplastic syndrome)      PSHx:   Stented coronary artery    History of tonsillectomy    History of colonoscopy    H/O hemorrhoidectomy    Status post placement of implantable loop recorder      FAMILY HISTORY:  FH: CHF (congestive heart failure) (Mother)  mother    Family history of cerebrovascular accident (CVA) in father (Father)      Allergies:  iodine (Anaphylaxis)  tetracycline (Short breath)    Home Medications:  allopurinol 100 mg oral tablet: 1 tab(s) orally once a day (08 Jun 2021 03:27)  amLODIPine 5 mg oral tablet: 1 tab(s) orally once a day (08 Jun 2021 03:27)  aspirin 81 mg oral delayed release tablet: 1 tab(s) orally once a day (08 Jun 2021 03:27)  levothyroxine: 37.5 microgram(s) orally once a day (08 Jun 2021 03:27)  methylphenidate 20 mg oral tablet: 1 tab(s) orally 3 times a day (08 Jun 2021 03:27)  metoprolol tartrate 50 mg oral tablet: 1 tab(s) orally 2 times a day (08 Jun 2021 03:27)  omeprazole 20 mg oral delayed release capsule: 1 cap(s) orally once a day (08 Jun 2021 03:27)  rosuvastatin 5 mg oral tablet: 1 tab(s) orally once a day (08 Jun 2021 03:27)    Current Medications:   acetaminophen   Tablet .. 650 milliGRAM(s) Oral every 6 hours PRN  allopurinol 100 milliGRAM(s) Oral daily  amLODIPine   Tablet 5 milliGRAM(s) Oral daily  ascorbic acid 500 milliGRAM(s) Oral daily  aspirin enteric coated 81 milliGRAM(s) Oral daily  atorvastatin 20 milliGRAM(s) Oral at bedtime  cholecalciferol 1000 Unit(s) Oral daily  dextrose 40% Gel 15 Gram(s) Oral once  dextrose 5%. 1000 milliLiter(s) IV Continuous <Continuous>  dextrose 5%. 1000 milliLiter(s) IV Continuous <Continuous>  dextrose 50% Injectable 25 Gram(s) IV Push once  dextrose 50% Injectable 12.5 Gram(s) IV Push once  dextrose 50% Injectable 25 Gram(s) IV Push once  glucagon  Injectable 1 milliGRAM(s) IntraMuscular once  insulin lispro (ADMELOG) corrective regimen sliding scale   SubCutaneous three times a day before meals  insulin lispro (ADMELOG) corrective regimen sliding scale   SubCutaneous at bedtime  lactated ringers. 1000 milliLiter(s) IV Continuous <Continuous>  levothyroxine 37.5 MICROGram(s) Oral daily  methylphenidate 20 milliGRAM(s) Oral three times a day  metoprolol tartrate 50 milliGRAM(s) Oral two times a day  multivitamin/minerals Oral Solution (WELLESSE) 30 milliLiter(s) Oral daily  nystatin    Suspension 985745 Unit(s) Oral four times a day  pantoprazole    Tablet 40 milliGRAM(s) Oral before breakfast  phytonadione   Solution 5 milliGRAM(s) Oral daily  polyethylene glycol 3350 17 Gram(s) Oral daily  senna 2 Tablet(s) Oral at bedtime    Social History  Smoking History: denies  Alcohol Use: denies  Drug Use: denies    REVIEW OF SYSTEMS:  Constitutional:     [ ] negative [ ] fevers [ ] chills [ ] weight loss [ ] weight gain  HEENT:                  [ ] negative [ ] dry eyes [ ] eye irritation [ ] postnasal drip [ ] nasal congestion  CV:                         [ ] negative  [ ] chest pain [ ] orthopnea [ ] palpitations [ ] murmur  Resp:                     [ ] negative [ ] cough [ ] shortness of breath [ ] wheezing [ ] sputum [ ] hemoptysis  GI:                          [ ] negative [ ] nausea [ ] vomiting [ ] diarrhea [ ] constipation [ ] abd pain [ ] dysphagia   :                        [ ] negative [ ] dysuria [ ] nocturia [ ] hematuria [ ] increased urinary frequency  MSK:                      [ ] negative [ ] back pain [ ] myalgias [ ] arthralgias [ ] fracture  Skin:                       [ ] negative [ ] rash [ ] itch  Neuro:                   [ ] negative [ ] headache [ ] dizziness [ ] syncope [ ] weakness [ ] numbness  Psych:                    [ ] negative [ ] anxiety [ ] depression  Endo:                     [ ] negative [ ] diabetes [ ] thyroid problem  Heme/Lymph:      [ ] negative [ ] anemia [ ] bleeding problem  Allergic/Immune: [ ] negative [ ] itchy eyes [ ] nasal discharge [ ] hives [ ] angioedema    [ ] All other systems negative or otherwise described above.  [X] Unable to assess ROS because patient minimally verbal    ICU Vital Signs Last 24 Hrs  T(C): 36.4 (10 Sam 2021 16:39), Max: 36.7 (10 Sam 2021 05:19)  T(F): 97.5 (10 Sam 2021 16:39), Max: 98 (10 Sam 2021 05:19)  HR: 94 (10 Sam 2021 18:00) (69 - 94)  BP: 110/70 (10 Sam 2021 18:00) (98/57 - 129/67)  BP(mean): --  ABP: --  ABP(mean): --  RR: 18 (10 Sam 2021 16:39) (18 - 19)  SpO2: 95% (10 Sam 2021 16:39) (94% - 97%)    Daily     Daily     Physical Exam:  GENERAL:  ill-appearing, cachectic  CHEST:  no respiratory distress  HEART:  RRR, systolic murmur, no g/r, hypovolemic  ABDOMEN:  Soft, non-tender, non-distended  EXTREMITIES:  no cyanosis, clubbing, or edema  SKIN:  No rash/erythema/ecchymoses  NEURO: lethargic     Cardiovascular Diagnostic Testing    ECG: Personally reviewed    Echo: Personally reviewed  < from: TTE with Doppler (w/Cont) (05.11.17 @ 23:39) >  Conclusions:  1. Mild mitral annular calcification, otherwise normal  mitral valve. Mild-moderate mitral regurgitation.  2. Calcified trileaflet aortic valve with normal opening.  Minimal aortic regurgitation.  3. Endocardial visualization enhanced with intravenous  injection of echo contrast (Definity). Moderate segmental  left ventricular systolic dysfunction. The mid to distal  septum and the apical cap are akinetic. The distal inferior  segment is dyskinetic. No left ventricular thrombus.  4. Normal right ventricular size and function.  *** Compared with echocardiogram of 5/18/2016, results are  similar on today's study.  ------------------------------------------------------------------------  Confirmed on  5/11/2017 - 09:32:13 by Ana Mistry M.D.  ------------------------------------------------------------------------    < end of copied text >      Stress Testing:   < from: Nuclear Stress Test-Exercise (01.14.15 @ 17:20) >  IMPRESSIONS:Abnormal Study  * Exercise capacity: 6 METS, Poor for age and gender.  * Chest Pain: No chest pain with exercise.  * Symptom: Thigh pain, now resolved.  * HR Response: Appropriate.  * BP Response: Appropriate.  * Heart Rhythm: Normal Sinus Rhythm - 65 BPM.  * Q Waves: Anterolateral MI.  * Baseline ECG: Nonspecific ST-T wave abnormality.  * ECG Changes: ECG interpretation limited by artifact  during exercise.  Grossly no ischemic ST segment changes  beyond baseline abnormalities.  * Arrhythmia: Frequent VPDs occurred during recovery.  Bigeminy pattern during recovery. .  * Review of raw data shows: The study is of good technical  quality.  * The left ventricle was normal in size. There is a  medium-sized, severe defect in the qwi-fy-vwucod septal  and apical walls that is mostly fixed suggestive of  infarct with minimal periinfarct ischemia.  * Post-stress gated wall motion analysis was performed  (LVEF = 53 %;LVEDV = 106 ml.), revealing severe  hypokinesis of the wrb-vc-cooult septal and apical walls.  ------------------------------------------------------------------------  Confirmed on  1/14/2015 - 19:17:21 by Cornell Monge MD  ------------------------------------------------------------------------    < end of copied text >      Cath:   < from: Cardiac Cath Lab (07.02.10 @ 07:43) >  Coronary vessels: The coronary circulation is right dominant.  LM:      LM: Normal.  LAD:      Mid LAD: There was a 100 % stenosis. There was good collateral  blood supply to the distal myocardium.  CX:      OM1: There was a 99 % stenosis.  RCA:      RCA: Angiography showed minor luminal irregularities with no flow  limiting lesions.  Complications: There were no complications.  Summary:  Coronary circulation: LM: Normal. Mid LAD: There was a 100 % stenosis.  There was good collateral blood supply to the distal myocardium. OM1:  There was a 99 % stenosis. RCA: Angiography showed minor luminal  irregularities with no flow limiting lesions.  1st lesion interventions: A stent was performed on the 99 % lesion in the  1st obtuse marginal.Following intervention there was an excellent  angiographic appearance with a 1 % residual stenosis.  Recommendations:  Patient to return as outpatient post rehab for LAD.  Prepared and signed by  Juliann Francis M.D.  Signed 07/02/2010 08:37:59    < end of copied text >      Imaging: none    CXR: Personally reviewed    Labs: Personally reviewed                        7.7    3.67  )-----------( 62       ( 10 Sam 2021 07:04 )             22.8     06-10    139  |  109<H>  |  33<H>  ----------------------------<  135<H>  4.3   |  22  |  0.61    Ca    9.9      10 Sam 2021 07:04    TPro  5.8<L>  /  Alb  2.5<L>  /  TBili  1.1  /  DBili  x   /  AST  79<H>  /  ALT  88<H>  /  AlkPhos  249<H>  06-10    PT/INR - ( 09 Jun 2021 07:02 )   PT: 18.0 sec;   INR: 1.53 ratio         PTT - ( 09 Jun 2021 07:02 )  PTT:31.4 sec        Thyroid Stimulating Hormone, Serum: 5.63 uIU/mL (06-08 @ 12:23)

## 2021-06-10 NOTE — DIETITIAN INITIAL EVALUATION ADULT. - PHYSCIAL ASSESSMENT
Per flow sheets pt with suspected DTI to right inner second toe.  Nutrition focused physical exam deferred at this time as pt sleeping and without capacity to consent to exam - signs of moderate muscle depletion noted to temples, and moderate/severe fat loss noted to orbital and buccal areas.

## 2021-06-11 NOTE — CONSULT NOTE ADULT - PROBLEM SELECTOR RECOMMENDATION 5
- see GOC note  - DNR/DNI/no PEG  - home with hospice; referral made; transfer to PCU when bed available as transition to home hospice

## 2021-06-11 NOTE — PROGRESS NOTE ADULT - PROBLEM SELECTOR PROBLEM 2
R/O Neuromuscular disease

## 2021-06-11 NOTE — PROGRESS NOTE ADULT - PROBLEM SELECTOR PLAN 7
-Off AC given fall risk and prior SDH  -Continue with beta blocker
-Off AC given fall risk and prior SDH  -Continue with beta blocker
-Off AC given fall risk and prior SDH  -Continue with beta blocker  -cards appreciated.
-Off AC given fall risk and prior SDH  -Continue with beta blocker

## 2021-06-11 NOTE — CONSULT NOTE ADULT - CONVERSATION DETAILS
Family meeting held with wife regarding overall GOC. She states after speaking with family, goals are home with hospice, and she is open to PCU transfer for symptom monitoring and enhanced family visitation in the interim. Discussed ACP, she agreed to DNR/DNI/no PEG. JESSICA completed and in chart.

## 2021-06-11 NOTE — PROGRESS NOTE ADULT - PROBLEM SELECTOR PLAN 5
-Continue with methylphenidate

## 2021-06-11 NOTE — PROGRESS NOTE ADULT - ASSESSMENT
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  He was initially diagnosed in 2017 due to anemia/transfusion dependent.  He was treated with revlimid and became transfusion independent.  Progressive disease in 2019, MDS with excess blasts s/p treatment with vidaza/delays due to UTI/COVID.    He is now on supportive transfusion due to progressive decline in his performance/functional/cognitive status.    He has been clinically worsening lately, now with total assist, unable to eat so he was recommend to come to the hospital.    MRI brain completed 5/21/21 did not have any acute infarct, generalized volume loss/chronic ischemic changes.    Awaiting neurology work up/EEG.  ?LP for NPH  His plt count is worsening, his Hg is worse as well.  Discussed with wife, cannot rule out progression to AML, worsening MDS.   Continue transfusional support for now.  If his hematologic disease is worsening, may assist in goals of care.   PEG tube likely to be placed.     
80M with PMHx of MDS (transfusion dependent), hypothyroidism, ADD, atrial fibrillation (not on AC given fall & bleeding risk), HTN, and CAD (with stents) told to come in by hematologist for increasing weakness and blood transfusion. Apparently patient with failure to thrive presenting for several weeks and was seeing a neuromuscular specialist for truncal instability. Per heme outpatient notes it seems they were considering bone marrow biopsy. Patient admitted for expedited work up of these conditions. 

## 2021-06-11 NOTE — PROGRESS NOTE ADULT - PROBLEM SELECTOR PLAN 9
-FS TID AC & insulin sliding scale

## 2021-06-11 NOTE — PROGRESS NOTE ADULT - PROBLEM SELECTOR PLAN 6
-Continue with Synthroid  -Send TSH
-Continue with Synthroid  -TSH elevated, FT4 normal.  -Am cortisol normal.
-Continue with Synthroid  -TSH elevated, FT4 normal.
-Continue with Synthroid  -TSH elevated, FT4 normal.  -F/u am cortisol.

## 2021-06-11 NOTE — PROGRESS NOTE ADULT - PROBLEM SELECTOR PLAN 4
-Continue with baby aspirin, statin, and beta blocker
-Continue with baby aspirin, statin, and beta blocker  -Last echo in 2017 in [facility] showed EF 40%.   -Dr. Francis updated on patient's status.  -cards recs appreciated.
-Continue with baby aspirin, statin, and beta blocker - adjusted per cards.   -Last echo in 2017 in [facility] showed EF 40%.   -Dr. Francis updated on patient's status.  -cards recs appreciated.
-Continue with baby aspirin, statin, and beta blocker

## 2021-06-11 NOTE — PROGRESS NOTE ADULT - PROBLEM SELECTOR PROBLEM 1
MDS (myelodysplastic syndrome)

## 2021-06-11 NOTE — PROGRESS NOTE ADULT - PROBLEM SELECTOR PLAN 3
Patient incidentally found to have hypercalcemia and when corrected for hypoalbuminemia is worse than on CMP. For now will repeat labs and see what calcium is. The hypercalcemia may be contributing to weakness. May be difficult to hydrate given HFrEF. If still elevated after 1 L NS will send PTH  -Will give IVF's.
Patient incidentally found to have hypercalcemia and when corrected for hypoalbuminemia is worse than on CMP. For now will repeat labs and see what calcium is. The hypercalcemia may be contributing to weakness. May be difficult to hydrate given HFrEF. If still elevated after 1 L NS will send PTH  -May have been due in part to dehydration.   -c/w IVF's given reduced po intake.

## 2021-06-11 NOTE — PROGRESS NOTE ADULT - PROBLEM SELECTOR PLAN 10
Prior notes indicate that it is believed to be due to iron deposition from frequent pRBC transfusions. For now continue to monitor, RUQ sono shows gallbladder sludge without cholecystitis.  -hepatitis labs - negative.      11. Prophylactic measure: -SCD's. -PT/OT evals. RD eval. SLP f/u. -Updated PMD Dr. Cedillo.   -Nystatin added for recurrent oral thrush - improved.   -Discussed plan with wife at bedside and Dr. Bray and Dr. Elizondo and GI and patient's daughter Ashley and palliative. -D/w ACP Zeta and RN.   -Patient's family ultimately decided no peg tube and to pursue home hospice. Referral made.   -Palliative recs appreciated. Patient now DNR/DNI. Will transfer to PCU when bed available.
Prior notes indicate that it is believed to be due to iron deposition from frequent pRBC transfusions. For now continue to monitor, RUQ sono shows gallbladder sludge without cholecystitis.  -hepatitis labs - testing.     11. Prophylactic measure: -SCD's. -PT/OT evals. RD eval. SLP recs pureed diet. -Updated PMD Dr. Cedillo.   -Nystatin added for recurrent oral thrush - improved.   -Discussed plan with wife at bedside and Dr. Bray.   -Palliative eval for discussion of GOC/advanced directives.
Prior notes indicate that it is believed to be due to iron deposition from frequent pRBC transfusions. For now continue to monitor, RUQ sono shows gallbladder sludge without cholecystitis.  -hepatitis labs - negative.      11. Prophylactic measure: -SCD's. -PT/OT evals. RD eval. SLP f/u. -Updated PMD Dr. Cedillo.   -Nystatin added for recurrent oral thrush - improved.   -Discussed plan with wife at bedside and Dr. Bray and Dr. Elizondo and Dr. Francis/cards and GI. -D/w ACP Dinora.   -Palliative eval for discussion of GOC/advanced directives. -Briefly discussed code status with wife and she seems to be leaning towards DNR/DNI, but will d/w her family and revisit this later.
Prior notes indicate that it is believed to be due to iron deposition from frequent pRBC transfusions. For now continue to monitor, will get RUQ sono.  -hepatitis labs.     11. Prophylactic measure: -SCD's. -PT/OT evals. RD eval. SLP recs pureed diet. -Updated PMD Dr. Cedillo.   -Nystatin added for recurrent oral thrush.

## 2021-06-11 NOTE — CONSULT NOTE ADULT - SUBJECTIVE AND OBJECTIVE BOX
HPI: 80M with PMH of transfusion-dependent MDS, hypothyroidism, ADD, AF (no AC), HTN, CAD here for increasing weakness, after FTT for weeks as outpatient. Diagnosed here with ALS, family decided not to pursue PEG tube. After discussions with various consultants, wife and family decided on home with hospice. Patient has significant family support at home, including wife, and four children and ten grandchildren who all live close by. Palliative called for further GOC.    PERTINENT PM/SXH:   Hypertension    DM (Diabetes Mellitus)    Hypercholesterolemia    Gout    Congestive heart failure (CHF)    Atrial fibrillation    Hypothyroid    ADD (attention deficit disorder)    CAD (coronary artery disease)    MDS (myelodysplastic syndrome)      Stented coronary artery    History of tonsillectomy    History of colonoscopy    H/O hemorrhoidectomy    Status post placement of implantable loop recorder      FAMILY HISTORY:  FH: CHF (congestive heart failure) (Mother)  mother    Family history of cerebrovascular accident (CVA) in father (Father)      ITEMS NOT CHECKED ARE NOT PRESENT    SOCIAL HISTORY:   Significant other/partner[X ]  Children[X ]  Sabianism/Spirituality:  Substance hx:  [ ]   Tobacco hx:  [ ]   Alcohol hx: [ ]   Home Opioid hx:  [ ] I-Stop Reference No: [id number]  Living Situation: [ X]Home  [ ]Long term care  [ ]Rehab [ ]Other  Home Services: [ ] HHA [ ] Visting RN [ ] Hospice    ADVANCE DIRECTIVES:    DNR  Yes    MOLST  [ ]  Living Will  [ ]   DECISION MAKER(s):  [ ] Health Care Proxy(s)  [ ] Surrogate(s)  [ ] Guardian           Name(s): Phone Number(s):    BASELINE (I)ADL(s) (prior to admission):  [place]: [ ]Total  [ ] Moderate [X ]Dependent    Allergies    iodine (Anaphylaxis)  tetracycline (Short breath)    Intolerances    MEDICATIONS  (STANDING):  allopurinol 100 milliGRAM(s) Oral daily  amLODIPine   Tablet 5 milliGRAM(s) Oral daily  ascorbic acid 500 milliGRAM(s) Oral daily  aspirin enteric coated 81 milliGRAM(s) Oral daily  atorvastatin 20 milliGRAM(s) Oral at bedtime  cholecalciferol 1000 Unit(s) Oral daily  dextrose 40% Gel 15 Gram(s) Oral once  dextrose 5%. 1000 milliLiter(s) (50 mL/Hr) IV Continuous <Continuous>  dextrose 5%. 1000 milliLiter(s) (100 mL/Hr) IV Continuous <Continuous>  dextrose 50% Injectable 25 Gram(s) IV Push once  dextrose 50% Injectable 12.5 Gram(s) IV Push once  dextrose 50% Injectable 25 Gram(s) IV Push once  glucagon  Injectable 1 milliGRAM(s) IntraMuscular once  insulin lispro (ADMELOG) corrective regimen sliding scale   SubCutaneous three times a day before meals  insulin lispro (ADMELOG) corrective regimen sliding scale   SubCutaneous at bedtime  lactated ringers. 1000 milliLiter(s) (75 mL/Hr) IV Continuous <Continuous>  levothyroxine 37.5 MICROGram(s) Oral daily  methylphenidate 20 milliGRAM(s) Oral three times a day  metoprolol tartrate 50 milliGRAM(s) Oral two times a day  multivitamin/minerals Oral Solution (WELLESSE) 30 milliLiter(s) Oral daily  nystatin    Suspension 497357 Unit(s) Oral four times a day  pantoprazole    Tablet 40 milliGRAM(s) Oral before breakfast  polyethylene glycol 3350 17 Gram(s) Oral daily  senna 2 Tablet(s) Oral at bedtime    MEDICATIONS  (PRN):  acetaminophen   Tablet .. 650 milliGRAM(s) Oral every 6 hours PRN Temp greater or equal to 38C (100.4F), Mild Pain (1 - 3), Moderate Pain (4 - 6), Severe Pain (7 - 10)    PRESENT SYMPTOMS: [X ]Unable to obtain due to poor mentation   Source if other than patient:  [ ]Family   [ ]Team     Pain: [ ]yes [ ]no  QOL impact -   Location -                    Aggravating factors -  Quality -  Radiation -  Timing-  Severity (0-10 scale):  Minimal acceptable level (0-10 scale):     CPOT:    https://www.sccm.org/getattachment/zoq72i01-4j7l-0y4j-3b8j-0112s8103g6b/Critical-Care-Pain-Observation-Tool-(CPOT)      PAIN AD Score: 0    http://geriatrictoolkit.[employer].edu/cog/painad.pdf (press ctrl +  left click to view)    Dyspnea:                           [ ]Mild [ ]Moderate [ ]Severe  Anxiety:                             [ ]Mild [ ]Moderate [ ]Severe  Fatigue:                             [ ]Mild [ ]Moderate [ ]Severe  Nausea:                             [ ]Mild [ ]Moderate [ ]Severe  Loss of appetite:              [ ]Mild [ ]Moderate [ ]Severe  Constipation:                    [ ]Mild [ ]Moderate [ ]Severe    Other Symptoms:  [ ]All other review of systems negative     Palliative Performance Status Version 2:  <30%    http://[facility].org/files/news/palliative_performance_scale_ppsv2.pdf  PHYSICAL EXAM:  Vital Signs Last 24 Hrs  T(C): 36.9 (11 Jun 2021 12:30), Max: 36.9 (11 Jun 2021 12:30)  T(F): 98.4 (11 Jun 2021 12:30), Max: 98.4 (11 Jun 2021 12:30)  HR: 80 (11 Jun 2021 12:30) (72 - 94)  BP: 120/61 (11 Jun 2021 12:30) (98/57 - 129/76)  BP(mean): --  RR: 18 (11 Jun 2021 12:30) (18 - 20)  SpO2: 97% (11 Jun 2021 12:30) (94% - 97%) I&O's Summary    10 Sam 2021 07:01  -  11 Jun 2021 07:00  --------------------------------------------------------  IN: 2810 mL / OUT: 0 mL / NET: 2810 mL      GENERAL:  [ ]Alert  [ ]Oriented x   [X ]Lethargic  [ ]Cachexia  [ ]Unarousable  [ ]Verbal  [X ]Non-Verbal  Behavioral:   [ ] Anxiety  [ ] Delirium [ ] Agitation [X ] Other calm  HEENT:  [X ]Normal   [ ]Dry mouth   [ ]ET Tube/Trach  [ ]Oral lesions  PULMONARY:   [ X]Clear [ ]Tachypnea  [ ]Audible excessive secretions   [ ]Rhonchi        [ ]Right [ ]Left [ ]Bilateral  [ ]Crackles        [ ]Right [ ]Left [ ]Bilateral  [ ]Wheezing     [ ]Right [ ]Left [ ]Bilateral  [ ]Diminished breath sounds [ ]right [ ]left [ ]bilateral  CARDIOVASCULAR:    [ X]Regular [ ]Irregular [ ]Tachy  [ ]Stanford [ ]Murmur [ ]Other  GASTROINTESTINAL:  [X ]Soft  [ ]Distended   [X ]+BS  [X ]Non tender [ ]Tender  [ ]PEG [ ]OGT/ NGT  Last BM:     GENITOURINARY:  [ ]Normal [ ] Incontinent   [ ]Oliguria/Anuria   [ ]Fields  MUSCULOSKELETAL:   [ ]Normal   [ ]Weakness  [X ]Bed/Wheelchair bound [ ]Edema  NEUROLOGIC:   [ ]No focal deficits  [X ]Cognitive impairment  [ ]Dysphagia [ ]Dysarthria [ ]Paresis [ ]Other   SKIN:   [ ]Normal    [ ]Rash  [ ]Pressure ulcer(s)       Present on admission [ ]y [ ]n    CRITICAL CARE:  [ ] Shock Present  [ ]Septic [ ]Cardiogenic [ ]Neurologic [ ]Hypovolemic  [ ]  Vasopressors [ ]  Inotropes   [ ]Respiratory failure present [ ]Mechanical ventilation [ ]Non-invasive ventilatory support [ ]High flow  [ ]Acute  [ ]Chronic [ ]Hypoxic  [ ]Hypercarbic [ ]Other  [ ]Other organ failure     LABS: reviewed                        7.4    3.52  )-----------( 66       ( 11 Jun 2021 07:20 )             22.4   06-11    138  |  107  |  29<H>  ----------------------------<  107<H>  4.1   |  22  |  0.62    Ca    9.9      11 Jun 2021 07:16    TPro  5.8<L>  /  Alb  2.5<L>  /  TBili  1.1  /  DBili  x   /  AST  79<H>  /  ALT  88<H>  /  AlkPhos  249<H>  06-10  PT/INR - ( 11 Jun 2021 07:17 )   PT: 17.1 sec;   INR: 1.45 ratio         PTT - ( 11 Jun 2021 07:17 )  PTT:31.2 sec      RADIOLOGY & ADDITIONAL STUDIES:  US abdomen 6/9/21    Gallbladder sludge without evidence of acute cholecystitis.    A right pleural effusion.    PROTEIN CALORIE MALNUTRITION PRESENT: [ ]mild [ ]moderate [ ]severe [ ]underweight [ ]morbid obesity  https://www.andeal.org/vault/2440/web/files/ONC/Table_Clinical%20Characteristics%20to%20Document%20Malnutrition-White%20JV%20et%20al%202012.pdf    Height (cm): 177.8 (06-07-21 @ 20:38), 177.8 (05-26-21 @ 17:12), 177.8 (04-23-21 @ 16:30)  Weight (kg): 59 (06-07-21 @ 20:38), 67.6 (05-26-21 @ 17:12), 68 (04-23-21 @ 16:30)  BMI (kg/m2): 18.7 (06-07-21 @ 20:38), 21.4 (05-26-21 @ 17:12), 21.5 (04-23-21 @ 16:30)    [ ]PPSV2 < or = to 30% [ ]significant weight loss  [ ]poor nutritional intake  [ ]anasarca     Albumin, Serum: 2.5 g/dL (06-10-21 @ 07:04)   [ ]Artificial Nutrition      REFERRALS:   [ ]Chaplaincy  [ ]Hospice  [ ]Child Life  [ ]Social Work  [ ]Case management [ ]Holistic Therapy     Goals of Care Document:     ______________  William Abraham MD   of Geriatric and Palliative Medicine  [facility]     Please page the following number for clinical matters between the hours of 9AM and 5PM   from Monday through Friday : [phone]    After 5PM and on weekends, please page: [phone]. The Geriatric and Palliative Medicine consult service has 24/7 coverage for medical recommendations, including for symptom management needs.

## 2021-06-11 NOTE — PROGRESS NOTE ADULT - PROBLEM SELECTOR PLAN 2
Patient follows with Dr. Bray for what wife states is truncal instability, increasing weakness, trouble with handling secretions and swallowing? Patient had outpatient work up including MRI brain, results in HIE show no stroke or mass. Aspiration precautions.  -F/u neurology recs.   -ALS confirmed, per discussion with Dr. Bray.   -?need for LP. MRI brain outpatient showed ventriculomegaly due to volume loss, but could this be NPH? Patient is incontinent of urine, has trouble ambulating, and has mental impairment. - per neuro, patient has ALS instead.   -SLP recs pureed diet initially, but deferred MBS and recs consider non-oral means. -Calorie count vs keep NPO. -Family wants pleasure feeds.   -Discussed with wife, heme, and neuro regarding PEG. All had agree to pursue. -GI and cards recs appreciated. -Ultimately patient's family reconsidered and decided to no pursue PEG and do home hospice instead.    ?EEG - not rec per neuro.   -EMG ordered per neuro recs. -findings c/w ALS.
Patient follows with Dr. Bray for what wife states is truncal instability, increasing weakness, trouble with handling secretions and swallowing? Patient had outpatient work up including MRI brain, results in HIE show no stroke or mass. Aspiration precautions.  -F/u neurology recs.   ?ALS, per discussion with Dr. Bray.   -?need for LP. MRI brain outpatient showed ventriculomegaly due to volume loss, but could this be NPH? Patient is incontinent of urine, has trouble ambulating, and has mental impairment.  -SLP recs pureed diet. Discussed with wife, if continue poor PO intake/weight loss, then patient may need a PEG tube to ensure nutrition. -MBS ordered per neuro recs.   ?EEG  -EMG ordered per neuro recs.
Patient follows with Dr. Bray for what wife states is truncal instability, increasing weakness, trouble with handling secretions and swallowing? Patient had outpatient work up including MRI brain, results in HIE show no stroke or mass. Aspiration precautions.  -F/u neurology recs.   ?ALS  -?need for LP. MRI brain outpatient showed ventriculomegaly due to volume loss, but could this be NPH? Patient is incontinent of urine, has trouble ambulating, and has mental impairment.  -SLP recs pureed diet. Discussed with wife, if continue poor PO intake/weight loss, then patient may need a PEG tube to ensure nutrition.  ?EEG
Patient follows with Dr. Bray for what wife states is truncal instability, increasing weakness, trouble with handling secretions and swallowing? Patient had outpatient work up including MRI brain, results in HIE show no stroke or mass. Aspiration precautions.  -F/u neurology recs.   -ALS confirmed, per discussion with Dr. Brya.   -?need for LP. MRI brain outpatient showed ventriculomegaly due to volume loss, but could this be NPH? Patient is incontinent of urine, has trouble ambulating, and has mental impairment. - per neuro, patient has ALS instead.   -SLP recs pureed diet initially, but deferred MBS and recs consider non-oral means. -Calorie count vs keep NPO.   -Discussed with wife, heme, and neuro regarding PEG. All agree to pursue. -GI consulted, requested cards clearance; all recs appreciated. -Consider giving platelets prior to procedure.   ?EEG - not rec per neuro.   -EMG ordered per neuro recs. -findings c/w ALS.

## 2021-06-11 NOTE — PROGRESS NOTE ADULT - SUBJECTIVE AND OBJECTIVE BOX
Cedric Thomson MD  Division of Hospital Medicine  Cell: [phone]  Pager: [phone]  Office: [phone]    Patient is a 80y old  Male who presents with a chief complaint of Weakness (11 Jun 2021 14:52)        SUBJECTIVE / OVERNIGHT EVENTS: Patient unable to give ROS due to poor mental status, but no events reported.       MEDICATIONS  (STANDING):  allopurinol 100 milliGRAM(s) Oral daily  amLODIPine   Tablet 2.5 milliGRAM(s) Oral daily  ascorbic acid 500 milliGRAM(s) Oral daily  aspirin enteric coated 81 milliGRAM(s) Oral daily  atorvastatin 20 milliGRAM(s) Oral at bedtime  cholecalciferol 1000 Unit(s) Oral daily  dextrose 40% Gel 15 Gram(s) Oral once  dextrose 5%. 1000 milliLiter(s) (50 mL/Hr) IV Continuous <Continuous>  dextrose 5%. 1000 milliLiter(s) (100 mL/Hr) IV Continuous <Continuous>  dextrose 50% Injectable 25 Gram(s) IV Push once  dextrose 50% Injectable 12.5 Gram(s) IV Push once  dextrose 50% Injectable 25 Gram(s) IV Push once  glucagon  Injectable 1 milliGRAM(s) IntraMuscular once  insulin lispro (ADMELOG) corrective regimen sliding scale   SubCutaneous three times a day before meals  insulin lispro (ADMELOG) corrective regimen sliding scale   SubCutaneous at bedtime  lactated ringers. 1000 milliLiter(s) (75 mL/Hr) IV Continuous <Continuous>  levothyroxine 37.5 MICROGram(s) Oral daily  methylphenidate 20 milliGRAM(s) Oral three times a day  metoprolol succinate  milliGRAM(s) Oral daily  multivitamin/minerals Oral Solution (WELLESSE) 30 milliLiter(s) Oral daily  nystatin    Suspension [id number] Unit(s) Oral four times a day  pantoprazole    Tablet 40 milliGRAM(s) Oral before breakfast  polyethylene glycol 3350 17 Gram(s) Oral daily  senna 2 Tablet(s) Oral at bedtime    MEDICATIONS  (PRN):  acetaminophen   Tablet .. 650 milliGRAM(s) Oral every 6 hours PRN Temp greater or equal to 38C (100.4F), Mild Pain (1 - 3), Moderate Pain (4 - 6), Severe Pain (7 - 10)      Vital Signs Last 24 Hrs  T(C): 36.4 (12 Jun 2021 04:54), Max: 37.1 (11 Jun 2021 15:25)  T(F): 97.5 (12 Jun 2021 04:54), Max: 98.7 (11 Jun 2021 15:25)  HR: 96 (12 Jun 2021 04:54) (78 - 98)  BP: 117/61 (12 Jun 2021 04:54) (110/64 - 120/61)  BP(mean): --  RR: 18 (12 Jun 2021 04:54) (10 - 18)  SpO2: 96% (12 Jun 2021 04:54) (94% - 97%)  CAPILLARY BLOOD GLUCOSE      POCT Blood Glucose.: 119 mg/dL (11 Jun 2021 21:31)  POCT Blood Glucose.: 125 mg/dL (11 Jun 2021 16:55)  POCT Blood Glucose.: 139 mg/dL (11 Jun 2021 12:16)    I&O's Summary    10 Sam 2021 07:01  -  11 Jun 2021 07:00  --------------------------------------------------------  IN: 2810 mL / OUT: 0 mL / NET: 2810 mL    11 Jun 2021 07:01  -  12 Jun 2021 06:18  --------------------------------------------------------  IN: 0 mL / OUT: 0 mL / NET: 0 mL          PHYSICAL EXAM:   GENERAL: NAD, thin  HEAD:  Atraumatic, Normocephalic  EYES: conjunctiva and sclera clear  NECK: Supple   CHEST/LUNG: Clear to auscultation bilaterally; No wheeze  HEART: S1S2 normal. Regular rate and rhythm; No murmurs, rubs, or gallops  ABDOMEN: Soft, Nontender, Nondistended; Bowel sounds present  EXTREMITIES:    No clubbing, cyanosis, or edema; le muscle wasting and twitching.   PSYCH/Neuro: Sleepy but arousable.   SKIN: No rashes or lesions      LABS:                        7.4    3.52  )-----------( 66       ( 11 Jun 2021 07:20 )             22.4     06-11    138  |  107  |  29<H>  ----------------------------<  107<H>  4.1   |  22  |  0.62    Ca    9.9      11 Jun 2021 07:16    TPro  5.8<L>  /  Alb  2.5<L>  /  TBili  1.1  /  DBili  x   /  AST  79<H>  /  ALT  88<H>  /  AlkPhos  249<H>  06-10    PT/INR - ( 11 Jun 2021 07:17 )   PT: 17.1 sec;   INR: 1.45 ratio         PTT - ( 11 Jun 2021 07:17 )  PTT:31.2 sec            RADIOLOGY & ADDITIONAL TESTS:    Imaging Personally Reviewed:   Consultant(s) Notes Reviewed:  cardio, GI, palliative, neuro  Care Discussed with Consultants/Other Providers: GI and palliative and neuro

## 2021-06-11 NOTE — CONSULT NOTE ADULT - CONSULT REQUESTED DATE/TIME
08-Jun-2021 08:34
10-Sam-2021 16:08
10-Sam-2021 19:25
Adequate: hears normal conversation without difficulty
08-Jun-2021 19:46
11-Jun-2021

## 2021-06-11 NOTE — PROGRESS NOTE ADULT - PROBLEM SELECTOR PLAN 1
-Hematology aware, will follow up their recommendations  -Hemoglobin goal > 7, per heme.   -S/p 1prbc.  -Monitor cbc with diff.   -Vitamin K ordered for elevated INR.
-Hematology aware, will follow up their recommendations  -Hemoglobin goal > 7, per heme. -Consider goal > 8 given history of CAD.   -S/p 1prbc.  -Monitor cbc with diff.   -Vitamin K ordered for elevated INR.  -May need BM bx, per heme. -But now deferred given patient going for hospice.
-Hematology aware, will follow up their recommendations  -Hemoglobin goal per heme, will clarify  -S/p 1prbc.
-Hematology aware, will follow up their recommendations  -Hemoglobin goal > 7, per heme. -Consider goal > 8 given history of CAD.   -S/p 1prbc.  -Monitor cbc with diff.   -Vitamin K ordered for elevated INR.  -May need BM bx, per heme.

## 2021-06-11 NOTE — CHART NOTE - NSCHARTNOTEFT_GEN_A_CORE
Nutrition Follow Up Note  Patient seen for: malnutrition follow up/calorie count    Chart reviewed, events noted. Pt is a 80M with PMHx of MDS (transfusion dependent), hypothyroidism, ADD, atrial fibrillation (not on AC given fall & bleeding risk), HTN, and CAD (with stents) told to come in by hematologist for increasing weakness and blood transfusion.  -- Pt w/ cardiac clearance for PEG placement, NPO this AM for PEG placement.  -- Calorie count ordered, per discussion with RN and Dinora BENSON yesterday plan to hold off on calorie count in setting of PEG placement.    Source: [] Patient       [x] EMR        [x] RN        [] Family at bedside       [] Other:    -If unable to interview patient: [] Trach/Vent/BiPAP  [x] Disoriented/confused/inappropriate to interview    Diet Order:   Diet, Pureed:   Consistent Carbohydrate {No Snacks} (CSTCHO)  Supplement Feeding Modality:  Oral  Glucerna Shake Cans or Servings Per Day:  2       Frequency:  Daily (06-10-21)  Diet, NPO after Midnight:      NPO Start Date: 10-Sam-2021,   NPO Start Time: 23:59 (06-10-21)    GI:  Last BM 6/11.   Bowel Regimen? [x] Yes   [] No    Weights:   no updated weights available at this time, will continue to monitor weights for changes if any     Nutritionally Pertinent MEDICATIONS  (STANDING):  allopurinol  amLODIPine   Tablet  ascorbic acid  atorvastatin  cholecalciferol  dextrose 40% Gel  dextrose 5%.  dextrose 5%.  dextrose 50% Injectable  dextrose 50% Injectable  dextrose 50% Injectable  glucagon  Injectable  insulin lispro (ADMELOG) corrective regimen sliding scale  insulin lispro (ADMELOG) corrective regimen sliding scale  lactated ringers.  levothyroxine  metoprolol tartrate  multivitamin/minerals Oral Solution (WELLESSE)  nystatin    Suspension  pantoprazole    Tablet  phytonadione   Solution  polyethylene glycol 3350  senna    Pertinent Labs: 06-11 @ 07:16: Na 138, BUN 29<H>, Cr 0.62, <H>, K+ 4.1, Phos --, Mg --, Alk Phos --, ALT/SGPT --, AST/SGOT --, HbA1c --    A1C with Estimated Average Glucose Result: 6.6 % (06-09-21 @ 08:23)  A1C with Estimated Average Glucose Result: 6.1 % (04-24-21 @ 10:17)    Finger Sticks:  POCT Blood Glucose.: 145 mg/dL (06-10 @ 21:58)  POCT Blood Glucose.: 136 mg/dL (06-10 @ 17:03)  POCT Blood Glucose.: 155 mg/dL (06-10 @ 12:32)      Skin per nursing documentation: suspected DTI right inner toe  Edema: No noted edema as per flow sheets.     Estimated Needs: based on dosing wt 58.9kg with consideration for underweight status, pressure injury  [x] no change since previous assessment  Estimated Energy Needs: 1767-2061kcal (30-35kcal/kg)  Estimated Protein Needs: 71-84g protein (1.2-1.4g/kg)    Previous Nutrition Diagnosis: Severe Malnutrition   Nutrition Diagnosis is: [x] ongoing  [] resolved [] not applicable     New Nutrition Diagnosis: [x] Not applicable    Nutrition Care Plan:  [x] In Progress - to be addressed when EN feedings initiated  [] Achieved  [] Not applicable    Nutrition Interventions:    Recommendations:      1. When medically feasible, recommend Glucerna 1.5 @ 50mL/hr x 24 hr to provide 1200mL feed, 1800kcal, 99g protein, 911mL free water. Meets 30kcal/kg, 1.6g/kg protein based on dosing wt of 58.9kg.   -- Suggest initiate feeds at 10mL/hr increasing by 10mL/hr q8H or as tolerated until goal rate achieved.  -- Monitor and replete electrolytes as needed.  2. PO diet per discretion of medical team/SLP.  3. Continue multivitamin and vitamin C if no other contraindications.  4. RD remains available to adjust TF regimen/formulary as needed or upon request.     Recommend deferring calorie count at this time in setting of PEG placement.    Monitoring and Evaluation:   Continue to monitor nutritional intake, tolerance to diet prescription, weights, labs, skin integrity    RD remains available upon request and will follow up per protocol    Amparo Hancock MS, RD, CDN Pager# [phone]

## 2021-06-11 NOTE — HOSPICE CARE NOTE - CONVESATION DETAILS
GARRETT w/EFFIE Lynn - confirmed Pt is pending PEG placement for tomorrow.    HCN RN will f/u on Monday, 6/14/2021.

## 2021-06-11 NOTE — CONSULT NOTE ADULT - PROBLEM SELECTOR RECOMMENDATION 9
- goals are for symptom-directed care at home with hospice  - can transfuse for symptoms as needed  - monitor CBC

## 2021-06-11 NOTE — PROGRESS NOTE ADULT - SUBJECTIVE AND OBJECTIVE BOX
HPI:  80M with PMHx of MDS (transfusion dependent), hypothyroidism, ADD, atrial fibrillation (not on AC given fall & bleeding risk), HTN, HFrEF, DM2, cervical neuropathy, and CAD (with stents) told to come in by hematologist for increasing weakness and blood transfusion. Pt receives blood transfusions every 3 weeks with goal hemoglobin >8. Per patient's wife he has poor PO intake at baseline and has not been ambulatory for several years. She denies infectious symptoms such as fever or diarrhea. Patient recently at [facility] for same reason and discharged after IV hydration. GI consulted for peg placement; cardiology consulted for preop. Procedure possibly occurring tomorrow.    ===========================  Interval Events  -   -   - No reported worsening CP/Palps/SOB although sx report limited by baseline  ===========================      PMHx:   Hypertension    DM (Diabetes Mellitus)    Hypercholesterolemia    Gout    Congestive heart failure (CHF)    Atrial fibrillation    Hypothyroid    ADD (attention deficit disorder)    CAD (coronary artery disease)    MDS (myelodysplastic syndrome)      PSHx:   Stented coronary artery    History of tonsillectomy    History of colonoscopy    H/O hemorrhoidectomy    Status post placement of implantable loop recorder      FAMILY HISTORY:  FH: CHF (congestive heart failure) (Mother)  mother    Family history of cerebrovascular accident (CVA) in father (Father)      Allergies:  iodine (Anaphylaxis)  tetracycline (Short breath)    Home Medications:  allopurinol 100 mg oral tablet: 1 tab(s) orally once a day (08 Jun 2021 03:27)  amLODIPine 5 mg oral tablet: 1 tab(s) orally once a day (08 Jun 2021 03:27)  aspirin 81 mg oral delayed release tablet: 1 tab(s) orally once a day (08 Jun 2021 03:27)  levothyroxine: 37.5 microgram(s) orally once a day (08 Jun 2021 03:27)  methylphenidate 20 mg oral tablet: 1 tab(s) orally 3 times a day (08 Jun 2021 03:27)  metoprolol tartrate 50 mg oral tablet: 1 tab(s) orally 2 times a day (08 Jun 2021 03:27)  omeprazole 20 mg oral delayed release capsule: 1 cap(s) orally once a day (08 Jun 2021 03:27)  rosuvastatin 5 mg oral tablet: 1 tab(s) orally once a day (08 Jun 2021 03:27)    Current Medications:   acetaminophen   Tablet .. 650 milliGRAM(s) Oral every 6 hours PRN  allopurinol 100 milliGRAM(s) Oral daily  amLODIPine   Tablet 5 milliGRAM(s) Oral daily  ascorbic acid 500 milliGRAM(s) Oral daily  aspirin enteric coated 81 milliGRAM(s) Oral daily  atorvastatin 20 milliGRAM(s) Oral at bedtime  cholecalciferol 1000 Unit(s) Oral daily  dextrose 40% Gel 15 Gram(s) Oral once  dextrose 5%. 1000 milliLiter(s) IV Continuous <Continuous>  dextrose 5%. 1000 milliLiter(s) IV Continuous <Continuous>  dextrose 50% Injectable 25 Gram(s) IV Push once  dextrose 50% Injectable 12.5 Gram(s) IV Push once  dextrose 50% Injectable 25 Gram(s) IV Push once  glucagon  Injectable 1 milliGRAM(s) IntraMuscular once  insulin lispro (ADMELOG) corrective regimen sliding scale   SubCutaneous three times a day before meals  insulin lispro (ADMELOG) corrective regimen sliding scale   SubCutaneous at bedtime  lactated ringers. 1000 milliLiter(s) IV Continuous <Continuous>  levothyroxine 37.5 MICROGram(s) Oral daily  methylphenidate 20 milliGRAM(s) Oral three times a day  metoprolol tartrate 50 milliGRAM(s) Oral two times a day  multivitamin/minerals Oral Solution (WELLESSE) 30 milliLiter(s) Oral daily  nystatin    Suspension 870901 Unit(s) Oral four times a day  pantoprazole    Tablet 40 milliGRAM(s) Oral before breakfast  phytonadione   Solution 5 milliGRAM(s) Oral daily  polyethylene glycol 3350 17 Gram(s) Oral daily  senna 2 Tablet(s) Oral at bedtime    Social History  Smoking History: denies  Alcohol Use: denies  Drug Use: denies    REVIEW OF SYSTEMS:  Constitutional:     [ ] negative [ ] fevers [ ] chills [ ] weight loss [ ] weight gain  HEENT:                  [ ] negative [ ] dry eyes [ ] eye irritation [ ] postnasal drip [ ] nasal congestion  CV:                         [ ] negative  [ ] chest pain [ ] orthopnea [ ] palpitations [ ] murmur  Resp:                     [ ] negative [ ] cough [ ] shortness of breath [ ] wheezing [ ] sputum [ ] hemoptysis  GI:                          [ ] negative [ ] nausea [ ] vomiting [ ] diarrhea [ ] constipation [ ] abd pain [ ] dysphagia   :                        [ ] negative [ ] dysuria [ ] nocturia [ ] hematuria [ ] increased urinary frequency  MSK:                      [ ] negative [ ] back pain [ ] myalgias [ ] arthralgias [ ] fracture  Skin:                       [ ] negative [ ] rash [ ] itch  Neuro:                   [ ] negative [ ] headache [ ] dizziness [ ] syncope [ ] weakness [ ] numbness  Psych:                    [ ] negative [ ] anxiety [ ] depression  Endo:                     [ ] negative [ ] diabetes [ ] thyroid problem  Heme/Lymph:      [ ] negative [ ] anemia [ ] bleeding problem  Allergic/Immune: [ ] negative [ ] itchy eyes [ ] nasal discharge [ ] hives [ ] angioedema    [ ] All other systems negative or otherwise described above.  [X] Unable to assess ROS because patient minimally verbal    ICU Vital Signs Last 24 Hrs  T(C): 36.4 (10 Sam 2021 16:39), Max: 36.7 (10 Sam 2021 05:19)  T(F): 97.5 (10 Sam 2021 16:39), Max: 98 (10 Sam 2021 05:19)  HR: 94 (10 Sam 2021 18:00) (69 - 94)  BP: 110/70 (10 Sam 2021 18:00) (98/57 - 129/67)  BP(mean): --  ABP: --  ABP(mean): --  RR: 18 (10 Sam 2021 16:39) (18 - 19)  SpO2: 95% (10 Sam 2021 16:39) (94% - 97%)    Daily     Daily     Physical Exam:  GENERAL:  ill-appearing, cachectic  CHEST:  no respiratory distress  HEART:  RRR, systolic murmur, no g/r, hypovolemic  ABDOMEN:  Soft, non-tender, non-distended  EXTREMITIES:  no cyanosis, clubbing, or edema  SKIN:  No rash/erythema/ecchymoses  NEURO: lethargic     Cardiovascular Diagnostic Testing    ECG: Personally reviewed    Echo: Personally reviewed  < from: TTE with Doppler (w/Cont) (05.11.17 @ 23:39) >  Conclusions:  1. Mild mitral annular calcification, otherwise normal  mitral valve. Mild-moderate mitral regurgitation.  2. Calcified trileaflet aortic valve with normal opening.  Minimal aortic regurgitation.  3. Endocardial visualization enhanced with intravenous  injection of echo contrast (Definity). Moderate segmental  left ventricular systolic dysfunction. The mid to distal  septum and the apical cap are akinetic. The distal inferior  segment is dyskinetic. No left ventricular thrombus.  4. Normal right ventricular size and function.  *** Compared with echocardiogram of 5/18/2016, results are  similar on today's study.  ------------------------------------------------------------------------  Confirmed on  5/11/2017 - 09:32:13 by Ana Mistry M.D.  ------------------------------------------------------------------------    < end of copied text >      Stress Testing:   < from: Nuclear Stress Test-Exercise (01.14.15 @ 17:20) >  IMPRESSIONS:Abnormal Study  * Exercise capacity: 6 METS, Poor for age and gender.  * Chest Pain: No chest pain with exercise.  * Symptom: Thigh pain, now resolved.  * HR Response: Appropriate.  * BP Response: Appropriate.  * Heart Rhythm: Normal Sinus Rhythm - 65 BPM.  * Q Waves: Anterolateral MI.  * Baseline ECG: Nonspecific ST-T wave abnormality.  * ECG Changes: ECG interpretation limited by artifact  during exercise.  Grossly no ischemic ST segment changes  beyond baseline abnormalities.  * Arrhythmia: Frequent VPDs occurred during recovery.  Bigeminy pattern during recovery. .  * Review of raw data shows: The study is of good technical  quality.  * The left ventricle was normal in size. There is a  medium-sized, severe defect in the rji-md-bsojez septal  and apical walls that is mostly fixed suggestive of  infarct with minimal periinfarct ischemia.  * Post-stress gated wall motion analysis was performed  (LVEF = 53 %;LVEDV = 106 ml.), revealing severe  hypokinesis of the mby-ob-uxswwg septal and apical walls.  ------------------------------------------------------------------------  Confirmed on  1/14/2015 - 19:17:21 by Cornell Monge MD  ------------------------------------------------------------------------    < end of copied text >      Cath:   < from: Cardiac Cath Lab (07.02.10 @ 07:43) >  Coronary vessels: The coronary circulation is right dominant.  LM:      LM: Normal.  LAD:      Mid LAD: There was a 100 % stenosis. There was good collateral  blood supply to the distal myocardium.  CX:      OM1: There was a 99 % stenosis.  RCA:      RCA: Angiography showed minor luminal irregularities with no flow  limiting lesions.  Complications: There were no complications.  Summary:  Coronary circulation: LM: Normal. Mid LAD: There was a 100 % stenosis.  There was good collateral blood supply to the distal myocardium. OM1:  There was a 99 % stenosis. RCA: Angiography showed minor luminal  irregularities with no flow limiting lesions.  1st lesion interventions: A stent was performed on the 99 % lesion in the  1st obtuse marginal.Following intervention there was an excellent  angiographic appearance with a 1 % residual stenosis.  Recommendations:  Patient to return as outpatient post rehab for LAD.  Prepared and signed by  Juliann Francis M.D.  Signed 07/02/2010 08:37:59    < end of copied text >      Imaging: none    CXR: Personally reviewed    Labs: Personally reviewed                        7.7    3.67  )-----------( 62       ( 10 Sam 2021 07:04 )             22.8     06-10    139  |  109<H>  |  33<H>  ----------------------------<  135<H>  4.3   |  22  |  0.61    Ca    9.9      10 Sam 2021 07:04    TPro  5.8<L>  /  Alb  2.5<L>  /  TBili  1.1  /  DBili  x   /  AST  79<H>  /  ALT  88<H>  /  AlkPhos  249<H>  06-10    PT/INR - ( 09 Jun 2021 07:02 )   PT: 18.0 sec;   INR: 1.53 ratio         PTT - ( 09 Jun 2021 07:02 )  PTT:31.4 sec        Thyroid Stimulating Hormone, Serum: 5.63 uIU/mL (06-08 @ 12:23)      Assessment and Recommendation:   · Assessment	  80M with PMHx of MDS (transfusion dependent), hypothyroidism, ADD, atrial fibrillation (not on AC given fall & bleeding risk), HTN, HFrEF, DM2, cervical neuropathy, and CAD (with stents) told to come in by hematologist for increasing weakness, blood transfusion, FTT with plan for peg placement requiring cardiac risk stratification    Preoperative Risk Stratification for PEG  General Cardiac Management    Patient has a history of:  MDS requiring blood transfusions  AF which is rate controlled with lopressor 50mg BID; currently NSR, no AC due to fall risk and bleed hx  CAD s/p stents, currently managed with ASA 81mg qd and rosuvastatin 5mg qd  HTN; normotensive, managed w/ norvasc 5mg qd  HFrEF managed w/ BB  DM2 on metformin  Hyopothyroidism on synthroid    RCRI Score - Class II Risk 6.0 % 30-day risk of death, MI, or cardiac arrest  Alford score - 3.9 % Risk of myocardial infarction or cardiac arrest, intraoperatively or up to 30 days post-op  Patient does not achieve >= 4 METS.     Recommendations  - Change Lopressor to Toprol    - Reduce Norvasc to 2.5 mg   - Continue ASA   - Cleared for PEG as outlined previously     Christiana Salas

## 2021-06-11 NOTE — CONSULT NOTE ADULT - ASSESSMENT
80M with PMH of transfusion-dependent MDS, hypothyroidism, ADD, AF (no AC), HTN, CAD here for increasing weakness, after FTT for weeks as outpatient. Diagnosed here with ALS, family decided not to pursue PEG tube. After discussions with various consultants, wife and family decided on home with hospice. Patient has significant family support at home, including wife, and four children and ten grandchildren who all live close by. Palliative called for further GOC.

## 2021-06-11 NOTE — PROGRESS NOTE ADULT - PROBLEM SELECTOR PLAN 8
-Monitor volume status especially after pRBC  -Lasix as needed after transfusions  -Right pleural effusion seen on US RUQ. Monitor.
-Monitor volume status especially after pRBC  -Lasix as needed after transfusions. -hold at this time as patient mostly appears euvolemic to dry.   -Right pleural effusion seen on US RUQ. Monitor.  -Last echo in 2017 in [facility] showed EF 40%.
-Monitor volume status especially after pRBC  -Lasix as needed after transfusions. -hold at this time as patient mostly appears euvolemic to dry given poor po intake.   -Right pleural effusion seen on US RUQ. Monitor.  -Last echo in 2017 in [facility] showed EF 40%.  -cards recs appreciated.
-Monitor volume status especially after pRBC  -Lasix as needed after transfusions

## 2021-06-12 NOTE — PROGRESS NOTE ADULT - NUTRITIONAL ASSESSMENT
This patient has been assessed with a concern for Malnutrition and has been determined to have a diagnosis/diagnoses of Severe protein-calorie malnutrition and Underweight (BMI < 19).    This patient is being managed with:   Diet Pureed-  Consistent Carbohydrate {No Snacks} (CSTCHO)  Entered: Jun 8 2021 11:33AM    
This patient has been assessed with a concern for Malnutrition and has been determined to have a diagnosis/diagnoses of Severe protein-calorie malnutrition and Underweight (BMI < 19).    This patient is being managed with:   Diet Pureed-  Consistent Carbohydrate {No Snacks} (CSTCHO)  Supplement Feeding Modality:  Oral  Glucerna Shake Cans or Servings Per Day:  2       Frequency:  Daily  Entered: Sam 10 2021  4:48PM    
This patient has been assessed with a concern for Malnutrition and has been determined to have a diagnosis/diagnoses of Severe protein-calorie malnutrition and Underweight (BMI < 19).    This patient is being managed with:   Diet Pureed-  Consistent Carbohydrate {No Snacks} (CSTCHO)  Supplement Feeding Modality:  Oral  Glucerna Shake Cans or Servings Per Day:  2       Frequency:  Daily  Entered: Sam 10 2021  4:48PM    Diet NPO after Midnight-     NPO Start Date: 10-Sam-2021   NPO Start Time: 23:59  Entered: Sam 10 2021  4:04PM

## 2021-06-12 NOTE — DISCHARGE NOTE PROVIDER - CARE PROVIDER_API CALL
Kahlil Cedillo)  Medicine  [facility]  [address] [place] [address], [address]  [place] [address]  Phone: [phone]  Fax: [phone]  Follow Up Time:

## 2021-06-12 NOTE — DISCHARGE NOTE PROVIDER - DETAILS OF MALNUTRITION DIAGNOSIS/DIAGNOSES
This patient has been assessed with a concern for Malnutrition and was treated during this hospitalization for the following Nutrition diagnosis/diagnoses:     -  06/10/2021: Severe protein-calorie malnutrition   -  06/10/2021: Underweight (BMI < 19)

## 2021-06-12 NOTE — CHART NOTE - NSCHARTNOTEFT_GEN_A_CORE
Informed by RN of critical value as follow:                        6.5    4.07  )-----------( 55       ( 12 Jun 2021 07:12 )             19.4     80M with PMHx of MDS (transfusion dependent), hypothyroidism, ADD, atrial fibrillation (not on AC given fall & bleeding risk), HTN, and CAD (with stents) told to come in by hematologist for increasing weakness and blood transfusion. History obtained from wife as patient is minimally verbal at baseline. Apparently he receives blood transfusion every 3 weeks with goal hemoglobin >8.    Followed by Oncology; last seen 6/10-->Pt initially diagnosed in 2017 due to anemia/transfusion dependent.  Progressive disease in 2019, MDS with excess blasts s/p treatment with vidaza/delays due to UTI/COVID.  He is now on supportive transfusion due to progressive decline in his performance/functional/cognitive status.  Hg responded to transfusion.  Prior to discharge, would transfuse to Hg of 10 as he is unable to get to [place]. New thrombocytopenia.  His WBC/neutrophils are normal.      P/E:   Drowsy, arouses to verbal, Oriented x1 (2 with choices)  LCTA B/L  S1S2  Abd soft NT/ND +BS  Voiding adequately  No peripheral edema      A/P:  MDS  Anemia  Transfuse 1u prbc today    D/w wife at bedside  Dr Thomson([facility]) to be informed      PATRICIA Guillory [id number] Informed by RN of critical value as follow:                        6.5    4.07  )-----------( 55       ( 12 Jun 2021 07:12 )             19.4     80M with PMHx of MDS (transfusion dependent), hypothyroidism, ADD, atrial fibrillation (not on AC given fall & bleeding risk), HTN, and CAD (with stents) told to come in by hematologist for increasing weakness and blood transfusion. History obtained from wife as patient is minimally verbal at baseline. Apparently he receives blood transfusion every 3 weeks with goal hemoglobin >8.    Followed by Oncology; last seen 6/10-->Pt initially diagnosed in 2017 due to anemia/transfusion dependent.  Progressive disease in 2019, MDS with excess blasts s/p treatment with vidaza/delays due to UTI/COVID.  He is now on supportive transfusion due to progressive decline in his performance/functional/cognitive status.  Hg responded to transfusion.  Prior to discharge, would transfuse to Hg of 10 as he is unable to get to [place]. New thrombocytopenia.  His WBC/neutrophils are normal.      P/E:   Drowsy, arouses to verbal, Oriented x1 (2 with choices)  LCTA B/L  S1S2  Abd soft NT/ND +BS  Voiding adequately  No peripheral edema      A/P:  MDS  Anemia  Transfuse 1u prbc today    D/w wife at bedside  Dr Thomson([facility]) informed      PATRICIA Guillory [id number]

## 2021-06-12 NOTE — DISCHARGE NOTE PROVIDER - NSDCMRMEDTOKEN_GEN_ALL_CORE_FT
allopurinol 100 mg oral tablet: 1 tab(s) orally once a day  levothyroxine: 37.5 microgram(s) orally once a day  methylphenidate 20 mg oral tablet: 1 tab(s) orally 3 times a day  metoprolol succinate 100 mg oral tablet, extended release: 1 tab(s) orally once a day  nystatin 100,000 units/mL oral suspension: 5 milliliter(s) orally (swish &amp; swallow) 4 times a day, As Needed for oral thrush   omeprazole 20 mg oral delayed release capsule: 1 cap(s) orally once a day

## 2021-06-12 NOTE — DISCHARGE NOTE NURSING/CASE MANAGEMENT/SOCIAL WORK - PATIENT PORTAL LINK FT
You can access the FollowMyHealth Patient Portal offered by [facility] by registering at the following website: [email or web address]. By joining [employer]’s FollowMyHealth portal, you will also be able to view your health information using other applications (apps) compatible with our system.

## 2021-06-12 NOTE — DISCHARGE NOTE PROVIDER - NSDCCPCAREPLAN_GEN_ALL_CORE_FT
PRINCIPAL DISCHARGE DIAGNOSIS  Diagnosis: Anemia  Assessment and Plan of Treatment: You have received 2 bags of blood during hospital stay.  Anemia is when you have a low blood count of hemoglobin (HGB) and/or hematocrit (HCT), or RBC (red blood cells).  If your hgb is <13 (women) or <12 (men), then you are considered to be anemic.  Losing a moderate to large amount of blood cause anemia.  Although, there are several different types of anemia that are not due to blood loss.  The primary symptoms of anemia is difficulty breathing with exertion or at rest, fatigue, bounding pulses, and/or palpitations.  If you are persistently having these symptoms, you will need to seek help from your healthcare provider.      SECONDARY DISCHARGE DIAGNOSES  Diagnosis: Transaminitis  Assessment and Plan of Treatment: Condition mildly improved but remains elevated.    Diagnosis: Hypercalcemia  Assessment and Plan of Treatment: Improved.    Diagnosis: MDS (myelodysplastic syndrome)  Assessment and Plan of Treatment: Follow up as needed with your Oncologist    Diagnosis: ALS (amyotrophic lateral sclerosis)  Assessment and Plan of Treatment: Follow up as needed with your Oncologist    Diagnosis: DM (Diabetes Mellitus)  Assessment and Plan of Treatment: If still taking oral hypoglycemic medications, they should be stopped as you are not eating. This will prevent severe hypoglycemia that can be life threatening.    Diagnosis: Hypothyroid  Assessment and Plan of Treatment: Continue with medication as prescribed.    Diagnosis: Advance care planning  Assessment and Plan of Treatment: Your family have chosen to take you home and arrange for comfort care with Hospice in your home.  A referral was sent to Hospice in the community and your wife was given their contact number.     PRINCIPAL DISCHARGE DIAGNOSIS  Diagnosis: Anemia  Assessment and Plan of Treatment: You have received 2 bags of blood during hospital stay.  Anemia is when you have a low blood count of hemoglobin (HGB) and/or hematocrit (HCT), or RBC (red blood cells).  If your hgb is <13 (women) or <12 (men), then you are considered to be anemic.  Losing a moderate to large amount of blood cause anemia.  Although, there are several different types of anemia that are not due to blood loss.  The primary symptoms of anemia is difficulty breathing with exertion or at rest, fatigue, bounding pulses, and/or palpitations.  If you are persistently having these symptoms, you will need to seek help from your healthcare provider.      SECONDARY DISCHARGE DIAGNOSES  Diagnosis: Transaminitis  Assessment and Plan of Treatment: Condition mildly improved but remains elevated.    Diagnosis: Hypercalcemia  Assessment and Plan of Treatment: Improved.    Diagnosis: MDS (myelodysplastic syndrome)  Assessment and Plan of Treatment: Follow up as needed with your Oncologist    Diagnosis: ALS (amyotrophic lateral sclerosis)  Assessment and Plan of Treatment: Follow up as needed with your Oncologist    Diagnosis: Oral thrush  Assessment and Plan of Treatment: This is a fungal infection in the mouth which appear as white patches on the tongue, and oral mucosa.  You were treated in the hospital but can continue at home if you find the white patches have not completely resolved or have recurred.    Diagnosis: DM (Diabetes Mellitus)  Assessment and Plan of Treatment: If still taking oral hypoglycemic medications, they should be stopped as you are not eating. This will prevent severe hypoglycemia that can be life threatening.    Diagnosis: Hypothyroid  Assessment and Plan of Treatment: Continue with medication as prescribed.    Diagnosis: Advance care planning  Assessment and Plan of Treatment: Your family have chosen to take you home and arrange for comfort care with Hospice in your home.  A referral was sent to Hospice in the community and your wife was given their contact number.

## 2021-06-12 NOTE — DISCHARGE NOTE PROVIDER - HOSPITAL COURSE
80M with PMHx of MDS (transfusion dependent), hypothyroidism, ADD, atrial fibrillation (not on AC given fall & bleeding risk), HTN, and CAD (with stents) told to come in by hematologist for increasing weakness and blood transfusion. History obtained from wife as patient is minimally verbal at baseline. Pt receives blood transfusion every 3 weeks with goal hemoglobin >8. His last transfusion was 1.5 weeks ago. It seems for past several months patient with increasing weakness and truncal instability. 80M with PMHx of MDS (transfusion dependent), hypothyroidism, ADD, atrial fibrillation (not on AC given fall & bleeding risk), HTN, and CAD (with stents) told to come in by hematologist for increasing weakness and blood transfusion. History obtained from wife as patient is minimally verbal at baseline. Pt receives blood transfusion every 3 weeks with goal hemoglobin >8. His last transfusion was 1.5 weeks ago. It seems for past several months patient with increasing weakness and truncal instability.     Course: 80M with PMHx of MDS (transfusion dependent), hypothyroidism, ADD, atrial fibrillation (not on AC given fall & bleeding risk), HTN, and CAD (with stents) told to come in by hematologist for increasing weakness and blood transfusion. History obtained from wife as patient is minimally verbal at baseline. Pt receives blood transfusion every 3 weeks with goal hemoglobin >8. His last transfusion was 1.5 weeks ago. It seems for past several months patient with increasing weakness and truncal instability.     Course:  Anemia: Oncology followed; initially diagnosed in 2017 due to anemia/transfusion dependent.  Progressive disease in 2019, MDS with excess blasts s/p treatment with vidaza/delays due to UTI/COVID. now on supportive transfusion due to progressive decline in his performance/functional/cognitive status. Hg responded to transfusion.  Prior to discharge, would transfuse to Hg of 10 as he is unable to get to [place]. New thrombocytopenia.  His WBC/neutrophils are normal. Received 2nd unit due to Hg dropped to 6.5 but no post transfusion cbc done as wife/family decided they want to take pt home today as she believes that he may pass in a couple of days. Referral made for home hospice in the community.    R/O Neuromuscular disease: Patient follows with Dr. Bray for what wife states is truncal instability, increasing weakness, trouble with handling secretions and swallowing; Dr Bray consulted and confirmed  ALS . Patient's motor decline, brisk DTR's in atrophic and weak UE's along with today's EMG/NCV findings are diagnostic for amyotrophic lateral sclerosis.      Dysphagia: SLP recs pureed diet initially, but deferred MBS and recs consider non-oral means. Pt wife & family decided to not pursue PEG and do home hospice instead.     Hypercalcemia: May have been due in part to dehydration as calcium level improved on IVF    Transaminitis: RUQ sono shows gallbladder sludge without cholecystitis; hepatitis labs - negative.      Pt medically cleared for dc home with community hospice referral as pt's wife/ family wants to take him home. His wife stated that he has been this way for weeks and she and her family have been caring for him so she wants him home with all of his family around him.   80M with PMHx of MDS (transfusion dependent), hypothyroidism, ADD, atrial fibrillation (not on AC given fall & bleeding risk), HTN, and CAD (with stents) told to come in by hematologist for increasing weakness and blood transfusion. History obtained from wife as patient is minimally verbal at baseline. Pt receives blood transfusion every 3 weeks with goal hemoglobin >8. His last transfusion was 1.5 weeks ago. It seems for past several months patient with increasing weakness and truncal instability.     Course:  Anemia: Oncology followed; initially diagnosed with MDS in 2017 due to anemia/transfusion dependent.  Progressive disease in 2019, MDS with excess blasts s/p treatment with vidaza/delays due to UTI/COVID. now on supportive transfusion due to progressive decline in his performance/functional/cognitive status. Hg responded to transfusion.  Prior to discharge, would transfuse to Hg of 10 as he is unable to get to [place]. New thrombocytopenia.  His WBC/neutrophils are normal. Received 2nd unit due to Hg dropped to 6.5 but no post transfusion cbc done as wife/family decided they want to take pt home today as she believes that he may pass in a couple of days. Referral made for home hospice in the community.    R/O Neuromuscular disease: Patient follows with Dr. Bray for what wife states is truncal instability, increasing weakness, trouble with handling secretions and swallowing; Dr Bray consulted and confirmed  ALS . Patient's motor decline, brisk DTR's in atrophic and weak UE's along with today's EMG/NCV findings are diagnostic for amyotrophic lateral sclerosis.      Dysphagia: SLP recs pureed diet initially, but deferred MBS and recs consider non-oral means. Pt wife & family decided to not pursue PEG and do home hospice instead.     Hypercalcemia: May have been due in part to dehydration as calcium level improved on IVF    Transaminitis: RUQ sono shows gallbladder sludge without cholecystitis; hepatitis labs - negative.      Pt medically cleared for dc home with community hospice referral as pt's wife/ family wants to take him home. His wife stated that he has been this way for weeks and she and her family have been caring for him so she wants him home with all of his family around him.

## 2021-06-12 NOTE — PROGRESS NOTE ADULT - SUBJECTIVE AND OBJECTIVE BOX
-Patient seen/examined. Discussed with wife extensively at bedside. Discussed with NP Caitlin.   -1 PRBC given prior to DC.   -Patient's wife wants to take him home today to be with family. They are planning on comfort measures at home. She would rather not wait until home hospice is officially set up, however she will call them to follow up from home. CM to provide info as well.   -Wife states that she has help from family who are in healthcare and also has been dealing with her  in his current state, so she doesn't need immediate additional help and equipment at home. She'd rather take him home today and f/u with home hospice as an outpatient. Will respect her wishes.   -Hold metformin and amlodipine on DC.   -MOLST form given to wife.   -40 minutes spent on the discharge process.

## 2021-06-14 PROBLEM — G12.21 ALS (AMYOTROPHIC LATERAL SCLEROSIS): Status: ACTIVE | Noted: 2021-01-01

## 2021-06-17 NOTE — PATIENT PROFILE ADULT - TYPE OF COMMUNICATION USED TO ADDRESS HEALTHCARE
"Chief Complaint   Patient presents with     RECHECK     pt stat's she's following up on her right toe. complains of pain.        34 year old  1987    Ht 1.72 m (5' 7.72\")   Wt 64 kg (141 lb)   BMI 21.62 kg/m      Pain Assessment  Patient Currently in Pain: Yes  0-10 Pain Scale: 4  Primary Pain Location: Toe (Comment which one)     [facility] [address] [place] [address] [address]    No Known Allergies    Current Outpatient Medications   Medication     acetaminophen (TYLENOL) 500 MG tablet     etonogestrel-ethinyl estradiol (NUVARING) 0.12-0.015 MG/24HR vaginal ring     hydrOXYzine (VISTARIL) 25 MG capsule     hydrOXYzine (VISTARIL) 25 MG capsule     Misc. Devices (ROLLER WALKER) MISC     oxyCODONE (ROXICODONE) 5 MG tablet     senna-docusate (SENOKOT-S/PERICOLACE) 8.6-50 MG tablet     No current facility-administered medications for this visit.      Questionnaires:    HOOS Hip Dysfunction & Osteoarthritis Outcome Questionnaire    No flowsheet data found.     KOOS Knee Survey Assessment    No flowsheet data found.     Promis 10 Assessment    No flowsheet data found.     Ortho Oxford Knee Questionnaire    No flowsheet data found.       Nancy Barrios ATC   "
Other

## 2021-06-24 NOTE — DISCHARGE NOTE ADULT - MEDICATION SUMMARY - MEDICATIONS TO TAKE
I will START or STAY ON the medications listed below when I get home from the hospital:    Ecotrin Adult Low Strength 81 mg oral delayed release tablet  -- 1 tab(s) by mouth once a day  -- Indication: For stroke    lisinopril 10 mg oral tablet  -- 1 tab(s) by mouth once a day  -- Indication: For hypertension    metFORMIN 500 mg oral tablet  -- 1 tab(s) by mouth 2 times a day  -- Indication: For diabetes    Tradjenta 5 mg oral tablet  -- 1 tab(s) by mouth once a day  -- Indication: For diabetes    colchicine 0.6 mg oral tablet  -- 0.5 tab(s) by mouth once a day  -- Indication: For arthritis    Crestor 5 mg oral tablet  -- 1 tab(s) by mouth once a day (at bedtime)  -- Indication: For DLD    clopidogrel 75 mg oral tablet  -- 1 tab(s) by mouth once a day  -- Indication: For stroke    Lopressor 50 mg oral tablet  -- 1 tab(s) by mouth 2 times a day  -- Indication: For hypertension    Ritalin 20 mg oral tablet  -- 1 tab(s) by mouth 3 times a day  -- Indication: For stimulant    Lasix 20 mg oral tablet  -- 1 tab(s) by mouth once a day  -- Indication: For hypertension    omeprazole 20 mg oral delayed release capsule  -- 1 cap(s) by mouth once a day  -- Indication: For gerd    Synthroid 25 mcg (0.025 mg) oral tablet  -- 1.5 tab(s) by mouth once a day  -- Indication: For hypothyroid No

## 2021-07-20 LAB
ALBUMIN SERPL ELPH-MCNC: 3.3 G/DL
ALBUMIN SERPL ELPH-MCNC: 3.4 G/DL
ALBUMIN SERPL ELPH-MCNC: 3.6 G/DL
ALBUMIN SERPL ELPH-MCNC: 3.7 G/DL
ALBUMIN SERPL ELPH-MCNC: 3.8 G/DL
ALBUMIN SERPL ELPH-MCNC: 4 G/DL
ALBUMIN SERPL ELPH-MCNC: 4 G/DL
ALP BLD-CCNC: 174 U/L
ALP BLD-CCNC: 206 U/L
ALP BLD-CCNC: 237 U/L
ALP BLD-CCNC: 240 U/L
ALP BLD-CCNC: 241 U/L
ALP BLD-CCNC: 254 U/L
ALP BLD-CCNC: 255 U/L
ALP BLD-CCNC: 260 U/L
ALP BLD-CCNC: 268 U/L
ALP BLD-CCNC: 275 U/L
ALP BLD-CCNC: 318 U/L
ALP BLD-CCNC: 322 U/L
ALP BLD-CCNC: 334 U/L
ALP BLD-CCNC: 348 U/L
ALP BLD-CCNC: 376 U/L
ALT SERPL-CCNC: 101 U/L
ALT SERPL-CCNC: 105 U/L
ALT SERPL-CCNC: 106 U/L
ALT SERPL-CCNC: 109 U/L
ALT SERPL-CCNC: 113 U/L
ALT SERPL-CCNC: 116 U/L
ALT SERPL-CCNC: 117 U/L
ALT SERPL-CCNC: 257 U/L
ALT SERPL-CCNC: 52 U/L
ALT SERPL-CCNC: 67 U/L
ALT SERPL-CCNC: 78 U/L
ALT SERPL-CCNC: 83 U/L
ALT SERPL-CCNC: 83 U/L
ALT SERPL-CCNC: 89 U/L
ALT SERPL-CCNC: 95 U/L
ANION GAP SERPL CALC-SCNC: 10 MMOL/L
ANION GAP SERPL CALC-SCNC: 11 MMOL/L
ANION GAP SERPL CALC-SCNC: 12 MMOL/L
ANION GAP SERPL CALC-SCNC: 14 MMOL/L
ANION GAP SERPL CALC-SCNC: 7 MMOL/L
ANION GAP SERPL CALC-SCNC: 7 MMOL/L
ANION GAP SERPL CALC-SCNC: 8 MMOL/L
ANION GAP SERPL CALC-SCNC: 9 MMOL/L
APPEARANCE: CLEAR
AST SERPL-CCNC: 228 U/L
AST SERPL-CCNC: 40 U/L
AST SERPL-CCNC: 42 U/L
AST SERPL-CCNC: 55 U/L
AST SERPL-CCNC: 55 U/L
AST SERPL-CCNC: 62 U/L
AST SERPL-CCNC: 64 U/L
AST SERPL-CCNC: 69 U/L
AST SERPL-CCNC: 73 U/L
AST SERPL-CCNC: 74 U/L
AST SERPL-CCNC: 79 U/L
AST SERPL-CCNC: 82 U/L
AST SERPL-CCNC: 83 U/L
AST SERPL-CCNC: 85 U/L
AST SERPL-CCNC: 87 U/L
BACTERIA UR CULT: NORMAL
BACTERIA: NEGATIVE
BACTERIA: NEGATIVE
BASOPHILS # BLD AUTO: 0.04 K/UL
BASOPHILS # BLD AUTO: 0.06 K/UL
BASOPHILS # BLD AUTO: 0.07 K/UL
BASOPHILS # BLD AUTO: 0.08 K/UL
BASOPHILS NFR BLD AUTO: 0.8 %
BASOPHILS NFR BLD AUTO: 1.3 %
BILIRUB SERPL-MCNC: 0.4 MG/DL
BILIRUB SERPL-MCNC: 0.5 MG/DL
BILIRUB SERPL-MCNC: 0.5 MG/DL
BILIRUB SERPL-MCNC: 0.6 MG/DL
BILIRUB SERPL-MCNC: 0.7 MG/DL
BILIRUBIN URINE: NEGATIVE
BLOOD URINE: NEGATIVE
BUN SERPL-MCNC: 44 MG/DL
BUN SERPL-MCNC: 45 MG/DL
BUN SERPL-MCNC: 45 MG/DL
BUN SERPL-MCNC: 47 MG/DL
BUN SERPL-MCNC: 47 MG/DL
BUN SERPL-MCNC: 49 MG/DL
BUN SERPL-MCNC: 50 MG/DL
BUN SERPL-MCNC: 50 MG/DL
BUN SERPL-MCNC: 51 MG/DL
BUN SERPL-MCNC: 52 MG/DL
BUN SERPL-MCNC: 53 MG/DL
BUN SERPL-MCNC: 56 MG/DL
BUN SERPL-MCNC: 56 MG/DL
BUN SERPL-MCNC: 57 MG/DL
BUN SERPL-MCNC: 63 MG/DL
CALCIUM SERPL-MCNC: 9 MG/DL
CALCIUM SERPL-MCNC: 9.1 MG/DL
CALCIUM SERPL-MCNC: 9.1 MG/DL
CALCIUM SERPL-MCNC: 9.2 MG/DL
CALCIUM SERPL-MCNC: 9.3 MG/DL
CALCIUM SERPL-MCNC: 9.4 MG/DL
CALCIUM SERPL-MCNC: 9.5 MG/DL
CALCIUM SERPL-MCNC: 9.5 MG/DL
CALCIUM SERPL-MCNC: 9.6 MG/DL
CALCIUM SERPL-MCNC: 9.8 MG/DL
CALCIUM SERPL-MCNC: 9.8 MG/DL
CALCIUM SERPL-MCNC: 9.9 MG/DL
CHLORIDE SERPL-SCNC: 104 MMOL/L
CHLORIDE SERPL-SCNC: 104 MMOL/L
CHLORIDE SERPL-SCNC: 105 MMOL/L
CHLORIDE SERPL-SCNC: 105 MMOL/L
CHLORIDE SERPL-SCNC: 106 MMOL/L
CHLORIDE SERPL-SCNC: 107 MMOL/L
CHLORIDE SERPL-SCNC: 108 MMOL/L
CHLORIDE SERPL-SCNC: 108 MMOL/L
CHLORIDE SERPL-SCNC: 109 MMOL/L
CO2 SERPL-SCNC: 18 MMOL/L
CO2 SERPL-SCNC: 20 MMOL/L
CO2 SERPL-SCNC: 21 MMOL/L
CO2 SERPL-SCNC: 21 MMOL/L
CO2 SERPL-SCNC: 22 MMOL/L
CO2 SERPL-SCNC: 23 MMOL/L
CO2 SERPL-SCNC: 24 MMOL/L
CO2 SERPL-SCNC: 24 MMOL/L
CO2 SERPL-SCNC: 25 MMOL/L
COLOR: NORMAL
COLOR: YELLOW
CREAT SERPL-MCNC: 1.07 MG/DL
CREAT SERPL-MCNC: 1.12 MG/DL
CREAT SERPL-MCNC: 1.15 MG/DL
CREAT SERPL-MCNC: 1.16 MG/DL
CREAT SERPL-MCNC: 1.18 MG/DL
CREAT SERPL-MCNC: 1.2 MG/DL
CREAT SERPL-MCNC: 1.22 MG/DL
CREAT SERPL-MCNC: 1.24 MG/DL
CREAT SERPL-MCNC: 1.25 MG/DL
CREAT SERPL-MCNC: 1.33 MG/DL
CREAT SERPL-MCNC: 1.34 MG/DL
CREAT SERPL-MCNC: 1.34 MG/DL
CREAT SERPL-MCNC: 1.37 MG/DL
CREAT SERPL-MCNC: 1.39 MG/DL
CREAT SERPL-MCNC: 1.4 MG/DL
EOSINOPHIL # BLD AUTO: 0.12 K/UL
EOSINOPHIL # BLD AUTO: 0.16 K/UL
EOSINOPHIL # BLD AUTO: 0.2 K/UL
EOSINOPHIL # BLD AUTO: 0.25 K/UL
EOSINOPHIL # BLD AUTO: 0.26 K/UL
EOSINOPHIL # BLD AUTO: 0.27 K/UL
EOSINOPHIL # BLD AUTO: 0.33 K/UL
EOSINOPHIL NFR BLD AUTO: 2 %
EOSINOPHIL NFR BLD AUTO: 3.5 %
EOSINOPHIL NFR BLD AUTO: 4 %
EOSINOPHIL NFR BLD AUTO: 4 %
EOSINOPHIL NFR BLD AUTO: 4.5 %
EOSINOPHIL NFR BLD AUTO: 4.9 %
EOSINOPHIL NFR BLD AUTO: 5.3 %
FERRITIN SERPL-MCNC: 5202 NG/ML
FERRITIN SERPL-MCNC: ABNORMAL NG/ML
GLUCOSE QUALITATIVE U: NEGATIVE
GLUCOSE SERPL-MCNC: 110 MG/DL
GLUCOSE SERPL-MCNC: 118 MG/DL
GLUCOSE SERPL-MCNC: 129 MG/DL
GLUCOSE SERPL-MCNC: 129 MG/DL
GLUCOSE SERPL-MCNC: 130 MG/DL
GLUCOSE SERPL-MCNC: 134 MG/DL
GLUCOSE SERPL-MCNC: 137 MG/DL
GLUCOSE SERPL-MCNC: 140 MG/DL
GLUCOSE SERPL-MCNC: 140 MG/DL
GLUCOSE SERPL-MCNC: 142 MG/DL
GLUCOSE SERPL-MCNC: 143 MG/DL
GLUCOSE SERPL-MCNC: 143 MG/DL
GLUCOSE SERPL-MCNC: 147 MG/DL
GLUCOSE SERPL-MCNC: 158 MG/DL
GLUCOSE SERPL-MCNC: 171 MG/DL
HAV IGM SER QL: NONREACTIVE
HBV SURFACE AB SER QL: NONREACTIVE
HBV SURFACE AG SER QL: NONREACTIVE
HCT VFR BLD CALC: 26.4 %
HCT VFR BLD CALC: 27.8 %
HCT VFR BLD CALC: 29.2 %
HCT VFR BLD CALC: 30.1 %
HCT VFR BLD CALC: 31 %
HCT VFR BLD CALC: 32.1 %
HCT VFR BLD CALC: 32.1 %
HCV AB SER QL: NONREACTIVE
HCV S/CO RATIO: 0.16 S/CO
HGB BLD-MCNC: 10.2 G/DL
HGB BLD-MCNC: 10.3 G/DL
HGB BLD-MCNC: 10.6 G/DL
HGB BLD-MCNC: 8.7 G/DL
HGB BLD-MCNC: 8.8 G/DL
HGB BLD-MCNC: 9.7 G/DL
HGB BLD-MCNC: 9.8 G/DL
HYALINE CASTS: 0 /LPF
HYALINE CASTS: 0 /LPF
IMM GRANULOCYTES NFR BLD AUTO: 0.8 %
IMM GRANULOCYTES NFR BLD AUTO: 0.9 %
IMM GRANULOCYTES NFR BLD AUTO: 1 %
IMM GRANULOCYTES NFR BLD AUTO: 1 %
IMM GRANULOCYTES NFR BLD AUTO: 1.2 %
KETONES URINE: NEGATIVE
LDH SERPL-CCNC: 105 U/L
LDH SERPL-CCNC: 139 U/L
LDH SERPL-CCNC: 99 U/L
LEUKOCYTE ESTERASE URINE: NEGATIVE
LYMPHOCYTES # BLD AUTO: 0.88 K/UL
LYMPHOCYTES # BLD AUTO: 1.05 K/UL
LYMPHOCYTES # BLD AUTO: 1.2 K/UL
LYMPHOCYTES # BLD AUTO: 1.22 K/UL
LYMPHOCYTES # BLD AUTO: 1.33 K/UL
LYMPHOCYTES # BLD AUTO: 1.5 K/UL
LYMPHOCYTES # BLD AUTO: 1.74 K/UL
LYMPHOCYTES NFR BLD AUTO: 14.6 %
LYMPHOCYTES NFR BLD AUTO: 19.9 %
LYMPHOCYTES NFR BLD AUTO: 23 %
LYMPHOCYTES NFR BLD AUTO: 23.2 %
LYMPHOCYTES NFR BLD AUTO: 24.2 %
LYMPHOCYTES NFR BLD AUTO: 26.3 %
LYMPHOCYTES NFR BLD AUTO: 28.2 %
MAN DIFF?: NORMAL
MCHC RBC-ENTMCNC: 27.9 PG
MCHC RBC-ENTMCNC: 29 PG
MCHC RBC-ENTMCNC: 29.1 PG
MCHC RBC-ENTMCNC: 29.3 PG
MCHC RBC-ENTMCNC: 29.8 PG
MCHC RBC-ENTMCNC: 29.9 PG
MCHC RBC-ENTMCNC: 29.9 PG
MCHC RBC-ENTMCNC: 31.3 GM/DL
MCHC RBC-ENTMCNC: 32.1 GM/DL
MCHC RBC-ENTMCNC: 32.6 GM/DL
MCHC RBC-ENTMCNC: 32.9 GM/DL
MCHC RBC-ENTMCNC: 33 GM/DL
MCHC RBC-ENTMCNC: 33.2 GM/DL
MCHC RBC-ENTMCNC: 33.3 GM/DL
MCV RBC AUTO: 84.9 FL
MCV RBC AUTO: 89.5 FL
MCV RBC AUTO: 90.1 FL
MCV RBC AUTO: 90.1 FL
MCV RBC AUTO: 90.4 FL
MCV RBC AUTO: 90.4 FL
MCV RBC AUTO: 93 FL
MICROSCOPIC-UA: NORMAL
MICROSCOPIC-UA: NORMAL
MONOCYTES # BLD AUTO: 0.56 K/UL
MONOCYTES # BLD AUTO: 0.57 K/UL
MONOCYTES # BLD AUTO: 0.61 K/UL
MONOCYTES # BLD AUTO: 0.73 K/UL
MONOCYTES # BLD AUTO: 0.76 K/UL
MONOCYTES # BLD AUTO: 0.9 K/UL
MONOCYTES # BLD AUTO: 0.92 K/UL
MONOCYTES NFR BLD AUTO: 12.1 %
MONOCYTES NFR BLD AUTO: 12.1 %
MONOCYTES NFR BLD AUTO: 12.6 %
MONOCYTES NFR BLD AUTO: 14.3 %
MONOCYTES NFR BLD AUTO: 14.5 %
MONOCYTES NFR BLD AUTO: 15.2 %
MONOCYTES NFR BLD AUTO: 9.1 %
NEUTROPHILS # BLD AUTO: 2.65 K/UL
NEUTROPHILS # BLD AUTO: 2.84 K/UL
NEUTROPHILS # BLD AUTO: 2.95 K/UL
NEUTROPHILS # BLD AUTO: 3.41 K/UL
NEUTROPHILS # BLD AUTO: 3.41 K/UL
NEUTROPHILS # BLD AUTO: 3.68 K/UL
NEUTROPHILS # BLD AUTO: 3.97 K/UL
NEUTROPHILS NFR BLD AUTO: 55 %
NEUTROPHILS NFR BLD AUTO: 55.3 %
NEUTROPHILS NFR BLD AUTO: 55.7 %
NEUTROPHILS NFR BLD AUTO: 56 %
NEUTROPHILS NFR BLD AUTO: 58.5 %
NEUTROPHILS NFR BLD AUTO: 61.2 %
NEUTROPHILS NFR BLD AUTO: 65.7 %
NITRITE URINE: NEGATIVE
PH URINE: 5
PH URINE: 5.5
PH URINE: 6
PLATELET # BLD AUTO: 106 K/UL
PLATELET # BLD AUTO: 119 K/UL
PLATELET # BLD AUTO: 227 K/UL
PLATELET # BLD AUTO: 268 K/UL
PLATELET # BLD AUTO: 278 K/UL
PLATELET # BLD AUTO: 285 K/UL
PLATELET # BLD AUTO: 302 K/UL
POTASSIUM SERPL-SCNC: 4.5 MMOL/L
POTASSIUM SERPL-SCNC: 4.6 MMOL/L
POTASSIUM SERPL-SCNC: 4.8 MMOL/L
POTASSIUM SERPL-SCNC: 4.9 MMOL/L
POTASSIUM SERPL-SCNC: 5 MMOL/L
POTASSIUM SERPL-SCNC: 5.1 MMOL/L
POTASSIUM SERPL-SCNC: 5.2 MMOL/L
POTASSIUM SERPL-SCNC: 5.3 MMOL/L
POTASSIUM SERPL-SCNC: 5.4 MMOL/L
POTASSIUM SERPL-SCNC: 5.5 MMOL/L
POTASSIUM SERPL-SCNC: 6 MMOL/L
PROT SERPL-MCNC: 6.4 G/DL
PROT SERPL-MCNC: 6.4 G/DL
PROT SERPL-MCNC: 6.5 G/DL
PROT SERPL-MCNC: 6.6 G/DL
PROT SERPL-MCNC: 6.6 G/DL
PROT SERPL-MCNC: 6.8 G/DL
PROT SERPL-MCNC: 6.9 G/DL
PROT SERPL-MCNC: 7 G/DL
PROT SERPL-MCNC: 7.1 G/DL
PROT SERPL-MCNC: 7.2 G/DL
PROT SERPL-MCNC: 7.4 G/DL
PROTEIN URINE: ABNORMAL
PROTEIN URINE: NEGATIVE
PROTEIN URINE: NORMAL
PROTEIN URINE: NORMAL
RBC # BLD: 2.95 M/UL
RBC # BLD: 2.99 M/UL
RBC # BLD: 3.24 M/UL
RBC # BLD: 3.34 M/UL
RBC # BLD: 3.55 M/UL
RBC # BLD: 3.55 M/UL
RBC # BLD: 3.65 M/UL
RBC # FLD: 14 %
RBC # FLD: 14.3 %
RBC # FLD: 14.6 %
RBC # FLD: 14.6 %
RBC # FLD: 14.9 %
RBC # FLD: 15.9 %
RBC # FLD: 17.2 %
RED BLOOD CELLS URINE: 1 /HPF
RED BLOOD CELLS URINE: 1 /HPF
SARS-COV-2 N GENE NPH QL NAA+PROBE: NOT DETECTED
SODIUM SERPL-SCNC: 135 MMOL/L
SODIUM SERPL-SCNC: 136 MMOL/L
SODIUM SERPL-SCNC: 136 MMOL/L
SODIUM SERPL-SCNC: 137 MMOL/L
SODIUM SERPL-SCNC: 138 MMOL/L
SODIUM SERPL-SCNC: 139 MMOL/L
SODIUM SERPL-SCNC: 140 MMOL/L
SPECIFIC GRAVITY URINE: 1.01
SPECIFIC GRAVITY URINE: 1.02
SQUAMOUS EPITHELIAL CELLS: 0 /HPF
SQUAMOUS EPITHELIAL CELLS: 1 /HPF
UROBILINOGEN URINE: NORMAL
WBC # FLD AUTO: 4.53 K/UL
WBC # FLD AUTO: 5.06 K/UL
WBC # FLD AUTO: 5.3 K/UL
WBC # FLD AUTO: 6.02 K/UL
WBC # FLD AUTO: 6.04 K/UL
WBC # FLD AUTO: 6.17 K/UL
WBC # FLD AUTO: 6.2 K/UL
WHITE BLOOD CELLS URINE: 0 /HPF
WHITE BLOOD CELLS URINE: 1 /HPF

## 2021-07-21 ENCOUNTER — APPOINTMENT (OUTPATIENT)
Dept: NEUROLOGY | Facility: CLINIC | Age: 81
End: 2021-07-21

## 2021-08-12 NOTE — PATIENT PROFILE ADULT - NSPROIMPLANTSMEDDEV_GEN_A_NUR
Body Location Override (Optional - Billing Will Still Be Based On Selected Body Map Location If Applicable): right wrist Detail Level: Detailed Depth Of Biopsy: dermis Was A Bandage Applied: Yes Size Of Lesion In Cm: 0 Biopsy Type: H and E Biopsy Method: Personna blade Anesthesia Type: 1% lidocaine with epinephrine and a 1:10 solution of 8.4% sodium bicarbonate Anesthesia Volume In Cc (Will Not Render If 0): 1 Hemostasis: Aluminum Chloride Wound Care: Petrolatum Dressing: bandage Destruction After The Procedure: No Type Of Destruction Used: Curettage Curettage Text: The wound bed was treated with curettage after the biopsy was performed. Cryotherapy Text: The wound bed was treated with cryotherapy after the biopsy was performed. Electrodesiccation Text: The wound bed was treated with electrodesiccation after the biopsy was performed. Electrodesiccation And Curettage Text: The wound bed was treated with electrodesiccation and curettage after the biopsy was performed. Silver Nitrate Text: The wound bed was treated with silver nitrate after the biopsy was performed. Lab: [id number] Consent: Written consent was obtained and risks were reviewed including but not limited to scarring, infection, bleeding, scabbing, incomplete removal, nerve damage and allergy to anesthesia. Post-Care Instructions: I reviewed with the patient in detail post-care instructions. Patient is to keep the biopsy site dry overnight, and then cleanse with soap and water every day, and apply Vaseline, and cover with bandage, every day until completely healed. Notification Instructions: Patient will be notified of biopsy results. However, patient instructed to call the office if not contacted within 2 weeks. Billing Type: Third-Party Bill Information: Selecting Yes will display possible errors in your note based on the variables you have selected. This validation is only offered as a suggestion for you. PLEASE NOTE THAT THE VALIDATION TEXT WILL BE REMOVED WHEN YOU FINALIZE YOUR NOTE. IF YOU WANT TO FAX A PRELIMINARY NOTE YOU WILL NEED TO TOGGLE THIS TO 'NO' IF YOU DO NOT WANT IT IN YOUR FAXED NOTE. cardiac loop and stent

## 2021-08-15 NOTE — PROGRESS NOTE ADULT - PROBLEM SELECTOR PROBLEM 4
CAD (coronary artery disease)
c/o dry cough x 3 weeks, no s/s of distress, will continue to monitor for change in assessment

## 2021-08-16 NOTE — PATIENT PROFILE ADULT - NSPROHMDIABETMGMTSTRAT_GEN_A_NUR
Quality 337: Tuberculosis Prevention For Psoriasis And Psoriatic Arthritis Patients On A Biological Immune Response Modifier: Patient has a documented negative annual TB screening.
Detail Level: Generalized
Quality 410: Psoriasis Clinical Response To Oral Systemic Or Biologic Mediations: Psoriasis Assessment Tool Documented, Met Specified Benchmark
blood glucose testing/medication therapy

## 2021-09-09 NOTE — PATIENT PROFILE ADULT. - NSSUBSTANCEUSE_GEN_ALL_CORE_SD
Advancement Flap (Double) Text: The defect edges were debeveled with a #15 scalpel blade.  Given the location of the defect and the proximity to free margins a double advancement flap was deemed most appropriate.  Using a sterile surgical marker, the appropriate advancement flaps were drawn incorporating the defect and placing the expected incisions within the relaxed skin tension lines where possible.    The area thus outlined was incised deep to adipose tissue with a #15 scalpel blade.  The skin margins were undermined to an appropriate distance in all directions utilizing iris scissors. never used

## 2021-09-28 ENCOUNTER — APPOINTMENT (OUTPATIENT)
Dept: ELECTROPHYSIOLOGY | Facility: CLINIC | Age: 81
End: 2021-09-28

## 2021-09-28 ENCOUNTER — APPOINTMENT (OUTPATIENT)
Dept: CARDIOLOGY | Facility: CLINIC | Age: 81
End: 2021-09-28

## 2021-12-29 NOTE — SWALLOW BEDSIDE ASSESSMENT ADULT - SWALLOW EVAL: SECRETION MANAGEMENT
Haresh Rock is a 74 year old male who is being seen via a billable video visit.      Patient has given verbal consent for Video visit? Yes    Video Start Time: 11:30 am     Telehealth Visit Details    Type of Service:  Telehealth    Video End Time (time video stopped): 11:57    Originating Location (pt. location): Home    Additional Participants in Telehealth Visit: None     Distant Location (provider location):  [facility]     Mode of Communication (Audio Visual or Audio Only):  Audio Visual       Linda Anthony, SLP  December 29, 2021      Unable to assess due to altered mentation/reduced active participation.

## 2022-05-25 NOTE — ED PROVIDER NOTE - RESPIRATORY, MLM
Opt out
Breath sounds are clear, no distress present, no wheeze, rales, rhonchi or tachypnea. Normal rate and effort.

## 2022-07-06 NOTE — ED ADULT NURSE NOTE - CCCP TRG CHIEF CMPLNT
Rotate Tylenol and ibuprofen as needed for throat pain.  Warm salt gargles for throat.   Get over the counter cepacol or Chloraseptic throat spray.   Daily zyrtec for next 3 days.   Follow up with PCP in 2-5 days if symptoms do not resolve.   
weakness, fall X2

## 2023-01-11 NOTE — ED PROVIDER NOTE - CROS ED ROS STATEMENT
Pt c/o right flank pain radiating to right side of abdomen with vomiting x today. No acute distress noted, denies urinary symptoms, denies chest pain, no SOB noted.
all other ROS negative except as per HPI

## 2023-04-03 NOTE — ED ADULT TRIAGE NOTE - HEIGHT IN INCHES
10 [place] Counseling:  I discussed with the patient the risks of topical clascoterone including but not limited to erythema, scaling, itching, and stinging. Patient voiced their understanding.

## 2023-05-15 NOTE — DIETITIAN INITIAL EVALUATION ADULT. - PROBLEM SELECTOR PROBLEM 7
ENDOSCOPY DISCHARGE INSTRUCTIONS:    Call the physician that did your procedure for any questions or concern:    [facility] [phone]  DR. BRIDGET SILVA          ACTIVITY:    There are potential side effects to the medications used for sedation and anesthesia during your procedure. These include:  Dizziness or light-headedness, confusion or memory loss, delayed reaction times, loss of coordination, nausea and vomiting. Because of your increased risk for injury, we ask that you observe the following precautions: For the next 24 hours,  DO NOT operate an automobile, bicycle, motorcycle, , power tools or large equipment of any kind. Do not drink alcohol, sign any legal documents or make any legal decisions for 24 hours. Do not bend your head over lower than your heart. DO sit on the side of bed/couch awhile before getting up. Plan on bedrest or quiet relaxation today. You may resume normal activities in 24 hours. DIET:    Your first meal today should be light, avoiding spicy and fatty foods. If you tolerate this first meal, then you may advance to your regular diet unless otherwise advised by your physician. NORMAL SYMPTOMS:  -Gaseous discomfort    NOTIFY YOUR PHYSICIAN IF THESE SYMPTOMS OCCUR:  1. Fever (greater than 100)  5. Increased abdominal bloating  2. Severe pain    6. Excessive bleeding  3. Nausea and vomiting  7. Chest pain                                                                    4. Chills    8. Shortness of breath    ADDITIONAL INSTRUCTIONS:    Biopsy results: Call [address] for biopsy results in 1 week    Educational Information:          Please review these discharge instructions this evening or tomorrow for  information you may have forgotten. We want to thank you for choosing the [facility] as your health care provider. We always strive to provide you with excellent care while you are here.  You may receive a survey in the mail regarding
Atrial fibrillation

## 2023-05-18 NOTE — H&P ADULT - PROBLEM/PLAN-1
The staff and providers of Non-interventional Pain Management would like to THANK YOU!  Thank you for utilizing the non-interventional chronic pain management service and [facility] as your healthcare provider.   Our goal is to always provide you with the best of care and we continue to look for better ways to improve the service we provide you.   You may receive a survey in the mail with questions specific to your encounter with our clinic. Should you receive a survey, please take a few minutes to rate your experience with your visit. Our providers and staff value your opinions and insights.  Thank you in advance for your time and interest!  Olga Joseph NP      If you need medication a refill, please make sure you are contacting our office 5-7 business days prior to running out (requests received later will not be considered urgent).     If you miss an appointment, please understand that you may not be granted refills or you may be provided with a partial refill to get you through to your next appointment. This is up to provider discretion. If you miss too many appointments, medications may be reduced, weaned, or stopped.     You may reach us at [phone].      Dr. Devine  [facility]  Primary Practice Location  [address] [place] [id number]  Phone: [phone]  Fax: [phone]            
DISPLAY PLAN FREE TEXT

## 2023-09-21 NOTE — ED ADULT TRIAGE NOTE - NS ED TRIAGE AVPU SCALE
Verbal - The patient responds to verbal stimuli by opening their eyes when someone speaks to them. The patient is not fully oriented to time, place, or person. [employer] Counseling:  I discussed with the patient the risks of mycophenolate mofetil including but not limited to infection/immunosuppression, GI upset, hypokalemia, hypercholesterolemia, bone marrow suppression, lymphoproliferative disorders, malignancy, GI ulceration/bleed/perforation, colitis, interstitial lung disease, kidney failure, progressive multifocal leukoencephalopathy, and birth defects.  The patient understands that monitoring is required including a baseline creatinine and regular CBC testing. In addition, patient must alert us immediately if symptoms of infection or other concerning signs are noted.

## 2024-07-30 NOTE — ED ADULT NURSE NOTE - OBJECTIVE STATEMENT
What Type Of Note Output Would You Prefer (Optional)?: Standard Output How Severe Are Your Spot(S)?: mild Have Your Spot(S) Been Treated In The Past?: has not been treated Hpi Title: Evaluation of Skin Lesions Family Member: Father 79y male arrived to ED complaining of generalized weakness and AMS. Patient PMHx MDS, ADD, hypothyroid, afib, CHF, stroke, TIAsgout, HLD, DM, HTN, CAD w/ stents. Patient endorses intermittent LLQ pain and L flank pain x3-4 days. Pain is worse with movement. +CVA tenderness. Patient sent by [facility] for AMS. Patient endorses constipation (last BM 3 days ago), passing gas. Patient denies CP, SOB, n/v/d, chills, fever. Patient recently tx for UTI. Patient A&Ox3 at baseline (and currently in ED). Patient weightbearing at baseline, not ambulatory and unable to perform either in ED. Family at the bedside. VS stable.

## 2024-09-20 NOTE — DISCHARGE NOTE NURSING/CASE MANAGEMENT/SOCIAL WORK - NSDCPEFALRISK_GEN_ALL_CORE
Addended by: PAVITHRA GHOSH on: 9/20/2024 02:58 PM     Modules accepted: Orders    
Patient information on fall and injury prevention

## 2024-10-28 NOTE — ED ADULT NURSE NOTE - CHIEF COMPLAINT QUOTE
anemia, (noted low H/H at Dr alcazar's office during routine visit, Hgb: 8.5), concern for bleed of unknown source, wife notes SOB, PUGH x few mos intermittently, +dark stools (denies hematuria), colonoscopy/endoscopy in feb: normal yes
